# Patient Record
Sex: FEMALE | Race: WHITE | Employment: OTHER | URBAN - METROPOLITAN AREA
[De-identification: names, ages, dates, MRNs, and addresses within clinical notes are randomized per-mention and may not be internally consistent; named-entity substitution may affect disease eponyms.]

---

## 2017-01-17 PROBLEM — R09.81 CONGESTION OF NASAL SINUS: Status: ACTIVE | Noted: 2017-01-17

## 2017-01-17 PROBLEM — J30.89 PERENNIAL ALLERGIC RHINITIS: Status: ACTIVE | Noted: 2017-01-17

## 2017-06-20 PROBLEM — R09.81 CONGESTION OF NASAL SINUS: Status: RESOLVED | Noted: 2017-01-17 | Resolved: 2017-06-20

## 2018-02-26 PROBLEM — J31.0 NONALLERGIC RHINITIS: Status: ACTIVE | Noted: 2018-02-26

## 2018-06-13 ENCOUNTER — HOSPITAL ENCOUNTER (OUTPATIENT)
Dept: MAMMOGRAPHY | Age: 73
Discharge: HOME | End: 2018-06-13
Attending: INTERNAL MEDICINE
Payer: MEDICARE

## 2018-06-13 DIAGNOSIS — Z12.31 VISIT FOR SCREENING MAMMOGRAM: ICD-10-CM

## 2018-06-13 PROCEDURE — 77063 BREAST TOMOSYNTHESIS BI: CPT

## 2018-08-12 ENCOUNTER — APPOINTMENT (OUTPATIENT)
Dept: CT IMAGING | Age: 73
End: 2018-08-12
Attending: EMERGENCY MEDICINE
Payer: MEDICARE

## 2018-08-12 ENCOUNTER — HOSPITAL ENCOUNTER (EMERGENCY)
Age: 73
Discharge: HOME | End: 2018-08-12
Attending: EMERGENCY MEDICINE
Payer: MEDICARE

## 2018-08-12 VITALS
DIASTOLIC BLOOD PRESSURE: 82 MMHG | OXYGEN SATURATION: 100 % | HEIGHT: 62 IN | HEART RATE: 66 BPM | WEIGHT: 125 LBS | RESPIRATION RATE: 16 BRPM | TEMPERATURE: 98 F | BODY MASS INDEX: 23 KG/M2 | SYSTOLIC BLOOD PRESSURE: 142 MMHG

## 2018-08-12 DIAGNOSIS — R20.0 NUMBNESS: Primary | ICD-10-CM

## 2018-08-12 LAB
ALBUMIN SERPL-MCNC: 3.6 G/DL (ref 3.2–4.6)
ALBUMIN/GLOB SERPL: 1.1 {RATIO} (ref 1.2–3.5)
ALP SERPL-CCNC: 78 U/L (ref 50–136)
ALT SERPL-CCNC: 25 U/L (ref 12–65)
ANION GAP SERPL CALC-SCNC: 6 MMOL/L (ref 7–16)
AST SERPL-CCNC: 18 U/L (ref 15–37)
ATRIAL RATE: 57 BPM
BASOPHILS # BLD: 0.1 K/UL
BASOPHILS NFR BLD: 1 % (ref 0–2)
BILIRUB SERPL-MCNC: 0.2 MG/DL (ref 0.2–1.1)
BUN SERPL-MCNC: 14 MG/DL (ref 8–23)
CALCIUM SERPL-MCNC: 8.9 MG/DL (ref 8.3–10.4)
CALCULATED P AXIS, ECG09: 45 DEGREES
CALCULATED R AXIS, ECG10: 97 DEGREES
CALCULATED T AXIS, ECG11: 60 DEGREES
CHLORIDE SERPL-SCNC: 103 MMOL/L (ref 98–107)
CO2 SERPL-SCNC: 31 MMOL/L (ref 21–32)
CREAT SERPL-MCNC: 0.84 MG/DL (ref 0.6–1)
DIAGNOSIS, 93000: NORMAL
DIFFERENTIAL METHOD BLD: ABNORMAL
EOSINOPHIL # BLD: 0.5 K/UL
EOSINOPHIL NFR BLD: 6 % (ref 0.5–7.8)
ERYTHROCYTE [DISTWIDTH] IN BLOOD BY AUTOMATED COUNT: 12 %
GLOBULIN SER CALC-MCNC: 3.4 G/DL (ref 2.3–3.5)
GLUCOSE SERPL-MCNC: 121 MG/DL (ref 65–100)
HCT VFR BLD AUTO: 40.4 % (ref 35.8–46.3)
HGB BLD-MCNC: 13.3 G/DL (ref 11.7–15.4)
IMM GRANULOCYTES # BLD: 0 K/UL
IMM GRANULOCYTES NFR BLD AUTO: 0 % (ref 0–5)
INR PPP: 1
LYMPHOCYTES # BLD: 1.7 K/UL
LYMPHOCYTES NFR BLD: 23 % (ref 13–44)
MCH RBC QN AUTO: 31.5 PG (ref 26.1–32.9)
MCHC RBC AUTO-ENTMCNC: 32.9 G/DL (ref 31.4–35)
MCV RBC AUTO: 95.7 FL (ref 79.6–97.8)
MONOCYTES # BLD: 0.8 K/UL
MONOCYTES NFR BLD: 10 % (ref 4–12)
NEUTS SEG # BLD: 4.4 K/UL
NEUTS SEG NFR BLD: 60 % (ref 43–78)
NRBC # BLD: 0 K/UL (ref 0–0.2)
P-R INTERVAL, ECG05: 174 MS
PLATELET # BLD AUTO: 278 K/UL (ref 150–450)
PMV BLD AUTO: 8.7 FL (ref 9.4–12.3)
POTASSIUM SERPL-SCNC: 4 MMOL/L (ref 3.5–5.1)
PROT SERPL-MCNC: 7 G/DL (ref 6.3–8.2)
PROTHROMBIN TIME: 13.2 SEC (ref 11.5–14.5)
Q-T INTERVAL, ECG07: 426 MS
QRS DURATION, ECG06: 122 MS
QTC CALCULATION (BEZET), ECG08: 414 MS
RBC # BLD AUTO: 4.22 M/UL (ref 4.05–5.2)
SODIUM SERPL-SCNC: 140 MMOL/L (ref 136–145)
VENTRICULAR RATE, ECG03: 57 BPM
WBC # BLD AUTO: 7.4 K/UL (ref 4.3–11.1)

## 2018-08-12 PROCEDURE — 99285 EMERGENCY DEPT VISIT HI MDM: CPT | Performed by: EMERGENCY MEDICINE

## 2018-08-12 PROCEDURE — 81003 URINALYSIS AUTO W/O SCOPE: CPT | Performed by: EMERGENCY MEDICINE

## 2018-08-12 PROCEDURE — 85610 PROTHROMBIN TIME: CPT

## 2018-08-12 PROCEDURE — 85025 COMPLETE CBC W/AUTO DIFF WBC: CPT

## 2018-08-12 PROCEDURE — 80053 COMPREHEN METABOLIC PANEL: CPT

## 2018-08-12 PROCEDURE — 93005 ELECTROCARDIOGRAM TRACING: CPT | Performed by: EMERGENCY MEDICINE

## 2018-08-12 PROCEDURE — 70450 CT HEAD/BRAIN W/O DYE: CPT

## 2018-08-12 NOTE — ED NOTES
I have reviewed discharge instructions with the patient. The patient verbalized understanding. Patient left ED via Discharge Method: ambulatory to Home with spouse. Opportunity for questions and clarification provided. Patient given 0 scripts. To continue your aftercare when you leave the hospital, you may receive an automated call from our care team to check in on how you are doing. This is a free service and part of our promise to provide the best care and service to meet your aftercare needs.  If you have questions, or wish to unsubscribe from this service please call [phone]. Thank you for Choosing our [facility].

## 2018-08-12 NOTE — DISCHARGE INSTRUCTIONS
Return with any fevers, vomiting, worsening symptoms, or additional concerns. As we discussed, I encourage you to stop drinking alcohol. Follow-up with your primary care doctor in 2 or 3 days for reevaluation.

## 2018-08-12 NOTE — ED TRIAGE NOTES
Pt in states right arm numbness and tingling starting at 1500 states right side facial tingling also. States arm numbness resolved on the way to the hospital. No facial droop, weakness noted. Dr. Andrew Pappas at bedside to evaluate pt.

## 2018-08-13 NOTE — ED PROVIDER NOTES
HPI Comments: 80-year-old lady presents with concerns about some right-sided numbness that has been present for most of the day. Patient does have some dementia so getting a direct history from her is a little bit difficult. Her  reports that she ssaid her right arm felt different. She was unable to give any other specific clarification. She said she has not had any weakness or numbness and she has had no speech difficulty. additionally, the patient does drink daily. Elements of this note were created using speech recognition software. As such, errors of speech recognition may be present. Patient is a 67 y.o. female presenting with numbness. The history is provided by the patient. Numbness   Pertinent negatives include no shortness of breath, no chest pain, no vomiting, no confusion, no headaches and no nausea.         Past Medical History:   Diagnosis Date    Alcohol abuse, daily use 11/21/2014    Depression 11/21/2014    Lumbar stenosis 7/4/2012    Mild cognitive impairment 11/21/2014    Osteopenia 3/19/2015    Spondylolisthesis of lumbar region 7/4/2012    Vitamin D deficiency 11/21/2014       Past Surgical History:   Procedure Laterality Date    HX BACK SURGERY  5/31/12    L3-L4 Metrix TLIF with sextant/bilateral decompression - Dr. Glez Forward HX BREAST LUMPECTOMY Bilateral     HX HEENT Bilateral 2015    sinus    HX HYSTERECTOMY      complete    HX OTHER SURGICAL      sinuses x2    HX TONSILLECTOMY      HX WRIST FRACTURE [place]           Family History:   Problem Relation Age of Onset   Clove.Goldberg Stroke Mother     Heart Disease Mother     Arthritis-osteo Mother     Heart Attack Mother     Stroke Father     Dementia Father     Alzheimer Father 48    Hypertension Sister     Heart Disease Sister      stent and AVR    Coronary Artery Disease Maternal Uncle     Breast Cancer Neg Hx        Social History     Social History    Marital status:      Spouse name: [facility] Valerie Number of children: 2    Years of education: 1 post HS     Occupational History     Retired     Social History Main Topics    Smoking status: Former Smoker     Quit date: 1/21/1972    Smokeless tobacco: Never Used    Alcohol use 7.0 oz/week     7 Glasses of wine, 7 Shots of liquor per week    Drug use: No    Sexual activity: Not on file     Other Topics Concern    Not on file     Social History Narrative         ALLERGIES: Levofloxacin; Poison oak extract; Bupropion; Pcn [penicillins]; Shellfish containing products; Singulair [montelukast]; Sulfamethoxazole; and Wellbutrin [bupropion hcl]    Review of Systems   Constitutional: Negative for chills, diaphoresis and fever. HENT: Negative for congestion, rhinorrhea and sore throat. Eyes: Negative for redness and visual disturbance. Respiratory: Negative for cough, chest tightness, shortness of breath and wheezing. Cardiovascular: Negative for chest pain and palpitations. Gastrointestinal: Negative for abdominal pain, blood in stool, diarrhea, nausea and vomiting. Endocrine: Negative for polydipsia and polyuria. Genitourinary: Negative for dysuria and hematuria. Musculoskeletal: Negative for arthralgias, myalgias and neck stiffness. Skin: Negative for rash. Allergic/Immunologic: Negative for environmental allergies and food allergies. Neurological: Positive for numbness. Negative for dizziness, weakness and headaches. Hematological: Negative for adenopathy. Does not bruise/bleed easily. Psychiatric/Behavioral: Negative for confusion and sleep disturbance. The patient is not nervous/anxious. Vitals:    08/12/18 1752 08/12/18 1800 08/12/18 1900 08/12/18 1909   BP: 142/79 131/75 142/82 142/82   Pulse: 62 65 66 66   Resp: 16   16   Temp:       SpO2: 95% 95% 100% 100%   Weight:       Height:                Physical Exam   Constitutional: She is oriented to person, place, and time. She appears well-developed and well-nourished.    HENT: Head: Normocephalic and atraumatic. Eyes: Conjunctivae and EOM are normal. Pupils are equal, round, and reactive to light. Neck: Normal range of motion. Cardiovascular: Normal rate and regular rhythm. Pulmonary/Chest: Effort normal and breath sounds normal. No respiratory distress. She has no wheezes. She has no rales. She exhibits no tenderness. Abdominal: Soft. Bowel sounds are normal. There is no rebound and no guarding. Musculoskeletal: Normal range of motion. She exhibits no edema or tenderness. Lymphadenopathy:     She has no cervical adenopathy. Neurological: She is alert and oriented to person, place, and time. Patient is a mystery emerged without difficulty. I could not find any objective sensory deficits. She had normal reflexes and normal sharp dull sensation. She had normal vibration sensation. Skin: Skin is warm and dry. Psychiatric: She has a normal mood and affect. Nursing note and vitals reviewed. MDM  Number of Diagnoses or Management Options  Numbness:   Diagnosis management comments: CT scan of blood work is unremarkable. I will plan to discharge her home.         ED Course       Procedures

## 2018-09-04 PROBLEM — K21.00 GASTROESOPHAGEAL REFLUX DISEASE WITH ESOPHAGITIS: Status: ACTIVE | Noted: 2018-09-04

## 2018-09-04 PROBLEM — Z00.00 ROUTINE GENERAL MEDICAL EXAMINATION AT A HEALTH CARE FACILITY: Status: ACTIVE | Noted: 2018-09-04

## 2018-09-04 PROBLEM — M70.61 TROCHANTERIC BURSITIS OF RIGHT HIP: Status: ACTIVE | Noted: 2018-09-04

## 2018-09-04 PROBLEM — E03.9 ACQUIRED HYPOTHYROIDISM: Status: ACTIVE | Noted: 2018-09-04

## 2018-09-04 PROBLEM — R73.9 HYPERGLYCEMIA: Status: ACTIVE | Noted: 2018-09-04

## 2019-05-10 PROBLEM — R45.1 AGITATION: Status: ACTIVE | Noted: 2019-05-10

## 2019-05-10 PROBLEM — G30.1 DEMENTIA OF THE ALZHEIMER'S TYPE, WITH LATE ONSET, UNCOMPLICATED (HCC): Status: ACTIVE | Noted: 2019-05-10

## 2019-05-10 PROBLEM — F02.80 DEMENTIA OF THE ALZHEIMER'S TYPE, WITH LATE ONSET, UNCOMPLICATED (HCC): Status: ACTIVE | Noted: 2019-05-10

## 2019-10-03 PROBLEM — Z00.00 ROUTINE GENERAL MEDICAL EXAMINATION AT A HEALTH CARE FACILITY: Status: RESOLVED | Noted: 2018-09-04 | Resolved: 2019-10-03

## 2019-10-10 PROBLEM — E78.00 PURE HYPERCHOLESTEROLEMIA: Status: ACTIVE | Noted: 2019-10-10

## 2019-10-10 PROBLEM — R73.09 ABNORMAL GLUCOSE: Status: ACTIVE | Noted: 2019-10-10

## 2019-12-16 PROBLEM — M19.041 PRIMARY OSTEOARTHRITIS OF BOTH HANDS: Status: ACTIVE | Noted: 2019-12-16

## 2019-12-16 PROBLEM — M19.042 PRIMARY OSTEOARTHRITIS OF BOTH HANDS: Status: ACTIVE | Noted: 2019-12-16

## 2020-01-21 ENCOUNTER — HOSPITAL ENCOUNTER (INPATIENT)
Age: 75
LOS: 1 days | Discharge: HOME | DRG: 087 | End: 2020-01-23
Attending: EMERGENCY MEDICINE | Admitting: HOSPITALIST
Payer: MEDICARE

## 2020-01-21 ENCOUNTER — APPOINTMENT (OUTPATIENT)
Dept: CT IMAGING | Age: 75
DRG: 087 | End: 2020-01-21
Attending: EMERGENCY MEDICINE
Payer: MEDICARE

## 2020-01-21 ENCOUNTER — APPOINTMENT (OUTPATIENT)
Dept: GENERAL RADIOLOGY | Age: 75
DRG: 087 | End: 2020-01-21
Attending: EMERGENCY MEDICINE
Payer: MEDICARE
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
## 2020-01-21 DIAGNOSIS — S06.5XAA SUBDURAL HEMATOMA: ICD-10-CM

## 2020-01-21 DIAGNOSIS — S01.511A LACERATION OF LIP, INITIAL ENCOUNTER: ICD-10-CM

## 2020-01-21 DIAGNOSIS — S01.119A: ICD-10-CM

## 2020-01-21 PROBLEM — I61.9 INTRACEREBRAL BLEED (HCC): Status: ACTIVE | Noted: 2020-01-21

## 2020-01-21 PROBLEM — I60.9 SUBARACHNOID HEMORRHAGE (HCC): Status: ACTIVE | Noted: 2020-01-21

## 2020-01-21 PROBLEM — S06.36AA INTRACEREBRAL BLEED DUE TO TRAUMA: Status: ACTIVE | Noted: 2020-01-21

## 2020-01-21 PROBLEM — E86.0 DEHYDRATION: Status: ACTIVE | Noted: 2020-01-21

## 2020-01-21 PROCEDURE — 70486 CT MAXILLOFACIAL W/O DYE: CPT

## 2020-01-21 PROCEDURE — 99218 HC RM OBSERVATION: CPT

## 2020-01-21 PROCEDURE — 74011250636 HC RX REV CODE- 250/636: Performed by: HOSPITALIST

## 2020-01-21 PROCEDURE — 0HQ1XZZ REPAIR FACE SKIN, EXTERNAL APPROACH: ICD-10-PCS | Performed by: EMERGENCY MEDICINE

## 2020-01-21 PROCEDURE — 73130 X-RAY EXAM OF HAND: CPT

## 2020-01-21 PROCEDURE — 75810000293 HC SIMP/SUPERF WND  RPR

## 2020-01-21 PROCEDURE — 0CQ0XZZ REPAIR UPPER LIP, EXTERNAL APPROACH: ICD-10-PCS | Performed by: EMERGENCY MEDICINE

## 2020-01-21 PROCEDURE — 74011000250 HC RX REV CODE- 250: Performed by: EMERGENCY MEDICINE

## 2020-01-21 PROCEDURE — 99284 EMERGENCY DEPT VISIT MOD MDM: CPT

## 2020-01-21 PROCEDURE — 70450 CT HEAD/BRAIN W/O DYE: CPT

## 2020-01-21 PROCEDURE — 72125 CT NECK SPINE W/O DYE: CPT

## 2020-01-21 RX ORDER — IBUPROFEN 200 MG
1 TABLET ORAL
Status: DISCONTINUED | OUTPATIENT
Start: 2020-01-21 | End: 2020-01-23 | Stop reason: HOSPADM

## 2020-01-21 RX ORDER — SODIUM CHLORIDE 0.9 % (FLUSH) 0.9 %
5-40 SYRINGE (ML) INJECTION EVERY 8 HOURS
Status: DISCONTINUED | OUTPATIENT
Start: 2020-01-21 | End: 2020-01-22

## 2020-01-21 RX ORDER — SODIUM CHLORIDE 0.9 % (FLUSH) 0.9 %
5-40 SYRINGE (ML) INJECTION AS NEEDED
Status: DISCONTINUED | OUTPATIENT
Start: 2020-01-21 | End: 2020-01-23 | Stop reason: HOSPADM

## 2020-01-21 RX ORDER — ONDANSETRON 2 MG/ML
4 INJECTION INTRAMUSCULAR; INTRAVENOUS
Status: DISCONTINUED | OUTPATIENT
Start: 2020-01-21 | End: 2020-01-23 | Stop reason: HOSPADM

## 2020-01-21 RX ORDER — MEMANTINE HYDROCHLORIDE 5 MG/1
15 TABLET ORAL DAILY
Status: DISCONTINUED | OUTPATIENT
Start: 2020-01-22 | End: 2020-01-23 | Stop reason: HOSPADM

## 2020-01-21 RX ORDER — DONEPEZIL HYDROCHLORIDE 5 MG/1
5 TABLET, FILM COATED ORAL DAILY
Status: DISCONTINUED | OUTPATIENT
Start: 2020-01-22 | End: 2020-01-23 | Stop reason: HOSPADM

## 2020-01-21 RX ORDER — ACETAMINOPHEN 325 MG/1
650 TABLET ORAL
Status: DISCONTINUED | OUTPATIENT
Start: 2020-01-21 | End: 2020-01-23 | Stop reason: HOSPADM

## 2020-01-21 RX ORDER — METOPROLOL SUCCINATE 25 MG/1
25 TABLET, EXTENDED RELEASE ORAL DAILY
Status: DISCONTINUED | OUTPATIENT
Start: 2020-01-22 | End: 2020-01-23 | Stop reason: HOSPADM

## 2020-01-21 RX ORDER — HYDRALAZINE HYDROCHLORIDE 20 MG/ML
10 INJECTION INTRAMUSCULAR; INTRAVENOUS
Status: DISCONTINUED | OUTPATIENT
Start: 2020-01-21 | End: 2020-01-23 | Stop reason: HOSPADM

## 2020-01-21 RX ORDER — ADHESIVE BANDAGE
30 BANDAGE TOPICAL DAILY PRN
Status: DISCONTINUED | OUTPATIENT
Start: 2020-01-21 | End: 2020-01-23 | Stop reason: HOSPADM

## 2020-01-21 RX ORDER — NALOXONE HYDROCHLORIDE 0.4 MG/ML
0.4 INJECTION, SOLUTION INTRAMUSCULAR; INTRAVENOUS; SUBCUTANEOUS AS NEEDED
Status: DISCONTINUED | OUTPATIENT
Start: 2020-01-21 | End: 2020-01-23 | Stop reason: HOSPADM

## 2020-01-21 RX ORDER — DIPHENHYDRAMINE HCL 25 MG
25 CAPSULE ORAL
Status: DISCONTINUED | OUTPATIENT
Start: 2020-01-21 | End: 2020-01-23 | Stop reason: HOSPADM

## 2020-01-21 RX ORDER — HYDROCODONE BITARTRATE AND ACETAMINOPHEN 7.5; 325 MG/1; MG/1
1 TABLET ORAL
Status: DISCONTINUED | OUTPATIENT
Start: 2020-01-21 | End: 2020-01-23 | Stop reason: HOSPADM

## 2020-01-21 RX ORDER — MORPHINE SULFATE 2 MG/ML
2 INJECTION, SOLUTION INTRAMUSCULAR; INTRAVENOUS
Status: DISCONTINUED | OUTPATIENT
Start: 2020-01-21 | End: 2020-01-23 | Stop reason: HOSPADM

## 2020-01-21 RX ORDER — SODIUM CHLORIDE 9 MG/ML
50 INJECTION, SOLUTION INTRAVENOUS CONTINUOUS
Status: DISCONTINUED | OUTPATIENT
Start: 2020-01-21 | End: 2020-01-22

## 2020-01-21 RX ORDER — CLONIDINE HYDROCHLORIDE 0.1 MG/1
0.2 TABLET ORAL
Status: DISCONTINUED | OUTPATIENT
Start: 2020-01-21 | End: 2020-01-23 | Stop reason: HOSPADM

## 2020-01-21 RX ADMIN — Medication 5 ML: at 16:15

## 2020-01-21 RX ADMIN — SODIUM CHLORIDE 50 ML/HR: 900 INJECTION, SOLUTION INTRAVENOUS at 19:12

## 2020-01-21 RX ADMIN — Medication 10 ML: at 22:00

## 2020-01-21 NOTE — ED PROVIDER NOTES
66-year-old female was walking the dog with her daughter when she tripped over an uneven concrete edge and fell forward. Struck her face on the concrete. Was dazed but no loss of consciousness. No vomiting or ataxia or lethargy. Complains of pain in her right hand as well as facial injuries. No neck pain no chest pain no abdominal pain no focal numbness or weakness. No pain in her hips. Does have some issues with memory due to dementia. The history is provided by the patient and the spouse. The history is limited by the condition of the patient. Fall   The accident occurred less than 1 hour ago. The fall occurred while standing. She fell from a height of ground level. She landed on concrete. The volume of blood lost was minimal. The point of impact was the head. The pain is present in the head. The pain is mild. She was ambulatory at the scene. Associated symptoms include headaches and laceration. Pertinent negatives include no numbness, no abdominal pain, no nausea, no vomiting, no extremity weakness and no loss of consciousness. The patient's last tetanus shot was 5 to 10 years ago.        Past Medical History:   Diagnosis Date    Alcohol abuse, daily use 11/21/2014    Depression 11/21/2014    Lumbar stenosis 7/4/2012    Osteopenia 3/19/2015    Spondylolisthesis of lumbar region 7/4/2012    Vitamin D deficiency 11/21/2014       Past Surgical History:   Procedure Laterality Date    HX BACK SURGERY  5/31/12    L3-L4 Metrix TLIF with sextant/bilateral decompression - Dr. Landon Castanon HX BREAST LUMPECTOMY Bilateral     HX HEENT Bilateral 2015    sinus    HX HYSTERECTOMY      complete    HX OTHER SURGICAL      sinuses x2    HX TONSILLECTOMY      HX WRIST FRACTURE TX           Family History:   Problem Relation Age of Onset    Stroke Mother     Heart Disease Mother     Arthritis-osteo Mother     Heart Attack Mother     Stroke Father     Dementia Father     Alzheimer Father 48    Hypertension Sister     Heart Disease Sister         stent and AVR    Coronary Artery Disease Maternal Uncle     Breast Cancer Neg Hx        Social History     Socioeconomic History    Marital status:      Spouse name: Scott Berkowitz Number of children: 2    Years of education: 1 post HS    Highest education level: Not on file   Occupational History    Occupation:      Employer: RETIRED   Social Needs    Financial resource strain: Not on file    Food insecurity:     Worry: Not on file     Inability: Not on file   4tiitoo needs:     Medical: Not on file     Non-medical: Not on file   Tobacco Use    Smoking status: Former Smoker     Packs/day: 1.00     Years: 6.00     Pack years: 6.00     Last attempt to quit: 1972     Years since quittin.0    Smokeless tobacco: Never Used   Substance and Sexual Activity    Alcohol use:  Yes     Alcohol/week: 14.0 standard drinks     Types: 14 Shots of liquor per week    Drug use: No    Sexual activity: Not on file   Lifestyle    Physical activity:     Days per week: Not on file     Minutes per session: Not on file    Stress: Not on file   Relationships    Social connections:     Talks on phone: Not on file     Gets together: Not on file     Attends Mosque service: Not on file     Active member of club or organization: Not on file     Attends meetings of clubs or organizations: Not on file     Relationship status: Not on file    Intimate partner violence:     Fear of current or ex partner: Not on file     Emotionally abused: Not on file     Physically abused: Not on file     Forced sexual activity: Not on file   Other Topics Concern     Service Not Asked    Blood Transfusions Not Asked    Caffeine Concern Not Asked    Occupational Exposure Not Asked   Lonita Ely Hazards Not Asked    Sleep Concern Not Asked    Stress Concern Not Asked    Weight Concern Not Asked    Special Diet Not Asked    Back Care Not Asked    Exercise No Comment: tries to walk to her daughters house across busy intersection    Bike Helmet Not Asked   [address] Not Asked    Self-Exams Not Asked   Social History Narrative    , not active, gets up around 12 noon and then does things in the afternoon if a friend comes over. Unable to watch TV as cant figure out the controls. ALLERGIES: Levofloxacin; Poison oak extract; Bupropion; Pcn [penicillins]; Shellfish containing products; Singulair [montelukast]; Sulfamethoxazole; and Wellbutrin [bupropion hcl]    Review of Systems   Eyes: Negative for visual disturbance. Respiratory: Negative for shortness of breath. Cardiovascular: Negative for chest pain. Gastrointestinal: Negative for abdominal pain, nausea and vomiting. Musculoskeletal: Negative for back pain, extremity weakness and neck pain. Neurological: Positive for headaches. Negative for loss of consciousness, weakness and numbness. Vitals:    01/21/20 1557   BP: 129/79   Pulse: (!) 56   Resp: 14   Temp: 98 °F (36.7 °C)   SpO2: 97%   Weight: 56.7 kg (125 lb)            Physical Exam  Vitals signs and nursing note reviewed. Constitutional:       General: She is not in acute distress. Appearance: She is well-developed. HENT:      Head: Normocephalic. Mouth/Throat:     Eyes:      Pupils: Pupils are equal, round, and reactive to light. Neck:      Musculoskeletal: Normal range of motion and neck supple. No muscular tenderness. Cardiovascular:      Rate and Rhythm: Normal rate and regular rhythm. Heart sounds: Normal heart sounds. Pulmonary:      Effort: Pulmonary effort is normal.      Breath sounds: Normal breath sounds. Abdominal:      General: There is no distension. Palpations: Abdomen is soft. Tenderness: There is no tenderness. Musculoskeletal:      Right hip: Normal.      Left hip: Normal.        Hands:    Skin:     Findings: Laceration present.    Neurological:      Mental Status: She is alert.      GCS: GCS eye subscore is 4. GCS verbal subscore is 4. GCS motor subscore is 6. Comments: Speech normal  No focal weakness. Ambulatory. MDM  Number of Diagnoses or Management Options  Diagnosis management comments: Cranial and facial imaging. Imaging right hand. No evidence for syncope. Amount and/or Complexity of Data Reviewed  Tests in the radiology section of CPT®: reviewed and ordered  Obtain history from someone other than the patient: yes ()  Independent visualization of images, tracings, or specimens: yes    Risk of Complications, Morbidity, and/or Mortality  Presenting problems: moderate  Diagnostic procedures: low  Management options: moderate    Patient Progress  Patient progress: improved         Wound Repair  Date/Time: 1/21/2020 4:39 PM  Location details: lip  Wound length:2.5 cm or less  Anesthesia: local infiltration    Anesthesia:  Local Anesthetic: lidocaine 1% without epinephrine  Foreign bodies: no foreign bodies  Irrigation solution: saline  Debridement: none  Skin closure: gut  Number of sutures: 2  Technique: simple  Approximation: close  Patient tolerance: Patient tolerated the procedure well with no immediate complications  My total time at bedside, performing this procedure was 1-15 minutes. Comments: Laceration of lip does not cross vermilion border. 3 sutures 5-0 fast-absorbing gut placed. Good approximation of the edges. Wound Repair  Date/Time: 1/21/2020 5:35 PM  Location details: face  Wound length:2.5 cm or less  Anesthesia: local infiltration    Anesthesia:  Local Anesthetic: lidocaine 1% without epinephrine  Foreign bodies: no foreign bodies  Irrigation solution: saline  Debridement: none  Skin closure: gut  Technique: simple  Approximation: close  Patient tolerance: Patient tolerated the procedure well with no immediate complications  My total time at bedside, performing this procedure was 1-15 minutes.   Comments: Wound cleaned with saline. 2 small sutures of 5-0 fast-absorbing gut placed. Mertie Dibbles Hand Rt Min 3 V    Result Date: 1/21/2020  Right hand INDICATION: Fall. Pain. Swelling. COMPARISON: None TECHNIQUE: PA, lateral, oblique views of the right hand were obtained. FINDINGS: Bones are osteopenic. There is no acute fracture or dislocation. Alignment is maintained. There is joint space narrowing at the interphalangeal joints, consistent with osteoarthritis. There is soft tissue swelling at the dorsum of the hand. There are postsurgical changes of the distal radius. IMPRESSION: 1. Soft tissue swelling. No acute fracture is seen. 2. Osteoarthritis. Osteopenia. Ct Head Wo Cont    Result Date: 1/21/2020  Noncontrast CT of the brain. COMPARISON: August 12, 2018 INDICATION: Fall, head trauma. Change in mental status TECHNIQUE: Contiguous axial images were obtained from the skull base through the vertex without IV contrast. Radiation dose reduction techniques were used for this study:  Our CT scanners use one or all of the following: Automated exposure control, adjustment of the mA and/or kVp according to patient's size, iterative reconstruction. FINDINGS: There is tiny acute subdural hemorrhage along the anterior falx (series 2, image 19). There is small volume of subarachnoid hemorrhage in the right frontal lobe, near the vertex. There is no midline shift, mass effect or hydrocephalus. Periventricular hypodensities, nonspecific and likely due to chronic small vessel changes. There is right periorbital/forehead soft tissue swelling. There is no skull fracture. IMPRESSION: 1. Small volume subarachnoid hemorrhage in the right frontal lobe, near the vertex. Tiny acute subdural hemorrhage along the anterior falx. No mass effect or midline shift. Short-term follow-up is recommended. [address] [place] [address] Report telephoned to ED physician, Dr. Mando Cheatham, at 5:07 PM on exam date. Discussed with neurosurgery. No intervention needed.   Can be safely observed. Discussed with hospitalist regarding admission. Neurosurgery did recommend CT of the neck and that is ordered. 5:34 PM  Laceration above right IN right eyebrow continues to ooze. Less than a centimeter in length. 2 small absorbable sutures placed.   C procedure note

## 2020-01-21 NOTE — ED TRIAGE NOTES
Pt to ED with family c/o falling on concrete while walking the dog. No LOC but patient was stunned. Laceration to right eye brow. Abrasions to face and bilateral hands. Laceration to right index finger that may need stitches. Bruising and swelling noted to right hand. Pt takes 81mg asa daily. No bloodthinners.

## 2020-01-22 PROCEDURE — 65270000029 HC RM PRIVATE

## 2020-01-22 PROCEDURE — 97161 PT EVAL LOW COMPLEX 20 MIN: CPT

## 2020-01-22 PROCEDURE — 99218 HC RM OBSERVATION: CPT

## 2020-01-22 PROCEDURE — 74011250637 HC RX REV CODE- 250/637: Performed by: HOSPITALIST

## 2020-01-22 PROCEDURE — 65610000001 HC ROOM ICU GENERAL

## 2020-01-22 PROCEDURE — 97116 GAIT TRAINING THERAPY: CPT

## 2020-01-22 RX ADMIN — DONEPEZIL HYDROCHLORIDE 5 MG: 5 TABLET, FILM COATED ORAL at 08:57

## 2020-01-22 RX ADMIN — Medication 10 ML: at 14:00

## 2020-01-22 RX ADMIN — ACETAMINOPHEN 650 MG: 325 TABLET, FILM COATED ORAL at 09:41

## 2020-01-22 RX ADMIN — MEMANTINE 15 MG: 5 TABLET ORAL at 08:57

## 2020-01-22 RX ADMIN — METOPROLOL SUCCINATE 25 MG: 25 TABLET, EXTENDED RELEASE ORAL at 08:57

## 2020-01-22 RX ADMIN — DULOXETINE HYDROCHLORIDE 90 MG: 60 CAPSULE, DELAYED RELEASE ORAL at 08:57

## 2020-01-22 RX ADMIN — ACETAMINOPHEN 650 MG: 325 TABLET, FILM COATED ORAL at 20:06

## 2020-01-22 NOTE — PROGRESS NOTES
OT note: attempted to see patient she is currently eating. Will check back at another time.  Thank you for this referral.Vidhya Hall OTR/L

## 2020-01-22 NOTE — H&P
INTERNAL MEDICINE H&P/CONSULT    Subjective:     79-year-old female was walking the dog with her daughter when she tripped over an uneven concrete edge and fell forward. Struck her face on the concrete. Was dazed but no loss of consciousness. No vomiting or ataxia or lethargy. Complains of pain in her right hand as well as facial injuries. No neck pain no chest pain no abdominal pain no focal numbness or weakness. No pain in her hips. Does have some issues with memory due to dementia. Further questioning, with  at bedside, the pt did not pas out. She drinks only 2-3 oz daily of whiskey and not alcoholic. No hx of frequent falls. Past Medical History:   Diagnosis Date    Alcohol abuse, daily use 11/21/2014    Depression 11/21/2014    Lumbar stenosis 7/4/2012    Osteopenia 3/19/2015    Spondylolisthesis of lumbar region 7/4/2012    Vitamin D deficiency 11/21/2014      Past Surgical History:   Procedure Laterality Date    HX BACK SURGERY  5/31/12    L3-L4 Metrix TLIF with sextant/bilateral decompression - Dr. Elliott Arms HX BREAST LUMPECTOMY Bilateral     HX HEENT Bilateral 2015    sinus    HX HYSTERECTOMY      complete    HX OTHER SURGICAL      sinuses x2    HX TONSILLECTOMY      HX WRIST FRACTURE TX        Prior to Admission medications    Medication Sig Start Date End Date Taking? Authorizing Provider   metoprolol succinate (TOPROL-XL) 50 mg XL tablet TAKE ONE TABLET DAILY FOR BLOOD PRESSURE 1/14/20  Yes Florence Michael MD   donepezil (ARICEPT) 10 mg tablet TAKE ONE TABLET AT BEDTIME. 12/11/19  Yes Florence Michael MD   omeprazole (PRILOSEC) 40 mg capsule TAKE 1 CAPSULE EACH DAY. 12/2/19  Yes Richard Be MD   DULoxetine (CYMBALTA) 60 mg capsule TAKE 2 CAPSULES DAILY. Patient taking differently: 180 mg. 10/21/19  Yes Florence Michael MD   lisinopril (PRINIVIL, ZESTRIL) 40 mg tablet [id number]  Patient taking differently: Take 40 mg by mouth daily.  8/25/19  Yes Kristin Velásquez Gokul Roy MD   memantine (NAMENDA) 5 mg tablet Take 15 mg by mouth daily. Taking two tablets in am and one tablet in evening 6/18/18  Yes Provider, Historical   aspirin delayed-release 81 mg tablet Take 81 mg by mouth daily. Yes Provider, Historical   azelastine (ASTELIN) 137 mcg (0.1 %) nasal spray 1 [place] by Both Nostrils route two (2) times a day. Use in each nostril as directed  Indications: Vasomotor Rhinitis  Patient taking differently: 1 Spray by Both Nostrils route two (2) times daily as needed. Use in each nostril as directed  Indications: Vasomotor Rhinitis 4/3/19   Saul Simmons MD   TURMERIC ROOT EXTRACT PO Take 1,500 mg by mouth daily. Provider, Historical   cetirizine (ZYRTEC) 10 mg tablet Take 10 mg by mouth daily. Provider, Historical   triamcinolone (NASACORT AQ) 55 mcg nasal inhaler 2 Sprays by Both Nostrils route daily. Patient taking differently: 2 Sprays by Both Nostrils route daily as needed. 12/12/16   Saul Simmons MD   Cholecalciferol, Vitamin D3, (VITAMIN D3) 1,000 unit cap Take 1,000 Units by mouth daily. Provider, Historical   multivits,Stress Formula-Zinc tablet Take 1 Tab by mouth daily. Provider, Historical   omega-3 fatty acids-vitamin e (FISH OIL) 1,000 mg Cap Take 1 Cap by mouth daily (after breakfast). Provider, Historical   GLUC CHRISTIANSEN/CHONDRO CHRISTIANSEN A/VIT C/MN (GLUCOSAMINE 1500 COMPLEX PO) Take 1 Tab by mouth daily (after breakfast).     Provider, Historical     Allergies   Allergen Reactions    Levofloxacin Unknown (comments)    Poison [place] Extract Anaphylaxis    Bupropion Unknown (comments)    Pcn [Penicillins] Shortness of Breath and Swelling    Shellfish Containing Products Hives    Singulair [Montelukast] Anxiety    Sulfamethoxazole Other (comments) and Hives     She thinks she does not do well with this  She thinks she does not do well with this  She thinks she does not do well with this    Wellbutrin [Bupropion Hcl] Other (comments)     Other anxiety      Social History     Tobacco Use    Smoking status: Former Smoker     Packs/day: 1.00     Years: 6.00     Pack years: 6.00     Last attempt to quit: 1972     Years since quittin.0    Smokeless tobacco: Never Used   Substance Use Topics    Alcohol use: Yes     Alcohol/week: 14.0 standard drinks     Types: 14 Shots of liquor per week        Family History:  HTN    Review of Systems   A comprehensive review of systems was negative except for that written in the HPI. Objective: Intake / Output:  No intake/output data recorded. No intake/output data recorded. Physical Exam:  Visit Vitals  /76   Pulse 69   Temp 98.5 °F (36.9 °C)   Resp 15   Wt 56.7 kg (125 lb)   SpO2 98%   BMI 24.01 kg/m²     General appearance: awake, alert, cooperative, moderate distress, appears stated age - Pale, No icterus, Dry mucous membranes, Disoriented. Head: Normocephalic, without obvious abnormality, L frontal blue contusion 4-6 cm  Back: symmetric, no curvature. ROM normal. No CVA tenderness. Lungs: clear to auscultation bilaterally -  Heart: regular rate and rhythm, S1, S2 normal, no murmur, click, rub or gallop. Abdomen: soft, no tenderness, no distension, normal bowel sound, no masses, no organomegaly  Extremities: atraumatic, no cyanosis - Bilateral lower limbs edema none. Skin: No rashes or ulceration. Neurologic: Grossly intact - Moves 4 limbs. DTR +2. [place]'s reflex negative. Romberg's and gait tests are deferred at this time. ECG: sinus rhythm     Data Review (Labs):   No results found for this or any previous visit (from the past 24 hour(s)).     Assessment:     1-  Intracerebral bleed due to trauma ([facility] [address] (2020), small SAH and SDH, R frontal, reviewed nu Neurosurgery, no surgery recommended    2- Fall   3- Dehydration   4- Dementia  5- HTN, controlled        Plan:     Observation  Neuro checks  Hold aspirin  PT/ OT consulted  PPD placed  IVF hydration  UA pending  Blood pressure control  AM lab as needed  Continue essential home medications. Patient is full code. Further management depends on patient progress. Thank you for the oppourtinity to contribute in the care of your patient. Time 20 minutes.     Signed By: Chandan Truong MD     January 21, 2020

## 2020-01-22 NOTE — ED NOTES
TRANSFER - OUT REPORT:    Verbal report given to Anders rn (name) on Glen Ramirez  being transferred to [address] [place], [place] [id number] [facility] (unit) for routine progression of care       Report consisted of patients Situation, Background, Assessment and   Recommendations(SBAR). Information from the following report(s) SBAR, ED Summary, [facility] and Recent Results was reviewed with the receiving nurse. Lines:   Peripheral IV 01/21/20 Left Antecubital (Active)   Site Assessment Clean, dry, & intact 1/21/2020  6:02 PM   Phlebitis Assessment 0 1/21/2020  6:02 PM   Infiltration Assessment 0 1/21/2020  6:02 PM   Dressing Status Clean, dry, & intact 1/21/2020  6:02 PM        Opportunity for questions and clarification was provided.       Patient transported with:

## 2020-01-22 NOTE — PROGRESS NOTES
Problem: Mobility Impaired (Adult and Pediatric)  Goal: *Acute Goals and Plan of Care (Insert Text)  Description  STG:  (1.)Ms. Jonny Santos will move from supine to sit and sit to supine  with INDEPENDENT within 1-2 treatment day(s). (2.)Ms. Jonny Santos will transfer from bed to chair and chair to bed with INDEPENDENT using the least restrictive device within 1-2 treatment day(s). (3.)Ms. Morejon will ambulate with CGA-INDEPENDENT for 135-250 feet with the least restrictive device within 1-2 treatment day(s). LTG:  (1.)Ms. Morejon will ambulate with SUPERVISION-INDEPENDENT for 250-350 feet with the least restrictive device within 1-2 treatment day(s). ________________________________________________________________________________________________     Outcome: Progressing Towards Goal      PHYSICAL THERAPY: Initial Assessment 1/22/2020  OBSERVATION:    [place] / Product Type: Medicare /       NAME/AGE/GENDER: Jordyn Clement is a 76 y.o. female   PRIMARY DIAGNOSIS: Subarachnoid hemorrhage ([facility] [address] [I60.9]  Intracerebral bleed ([facility] [address] [I61.9] Intracerebral bleed due to trauma [facility]) Intracerebral bleed due to trauma [facility])        ICD-10: Treatment Diagnosis:    · Difficulty in walking, Not elsewhere classified (R26.2)  · Other abnormalities of gait and mobility (R26.89)   Precaution/Allergies:  Levofloxacin; Poison oak extract; Bupropion; Pcn [penicillins]; Shellfish containing products; Singulair [montelukast]; Sulfamethoxazole; and Wellbutrin [bupropion hcl]      ASSESSMENT:     Ms. Jonny Santos presents with decreased independence with functional mobility. Pt performed supine to sit to stand. Pt ambulated in hallway. Pt in bedside chair with needs in reach. Pt's  present. This section established at most recent assessment   PROBLEM LIST (Impairments causing functional limitations):  1. Decreased Strength  2. Decreased Transfer Abilities  3.  Decreased Ambulation Ability/Technique  4. Decreased Balance  5. Increased Pain  6. Decreased Activity Tolerance  7. Decreased Flexibility/Joint Mobility   INTERVENTIONS PLANNED: (Benefits and precautions of physical therapy have been discussed with the patient.)  1. Bed Mobility  2. Gait Training  3. Therapeutic Activites  4. Therapeutic Exercise/Strengthening  5. Transfer Training     TREATMENT PLAN: Frequency/Duration: daily for duration of hospital stay  Rehabilitation Potential For Stated Goals: Good     REHAB RECOMMENDATIONS (at time of discharge pending progress):    Placement: It is my opinion, based on this patient's performance to date, that Ms. Rena Philip may benefit from participating in 1-2 additional therapy sessions in order to continue to assess for rehab potential and then make recommendation for disposition at discharge. Equipment:    None at this time              HISTORY:   History of Present Injury/Illness (Reason for Referral):  Pt admitted with above diagnosis. Past Medical History/Comorbidities:   Ms. Rena Philip  has a past medical history of Alcohol abuse, daily use (11/21/2014), Depression (11/21/2014), Lumbar stenosis (7/4/2012), Osteopenia (3/19/2015), Spondylolisthesis of lumbar region (7/4/2012), and Vitamin D deficiency (11/21/2014). She also has no past medical history of Aneurysm ([address] Coagulation disorder ([address] Difficult intubation, Heart failure ([address] Malignant hyperthermia due to anesthesia, Nausea & vomiting, Pseudocholinesterase deficiency, or Unspecified sleep apnea. Ms. Rena Philip  has a past surgical history that includes hx hysterectomy; hx breast lumpectomy (Bilateral); hx back surgery (5/31/12); hx other surgical; hx wrist fracture tx; hx tonsillectomy; and hx heent (Bilateral, 2015).   Social History/Living Environment:   Home Environment: Independent living  # Steps to Enter: 4  One/Two Story Residence: One story  Living Alone: No  Support Systems: Home care staff, Child(kayla), Spouse/Significant Other/Partner  Patient Expects to be Discharged to[de-identified] Independent living facility  Current DME Used/Available at Home: None  Prior Level of Function/Work/Activity:  Pt ambulating with no assistive device. Had a similar fall four years ago. This fall pt fell over uneven surfaces. Number of Personal Factors/Comorbidities that affect the Plan of Care: 0: LOW COMPLEXITY   EXAMINATION:   Most Recent Physical Functioning:   Gross Assessment:                  Posture:  Posture (WDL): Within defined limits  Balance:  Sitting: Intact  Standing: Pull to stand; With support Bed Mobility:  Supine to Sit: Stand-by assistance  Wheelchair Mobility:     Transfers:  Sit to Stand: Contact guard assistance  Stand to Sit: Contact guard assistance  Gait:     Gait Abnormalities: (slightly unsteady gait)  Distance (ft): 135 Feet (ft)  Ambulation - Level of Assistance: Contact guard assistance      Body Structures Involved:  1. Bones  2. Joints  3. Muscles  4. Ligaments Body Functions Affected:  1. Neuromusculoskeletal  2. Movement Related Activities and Participation Affected:  1. Mobility   Number of elements that affect the Plan of Care: 4+: HIGH COMPLEXITY   CLINICAL PRESENTATION:   Presentation: Stable and uncomplicated: LOW COMPLEXITY   CLINICAL DECISION MAKIN[address] [place] [address]-PAC 6 Clicks   Basic Mobility Inpatient Short Form  How much difficulty does the patient currently have. .. Unable A Lot A Little None   1. Turning over in bed (including adjusting bedclothes, sheets and blankets)? [] 1   [] 2   [x] 3   [] 4   2. Sitting down on and standing up from a chair with arms ( e.g., wheelchair, bedside commode, etc.)   [] 1   [] 2   [x] 3   [] 4   3. Moving from lying on back to sitting on the side of the bed? [] 1   [] 2   [x] 3   [] 4   How much help from another person does the patient currently need. .. Total A Lot A Little None   4. Moving to and from a bed to a chair (including a wheelchair)?    [] 1   [] 2 [x] 3   [] 4   5. Need to walk in hospital room? [] 1   [] 2   [x] 3   [] 4   6. Climbing 3-5 steps with a railing? [] 1   [] 2   [x] 3   [] 4   © 2007, Trustees of [address] [place] [address] [address] [address], under license to [employer]. All rights reserved      Score:  Initial: 18 Most Recent: X (Date: -- )    Interpretation of Tool:  Represents activities that are increasingly more difficult (i.e. Bed mobility, Transfers, Gait). Medical Necessity:     · Skilled intervention continues to be required due to decreased mobility ability. Reason for Services/Other Comments:  · Patient continues to require skilled intervention due to   · Decreased mobility ability. · .   Use of outcome tool(s) and clinical judgement create a POC that gives a: Clear prediction of patient's progress: LOW COMPLEXITY            TREATMENT:   (In addition to Assessment/Re-Assessment sessions the following treatments were rendered)   Pre-treatment Symptoms/Complaints:    Pain: Initial:   Pain Intensity 1: 0  Post Session:  no complaints     Gait Training ( 15 minutes):  Gait training to improve and/or restore physical functioning as related to mobility. Ambulated 135 Feet (ft) with Contact guard assistance     Assessment/Reassessment only, no treatment provided today    Braces/Orthotics/Lines/Etc:   · O2 Device: Room air  Treatment/Session Assessment:    · Response to Treatment:  Pt participates with therapy. · Interdisciplinary Collaboration:   o Physical Therapist  o Registered Nurse  · After treatment position/precautions:   o Up in chair  o Bed in low position  o Caregiver at bedside  o Call light within reach  o RN notified   · Compliance with Program/Exercises: Compliant most of the time, Will assess as treatment progresses  · Recommendations/Intent for next treatment session: \"Next visit will focus on advancements to more challenging activities and reduction in assistance provided\".   Total Treatment Duration:  PT Patient Time In/Time Out  Time In: 1025  Time Out: [address], PT

## 2020-01-22 NOTE — PROGRESS NOTES
INTERNAL MEDICINE    Subjective:     80-year-old female was walking the dog with her daughter when she tripped over an uneven concrete edge and fell forward. Struck her face on the concrete. Was dazed but no loss of consciousness. No vomiting or ataxia or lethargy. Complains of pain in her right hand as well as facial injuries. No neck pain no chest pain no abdominal pain no focal numbness or weakness. No pain in her hips. Does have some issues with memory due to dementia. Further questioning, with  at bedside, the pt did not pas out. She drinks only 2-3 oz daily of whiskey and not alcoholic. No hx of frequent falls. Today, no ac events, feels and look better, pain controlled, no headache or visual spx    Objective: Intake / Output:  No intake/output data recorded. 01/20 1901 - 01/22 0700  In: 503.3 [I.V.:503.3]  Out: -     Physical Exam:  Visit Vitals  /76 (BP 1 Location: Right arm, BP Patient Position: At rest)   Pulse 72   Temp 98.3 °F (36.8 °C)   Resp 26   Ht 5' 2\" (1.575 m)   Wt 60.1 kg (132 lb 9.6 oz)   SpO2 97%   Breastfeeding No   BMI 24.25 kg/m²     General appearance: awake, alert, cooperative, moderate distress, appears stated age - Pale, No icterus, Disoriented. Head: Normocephalic, without obvious abnormality, L frontal blue contusion 4-6 cm  Lungs: clear to auscultation bilaterally -  Heart: regular rate and rhythm, S1, S2 normal, no murmur, click, rub or gallop. Abdomen: soft, no tenderness, no distension, normal bowel sound, no masses, no organomegaly  Extremities: atraumatic, no cyanosis - Bilateral lower limbs edema none. Skin: No rashes or ulceration. Neurologic: Grossly intact     ECG: sinus rhythm     Data Review (Labs):   No results found for this or any previous visit (from the past 24 hour(s)).     Assessment:     1-  Intracerebral bleed due to trauma ([facility] [address] (1/21/2020), small SAH and SDH, R frontal, reviewed nu Neurosurgery, no surgery recommended  2- Fall   3- Dehydration   4- Dementia  5- HTN, controlled      Plan:     Observation  Neuro checks  Hold aspirin  PT/ OT consulted, doing fair  UA pending  Blood pressure control  AM lab as needed    Dispo; repeat CT head early am tomorrow, DC home early if CT stable or improving   (have dentist appointment at 2pm)    Signed By: Kika Marquez MD     January 22, 2020

## 2020-01-22 NOTE — INTERDISCIPLINARY ROUNDS
Interdisciplinary team rounds were held 1/22/2020 with the following team members:Care Management, Nursing, Nutrition, Pastoral Care, Physical Therapy and Physician and the patient. Plan of care discussed. See clinical pathway and/or care plan for interventions and desired outcomes.

## 2020-01-22 NOTE — PROGRESS NOTES
Patient pulled out IV. MD notified. Orders to DC fluids and wait to put in a new IV until further notice. Will continue to monitor patient.

## 2020-01-22 NOTE — PROGRESS NOTES
Bedside shift change report given to Real Puente (oncoming nurse) by Amanda Armstrong RN (offgoing nurse). Report included the following information SBAR, Kardex, Intake/Output, MAR, Recent Results, Med Rec Status and Cardiac Rhythm NSR. Pt awake in bed, oriented to self only. Lung fields clear. S1 S2 auscultated, regular rate and rhythm. Bowel sounds active in all quadrants. Pulses palpable in all extremities. Pt ecchymotic from fall trauma on face and R hand. Pt fidgety and continually pulling leads and oxygen sensor. Dual NIH performed, score 1. Family at bedside.

## 2020-01-22 NOTE — PROGRESS NOTES
Admission note:  Patient came to ICU confused. Able to tell that she was in the hospital, that she fell before coming into the ER, and her name. Dual NIH=1. Her pupils are equal, round, and reactive to light. No facial droop. No slurred speech. Her face has bruising on the entire right side including nose and lips. Abrasion and contusion to the right eyebrow, forehead and right cheek. Patient and daughter state she chipped one of her teeth when she fell. HR 71. NSR. /67. O2 100% on room air with clear breath sounds bilaterally. Abdomen is soft, nontender, with no distention. Abrasion to the right and left palms and fingers. Right hand is swollen and bruised. It is wrapped up and has an ice pack on it. Abrasion to knees. No weakness to extremities. Patient states she is in no pain. She is calm, pleasant, and cooperative. Due to dementia, she becomes forgetful of where she is and begins to pull at wires and tubes. Daughter and  in the room with her. Family is cooperative and calm. Will continue to monitor.

## 2020-01-22 NOTE — PROGRESS NOTES
Care Management Interventions  PCP Verified by CM: Yes  Mode of Transport at Discharge: Other (see comment)  Transition of Care Consult (CM Consult): Other  Current Support Network: Lives with Spouse  Confirm Follow Up Transport: Family  The Patient and/or Patient Representative was Provided with a Choice of Provider and Agrees with the Discharge Plan?: Yes  Freedom of Choice List was Provided with Basic Dialogue that Supports the Patient's Individualized Plan of Care/Goals, Treatment Preferences and Shares the Quality Data Associated with the Providers?: Yes  Discharge Location  Discharge Placement: Home  Visited with pt regarding plans for discharge, pt lives at home with spouse, she is very inde, walks her dog daily, has a caregiver (pt suffers for Dementia) that comes mon, wed-fri from [phone]. Dtr is with mom on Tuesday. Pt's spouse is still working, daily until [phone]. Pt refused  to sign her Code 40 letter, will continue to follow until d/c, pt plans to return home with family.

## 2020-01-22 NOTE — ED NOTES
Report taken from [facility] and [address] [place] [address]. Assumed patient care at this time.

## 2020-01-23 ENCOUNTER — APPOINTMENT (OUTPATIENT)
Dept: CT IMAGING | Age: 75
DRG: 087 | End: 2020-01-23
Attending: HOSPITALIST
Payer: MEDICARE

## 2020-01-23 VITALS
WEIGHT: 132.6 LBS | HEART RATE: 75 BPM | TEMPERATURE: 98.1 F | OXYGEN SATURATION: 98 % | SYSTOLIC BLOOD PRESSURE: 131 MMHG | BODY MASS INDEX: 24.4 KG/M2 | RESPIRATION RATE: 20 BRPM | DIASTOLIC BLOOD PRESSURE: 68 MMHG | HEIGHT: 62 IN

## 2020-01-23 PROCEDURE — 97165 OT EVAL LOW COMPLEX 30 MIN: CPT

## 2020-01-23 PROCEDURE — 97535 SELF CARE MNGMENT TRAINING: CPT

## 2020-01-23 PROCEDURE — 70450 CT HEAD/BRAIN W/O DYE: CPT

## 2020-01-23 PROCEDURE — 74011250637 HC RX REV CODE- 250/637: Performed by: HOSPITALIST

## 2020-01-23 RX ADMIN — DONEPEZIL HYDROCHLORIDE 5 MG: 5 TABLET, FILM COATED ORAL at 08:46

## 2020-01-23 RX ADMIN — DULOXETINE HYDROCHLORIDE 90 MG: 60 CAPSULE, DELAYED RELEASE ORAL at 08:46

## 2020-01-23 RX ADMIN — MEMANTINE 15 MG: 5 TABLET ORAL at 08:47

## 2020-01-23 RX ADMIN — METOPROLOL SUCCINATE 25 MG: 25 TABLET, EXTENDED RELEASE ORAL at 08:46

## 2020-01-23 NOTE — PROGRESS NOTES
Problem: Self Care Deficits Care Plan (Adult)  Goal: *Acute Goals and Plan of Care (Insert Text)  Description  1. Patient will perform grooming with set up standing at sink. 2. Patient will perform Upper body dressing with set up. PROGRESSING  3. Patient will perform lower body dressing with supervision. Jojo Oswaldo PROGRESSING  4. Patient will perform upper and lower body bathing with set up. Jojo Oswaldo PROGRESSING  5. Patient will perform toilet transfers with SBA. 6. Patient will perform shower transfer with SBA. 7. Patient will participate in 30 + minutes of ADL/ therapeutic exercise/therapeutic activity with min rest breaks to increase activity tolerance for self care. Jojo Oswaldo PROGRESSING  8. Patient will perform ADL functional mobility in room with SBA. Goals to be achieved in 7 days. Outcome: Progressing Towards Goal     OCCUPATIONAL THERAPY: Initial Assessment and AM 1/23/2020  INPATIENT: OT Visit Days: 1  [place] / Product Type: Medicare /      NAME/AGE/GENDER: Jordyn Clement is a 76 y.o. female   PRIMARY DIAGNOSIS:  Subarachnoid hemorrhage ([facility]) [I60.9]  Intracerebral bleed ([facility] [address] [I61.9]  Intracerebral bleed ([facility] [address] [I61.9] Intracerebral bleed due to trauma [facility]) Intracerebral bleed due to trauma [facility])        ICD-10: Treatment Diagnosis:    Pain in Right Wrist (M25.531)  Other lack of cordination (R27.8)  Difficulty in walking, Not elsewhere classified (R26.2)  Other abnormalities of gait and mobility (R26.89)   Precautions/Allergies:     Levofloxacin; Poison oak extract; Bupropion; Pcn [penicillins]; Shellfish containing products; Singulair [montelukast]; Sulfamethoxazole; and Wellbutrin [bupropion hcl]      ASSESSMENT:     Ms. Jonyn Santos presents supine in bed, ace wrap on R wrist and R index with bandage. Her R fingers are bruised and R thumb has moderate edema. She fed herself with her R UE without difficulty this am per .  She has stiches in her face and bruising on R side of face. She transferred to EOB with SBA. She completed her sponge bath and was SBA/CGA for transfer to the recliner. She lives at home with her  and has hired help during the day. She plans to return home  is wanting out patient PT and she has an appointment on Friday for her R hand. She has all necessary equipment. Will follow. This section established at most recent assessment   PROBLEM LIST (Impairments causing functional limitations):  Decreased ADL/Functional Activities  Decreased Transfer Abilities  Decreased Ambulation Ability/Technique  Decreased Balance  Decreased Flexibility/Joint Mobility  Edema/Girth   INTERVENTIONS PLANNED: (Benefits and precautions of occupational therapy have been discussed with the patient.)  Activities of daily living training  Adaptive equipment training  Balance training  Clothing management  Neuromuscular re-eduation  Therapeutic activity  Therapeutic exercise     TREATMENT PLAN: Frequency/Duration: Follow patient 2 times to address above goals. Rehabilitation Potential For Stated Goals: Good     REHAB RECOMMENDATIONS (at time of discharge pending progress):    Placement: It is my opinion, based on this patient's performance to date, that Ms. Marylene Codding may benefit from being discharged with NO further skilled therapy due to patient has hand MD appointment on friday. Equipment:   None at this time              [address]:   History of Present Injury/Illness (Reason for Referral):  See H and P  Past Medical History/Comorbidities:   Ms. Marylene Codding  has a past medical history of Alcohol abuse, daily use (11/21/2014), Depression (11/21/2014), Lumbar stenosis (7/4/2012), Osteopenia (3/19/2015), Spondylolisthesis of lumbar region (7/4/2012), and Vitamin D deficiency (11/21/2014).  She also has no past medical history of Aneurysm ([address] Coagulation disorder ([address] Difficult intubation, Heart failure ([address] Malignant hyperthermia due to anesthesia, Nausea & vomiting, Pseudocholinesterase deficiency, or Unspecified sleep apnea. Ms. Jonny Santos  has a past surgical history that includes hx hysterectomy; hx breast lumpectomy (Bilateral); hx back surgery (5/31/12); hx other surgical; hx wrist fracture tx; hx tonsillectomy; and hx heent (Bilateral, 2015). Social History/Living Environment:   Home Environment: Independent living  # Steps to Enter: 4  One/Two Story Residence: One story  Living Alone: No  Support Systems: Home care staff, Child(kayla), Spouse/Significant Other/Partner  Patient Expects to be Discharged to[de-identified] Independent living facility  Current DME Used/Available at Home: Shower chair, Walker, rolling  Tub or Shower Type: Tub/Shower combination  Prior Level of Function/Work/Activity:  Mod I with ADLS     Number of Personal Factors/Comorbidities that affect the Plan of Care: Brief history (0):  LOW COMPLEXITY   ASSESSMENT OF OCCUPATIONAL PERFORMANCE[de-identified]   Activities of Daily Living:   Basic ADLs (From Assessment) Complex ADLs (From Assessment)   Feeding: Supervision  Oral Facial Hygiene/Grooming: Supervision  Bathing: Stand-by assistance  Upper Body Dressing: Supervision  Lower Body Dressing: Supervision  Toileting: Supervision, Stand by assistance     Grooming/Bathing/Dressing Activities of Daily Living     Cognitive Retraining  Safety/Judgement: Awareness of environment; Fall prevention   Upper Body Bathing  Bathing Assistance: Stand-by assistance  Position Performed: Seated in chair     Lower Body Bathing  Bathing Assistance: Stand-by assistance  Lower Body : Stand-by assistance  Position Performed: Seated in chair;Standing     Upper Body Dressing Assistance  Dressing Assistance: [address]: Supervision     Lower Body Dressing Assistance  Dressing Assistance: Supervision  Socks: Supervision Bed/Mat Mobility  Supine to Sit: Stand-by assistance  Sit to Stand: Stand-by assistance;Contact guard assistance  Stand to Sit: Stand-by assistance  Bed to Chair: Stand-by assistance;Contact guard assistance  Scooting: Stand-by assistance     Most Recent Physical Functioning:   Gross Assessment:                  Posture:  Posture (WDL): Within defined limits  Balance:  Sitting: Intact  Standing: With support; Without support Bed Mobility:  Supine to Sit: Stand-by assistance  Scooting: Stand-by assistance  Wheelchair Mobility:     Transfers:  Sit to Stand: Stand-by assistance;Contact guard assistance  Stand to Sit: Stand-by assistance  Bed to Chair: Stand-by assistance;Contact guard assistance            Patient Vitals for the past 6 hrs:   SpO2 Pulse   20 0729 98 % 75       Mental Status  Neurologic State: Alert  Orientation Level: Disoriented to person, Disoriented to situation, Oriented to person  Cognition: Follows commands  Perception: Appears intact  Perseveration: No perseveration noted  Safety/Judgement: Awareness of environment, Fall prevention                          Physical Skills Involved:  Balance  Edema Cognitive Skills Affected (resulting in the inability to perform in a timely and safe manner):  Perception  Comprehension Psychosocial Skills Affected:  Self-Awareness   Number of elements that affect the Plan of Care: 3-5:  MODERATE COMPLEXITY   CLINICAL DECISION MAKIN [facility] [address] [address] AM-PAC 6 Clicks   Daily Activity Inpatient Short Form  How much help from another person does the patient currently need. .. Total A Lot A Little None   1. Putting on and taking off regular lower body clothing? [] 1   [] 2   [x] 3   [] 4   2. Bathing (including washing, rinsing, drying)? [] 1   [] 2   [x] 3   [] 4   3. Toileting, which includes using toilet, bedpan or urinal?   [] 1   [] 2   [x] 3   [] 4   4. Putting on and taking off regular upper body clothing? [] 1   [] 2   [x] 3   [] 4   5. Taking care of personal grooming such as brushing teeth? [] 1   [] 2   [x] 3   [] 4   6. Eating meals?    [] 1   [] 2   [] 3   [x] 4   © 2007, Trustees of [facility], under license to [employer]. All rights reserved      Score:  Initial: 19 Most Recent: X (Date: -- )    Interpretation of Tool:  Represents activities that are increasingly more difficult (i.e. Bed mobility, Transfers, Gait). Medical Necessity:     · Patient is expected to demonstrate progress in   · Self care skills and functional mobility  ·     Reason for Services/Other Comments:  · Patient continues to require skilled intervention due to   · Above listed deficits     Use of outcome tool(s) and clinical judgement create a POC that gives a: LOW COMPLEXITY         TREATMENT:   (In addition to Assessment/Re-Assessment sessions the following treatments were rendered)     Pre-treatment Symptoms/Complaints:    Pain: Initial:   Pain Intensity 1: 0  Post Session:  0/10     Self Care: (15): Procedure(s) (per grid) utilized to improve and/or restore self-care/home management as related to dressing and bathing. Required minimal visual, verbal, and tactile cueing to facilitate activities of daily living skills and compensatory activities. Assessment/Reassessment  5 min    Braces/Orthotics/Lines/Etc:   O2 Device: Room air  Treatment/Session Assessment:    Response to Treatment:  tolerated well, up in recliner  present  Interdisciplinary Collaboration:   Occupational Therapist  Registered Nurse  After treatment position/precautions:   Up in chair  Bed/Chair-wheels locked  Bed in low position  Call light within reach  RN notified  Family at bedside   Compliance with Program/Exercises: Compliant all of the time. Recommendations/Intent for next treatment session: \"Next visit will focus on advancements to more challenging activities and reduction in assistance provided\".   Total Treatment Duration:15  OT Patient Time In/Time Out  Time In: 2461  Time Out: 1100 [facility],

## 2020-01-23 NOTE — DISCHARGE SUMMARY
Hospitalist Discharge Summary     Admit Date:  2020  4:01 PM   Name:  Debbei Sosa   Age:  76 y.o.  :  1945   MRN:  [id number]   PCP:  Candelario Muñoz MD  Treatment Team: Attending Provider: Elizabet Field MD; Utilization Review: Rhina Beck, RN; Care Manager: Jacqui Ferrer, RN; Staff Nurse: Rajni Guzmán, RN; Physical Therapist: Andria Lanes, PT; Primary Nurse: Moncho [place]; Occupational Therapist: Fani Forrester OT    Problem List for this Hospitalization:  Hospital Problems as of 2020 Date Reviewed: 10/10/2019          Codes Class Noted - Resolved POA    Subarachnoid hemorrhage ([facility] [address] ICD-10-CM: I60.9  ICD-9-CM: 789  2020 - Present Unknown        * (Principal) Intracerebral bleed due to trauma [facility]) ICD-10-CM: H59.550U  ICD-9-CM: 853.00  2020 - Present Unknown        Fall ICD-10-CM: W19. Garrie Penning  ICD-9-CM: E888.9  2020 - Present Unknown        Dehydration ICD-10-CM: E86.0  ICD-9-CM: 276.51  2020 - Present Unknown        Intracerebral bleed [facility]) ICD-10-CM: I61.9  ICD-9-CM: 234  2020 - Present Unknown                    Hospital Course:    Ms. Song Moon is a 77 yo female with PMH of dementia who was admitted s/p fall with SDH/SAH. There were no neurosurgical needs and followup CT head was stable. Here aspirin is stopped. She additionally sustained facial / lip lacerations that were sutured and will need PCP followup. She has a nondisplaced right nasal bone fracture that will need ENT referral. She is stable to discharge home with her  and will have ongoing PT. Disposition: Home or Self Care  Activity: Activity as tolerated  Diet: DIET CARDIAC Regular  Code Status: Full Code    Follow up instructions, discharge meds at bottom of this note. Plan was discussed with . All questions answered. Patient was stable at time of discharge. Patient will call a physician or return if any concerns.   Discharge summary was sent to PCP electronically via \"Comm Mgt\" link in [facility], if possible. Diagnostic Imaging/Tests:   Xr Hand Rt Min 3 V    Result Date: 1/21/2020  Right hand INDICATION: Fall. Pain. Swelling. COMPARISON: None TECHNIQUE: PA, lateral, oblique views of the right hand were obtained. FINDINGS: Bones are osteopenic. There is no acute fracture or dislocation. Alignment is maintained. There is joint space narrowing at the interphalangeal joints, consistent with osteoarthritis. There is soft tissue swelling at the dorsum of the hand. There are postsurgical changes of the distal radius. IMPRESSION: 1. Soft tissue swelling. No acute fracture is seen. 2. Osteoarthritis. Osteopenia. Ct Head Wo Cont    Result Date: 1/23/2020  CT HEAD WITHOUT CONTRAST. INDICATION: Intracranial hemorrhage, follow-up. COMPARISON: 21 January 2020, August 2018. TECHNIQUE:   5 mm axial scans from the skull base to the vertex. Our CT scanners use one or more of the following:  Automated exposure control, adjustment of the mA and or kV according to patient size, iterative reconstruction. FINDINGS:  No acute intraparenchymal hemorrhage identified on today's exam. The small subarachnoid hemorrhage near the left vertex is much less distinct, probably normal on today's exam. No abnormal extra-axial fluid collection. The ventricles are normal size. No midline shift or mass effect. Mild symmetric volume loss. Included portion of the paranasal sinuses and orbits grossly unremarkable. IMPRESSION:  Resolution of the small subarachnoid hemorrhage. No new abnormality. .  Chronic changes. Ct Head Wo Cont    Result Date: 1/21/2020  Noncontrast CT of the brain. COMPARISON: August 12, 2018 INDICATION: Fall, head trauma.  Change in mental status TECHNIQUE: Contiguous axial images were obtained from the skull base through the vertex without IV contrast. Radiation dose reduction techniques were used for this study:  Our CT scanners use one or all of the following: Automated exposure control, adjustment of the mA and/or kVp according to patient's size, iterative reconstruction. FINDINGS: There is tiny acute subdural hemorrhage along the anterior falx (series 2, image 19). There is small volume of subarachnoid hemorrhage in the right frontal lobe, near the vertex. There is no midline shift, mass effect or hydrocephalus. Periventricular hypodensities, nonspecific and likely due to chronic small vessel changes. There is right periorbital/forehead soft tissue swelling. There is no skull fracture. IMPRESSION: 1. Small volume subarachnoid hemorrhage in the right frontal lobe, near the vertex. Tiny acute subdural hemorrhage along the anterior falx. No mass effect or midline shift. Short-term follow-up is recommended. [facility] Report telephoned to ED physician, Dr. Michael Neville, at 5:07 PM on exam date. Ct Maxillofacial Wo Cont    Result Date: 1/21/2020  EXAM: CT face without contrast. INDICATION: Fall with facial laceration. COMPARISON: Facial CT dated April 16, 2016. Multiple high resolution axial images were obtained through the facial bones. Coronal reformats were also evaluated. Radiation dose reduction techniques were used for this study: All CT scans performed at this facility use one or all of the following: Automated exposure control, adjustment of the mA and/or kVp according to patient's size, iterative reconstruction. FINDINGS: Bandaging material noted superior to the right orbit. Under pneumatization of the frontal sinuses. Mucosal thickening of the anterior ethmoidal air cells as well as the left greater than right sphenoid sinus and bilateral maxillary sinuses with postsurgical change of bilateral maxillary antrostomy. Right cheek contusion and right supraorbital contusion. Nondisplaced fracture of the right nasal bone. Limited evaluation of the mandible given patient motion however the mandible appears grossly intact.  The mandibular condyles are well seated within the temporal mandibular joints. The pterygoid plates are intact. The globes appear normal. The walls, roofs, and floors of the orbits are intact. The intraconal and extraconal fat are within normal limits. IMPRESSION: 1. Right supraorbital and right cheek soft tissue contusion without evidence of orbital canal fracture. 2. Nondisplaced right nasal bone fracture with adjacent soft tissue swelling. 3. Bilateral maxillary antrostomies. 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 Ct Spine Cerv Wo Cont    Result Date: 1/21/2020  EXAM: CT of the cervical spine. INDICATION: Neck pain after recent fall. COMPARISON: None. Multiple axial images were obtained through the cervical spine without intravenous contrast. Coronal and sagittal reformatted images were also reviewed. Radiation dose reduction techniques were used for this study: All CT scans performed at this facility use one or all of the following: Automated exposure control, adjustment of the mA and/or kVp according to patient's size, iterative reconstruction. FINDINGS: Slight pannus formation about the dens. Ankylosis of the left and partial ankylosis of the right C2-C3 facet joints. Severe degenerative disc changes at C5-C6. Diffuse osteopenia. Advanced degenerative disc changes at T6-T7, partially imaged. Mild multilevel degenerative disc changes of the upper thoracic spine. No evidence of acute cervical spine fracture. Grade 1 anterolisthesis at C3-C4 and C4-C5 as well as C7-T1. IMPRESSION: 1. No evidence of acute cervical spine fracture. Degenerative changes as above. Echocardiogram results:  No results found for this visit on 01/21/20. Procedures done this admission:  * No surgery found *    All Micro Results     None          Labs: Results:       BMP, Mg, Phos No results for input(s): NA, K, CL, CO2, AGAP, BUN, CREA, CA, GLU, MG, PHOS in the last 72 hours.    CBC No results for input(s): WBC, RBC, HGB, HCT, PLT, GRANS, LYMPH, EOS, MONOS, BASOS, IG, ANEU, ABL, KIRA, ABM, ABB, AIG, HGBEXT, HCTEXT, PLTEXT in the last 72 hours. LFT No results for input(s): SGOT, ALT, TBIL, AP, TP, ALB, GLOB, AGRAT, GPT in the last 72 hours.    Cardiac Testing No results found for: BNPP, BNP, CPK, RCK1, RCK2, RCK3, RCK4, CKMB, CKNDX, CKND1, TROPT, TROIQ   Coagulation Tests Lab Results   Component Value Date/Time    Prothrombin time 13.2 08/12/2018 04:08 PM    Prothrombin time 10.8 07/10/2014 12:30 PM    INR 1.0 08/12/2018 04:08 PM    INR 1.0 07/10/2014 12:30 PM    aPTT 28.0 07/10/2014 12:30 PM      A1c Lab Results   Component Value Date/Time    Hemoglobin A1c 5.5 10/03/2019 09:18 AM    Hemoglobin A1c 5.2 03/22/2019 09:16 AM    Hemoglobin A1c 5.1 02/20/2018 10:08 AM    Hemoglobin A1c, External 5.7 05/20/2015      Lipid Panel Lab Results   Component Value Date/Time    Cholesterol, total 232 (H) 10/03/2019 09:18 AM    HDL Cholesterol 60 10/03/2019 09:18 AM    LDL, calculated 150 (H) 10/03/2019 09:18 AM    VLDL, calculated 22 10/03/2019 09:18 AM    Triglyceride 108 10/03/2019 09:18 AM      Thyroid Panel Lab Results   Component Value Date/Time    TSH 3.510 10/03/2019 09:18 AM    TSH 4.840 (H) 03/22/2019 09:16 AM        Most Recent UA Lab Results   Component Value Date/Time    Color YELLOW 05/24/2012 10:10 AM    Appearance CLEAR 05/24/2012 10:10 AM    pH (UA) 6.0 05/24/2012 10:10 AM    Protein NEGATIVE  05/24/2012 10:10 AM    Glucose NEGATIVE  05/24/2012 10:10 AM    Ketone NEGATIVE  05/24/2012 10:10 AM    Bilirubin NEGATIVE  05/24/2012 10:10 AM    Blood NEGATIVE  05/24/2012 10:10 AM    Urobilinogen 0.2 05/24/2012 10:10 AM    Nitrites NEGATIVE  05/24/2012 10:10 AM    Leukocyte Esterase NEGATIVE  05/24/2012 10:10 AM        Allergies   Allergen Reactions    Levofloxacin Unknown (comments)    Poison [place] Extract Anaphylaxis    Bupropion Unknown (comments)    Pcn [Penicillins] Shortness of Breath and Swelling    Shellfish Containing Products Hives  Singulair [Montelukast] Anxiety    Sulfamethoxazole Other (comments) and Hives     She thinks she does not do well with this  She thinks she does not do well with this  She thinks she does not do well with this    Wellbutrin [Bupropion Hcl] Other (comments)     Other anxiety     Immunization History   Administered Date(s) Administered    (RETIRED) Pneumococcal Vaccine (Unspecified Type) 10/01/2011    Influenza High Dose Vaccine PF 11/13/2018    Influenza Vaccine 01/01/2014, 12/30/2016, 11/18/2017    Influenza Vaccine (Tri) Adjuvanted 10/03/2019    Pneumococcal Conjugate (PCV-13) 04/10/2012, 02/06/2014, 05/20/2015    Pneumococcal Polysaccharide (PPSV-23) 01/01/2011    Pneumococcal Vaccine (Unspecified Type) 01/01/2011    Tdap 04/16/2016    Zoster Vaccine, Live 01/01/2010       All Labs from Last 24 Hrs:  No results found for this or any previous visit (from the past 24 hour(s)).     Current Med List in Hospital:   Current Facility-Administered Medications   Medication Dose Route Frequency    sodium chloride (NS) flush 5-40 mL  5-40 mL IntraVENous PRN    acetaminophen (TYLENOL) tablet 650 mg  650 mg Oral Q4H PRN    HYDROcodone-acetaminophen (NORCO) 7.5-325 mg per tablet 1 Tab  1 Tab Oral Q4H PRN    morphine injection 2 mg  2 mg IntraVENous Q4H PRN    naloxone (NARCAN) injection 0.4 mg  0.4 mg IntraVENous PRN    diphenhydrAMINE (BENADRYL) capsule 25 mg  25 mg Oral Q4H PRN    ondansetron (ZOFRAN) injection 4 mg  4 mg IntraVENous Q4H PRN    magnesium hydroxide (MILK OF MAGNESIA) 400 mg/5 mL oral suspension 30 mL  30 mL Oral DAILY PRN    nicotine (NICODERM CQ) 21 mg/24 hr patch 1 Patch  1 Patch TransDERmal DAILY PRN    cloNIDine HCL (CATAPRES) tablet 0.2 mg  0.2 mg Oral BID PRN    hydrALAZINE (APRESOLINE) 20 mg/mL injection 10 mg  10 mg IntraVENous Q6H PRN    DULoxetine (CYMBALTA) capsule 90 mg  90 mg Oral DAILY    memantine (NAMENDA) tablet 15 mg  15 mg Oral DAILY    donepeziL (ARICEPT) tablet 5 mg  5 mg Oral DAILY    metoprolol succinate (TOPROL-XL) XL tablet 25 mg  25 mg Oral DAILY       Discharge Exam:  Patient Vitals for the past 24 hrs:   Temp Pulse Resp BP SpO2   01/23/20 0729 98.1 °F (36.7 °C) 75 20  98 %   01/23/20 0300 97.6 °F (36.4 °C) 71 19 131/68 100 %   01/22/20 2300 97.1 °F (36.2 °C) 73 21 124/79 99 %   01/22/20 1900 97.4 °F (36.3 °C) 66 24 131/81 99 %   01/22/20 1725 97.8 °F (36.6 °C) 69 23 131/69 98 %   01/22/20 1709 97.8 °F (36.6 °C)       01/22/20 1237 98.3 °F (36.8 °C)       01/22/20 1153 98.1 °F (36.7 °C) 72 26 102/76 97 %     Oxygen Therapy  O2 Sat (%): 98 % (01/23/20 0729)  Pulse via Oximetry: 75 beats per minute (01/23/20 0729)  O2 Device: Room air (01/23/20 0729)    Estimated body mass index is 24.25 kg/m² as calculated from the following:    Height as of this encounter: 5' 2\" (1.575 m). Weight as of this encounter: 60.1 kg (132 lb 9.6 oz). Intake/Output Summary (Last 24 hours) at 1/23/2020 1027  Last data filed at 1/23/2020 8344  Gross per 24 hour   Intake 1440 ml   Output    Net 1440 ml       *Note that automatically entered I/Os may not be accurate; dependent on patient compliance with collection and accurate  by assistants. General:    Well nourished. Alert.elderly,  ENT:   Diffuse facial bruising  CV:   Regular rate and rhythm. No murmur, rub, or gallop. No edema  Lungs:  Clear to auscultation bilaterally. No wheezing, rhonchi, or rales. anterior  Abdomen: Soft, nontender, nondistended. Bowel sounds normal.   Extremities: Warm and dry  Neurologic: grossly intact  Psych:  Normal mood and affect.       Discharge Info:   Current Discharge Medication List      CONTINUE these medications which have NOT CHANGED    Details   metoprolol succinate (TOPROL-XL) 50 mg XL tablet TAKE ONE TABLET DAILY FOR BLOOD PRESSURE  Qty: 90 Tab, Refills: 3    Associated Diagnoses: Essential hypertension      donepezil (ARICEPT) 10 mg tablet TAKE ONE TABLET AT BEDTIME. Qty: 90 Tab, Refills: 3    Comments: PATIENT NEEDS A NEW RX FOR DONEPEZIL 10MG  Associated Diagnoses: Memory loss      omeprazole (PRILOSEC) 40 mg capsule TAKE 1 CAPSULE EACH DAY. Qty: 90 Cap, Refills: 3    Comments: PATIENT REQUESTING REFILL, PLEASE AUTHORIZE. THANK YOU, HERMILO LUIS. Associated Diagnoses: Gastroesophageal reflux disease with esophagitis      DULoxetine (CYMBALTA) 60 mg capsule TAKE 2 CAPSULES DAILY. Qty: 180 Cap, Refills: 3    Comments: PATIENT RQUESTING REFILL,PLEASE AUTHORIZE. THANK YOU, HERMILO LUIS. Associated Diagnoses: Mild cognitive impairment      memantine (NAMENDA) 5 mg tablet Take 15 mg by mouth daily. Taking two tablets in am and one tablet in evening      azelastine (ASTELIN) 137 mcg (0.1 %) nasal spray 1 [place] by Both Nostrils route two (2) times a day. Use in each nostril as directed  Indications: Vasomotor Rhinitis  Qty: 1 Bottle, Refills: 11    Associated Diagnoses: Nonallergic rhinitis      TURMERIC ROOT EXTRACT PO Take 1,500 mg by mouth daily. cetirizine (ZYRTEC) 10 mg tablet Take 10 mg by mouth daily. triamcinolone (NASACORT AQ) 55 mcg nasal inhaler 2 Sprays by Both Nostrils route daily. Qty: 3 Bottle, Refills: 4    Associated Diagnoses: Chronic maxillary sinusitis      Cholecalciferol, Vitamin D3, (VITAMIN D3) 1,000 unit cap Take 1,000 Units by mouth daily. multivits,Stress Formula-Zinc tablet Take 1 Tab by mouth daily. omega-3 fatty acids-vitamin e (FISH OIL) 1,000 mg Cap Take 1 Cap by mouth daily (after breakfast). GLUC CHRISTIANSEN/CHONDRO CHRISTIANSEN A/VIT C/MN (GLUCOSAMINE 1500 COMPLEX PO) Take 1 Tab by mouth daily (after breakfast). STOP taking these medications       lisinopril (PRINIVIL, ZESTRIL) 40 mg tablet Comments:   Reason for Stopping:         aspirin delayed-release 81 mg tablet Comments:   Reason for Stopping: Follow Up Orders:   Follow-up Appointments   Procedures    FOLLOW UP VISIT Appointment in: One Week 1 week PCP dr Moyer Nurse, needs ENT referral for nasal fracture,     1 week PCP dr Moyer Nurse, needs ENT referral for nasal fracture,     Standing Status:   Standing     Number of Occurrences:   1     Order Specific Question:   Appointment in     Answer: One Week       Follow-up Information     Follow up With Specialties Details Why Contact Info    Modesta Buerger, MD Internal Medicine   [address]  [phone]            Time spent in patient discharge planning and coordination 45 minutes.     Signed:  Rebecca Paredes MD

## 2020-01-23 NOTE — PROGRESS NOTES
Bedside shift change report given to Johanna Villanueva (oncoming nurse) by Jorge Guerra RN (offgoing nurse). Report included the following information SBAR, Kardex, Intake/Output, MAR, Recent Results and Cardiac Rhythm NSR. Pt awake in bed, oriented to self only. NIH 1. Lung fields clear. S1 S2 auscultated, regular rate and rhythm. Bowel sounds active in all quadrants. Pulses palpable in all extremities. Ecchymosis on face from fall 2 days ago. R hand ecchymotic and swollen, wrapped in ace compression bandage. Pt restless and removed cardiac monitor leads. Family at bedside.

## 2020-01-23 NOTE — INTERDISCIPLINARY ROUNDS
Interdisciplinary team rounds were held 1/23/2020 with the following team members:Care Management, Nursing, Nutrition, Pastoral Care, Physical Therapy and Physician and the patient. Plan of care discussed. See clinical pathway and/or care plan for interventions and desired outcomes.

## 2020-01-23 NOTE — DISCHARGE INSTRUCTIONS
Patient Education        Preventing Falls: Care Instructions  Your Care Instructions    Getting around your home safely can be a challenge if you have injuries or health problems that make it easy for you to fall. Loose rugs and furniture in walkways are among the dangers for many older people who have problems walking or who have poor eyesight. People who have conditions such as arthritis, osteoporosis, or dementia also have to be careful not to fall. You can make your home safer with a few simple measures. Follow-up care is a key part of your treatment and safety. Be sure to make and go to all appointments, and call your doctor if you are having problems. It's also a good idea to know your test results and keep a list of the medicines you take. How can you care for yourself at home? Taking care of yourself  · You may get dizzy if you do not drink enough water. To prevent dehydration, drink plenty of fluids, enough so that your urine is light yellow or clear like water. Choose water and other caffeine-free clear liquids. If you have kidney, heart, or liver disease and have to limit fluids, talk with your doctor before you increase the amount of fluids you drink. · Exercise regularly to improve your strength, muscle tone, and balance. Walk if you can. Swimming may be a good choice if you cannot walk easily. · Have your vision and hearing checked each year or any time you notice a change. If you have trouble seeing and hearing, you might not be able to avoid objects and could lose your balance. · Know the side effects of the medicines you take. Ask your doctor or pharmacist whether the medicines you take can affect your balance. Sleeping pills or sedatives can affect your balance. · Limit the amount of alcohol you drink. Alcohol can impair your balance and other senses. · Ask your doctor whether calluses or corns on your feet need to be removed.  If you wear loose-fitting shoes because of calluses or corns, you can lose your balance and fall. · Talk to your doctor if you have numbness in your feet. Preventing falls at home  · Remove raised doorway thresholds, throw rugs, and clutter. Repair loose carpet or raised areas in the floor. · Move furniture and electrical cords to keep them out of walking paths. · Use nonskid floor wax, and wipe up spills right away, especially on ceramic tile floors. · If you use a walker or cane, put rubber tips on it. If you use crutches, clean the bottoms of them regularly with an abrasive pad, such as steel wool. · Keep your house well lit, especially [facility], and outside walkways. Use night-lights in areas such as hallways and bathrooms. Add extra light switches or use remote switches (such as switches that go on or off when you clap your hands) to make it easier to turn lights on if you have to get up during the night. · Install sturdy handrails on stairways. · Move items in your cabinets so that the things you use a lot are on the lower shelves (about waist level). · Keep a cordless phone and a flashlight with new batteries by your bed. If possible, put a phone in each of the main rooms of your house, or carry a cell phone in case you fall and cannot reach a phone. Or, you can wear a device around your neck or wrist. You push a button that sends a signal for help. · Wear low-heeled shoes that fit well and give your feet good support. Use footwear with nonskid soles. Check the heels and soles of your shoes for wear. Repair or replace worn heels or soles. · Do not wear socks without shoes on wood floors. · Walk on the grass when the sidewalks are slippery. If you live in an area that gets snow and ice in the winter, sprinkle salt on slippery steps and sidewalks. Preventing falls in the bath  · Install grab bars and nonskid mats inside and outside your shower or tub and near the toilet and sinks. · Use shower chairs and bath benches.   · Use a hand-held shower head that will allow you to sit while showering. · Get into a tub or shower by putting the weaker leg in first. Get out of a tub or shower with your strong side first.  · Repair loose toilet seats and consider installing a raised toilet seat to make getting on and off the toilet easier. · Keep your bathroom door unlocked while you are in the shower. Where can you learn more? Go to http://kathie-carlos.info/. Enter [id number] [id number] in the search box to learn more about \"Preventing Falls: Care Instructions. \"  Current as of: November 7, 2018  Content Version: 12.2  © 7487-9628 [employer]. Care instructions adapted under license by [employer] (which disclaims liability or warranty for this information). If you have questions about a medical condition or this instruction, always ask your healthcare professional. [place] [address] any warranty or liability for your use of this information. Patient Education        Learning About a Closed Head Injury  What is a closed head injury? A closed head injury happens when your head gets hit hard. The strong force of the blow causes your brain to shake in your skull. This movement can cause the brain to bruise, swell, or tear. Sometimes nerves or blood vessels also get damaged. This can cause bleeding in or around the brain. A concussion is a type of closed head injury. What are the symptoms? If you have a mild concussion, you may have a mild headache or feel \"not quite right. \" These symptoms are common. They usually go away over a few days to 4 weeks. But sometimes after a concussion, you feel like you can't function as well as before the injury. And you have new symptoms. This is called postconcussive syndrome. You may:  · Find it harder to solve problems, think, concentrate, or remember. · Have headaches. · Have changes in your sleep patterns, such as not being able to sleep or sleeping all the time.   · Have changes in your personality. · Not be interested in your usual activities. · Feel angry or anxious without a clear reason. · Lose your sense of taste or smell. · Be dizzy, lightheaded, or unsteady. It may be hard to stand or walk. How is a closed head injury treated? Any person who may have a concussion needs to see a doctor. Some people have to stay in the hospital to be watched. Others can go home safely. If you go home, follow your doctor's instructions. He or she will tell you if you need someone to watch you closely for the next 24 hours or longer. Rest is the best treatment. Get plenty of sleep at night. And try to rest during the day. · Avoid activities that are physically or mentally demanding. These include housework, exercise, and schoolwork. And don't play video games, send text messages, or use the computer. You may need to change your school or work schedule to be able to avoid these activities. · Ask your doctor when it's okay to drive, ride a bike, or operate machinery. · Take an over-the-counter pain medicine, such as acetaminophen (Tylenol), ibuprofen (Advil, Motrin), or naproxen (Aleve). Be safe with medicines. Read and follow all instructions on the label. · Check with your doctor before you use any other medicines for pain. · Do not drink alcohol or use illegal drugs. They can slow recovery. They can also increase your risk of getting a second head injury. Follow-up care is a key part of your treatment and safety. Be sure to make and go to all appointments, and call your doctor if you are having problems. It's also a good idea to know your test results and keep a list of the medicines you take. Where can you learn more? Go to http://kathie-carlos.info/. Enter E235 in the search box to learn more about \"Learning About a Closed Head Injury. \"  Current as of: March 28, 2019  Content Version: 12.2  © 7355-2820 [employer].  Care instructions adapted under license by [facility] [address] (which disclaims liability or warranty for this information). If you have questions about a medical condition or this instruction, always ask your healthcare professional. [address] any warranty or liability for your use of this information. Patient Education        Subdural Hematoma: Care Instructions  Your Care Instructions  A subdural hematoma is a buildup of blood between the layers of tissue that cover the brain. The blood collects under the layer closest to the skull. (This layer is called the dura.) The bleeding is most often caused by a head injury, but there can be other causes. In an older adult, even a minor injury can lead to a subdural hematoma. The buildup of blood inside the skull can put pressure on the brain. This may cause symptoms, such as a severe headache, confusion, or seizures. There are two kinds of hematomas: acute and chronic. · With an acute hematoma, symptoms start soon after the injury. · With a chronic hematoma, it may be days or weeks before symptoms appear. Doctors use imaging tests to find the buildup of blood. You may have a test such as a CT scan or MRI. The doctor may also do a test to check the pressure inside your skull. Bleeding inside the skull may get worse over time. So it is very important to pay attention to your symptoms. And be sure to see your doctor for follow-up testing. In some cases, treatment is needed to remove the blood. This helps relieve the pressure on the brain. Your doctor may make one or two small holes in your skull. For a large hematoma, the doctor may need to remove a piece of the skull. You may not need treatment if you have a small hematoma that is not causing symptoms. If you take aspirin or some other blood thinner, you may need to stop taking it. The doctor may give you treatment to undo the effects of the blood thinner. This can help prevent more bleeding in the skull.   The doctor has checked you carefully, but problems can develop later. If you notice any problems or new symptoms, get medical treatment right away. Follow-up care is a key part of your treatment and safety. Be sure to make and go to all appointments, and call your doctor if you are having problems. It's also a good idea to know your test results and keep a list of the medicines you take. How can you care for yourself at home? For an acute hematoma  · Follow your doctor's instructions. He or she will tell you if you need someone to watch you closely for the next 24 hours or longer. For a chronic hematoma  · Get plenty of sleep at night, and take it easy during the day. Rest is the best way to recover. · Avoid activities that are physically or mentally demanding. These include housework, exercise, paperwork, video games, text messaging, and using the computer. You may need to change your work or school schedule for a while. · Return to your normal activities slowly. Do not try to do too much at once. For either type of hematoma  · Do not drink alcohol until your doctor says it is okay. · Don't drive a car, ride a bike, or operate machinery until your doctor says it's okay. · If you take aspirin or some other blood thinner, be sure to talk to your doctor. He or she will tell you if and when to start taking this medicine again. Make sure that you understand exactly what your doctor wants you to do. · If you normally take medicine, your doctor will tell you if and when you can restart it. He or she will also give you instructions about taking any new medicines. When should you call for help? Call 911 anytime you think you may need emergency care.  For example, call if:    · You have a seizure.     · You passed out (lost consciousness).     · You are confused or can't stay awake.    Call your doctor now or seek immediate medical care if:    · You have new or worse vomiting.     · You feel less alert.     · You have new weakness or numbness in any part of your body.     · You have a headache that is getting worse.    Watch closely for changes in your health, and be sure to contact your doctor if:    · You do not get better as expected.     · You have new symptoms, such as headaches, trouble concentrating, or changes in mood. Where can you learn more? Go to http://kathie-carlos.info/. Enter U698 in the search box to learn more about \"Subdural Hematoma: Care Instructions. \"  Current as of: March 28, 2019  Content Version: 12.2  © 0753-1080 [employer]. Care instructions adapted under license by [employer] (which disclaims liability or warranty for this information). If you have questions about a medical condition or this instruction, always ask your healthcare professional. [address] any warranty or liability for your use of this information.

## 2020-01-24 ENCOUNTER — PATIENT OUTREACH (OUTPATIENT)
Dept: CASE MANAGEMENT | Age: 75
End: 2020-01-24

## 2020-01-24 NOTE — PROGRESS NOTES
This note will not be viewable in [address] Transition of Care Discharge Follow-up Questionnaire   Date/Time of Call:   01/24/2020   10:20am   What was the patient hospitalized for? Intracerebral bleed due to Trauma   Does the patient understand his/her diagnosis and/or treatment and what happened during the hospitalization? Yes, spoke with patients  states understanding of diagnosis and treatment; and is agreeable to call. Patients  states doing ok. Patients  states patient has a caregiver every Mon, Wed, Thru and Fri. Patient states grateful for the call. Did the patient receive discharge instructions? Yes    CM Assessed Risk for Readmission:       Patient stated Risk for Readmission:      Moderate to High r/t diagnosis and/or comorbidities and/or treatments. Review any discharge instructions ([facility] Ask patient if they understand these. Do they have any questions? Reviewed, understanding is stated, no questions at this time       Were home services ordered (nursing, PT, OT, ST, etc.)? No     If so, has the first visit occurred? If not, why? (Assist with coordination of services if necessary.)   N/A     Was any DME ordered? No   If so, has it been received? If not, why?  (Assist patient in obtaining DME orders &/or equipment if necessary.) N/A   Complete a review of all medications (new, continued and discontinued meds per the D/C instructions and medication tab in [facility]). Completed  STOP taking:  aspirin delayed-release 81 mg tablet  Lisinopril 40 mg tablet (PRINIVIL, ZESTRIL)   Were all new prescriptions filled? If not, why?  (Assist patient in obtaining medications if necessary  escalate for CCM &/or SW if ongoing issues are verbalized by pt or anticipated)   Yes, patients  states  stopped  taking medication indicated.    Does the patient understand the purpose and dosing instructions for all medications? (If patient has questions, provide explanation and education.)   Yes      Does the patient have any problems in performing ADLs? (If patient is unable to perform ADLs  what is the limiting factor(s)? Do they have a support system that can assist? If no support system is present, discuss possible assistance that they may be able to obtain. Escalate for CCM/SW if ongoing issues are verbalized by pt or anticipated)   Independent with ADLs at baseline. Does the patient have all follow-up appointments scheduled? 7 day f/up with PCP?   (f/up with PCP may be w/in 14 days if patient has a f/up with their specialist w/in 7 days)    7-14 day f/up with specialist?   (or per discharge instructions)    If f/up has not been made  what actions has the care coordinator made to accomplish this? Has transportation been arranged? Yes        Dr. Mali Gregorio 1/28/2020        No specialist noted          Yes, no transportation needs at this time. Any other questions or concerns expressed by the patient? No other needs or concerns identified. Contact information for Care Coordinator was given, instructed to call with new questions or concerns. Schedule next appointment with [facility] or refer to RN Case Manager/ per the workflow guidelines. When is care coordinators next follow-up call scheduled? If referred for CCM  what RN care manager was the referral assigned? Care Coordinator will follow per workflow guidelines.           Within 14days   RONALD Call Completed By: Jaci Agudelo LPN  Care Coordinator

## 2020-01-28 PROBLEM — S02.2XXA CLOSED FRACTURE OF NASAL BONES: Status: ACTIVE | Noted: 2020-01-28

## 2020-01-28 PROBLEM — R26.89 BALANCE PROBLEM: Status: ACTIVE | Noted: 2020-01-28

## 2020-01-28 PROBLEM — S06.6XAA SUBARACHNOID HEMORRHAGE FOLLOWING INJURY: Status: ACTIVE | Noted: 2020-01-21

## 2020-02-06 ENCOUNTER — PATIENT OUTREACH (OUTPATIENT)
Dept: CASE MANAGEMENT | Age: 75
End: 2020-02-06

## 2020-02-06 NOTE — PROGRESS NOTES
This note will not be viewable in [address] Transitions of Care  Follow up Outreach Note   Outreach type Phone call: spoke with patient's   Home visit:   Date/Time of Outreach: 02/06/2020 2:06pm     Has patient attended PCP or specialist follow-up appointments since last contact? What was outcome of appointment? When is next follow-up scheduled? Patient's  states doing well. Patient's  reports a f/u visit with Gael France on 1/28/2020 and a visit with Dr. Ashley Montoya on that same day. Review medications. Any medication changes since last outreach? Does patient have any questions or issues related to their medications? None stated      Not at this time. Home health active? If yes  any issue? Progress? No       Referrals needed?  (CM, SW, HH, etc. )   No    Other issues/Miscellaneous? (Transportation, access to meals, ability to perform ADLs, adequate caregiver support, etc.) No other needs or concerns at this time. Patient 's   states his gratitude for follow up. Next Outreach Scheduled?     Graduation from program?   N/A    Yes        Next Steps/Goals (if applicable):   N/A     Outreach completed by:   Neha Toro LPN  Care Coordinator

## 2020-03-27 PROBLEM — S02.2XXA CLOSED FRACTURE OF NASAL BONES: Status: RESOLVED | Noted: 2020-01-28 | Resolved: 2020-03-27

## 2020-03-27 PROBLEM — R51.9 HEADACHE ABOVE THE EYE REGION: Status: ACTIVE | Noted: 2020-03-27

## 2020-05-22 ENCOUNTER — HOSPITAL ENCOUNTER (OUTPATIENT)
Dept: MAMMOGRAPHY | Age: 75
Discharge: HOME | End: 2020-05-22
Attending: INTERNAL MEDICINE
Payer: MEDICARE

## 2020-05-22 DIAGNOSIS — E28.39 OVARIAN FAILURE: ICD-10-CM

## 2020-05-22 DIAGNOSIS — Z12.31 VISIT FOR SCREENING MAMMOGRAM: ICD-10-CM

## 2020-05-22 PROCEDURE — 77063 BREAST TOMOSYNTHESIS BI: CPT

## 2020-05-22 PROCEDURE — 77080 DXA BONE DENSITY AXIAL: CPT

## 2020-06-16 PROBLEM — R41.3 MEMORY LOSS: Status: ACTIVE | Noted: 2020-06-16

## 2020-06-16 PROBLEM — G44.309 POST-TRAUMATIC HEADACHE, NOT INTRACTABLE: Status: ACTIVE | Noted: 2020-03-27

## 2020-10-09 ENCOUNTER — HOSPITAL ENCOUNTER (OUTPATIENT)
Dept: GENERAL RADIOLOGY | Age: 75
Discharge: HOME | End: 2020-10-09
Attending: INTERNAL MEDICINE

## 2020-10-09 DIAGNOSIS — G89.29 CHRONIC PAIN OF RIGHT KNEE: ICD-10-CM

## 2020-10-09 DIAGNOSIS — M25.561 CHRONIC PAIN OF RIGHT KNEE: ICD-10-CM

## 2020-10-21 NOTE — PROGRESS NOTES
Spoke with pt  and PT is working with her doing exercises. Pt also taking some advil prn to help with the pain.

## 2020-11-05 PROBLEM — R45.1 AGITATION: Status: RESOLVED | Noted: 2019-05-10 | Resolved: 2020-11-05

## 2020-11-05 PROBLEM — G44.309 POST-TRAUMATIC HEADACHE, NOT INTRACTABLE: Status: RESOLVED | Noted: 2020-03-27 | Resolved: 2020-11-05

## 2020-11-05 PROBLEM — R26.9 GAIT DIFFICULTY: Status: ACTIVE | Noted: 2020-11-05

## 2020-12-05 PROBLEM — Z00.00 ROUTINE PHYSICAL EXAMINATION: Status: RESOLVED | Noted: 2018-09-04 | Resolved: 2020-12-05

## 2021-03-01 ENCOUNTER — HOSPITAL ENCOUNTER (EMERGENCY)
Age: 76
Discharge: HOME | End: 2021-03-01
Attending: EMERGENCY MEDICINE
Payer: MEDICARE

## 2021-03-01 ENCOUNTER — APPOINTMENT (OUTPATIENT)
Dept: CT IMAGING | Age: 76
End: 2021-03-01
Attending: EMERGENCY MEDICINE
Payer: MEDICARE

## 2021-03-01 VITALS
SYSTOLIC BLOOD PRESSURE: 126 MMHG | BODY MASS INDEX: 23.92 KG/M2 | HEIGHT: 62 IN | TEMPERATURE: 99 F | OXYGEN SATURATION: 96 % | DIASTOLIC BLOOD PRESSURE: 66 MMHG | WEIGHT: 130 LBS | HEART RATE: 64 BPM | RESPIRATION RATE: 18 BRPM

## 2021-03-01 DIAGNOSIS — S00.83XA CONTUSION OF FACE, INITIAL ENCOUNTER: ICD-10-CM

## 2021-03-01 DIAGNOSIS — S09.90XA INJURY OF HEAD, INITIAL ENCOUNTER: ICD-10-CM
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
## 2021-03-01 PROCEDURE — 70450 CT HEAD/BRAIN W/O DYE: CPT

## 2021-03-01 PROCEDURE — 70486 CT MAXILLOFACIAL W/O DYE: CPT

## 2021-03-01 PROCEDURE — 99282 EMERGENCY DEPT VISIT SF MDM: CPT

## 2021-03-01 PROCEDURE — 99283 EMERGENCY DEPT VISIT LOW MDM: CPT

## 2021-03-01 PROCEDURE — 72125 CT NECK SPINE W/O DYE: CPT

## 2021-03-01 NOTE — ED TRIAGE NOTES
Patient complains of a fall yesterday. She tripped in her driveway, falling forward, and hit the right side of her face on the pavement. Patient has dementia so her  is the historian. They just left [address] [place] [address] office, and dr. Geronimo Aguirre told them to come here to get checked out.

## 2021-03-01 NOTE — ED PROVIDER NOTES
[facility]     Cherylene Chancy is a 76 y.o. female seen on 3/1/2021 in the [address] [place] [address] in room ER07/07. Patient's HPI on review of systems slightly limited secondary to patient's dementia. Chief Complaint   Patient presents with   [place]     HPI: 42-year-old female presenting the emergency department with her  for evaluation after having a fall yesterday. Patient has history of dementia and so was unable to give a good history. Per patient's , patient does have intermittent falls. In fact patient was seen earlier today by her orthopedic doctor after having a fall that resulted in a broken thumb last week. Patient's orthopedic doctor encouraged patient to be evaluated in the emergency department. Patient fell yesterday and landed on the right side of her face. Patient fall was witnessed and did not lose consciousness. Patient complains of right-sided face pain and mild neck pain. She has not had any nausea or vomiting. She has no other complaints at this time. Patient has chronically unstable gait. Historian:     REVIEW OF SYSTEMS     Review of Systems   Unable to perform ROS: Dementia   Constitutional: Negative. Gastrointestinal: Negative. Musculoskeletal: Positive for gait problem and neck pain. Psychiatric/Behavioral: Positive for confusion (Chronic dementia). All other systems reviewed and are negative.       PAST MEDICAL HISTORY     Past Medical History:   Diagnosis Date    Alcohol abuse, daily use 11/21/2014    Depression 11/21/2014    Lumbar stenosis 7/4/2012    Osteopenia 3/19/2015    Sinus problem     Spondylolisthesis of lumbar region 7/4/2012    Vitamin D deficiency 11/21/2014     Past Surgical History:   Procedure Laterality Date    HX BACK SURGERY  5/31/12    L3-L4 Metrix TLIF with sextant/bilateral decompression - Dr. Neeta Levine HX BREAST LUMPECTOMY Bilateral     HX HEENT Bilateral 2015    sinus    HX HYSTERECTOMY      complete    HX OTHER SURGICAL      sinuses x2    HX TONSILLECTOMY      HX WRIST FRACTURE [place]       Social History     Socioeconomic History    Marital status:      Spouse name: Sue Patel Number of children: 2    Years of education: 1 post HS    Highest education level: Not on file   Occupational History    Occupation:      Employer: RETIRED   Tobacco Use    Smoking status: Former Smoker     Packs/day: 1.00     Years: 6.00     Pack years: 6.00     Quit date: 1972     Years since quittin.1    Smokeless tobacco: Never Used   Substance and Sexual Activity    Alcohol use: Yes     Alcohol/week: 14.0 standard drinks     Types: 14 Shots of liquor per week    Drug use: No   Other Topics Concern    Exercise No     Comment: tries to walk to her daughters house across busy intersection   Social History Narrative    , not active, gets up around 12 noon and then does things in the afternoon if a friend comes over. Unable to watch TV as cant figure out the controls. Prior to Admission Medications   Prescriptions Last Dose Informant Patient Reported? Taking? B.infantis-B.ani-B.long-B.bifi (Probiotic 4X) 10-15 mg TbEC   Yes No   Sig: Take  by mouth. COCONUT OIL PO   Yes No   Sig: Take  by mouth. Cholecalciferol, Vitamin D3, (VITAMIN D3) 1,000 unit cap   Yes No   Sig: Take 1,000 Units by mouth daily. DULoxetine (CYMBALTA) 60 mg capsule   No No   Sig: TAKE 2 CAPSULES DAILY. TURMERIC PO   Yes No   Sig: Take  by mouth. TURMERIC ROOT EXTRACT PO   Yes No   Sig: Take 1,500 mg by mouth daily. azelastine (ASTELIN) 137 mcg (0.1 %) nasal spray   No No   Si [place] by Both Nostrils route two (2) times a day. Use in each nostril as directed  Indications: Vasomotor Rhinitis   Patient taking differently: 1 Spray by Both Nostrils route two (2) times daily as needed.  Use in each nostril as directed  Indications: Vasomotor Rhinitis   azelastine (ASTELIN) 137 mcg (0.1 %) nasal spray   No No   Si [place] by Both Nostrils route two (2) times a day. Use in each nostril as directed  Indications: seasonal runny nose   diphenhydrAMINE (Benadryl Allergy) 25 mg tablet   Yes No   Sig: Take 25 mg by mouth every six (6) hours as needed. donepeziL (ARICEPT) 10 mg tablet   No No   Sig: TAKE ONE TABLET AT BEDTIME. hydroCHLOROthiazide (HYDRODIURIL) 25 mg tablet   No No   Sig: Take 1 Tab by mouth daily. magnesium 250 mg tab   Yes No   Sig: Take  by mouth.   memantine (NAMENDA) 5 mg tablet   Yes No   Sig: Take 10 mg by mouth daily. Taking one tablets in am and one tablet in evening   metoprolol succinate (TOPROL-XL) 50 mg XL tablet   No No   Sig: TAKE ONE TABLET DAILY FOR BLOOD PRESSURE   multivits,Stress Formula-Zinc tablet   Yes No   Sig: Take 1 Tab by mouth daily. omega-3 fatty acids-vitamin e (FISH OIL) 1,000 mg Cap   Yes No   Sig: Take 1 Cap by mouth daily (after breakfast). omeprazole (PRILOSEC) 40 mg capsule   No No   Sig: TAKE 1 CAPSULE EACH DAY. triamcinolone (NASACORT AQ) 55 mcg nasal inhaler   No No   Si Sprays by Both Nostrils route daily. Patient taking differently: 2 Sprays by Both Nostrils route daily as needed. Facility-Administered Medications: None     Allergies   Allergen Reactions    Levofloxacin Unknown (comments)    Poison Doss Extract Anaphylaxis    Bupropion Unknown (comments)    Pcn [Penicillins] Shortness of Breath and Swelling    Shellfish Containing Products Hives    Singulair [Montelukast] Anxiety    Sulfamethoxazole Hives and Other (comments)     She thinks she does not do well with this      Wellbutrin [Bupropion Hcl] Other (comments)     Other anxiety        PHYSICAL EXAM       Vitals:    21 1735 21 1913   BP: 127/68    Pulse: 67    Resp: 18    Temp: 99.4 °F (37.4 °C)    SpO2: 96% 96%    Vital signs were reviewed. Physical Exam  Vitals signs and nursing note reviewed.    Constitutional:       General: She is not in acute distress. Appearance: She is not ill-appearing or toxic-appearing. HENT:      Head: Normocephalic. Comments: Tenderness to the superior orbital rim. Mild abrasion to the lateral right orbital rim. Nose: Nose normal.      Mouth/Throat:      Mouth: Mucous membranes are moist.   Eyes:      Comments: Extraocular motion is intact without evidence of entrapment. Left pupil 3 mm, right pupil 2 mm   Neck:      Musculoskeletal: Muscular tenderness (Mild tenderness to palpation over the lower C-spine) present. Cardiovascular:      Rate and Rhythm: Normal rate and regular rhythm. Pulses: Normal pulses. Pulmonary:      Effort: Pulmonary effort is normal.   Abdominal:      General: Abdomen is flat. Palpations: Abdomen is soft. Tenderness: There is no abdominal tenderness. Musculoskeletal:      Comments: Right thumb spica splint   Skin:     General: Skin is warm and dry. Neurological:      General: No focal deficit present. Mental Status: She is alert. Mental status is at baseline. She is disoriented. Psychiatric:         Mood and Affect: Mood normal.         Behavior: Behavior normal.          MEDICAL DECISION MAKING     ED Course:    No results found for this or any previous visit (from the past 8 hour(s)). Ct Head Wo Cont    Result Date: 3/1/2021  Exam: CT head without contrast. Indication: Fall yesterday. Patient hit the the right side of her face. Comparison: Head CT dated January 23, 2020. Multiple axial images obtained through the brain without intravenous contrast. Radiation dose reduction techniques were used for this study: All CT scans performed at this facility use one or all of the following: Automated exposure control, adjustment of the mA and/or kVp according to patient's size, iterative reconstruction. FINDINGS: Diffuse cerebral atrophy with commensurate ex vacuo dilatation of the ventricles.  Scattered periventricular and centrum semiovale white matter hypointensities most indicative of chronic ischemic white matter change. No evidence of intracranial mass, hemorrhage, or large territorial infarct. The basal cisterns are patent. No extra-axial fluid collection or mass effect. The orbital contents are within normal limits. The paranasal sinuses are clear. The mastoid air cells and middle ears are clear. No significant osseous or extracranial soft tissue lesions. 1. No evidence of acute intracranial abnormality. Chronic changes as above. Ct Maxillofacial Wo Cont    Result Date: 3/1/2021  EXAM: CT facial sinuses without contrast. INDICATION: Fall with right-sided facial pain. Fall yesterday. COMPARISON: None. Multiple high resolution axial images were obtained through the facial bones. Coronal reformats were also evaluated. Radiation dose reduction techniques were used for this study: All CT scans performed at this facility use one or all of the following: Automated exposure control, adjustment of the mA and/or kVp according to patient's size, iterative reconstruction. FINDINGS: Under pneumatization of the frontal sinuses. Post surgical change of bilateral maxillary antrostomy. Mucosal thickening of the left maxillary sinus. Zygomatic arches and orbital walls are intact. Pterygoid plates are intact. No evidence of acute facial fracture. Mandible is intact. No nasal bone fracture. Orbital contents are unremarkable. There is a slight soft tissue contusion along the right supraorbital soft tissues. 1.  No evidence of acute facial fracture. 2. Slight soft tissue contusion along the right supraorbital soft tissues. Ct Spine Cerv Wo Cont    Result Date: 3/1/2021  EXAM: CT of the cervical spine. INDICATION: Fall yesterday. COMPARISON: Cervical spine CT dated January 21, 2021. Multiple axial images were obtained through the cervical spine without intravenous contrast. Coronal and sagittal reformatted images were also reviewed.   Radiation dose reduction techniques were used for this study: All CT scans performed at this facility use one or all of the following: Automated exposure control, adjustment of the mA and/or kVp according to patient's size, iterative reconstruction. FINDINGS: Moderate atlantodental interval degenerative change. Calcified pannus formation about the dens. Ankylosis of the left C2/C3 facet joints and partial ankylosis on the right at C2-C3. Moderate multilevel facet arthropathy. Severe degenerative disc changes at C5-C6 with chronic endplate degenerative changes. Grade 1 anterolisthesis at C4-C5 which is unchanged. No evidence of acute cervical spine fracture. Moderate degenerative disc changes C6-C7 and C7-T1 otherwise mild multilevel degenerative disc changes of the cervical spine. 1. No evidence of acute cervical spine fracture. Degenerative changes as above. MDM  Number of Diagnoses or Management Options  Contusion of face, initial encounter  Fall, initial encounter  Injury of head, initial encounter  Diagnosis management comments: 29-year-old female with history of dementia presented the emergency department for evaluation after a fall. Patient scans are negative for acute fractures. Patient is in no acute distress. Patient will be discharged home will return the emergency department for any concerns. Amount and/or Complexity of Data Reviewed  Tests in the radiology section of CPT®: ordered and reviewed  Obtain history from someone other than the patient: yes  Independent visualization of images, tracings, or specimens: yes    Patient Progress  Patient progress: stable        Disposition:  Discharged  Diagnosis:     ICD-10-CM ICD-9-CM   1. Fall, initial encounter  [address] XXXA E888.9   2. Contusion of face, initial encounter  S00.83XA 920   3.  Injury of head, initial encounter  S09.90XA 959.01     ____________________________________________________________________  A portion of this note was generated using voice recognition dictation software. While the note has been reviewed for accuracy, please note certain words and phrases may not be transcribed as intended and some grammatical and/or typographical errors may be present.

## 2021-03-02 NOTE — ED NOTES
I have reviewed discharge instructions with the patient and spouse. The patient and spouse verbalized understanding. Patient left ED via Discharge Method: ambulatory to Home with spouse    Opportunity for questions and clarification provided. Patient given 0 scripts. To continue your aftercare when you leave the hospital, you may receive an automated call from our care team to check in on how you are doing. This is a free service and part of our promise to provide the best care and service to meet your aftercare needs.  If you have questions, or wish to unsubscribe from this service please call [phone]. Thank you for Choosing our [facility] Emergency Department.

## 2021-05-05 PROBLEM — Z20.828 EXPOSURE TO VIRAL DISEASE: Status: ACTIVE | Noted: 2021-05-05

## 2021-05-09 ENCOUNTER — HOSPITAL ENCOUNTER (EMERGENCY)
Age: 76
Discharge: HOME | End: 2021-05-09
Attending: EMERGENCY MEDICINE
Payer: MEDICARE

## 2021-05-09 ENCOUNTER — APPOINTMENT (OUTPATIENT)
Dept: CT IMAGING | Age: 76
End: 2021-05-09
Attending: EMERGENCY MEDICINE
Payer: MEDICARE

## 2021-05-09 ENCOUNTER — APPOINTMENT (OUTPATIENT)
Dept: GENERAL RADIOLOGY | Age: 76
End: 2021-05-09
Attending: EMERGENCY MEDICINE
Payer: MEDICARE

## 2021-05-09 VITALS
HEIGHT: 62 IN | SYSTOLIC BLOOD PRESSURE: 128 MMHG | OXYGEN SATURATION: 95 % | HEART RATE: 83 BPM | RESPIRATION RATE: 16 BRPM | TEMPERATURE: 98.2 F | WEIGHT: 135 LBS | BODY MASS INDEX: 24.84 KG/M2 | DIASTOLIC BLOOD PRESSURE: 68 MMHG

## 2021-05-09 DIAGNOSIS — Z23 IMMUNIZATION, TETANUS-DIPHTHERIA: ICD-10-CM

## 2021-05-09 DIAGNOSIS — S01.81XA FACIAL LACERATION, INITIAL ENCOUNTER: Primary | ICD-10-CM

## 2021-05-09 DIAGNOSIS — F03.90 DEMENTIA WITHOUT BEHAVIORAL DISTURBANCE, UNSPECIFIED DEMENTIA TYPE: ICD-10-CM
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
## 2021-05-09 PROCEDURE — 99283 EMERGENCY DEPT VISIT LOW MDM: CPT

## 2021-05-09 PROCEDURE — 75810000293 HC SIMP/SUPERF WND  RPR

## 2021-05-09 PROCEDURE — 70450 CT HEAD/BRAIN W/O DYE: CPT

## 2021-05-09 PROCEDURE — 90471 IMMUNIZATION ADMIN: CPT

## 2021-05-09 PROCEDURE — 73030 X-RAY EXAM OF SHOULDER: CPT

## 2021-05-09 PROCEDURE — 74011250636 HC RX REV CODE- 250/636: Performed by: EMERGENCY MEDICINE

## 2021-05-09 PROCEDURE — 90715 TDAP VACCINE 7 YRS/> IM: CPT | Performed by: EMERGENCY MEDICINE

## 2021-05-09 RX ADMIN — TETANUS TOXOID, REDUCED DIPHTHERIA TOXOID AND ACELLULAR PERTUSSIS VACCINE, ADSORBED 0.5 ML: 5; 2.5; 8; 8; 2.5 SUSPENSION INTRAMUSCULAR at 08:31

## 2021-05-09 NOTE — ED PROVIDER NOTES
Patient has a history of dementia, presents with her . He states that sometime last night the patient got out of bed to use the restroom and fell. She sustained a laceration to her forehead. He does not know if she fell in the bedroom or the bathroom. The patient is a poor historian and is unable to provide any meaningful details. He states that she has underlying dementia and her mental status has been at its baseline. She also seem to be complaining of left shoulder pain. She did receive a vaccination in her left shoulder over the past several days and has been complaining of pain to that shoulder but he says it seems to be worse this morning.            Past Medical History:   Diagnosis Date    Alcohol abuse, daily use 11/21/2014    Depression 11/21/2014    Lumbar stenosis 7/4/2012    Osteopenia 3/19/2015    Sinus problem     Spondylolisthesis of lumbar region 7/4/2012    Vitamin D deficiency 11/21/2014       Past Surgical History:   Procedure Laterality Date    HX BACK SURGERY  5/31/12    L3-L4 Metrix TLIF with sextant/bilateral decompression - Dr. Patric Champagne HX BREAST LUMPECTOMY Bilateral     HX HEENT Bilateral 2015    sinus    HX HYSTERECTOMY      complete    HX OTHER SURGICAL      sinuses x2    HX TONSILLECTOMY      HX WRIST FRACTURE [place]           Family History:   Problem Relation Age of Onset    Stroke Mother     Heart Disease Mother     Arthritis-osteo Mother     Heart Attack Mother     Stroke Father     Dementia Father     Alzheimer Father 48    Hypertension Sister     Heart Disease Sister         stent and AVR    Coronary Artery Disease Maternal Uncle     Breast Cancer Neg Hx        Social History     Socioeconomic History    Marital status:      Spouse name: Darrick Arcos Number of children: 2    Years of education: 1 post HS    Highest education level: Not on file   Occupational History    Occupation:      Employer: RETIRED   Social Needs    Financial resource strain: Not on file    Food insecurity     Worry: Not on file     Inability: Not on file    Transportation needs     Medical: Not on file     Non-medical: Not on file   Tobacco Use    Smoking status: Former Smoker     Packs/day: 1.00     Years: 6.00     Pack years: 6.00     Quit date: 1972     Years since quittin.3    Smokeless tobacco: Never Used   Substance and Sexual Activity    Alcohol use: Yes     Alcohol/week: 14.0 standard drinks     Types: 14 Shots of liquor per week    Drug use: No    Sexual activity: Not on file   Lifestyle    Physical activity     Days per week: Not on file     Minutes per session: Not on file    Stress: Not on file   Relationships    Social connections     Talks on phone: Not on file     Gets together: Not on file     Attends Bahai service: Not on file     Active member of club or organization: Not on file     Attends meetings of clubs or organizations: Not on file     Relationship status: Not on file    Intimate partner violence     Fear of current or ex partner: Not on file     Emotionally abused: Not on file     Physically abused: Not on file     Forced sexual activity: Not on file   Other Topics Concern     Service Not Asked    Blood Transfusions Not Asked    Caffeine Concern Not Asked    Occupational Exposure Not Asked   Jody Plaster Hazards Not Asked    Sleep Concern Not Asked    Stress Concern Not Asked    Weight Concern Not Asked    Special Diet Not Asked    Back Care Not Asked    Exercise No     Comment: tries to walk to her daughters house across busy intersection    Bike Helmet Not Asked    [address] Not Asked    Self-Exams Not Asked   Social History Narrative    , not active, gets up around 12 noon and then does things in the afternoon if a friend comes over. Unable to watch TV as cant figure out the controls.           ALLERGIES: Levofloxacin, Poison oak extract, Bupropion, Pcn [penicillins], Shellfish containing products, Singulair [montelukast], Sulfamethoxazole, and Wellbutrin [bupropion hcl]    Review of Systems   Constitutional: Negative for chills and fever. Gastrointestinal: Negative for nausea and vomiting. All other systems reviewed and are negative. Vitals:    05/09/21 0736   BP: (!) 144/86   Pulse: 88   Resp: 18   Temp: 98.2 °F (36.8 °C)   SpO2: 97%   Weight: 61.2 kg (135 lb)   Height: 5' 2\" (1.575 m)            Physical Exam  Vitals signs and nursing note reviewed. Constitutional:       General: She is not in acute distress. Appearance: Normal appearance. She is well-developed. HENT:      Head: Normocephalic. Comments: 2.5 cm linear laceration just over lateral aspect of left eyebrow as indicated. No swelling or ecchymosis noted  Eyes:      Conjunctiva/sclera: Conjunctivae normal.      Pupils: Pupils are equal, round, and reactive to light. Neck:      Musculoskeletal: Normal range of motion and neck supple. Pulmonary:      Effort: Pulmonary effort is normal. No respiratory distress. Musculoskeletal:         General: No tenderness. Skin:     General: Skin is warm and dry. Neurological:      Mental Status: She is alert.           MDM  Number of Diagnoses or Management Options  Dementia without behavioral disturbance, unspecified dementia type (HCC)  Facial laceration, initial encounter: new and does not require workup  Fall, initial encounter: new and does not require workup  Immunization, tetanus-diphtheria  Diagnosis management comments: 10:27 AM discussed results with patient and , need for follow-up in 10 days for suture removal.       Amount and/or Complexity of Data Reviewed  Tests in the radiology section of CPT®: ordered and reviewed  Obtain history from someone other than the patient: yes    Risk of Complications, Morbidity, and/or Mortality  Presenting problems: moderate  Diagnostic procedures: moderate  Management options: moderate    Patient Progress  Patient progress: improved         Wound Repair    Date/Time: 5/13/2021 5:09 PM  Performed by: attendingPreparation: skin prepped with Betadine  Pre-procedure re-eval: Immediately prior to the procedure, the patient was reevaluated and found suitable for the planned procedure and any planned medications. Time out: Immediately prior to the procedure a time out was called to verify the correct patient, procedure, equipment, staff and marking as appropriate. .  Location details: face  Wound length:2.6 - 7.5 cm  Foreign bodies: no foreign bodies  Irrigation solution: saline  Irrigation method: syringe  Debridement: none  Skin closure: 6-0 nylon  Number of sutures: 3  Technique: simple  Approximation: close  Dressing: 4x4 and antibiotic ointment  Patient tolerance: patient tolerated the procedure well with no immediate complications  My total time at bedside, performing this procedure was 1-15 minutes.

## 2021-05-09 NOTE — DISCHARGE INSTRUCTIONS
Follow-up with either your doctor or here in 10 days to have the sutures removed. Return to the emergency department if there are signs of infection such as redness, swelling or increased pain.

## 2021-05-09 NOTE — ED TRIAGE NOTES
Presents after fall at home aprox 1 inch laceration to left forehead. History of dementia but  reports patient is at baseline. Mask applied.

## 2021-05-09 NOTE — ED NOTES
I have reviewed discharge instructions with the patient. The patient verbalized understanding. Patient left ED via Discharge Method: wheelchair to Home with spouse. Opportunity for questions and clarification provided. Patient given 0 scripts. To continue your aftercare when you leave the hospital, you may receive an automated call from our care team to check in on how you are doing. This is a free service and part of our promise to provide the best care and service to meet your aftercare needs.  If you have questions, or wish to unsubscribe from this service please call [phone]. Thank you for Choosing our [address] [place] [address] Emergency Department.

## 2021-11-08 PROBLEM — G31.84 MILD COGNITIVE IMPAIRMENT: Status: ACTIVE | Noted: 2021-11-08

## 2021-11-08 PROBLEM — R73.09 ABNORMAL GLUCOSE: Status: RESOLVED | Noted: 2019-10-10 | Resolved: 2021-11-08

## 2021-11-08 PROBLEM — R06.09 DYSPNEA ON EXERTION: Status: ACTIVE | Noted: 2021-11-08

## 2021-11-08 PROBLEM — Z11.1 TUBERCULOSIS SCREENING: Status: ACTIVE | Noted: 2021-11-08

## 2021-11-08 PROBLEM — R41.3 MEMORY LOSS: Status: RESOLVED | Noted: 2020-06-16 | Resolved: 2021-11-08

## 2021-11-08 PROBLEM — Z01.84 IMMUNITY STATUS TESTING: Status: ACTIVE | Noted: 2021-11-08

## 2021-11-10 PROBLEM — S06.36AA INTRACEREBRAL BLEED DUE TO TRAUMA: Status: RESOLVED | Noted: 2020-01-21 | Resolved: 2021-11-10

## 2021-11-10 PROBLEM — M19.042 PRIMARY OSTEOARTHRITIS OF BOTH HANDS: Status: RESOLVED | Noted: 2019-12-16 | Resolved: 2021-11-10

## 2021-11-10 PROBLEM — M19.041 PRIMARY OSTEOARTHRITIS OF BOTH HANDS: Status: RESOLVED | Noted: 2019-12-16 | Resolved: 2021-11-10

## 2021-11-10 PROBLEM — G31.84 MILD COGNITIVE IMPAIRMENT: Status: RESOLVED | Noted: 2021-11-08 | Resolved: 2021-11-10

## 2021-12-08 PROBLEM — Z01.84 IMMUNITY STATUS TESTING: Status: RESOLVED | Noted: 2021-11-08 | Resolved: 2021-12-08

## 2021-12-08 PROBLEM — Z11.1 TUBERCULOSIS SCREENING: Status: RESOLVED | Noted: 2021-11-08 | Resolved: 2021-12-08

## 2022-03-19 PROBLEM — R26.89 BALANCE DISORDER: Status: ACTIVE | Noted: 2020-01-28

## 2022-03-19 PROBLEM — J30.89 PERENNIAL ALLERGIC RHINITIS: Status: ACTIVE | Noted: 2017-01-17
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
## 2022-03-19 PROBLEM — M70.61 TROCHANTERIC BURSITIS OF RIGHT HIP: Status: ACTIVE | Noted: 2018-09-04

## 2022-03-19 PROBLEM — R06.09 DYSPNEA ON EXERTION: Status: ACTIVE | Noted: 2021-11-08

## 2022-03-19 PROBLEM — Z20.828 EXPOSURE TO VIRAL DISEASE: Status: ACTIVE | Noted: 2021-05-05

## 2022-03-19 PROBLEM — E78.00 PURE HYPERCHOLESTEROLEMIA: Status: ACTIVE | Noted: 2019-10-10

## 2022-03-19 PROBLEM — E03.9 ACQUIRED HYPOTHYROIDISM: Status: ACTIVE | Noted: 2018-09-04

## 2022-03-19 PROBLEM — J31.0 NONALLERGIC RHINITIS: Status: ACTIVE | Noted: 2018-02-26

## 2022-03-19 PROBLEM — E86.0 DEHYDRATION: Status: ACTIVE | Noted: 2020-01-21

## 2022-03-20 PROBLEM — R26.9 GAIT DIFFICULTY: Status: ACTIVE | Noted: 2020-11-05

## 2022-03-20 PROBLEM — K21.00 GASTROESOPHAGEAL REFLUX DISEASE WITH ESOPHAGITIS: Status: ACTIVE | Noted: 2018-09-04

## 2022-03-20 PROBLEM — R73.9 HYPERGLYCEMIA: Status: ACTIVE | Noted: 2018-09-04

## 2022-04-14 ENCOUNTER — NURSE TRIAGE (OUTPATIENT)
Dept: OTHER | Facility: CLINIC | Age: 77
End: 2022-04-14

## 2022-04-14 NOTE — TELEPHONE ENCOUNTER
Received call from Cristiano Gunderson [facility]. Subjective: Caller states \"It happens when we go for a walk. It has been going on for a while. Her BP seems to be high. 152/93 and 148/99. Oxygen has been around 96-97%. She's just getting over a UTI and would like that checked again as well. \"    Current Symptoms: SOB with exertion    Onset: couple of months    Associated Symptoms: denies other symptoms    Pain Severity: denies pain    Temperature: denies fever    What has been tried: N/A    LMP: NA Pregnant: NA    Recommended disposition: Go to [facility]    Care advice provided, patient verbalizes understanding; denies any other questions or concerns; instructed to call back for any new or worsening symptoms. Patient/Caller agrees with recommended disposition; writer provided warm transfer to [address] [facility] for appointment scheduling    Attention Provider: Thank you for allowing me to participate in the care of your patient. The patient was connected to triage in response to information provided to the ECC. Please do not respond through this encounter as the response is not directed to a shared pool.     Reason for Disposition   MILD difficulty breathing (e.g., minimal/no SOB at rest, SOB with walking, pulse < 100) of new-onset or worse than normal    Protocols used: BREATHING DIFFICULTY-ADULT-[place]

## 2022-04-15 ENCOUNTER — HOSPITAL ENCOUNTER (OUTPATIENT)
Dept: LAB | Age: 77
Discharge: HOME | End: 2022-04-15
Payer: MEDICARE

## 2022-04-15 DIAGNOSIS — E03.9 ACQUIRED HYPOTHYROIDISM: ICD-10-CM

## 2022-04-15 DIAGNOSIS — I10 ESSENTIAL HYPERTENSION: ICD-10-CM

## 2022-04-15 DIAGNOSIS — R06.02 SOB (SHORTNESS OF BREATH) ON EXERTION: ICD-10-CM

## 2022-04-15 LAB
ALBUMIN SERPL-MCNC: 4 G/DL (ref 3.2–4.6)
ALBUMIN/GLOB SERPL: 1.3 {RATIO} (ref 1.2–3.5)
ALP SERPL-CCNC: 67 U/L (ref 50–136)
ALT SERPL-CCNC: 23 U/L (ref 12–65)
ANION GAP SERPL CALC-SCNC: 6 MMOL/L (ref 7–16)
AST SERPL-CCNC: 21 U/L (ref 15–37)
BASOPHILS # BLD: 0.1 K/UL (ref 0–0.2)
BASOPHILS NFR BLD: 1 % (ref 0–2)
BILIRUB SERPL-MCNC: 0.3 MG/DL (ref 0.2–1.1)
BUN SERPL-MCNC: 23 MG/DL (ref 8–23)
CALCIUM SERPL-MCNC: 9.6 MG/DL (ref 8.3–10.4)
CHLORIDE SERPL-SCNC: 104 MMOL/L (ref 98–107)
CO2 SERPL-SCNC: 30 MMOL/L (ref 21–32)
CREAT SERPL-MCNC: 0.8 MG/DL (ref 0.6–1)
DIFFERENTIAL METHOD BLD: ABNORMAL
EOSINOPHIL # BLD: 0.6 K/UL (ref 0–0.8)
EOSINOPHIL NFR BLD: 6 % (ref 0.5–7.8)
ERYTHROCYTE [DISTWIDTH] IN BLOOD BY AUTOMATED COUNT: 12.6 % (ref 11.9–14.6)
GLOBULIN SER CALC-MCNC: 3 G/DL (ref 2.3–3.5)
GLUCOSE SERPL-MCNC: 94 MG/DL (ref 65–100)
HCT VFR BLD AUTO: 41.3 % (ref 35.8–46.3)
HGB BLD-MCNC: 13.4 G/DL (ref 11.7–15.4)
IMM GRANULOCYTES # BLD AUTO: 0 K/UL (ref 0–0.5)
IMM GRANULOCYTES NFR BLD AUTO: 0 % (ref 0–5)
LYMPHOCYTES # BLD: 1.3 K/UL (ref 0.5–4.6)
LYMPHOCYTES NFR BLD: 14 % (ref 13–44)
MCH RBC QN AUTO: 29.7 PG (ref 26.1–32.9)
MCHC RBC AUTO-ENTMCNC: 32.4 G/DL (ref 31.4–35)
MCV RBC AUTO: 91.6 FL (ref 79.6–97.8)
MONOCYTES # BLD: 0.6 K/UL (ref 0.1–1.3)
MONOCYTES NFR BLD: 6 % (ref 4–12)
NEUTS SEG # BLD: 7.2 K/UL (ref 1.7–8.2)
NEUTS SEG NFR BLD: 74 % (ref 43–78)
NRBC # BLD: 0 K/UL (ref 0–0.2)
PLATELET # BLD AUTO: 300 K/UL (ref 150–450)
PMV BLD AUTO: 9.2 FL (ref 9.4–12.3)
POTASSIUM SERPL-SCNC: 3.7 MMOL/L (ref 3.5–5.1)
PROT SERPL-MCNC: 7 G/DL (ref 6.3–8.2)
RBC # BLD AUTO: 4.51 M/UL (ref 4.05–5.2)
SODIUM SERPL-SCNC: 140 MMOL/L (ref 136–145)
TSH SERPL DL<=0.005 MIU/L-ACNC: 3.6 UIU/ML (ref 0.36–3.74)
WBC # BLD AUTO: 9.8 K/UL (ref 4.3–11.1)

## 2022-04-15 PROCEDURE — 84443 ASSAY THYROID STIM HORMONE: CPT

## 2022-04-15 PROCEDURE — 36415 COLL VENOUS BLD VENIPUNCTURE: CPT

## 2022-04-15 PROCEDURE — 85025 COMPLETE CBC W/AUTO DIFF WBC: CPT

## 2022-04-15 PROCEDURE — 80053 COMPREHEN METABOLIC PANEL: CPT

## 2022-05-03 PROBLEM — F02.80 ALZHEIMER'S DEMENTIA WITHOUT BEHAVIORAL DISTURBANCE (HCC): Status: ACTIVE | Noted: 2022-05-03

## 2022-05-03 PROBLEM — G30.9 ALZHEIMER'S DEMENTIA WITHOUT BEHAVIORAL DISTURBANCE (HCC): Status: ACTIVE | Noted: 2022-05-03

## 2022-05-24 ENCOUNTER — NURSE ONLY (OUTPATIENT)
Dept: INTERNAL MEDICINE CLINIC | Facility: CLINIC | Age: 77
End: 2022-05-24
Payer: MEDICARE

## 2022-05-24 DIAGNOSIS — N39.0 RECURRENT UTI: ICD-10-CM

## 2022-05-24 DIAGNOSIS — R41.82 ALTERED MENTAL STATUS, UNSPECIFIED ALTERED MENTAL STATUS TYPE: ICD-10-CM

## 2022-05-24 DIAGNOSIS — F02.80 ALZHEIMER'S DEMENTIA WITHOUT BEHAVIORAL DISTURBANCE, UNSPECIFIED TIMING OF DEMENTIA ONSET: ICD-10-CM

## 2022-05-24 DIAGNOSIS — R41.82 ALTERED MENTAL STATUS, UNSPECIFIED ALTERED MENTAL STATUS TYPE: Primary | ICD-10-CM

## 2022-05-24 DIAGNOSIS — R82.81 PYURIA: Primary | ICD-10-CM

## 2022-05-24 DIAGNOSIS — G30.9 ALZHEIMER'S DEMENTIA WITHOUT BEHAVIORAL DISTURBANCE, UNSPECIFIED TIMING OF DEMENTIA ONSET: ICD-10-CM

## 2022-05-24 LAB
BILIRUBIN, URINE, POC: NEGATIVE
BLOOD URINE, POC: ABNORMAL
GLUCOSE URINE, POC: NEGATIVE
KETONES, URINE, POC: NEGATIVE
LEUKOCYTE ESTERASE, URINE, POC: ABNORMAL
NITRITE, URINE, POC: POSITIVE
PH, URINE, POC: 5 (ref 4.6–8)
PROTEIN,URINE, POC: NEGATIVE
SPECIFIC GRAVITY, URINE, POC: 1.01 (ref 1–1.03)
URINALYSIS CLARITY, POC: ABNORMAL
URINALYSIS COLOR, POC: ABNORMAL
UROBILINOGEN, POC: 0.2

## 2022-05-24 PROCEDURE — 81003 URINALYSIS AUTO W/O SCOPE: CPT | Performed by: INTERNAL MEDICINE

## 2022-05-24 RX ORDER — NITROFURANTOIN MACROCRYSTALS 50 MG/1
50 CAPSULE ORAL 4 TIMES DAILY
Qty: 28 CAPSULE | Refills: 0 | Status: SHIPPED | OUTPATIENT
Start: 2022-05-24 | End: 2022-05-31

## 2022-05-24 RX ORDER — TRIMETHOPRIM 100 MG/1
100 TABLET ORAL DAILY
Qty: 30 TABLET | Refills: 5 | Status: SHIPPED | OUTPATIENT
Start: 2022-05-24 | End: 2022-06-23

## 2022-05-24 NOTE — PROGRESS NOTES
HPI    Past Medical History, Past Surgical History, Family history, Social History, and Medications were all reviewed with the patient today and updated as necessary. Current Outpatient Medications   Medication Sig Dispense Refill    TURMERIC PO Take 1 capsule by mouth daily      azelastine (ASTELIN) 0.1 % nasal spray 1 spray by Nasal route 2 times daily      vitamin D 25 MCG (1000 UT) CAPS Take 1,000 Units by mouth daily      diclofenac sodium (VOLTAREN) 1 % GEL Apply 4 g topically 4 times daily      diphenhydrAMINE (SOMINEX) 25 MG tablet Take 25 mg by mouth every 6 hours as needed      donepezil (ARICEPT) 10 MG tablet TAKE ONE TABLET AT BEDTIME.  DULoxetine (CYMBALTA) 60 MG extended release capsule TAKE 2 CAPSULES DAILY.  hydroCHLOROthiazide (HYDRODIURIL) 25 MG tablet TAKE 1 TABLET BY MOUTH EACH DAY.  hydroxychloroquine (PLAQUENIL) 200 MG tablet Take 1 pill once a day after food.  memantine (NAMENDA) 10 MG tablet Take 10 mg by mouth 2 times daily      metoprolol succinate (TOPROL XL) 50 MG extended release tablet TAKE ONE TABLET DAILY FOR BLOOD PRESSURE      Naproxen Sodium 220 MG CAPS Take 1 capsule by mouth 2 times daily      nitrofurantoin (MACRODANTIN) 50 MG capsule Take 50 mg by mouth 4 times daily      omeprazole (PRILOSEC) 40 MG delayed release capsule TAKE 1 CAPSULE EACH DAY. No current facility-administered medications for this visit.      Allergies   Allergen Reactions    Levofloxacin Other (See Comments)    Penicillins Shortness Of Breath and Swelling    Poison [place] Extract Anaphylaxis    Bupropion Other (See Comments)     Other anxiety      Sulfamethoxazole Hives and Other (See Comments)     She thinks she does not do well with this    Montelukast Anxiety     Patient Active Problem List   Diagnosis    Vitamin D deficiency    Anxiety    Chronic pain of both knees    HLD (hyperlipidemia)    Trochanteric bursitis of right hip    Dehydration    Chronic maxillary sinusitis    Fall    Depression    Pure hypercholesterolemia    Perennial allergic rhinitis    Dementia of the Alzheimer's type, with late onset, uncomplicated ([facility]    Nonallergic rhinitis    Acquired hypothyroidism    Balance disorder    Spondylolisthesis of lumbar region    Dyspnea on exertion    Exposure to viral disease    Primary osteoarthritis    Spinal stenosis of lumbar region without neurogenic claudication    Gait difficulty    Hyperglycemia    Depression with anxiety    Gastroesophageal reflux disease with esophagitis    Osteopenia    Hypertension    Alzheimer's dementia without behavioral disturbance ([facility]     Past Medical History:   Diagnosis Date    Alcohol abuse, daily use 2014    Depression 2014    Lumbar stenosis 2012    Osteopenia 3/19/2015    Sinus problem     Spondylolisthesis of lumbar region 2012    Vitamin D deficiency 2014     Past Surgical History:   Procedure Laterality Date    BACK SURGERY  12    L3-L4 Metrix TLIF with sextant/bilateral decompression - Dr. Corbin Chavira LUMPECTOMY Bilateral     HEENT Bilateral     sinus    HYSTERECTOMY      complete    OTHER SURGICAL HISTORY      sinuses x2    TONSILLECTOMY      WRIST FRACTURE SURGERY       Family History   Problem Relation Age of Onset    Osteoarthritis Mother     Heart Disease Mother     Stroke Mother     Breast Cancer Neg Hx     Coronary Art Dis Maternal Uncle     Heart Attack Mother     Hypertension Sister     Alzheimer's Disease Father 48    Dementia Father     Stroke Father     Heart Disease Sister         stent and AVR     Social History     Tobacco Use    Smoking status: Former Smoker     Packs/day: 1.00     Quit date: 1972     Years since quittin.3    Smokeless tobacco: Never Used   Substance Use Topics    Alcohol use:  Yes     Alcohol/week: 14.0 standard drinks         Review of Systems      OBJECTIVE:  There were no vitals taken for this visit. Physical Exam     Medical problems and test results were reviewed with the patient today. Recent Results (from the past 672 hour(s))   AMB POC URINALYSIS DIP STICK MANUAL W/O MICRO    Collection Time: 04/29/22  1:49 PM   Result Value Ref Range    Color (UA POC) Light Yellow     Clarity (UA POC) Cloudy     Glucose, Urine, POC Negative Negative    Bilirubin, Urine, POC Negative Negative    KETONES, Urine, POC Negative Negative    Specific Gravity, Urine, POC 1.005 1.001 - 1.035 NA    Blood (UA POC) Trace Negative    pH, Urine, POC 6.5 4.6 - 8.0 NA    Protein, Urine, POC Negative Negative    Urobilinogen, POC 0.2 mg/dL 0.2 - 1    Nitrite, Urine, POC Positive Negative    Leukocyte Esterase, Urine, POC 3+ Negative   Culture, Urine    Collection Time: 04/29/22  2:02 PM    Specimen: Urine   Result Value Ref Range    Urine Culture, Routine ESCHERICHIA COLI (A)        Susceptibility    Escherichia coli - CULTURE, URINE*     amoxicillin-clavulanate S Sensitive ug/mL     ampicillin R Resistant ug/mL     cefepime S Sensitive ug/mL     cefTRIAXone S Sensitive ug/mL     cefuroxime S Sensitive ug/mL     ciprofloxacin R Resistant ug/mL     ertapenem S Sensitive ug/mL     gentamicin S Sensitive ug/mL     imipenem S Sensitive ug/mL     levofloxacin R Resistant ug/mL     meropenem S Sensitive ug/mL     nitrofurantoin S Sensitive ug/mL     piperacillin-tazobactam S Sensitive ug/mL     tetracycline S Sensitive ug/mL     tobramycin S Sensitive ug/mL     trimethoprim-sulfamethoxazole S Sensitive ug/mL     * R41.82^Altered mental status, unspecified^ICD-10-CM^^^^^^Altered mental status, unspecified         ASSESSMENT and PLAN    There are no diagnoses linked to this encounter. No follow-ups on file.

## 2022-05-26 ENCOUNTER — OFFICE VISIT (OUTPATIENT)
Dept: RHEUMATOLOGY | Age: 77
End: 2022-05-26
Payer: MEDICARE

## 2022-05-26 VITALS
HEIGHT: 62 IN | WEIGHT: 144 LBS | BODY MASS INDEX: 26.5 KG/M2 | DIASTOLIC BLOOD PRESSURE: 75 MMHG | HEART RATE: 64 BPM | SYSTOLIC BLOOD PRESSURE: 130 MMHG

## 2022-05-26 DIAGNOSIS — M11.269 PSEUDOGOUT OF KNEE, UNSPECIFIED LATERALITY: Primary | ICD-10-CM

## 2022-05-26 DIAGNOSIS — Z79.899 LONG-TERM USE OF HIGH-RISK MEDICATION: ICD-10-CM

## 2022-05-26 LAB
BACTERIA SPEC CULT: ABNORMAL
SERVICE CMNT-IMP: ABNORMAL

## 2022-05-26 PROCEDURE — G8399 PT W/DXA RESULTS DOCUMENT: HCPCS | Performed by: INTERNAL MEDICINE

## 2022-05-26 PROCEDURE — 99214 OFFICE O/P EST MOD 30 MIN: CPT | Performed by: INTERNAL MEDICINE

## 2022-05-26 PROCEDURE — 1036F TOBACCO NON-USER: CPT | Performed by: INTERNAL MEDICINE

## 2022-05-26 PROCEDURE — G8427 DOCREV CUR MEDS BY ELIG CLIN: HCPCS | Performed by: INTERNAL MEDICINE

## 2022-05-26 PROCEDURE — 1090F PRES/ABSN URINE INCON ASSESS: CPT | Performed by: INTERNAL MEDICINE

## 2022-05-26 PROCEDURE — 1123F ACP DISCUSS/DSCN MKR DOCD: CPT | Performed by: INTERNAL MEDICINE

## 2022-05-26 PROCEDURE — G8417 CALC BMI ABV UP PARAM F/U: HCPCS | Performed by: INTERNAL MEDICINE

## 2022-05-26 RX ORDER — HYDROXYCHLOROQUINE SULFATE 200 MG/1
TABLET, FILM COATED ORAL
Qty: 90 TABLET | Refills: 0 | Status: SHIPPED | OUTPATIENT
Start: 2022-05-26

## 2022-05-26 NOTE — PROGRESS NOTES
Annette Martinez M.D.  [address] [place] [address], [address] [facility]  Office : [phone], Fax: [phone] OFFICE VISIT NOTE  Date of Visit:  2022 2:57 PM    Patient Information:  Name:  Serena Ramirez  :  1945  Age:  68 y.o. Gender:  female      Ms. Donalee Cranker is here today for follow-up of pseudogout. Last visit: 2022      History of Present Illness: On talking to the patient's spouse he states that she has had increased knee pain and has been complaining of fatigue. Her last eye exam was at [facility] in 2022 and we are in the process of obtaining those notes. Since she last saw me she has had a fall 3 weeks ago and hurt her right leg and right shoulder blade. She does have some bruising involving the posterior aspect of the right leg. Her other current joint complaints are as mentioned below. Since the last visit, patient is feeling \"poor to fair\". Pain: 510  Location:  Has had bilateral knee pain right worse than the left knee   Quality:  Achy to sharp in nature. Modifying Factors: Certain positions bring on the pain. Associated Symptoms:  Has not been dropping things more often. Needs help with her ADL's.     Last TB screen: Unsure  TB result: Unsure     Current dose of steroids: None  How long on current dose of steroids:na  How long on continuous steroid therapy:na     Past DMARDs, if applicable (methotrexate, plaquenil/hydroxychloroquine, sulfasalazine, Arava/leflunomide): Currently on Plaquenil 200 mg once a day.      Past biologics, if applicable (enbrel, humira, simponi, cimzia, xeljanz, orencia, remicade, simponi aria, actemra, rituximab, Selam Eber, stelara, cosentyx):none     Past NSAIDs, if applicable (motrin, aleve, naproxen, advil, ibuprofen, celebrex, voltaren/diclofenac, etc.):Aleve         Last BMD:2020- due   Past osteoporosis drugs, if applicable (fosamax, actonel, boniva, reclast, prolia, forteo):none     BMI:26.34  Current exercise regimen, if any:none  Current vitamin D dose:5,000 international unit's daily. Current calcium dose:MV  Fractures since last visit, if any: None        The patient otherwise has no significant interval changes in health or medical history to report. History Reviewed:    Past Medical History  Past Medical History:   Diagnosis Date    Alcohol abuse, daily use 2014    Depression 2014    Lumbar stenosis 2012    Osteopenia 3/19/2015    Sinus problem     Spondylolisthesis of lumbar region 2012    Vitamin D deficiency 2014       Past Surgical History  Past Surgical History:   Procedure Laterality Date    BACK SURGERY  12    L3-L4 Metrix TLIF with sextant/bilateral decompression - Dr. Melita Lara BREAST LUMPECTOMY Bilateral     HEENT Bilateral 2015    sinus    HYSTERECTOMY      complete    OTHER SURGICAL HISTORY      sinuses x2    TONSILLECTOMY      WRIST FRACTURE SURGERY         Family History  Family History   Problem Relation Age of Onset    Osteoarthritis Mother     Heart Disease Mother     Stroke Mother     Breast Cancer Neg Hx     Coronary Art Dis Maternal Uncle     Heart Attack Mother     Hypertension Sister     Alzheimer's Disease Father 48    Dementia Father     Stroke Father     Heart Disease Sister         stent and AVR       Social History  Social History     Socioeconomic History    Marital status:      Spouse name: None    Number of children: None    Years of education: 1 post HS    Highest education level: None   Occupational History    None   Tobacco Use    Smoking status: Former Smoker     Packs/day: 1.00     Quit date: 1972     Years since quittin.3    Smokeless tobacco: Never Used   Substance and Sexual Activity    Alcohol use:  Yes     Alcohol/week: 14.0 standard drinks    Drug use: No    Sexual activity: None   Other Topics Concern    None   Social History Narrative , not active, gets up around 12 noon and then does things in the afternoon if a friend comes over. Unable to watch TV as cant figure out the controls. Social Determinants of Health     Financial Resource Strain:     Difficulty of Paying Living Expenses: Not on file   Food Insecurity:     Worried About Running Out of Food in the Last Year: Not on file    Joseluis of Food in the Last Year: Not on file   Transportation Needs:     Lack of Transportation (Medical): Not on file    Lack of Transportation (Non-Medical):  Not on file   Physical Activity:     Days of Exercise per Week: Not on file    Minutes of Exercise per Session: Not on file   Stress:     Feeling of Stress : Not on file   Social Connections:     Frequency of Communication with Friends and Family: Not on file    Frequency of Social Gatherings with Friends and Family: Not on file    Attends Rastafarian Services: Not on file    Active Member of [address] [place] [address] or Organizations: Not on file    Attends Club or Organization Meetings: Not on file    Marital Status: Not on file   Intimate Partner Violence:     Fear of Current or Ex-Partner: Not on file    Emotionally Abused: Not on file    Physically Abused: Not on file    Sexually Abused: Not on file   Housing Stability:     Unable to Pay for Housing in the Last Year: Not on file    Number of [place] in the Last Year: Not on file    Unstable Housing in the Last Year: Not on file               Allergy:  Allergies   Allergen Reactions    Levofloxacin Other (See Comments)    Penicillins Shortness Of Breath and Swelling    Poison [place] Extract Anaphylaxis    Bupropion Other (See Comments)     Other anxiety      Sulfamethoxazole Hives and Other (See Comments)     She thinks she does not do well with this    Montelukast Anxiety         Current Medications:  Outpatient Encounter Medications as of 5/26/2022   Medication Sig Dispense Refill    hydroxychloroquine (PLAQUENIL) 200 mg tablet Take 1 pill once a day after food. 90 tablet 0    nitrofurantoin (MACRODANTIN) 50 MG capsule Take 1 capsule by mouth 4 times daily for 7 days 28 capsule 0    trimethoprim (TRIMPEX) 100 MG tablet Take 1 tablet by mouth daily Take daily after finishing the macrodantin 30 tablet 5    TURMERIC PO Take 1 capsule by mouth daily      azelastine (ASTELIN) 0.1 % nasal spray 1 spray by Nasal route 2 times daily      vitamin D 25 MCG (1000 UT) CAPS Take 1,000 Units by mouth daily      diclofenac sodium (VOLTAREN) 1 % GEL Apply 4 g topically 4 times daily      diphenhydrAMINE (SOMINEX) 25 MG tablet Take 25 mg by mouth every 6 hours as needed      donepezil (ARICEPT) 10 MG tablet TAKE ONE TABLET AT BEDTIME.  DULoxetine (CYMBALTA) 60 MG extended release capsule TAKE 2 CAPSULES DAILY.  hydroCHLOROthiazide (HYDRODIURIL) 25 MG tablet TAKE 1 TABLET BY MOUTH EACH DAY.  memantine (NAMENDA) 10 MG tablet Take 10 mg by mouth 2 times daily      metoprolol succinate (TOPROL XL) 50 MG extended release tablet TAKE ONE TABLET DAILY FOR BLOOD PRESSURE      Naproxen Sodium 220 MG CAPS Take 1 capsule by mouth 2 times daily      omeprazole (PRILOSEC) 40 MG delayed release capsule TAKE 1 CAPSULE EACH DAY.  [DISCONTINUED] hydroxychloroquine (PLAQUENIL) 200 MG tablet Take 1 pill once a day after food. No facility-administered encounter medications on file as of 5/26/2022.            REVIEW OF SYSTEMS: The following systems were reviewed with patient today and were negative except for the following (depicted with an \"X\"):        \"X\" General  \"X\" Head and Neck  \"X\" Heart and Breathing  \"X\" Gastrointestinal    Fever/chills  x Hair loss   Shortness of breath   Upset stomach   x Falls   Dry mouth   Coughing   Diarrhea / constipation    Wt loss   Mouth sores   Wheezing   Heartburn    Wt gain   Ringing ears   Chest pain   Dark or bloody stools    Night sweats   Diff. swallowing  X None of above   Nausea or vomiting    None of above   None of above     X None of above                \"X\" Skin  \"X\" Neurology  \"X\" Urinary/Gyn  \"X\" Other   x Easy bruising   Numbness/ tingling   Female problems  x Depression    Rashes   Weakness  x Problems with urination   Feeling anxious    Sun sensitivity   Headaches   None of above   Problems sleeping    None of above  X None of above      None of above          Physical Exam:  Blood pressure 130/75, pulse 64, height 5' 2\" (1.575 m), weight 144 lb (65.3 kg). General:  Patient alert, cooperative and in no apparent distress. HEENT: Pupils equally reactive to light and accommodation, no scleral injection noted. Heart: Regular rate and rhythm, normal S1 and S2, no rubs or gallops. Lungs: Clear to auscultation bilaterally. Abdomen: Soft, nontender, no hepatosplenomegaly. Skin:  No rashes. No nail abnormalities. Neurologic:  Oriented, normal speech and affect. Normal gait. Extremities:  No edema in bilateral lower extremities with no cyanosis or clubbing. Muskoskeletal Exam:     I examined the shoulders, elbows, wrists, MCPs, PIPs, DIPs and knees bilaterally for strength, range of motion, deformity, tenderness, swelling, and synovitis. The findings are: Does have tenderness on palpation of the C-spine, T-spine and L-spine with no paraspinal or SI joint tenderness. Does have tenderness on palpation of the knees in the mid joint line with no synovitis, warmth or redness. Does have tenderness on palpation of the right ankle laterally with no synovitis, warmth or redness. Patient otherwise has a normal joint exam without other evidence of joint tenderness, synovitis, warmth, erythema, decreased ROM, weakness or deformities.      Radiology Reports Reviewed (if available):  Last 3 months  [unfilled]    Lab Reports Reviewed (if available): Last 3 months    Orders Only on 05/24/2022   Component Date Value Ref Range Status    Special Requests 05/24/2022 NO SPECIAL REQUESTS    Preliminary    Culture 05/24/2022 >100,000 COLONIES/mL GRAM NEGATIVE RODS IDENTIFICATION AND SUSCEPTIBILITY TO FOLLOW*   Preliminary   Nurse Only on 05/24/2022   Component Date Value Ref Range Status    Color, Urine, POC 05/24/2022 yell   Final    Clarity, Urine, POC 05/24/2022 turb   Final    Glucose, Urine, POC 05/24/2022 Negative  Negative Final    Bilirubin, Urine, POC 05/24/2022 Negative  Negative Final    KETONES, Urine, POC 05/24/2022 Negative  Negative Final    Specific Gravity, Urine, POC 05/24/2022 1.015  1.001 - 1.035 Final    Blood, Urine, POC 05/24/2022 neg  Negative Final    pH, Urine, POC 05/24/2022 5.0  4.6 - 8.0 Final    Protein, Urine, POC 05/24/2022 Negative  Negative Final    Urobilinogen, POC 05/24/2022 0.2   Final    Nitrate, Urine, POC 05/24/2022 Positive  Negative Corrected    Leukocyte Esterase, Urine, POC 05/24/2022 2+  Negative Final   Office Visit on 04/28/2022   Component Date Value Ref Range Status    Color (UA POC) 04/29/2022 Light Yellow   Final    Clarity (UA POC) 04/29/2022 Cloudy   Final    Glucose, Urine, POC 04/29/2022 Negative  Negative Final    Bilirubin, Urine, POC 04/29/2022 Negative  Negative Final    KETONES, Urine, POC 04/29/2022 Negative  Negative Final    Specific Gravity, Urine, POC 04/29/2022 1.005  1.001 - 1.035 NA Final    Blood (UA POC) 04/29/2022 Trace  Negative Final    pH, Urine, POC 04/29/2022 6.5  4.6 - 8.0 NA Final    Protein, Urine, POC 04/29/2022 Negative  Negative Final    Urobilinogen, POC 04/29/2022 0.2 mg/dL  0.2 - 1 Final    Nitrite, Urine, POC 04/29/2022 Positive  Negative Final    Leukocyte Esterase, Urine, POC 04/29/2022 3+  Negative Final    Urine Culture, Routine 04/29/2022 ESCHERICHIA COLI*  Final    Comment: Escherichia coli  Identified by an automated biochemical system.   Greater than 100,000 colony forming units per mL     Office Visit on 04/14/2022   Component Date Value Ref Range Status    Urine Culture, Routine 04/14/2022 No growth   Final    Color (UA POC) 04/14/2022 Yellow   Final    Clarity (UA POC) 04/14/2022 Clear   Final    Glucose, Urine, POC 04/14/2022 Negative  Negative Final    Bilirubin, Urine, POC 04/14/2022 Negative  Negative Final    KETONES, Urine, POC 04/14/2022 Negative  Negative Final    Specific Gravity, Urine, POC 04/14/2022 1.015  1.001 - 1.035 NA Final    Blood (UA POC) 04/14/2022 Negative  Negative Final    pH, Urine, POC 04/14/2022 7.0  4.6 - 8.0 NA Final    Protein, Urine, POC 04/14/2022 Negative  Negative Final    Urobilinogen, POC 04/14/2022 0.2 mg/dL  0.2 - 1 Final    Nitrite, Urine, POC 04/14/2022 Negative  Negative Final    Leukocyte Esterase, Urine, POC 04/14/2022 2+  Negative Final   Office Visit on 03/31/2022   Component Date Value Ref Range Status    Urine Culture, Routine 03/31/2022 ESCHERICHIA COLI*  Final    Comment: Escherichia coli  Identified by an automated biochemical system. Cefazolin <=4 ug/mL  Cefazolin with an MICKY <=16 predicts susceptibility to the oral agents  cefaclor, cefdinir, cefpodoxime, cefprozil, cefuroxime, cephalexin,  and loracarbef when used for therapy of uncomplicated urinary tract  infections due to E. coli, Klebsiella pneumoniae, and Proteus  mirabilis. Greater than 100,000 colony forming units per mL           The results above were reviewed and discussed with patient. Assessment/Plan:   Cornelio Rosales is a 68 y.o. female who presents with:     1. Pseudogout of knee, unspecified laterality: She was instructed to continue hydroxychloroquine 200 mg to be taken once a day after food. Patient was advised to continue to keep up with yearly eye exams to monitor for side effects of Plaquenil involving the eyes. -     hydroxychloroquine (PLAQUENIL) 200 mg tablet; Take 1 pill once a day after food.     2. Long-term use of high-risk medication: If there is any noted abnormality I will keep the patient informed but if not I will review her labs with her on follow-up. -     CBC with Auto Differential; Future  -     Comprehensive Metabolic Panel; Future  -     Comprehensive Metabolic Panel  -     CBC with Auto Differential    Disease activity plan:  As stated above. Steroid management plan:  As stated above, if applicable. Pain management plan:  As stated above, if applicable. Weight management plan:  Weight loss through diet and exercise is always encouraged    Disease prognosis: Good        I appreciate the opportunity to continue to participate in the care of this patient. Follow-up and Dispositions    · Return in about 3 months (around 8/26/2022). Electronically signed by:  Saira Luevano MD      This note was dictated using dragon voice recognition software.   It has been proofread, but there may still exist voice recognition errors that the author did not detect.                --------------------------------------------------------------------------------------------------------------------------------------------------------------------------------------------------------------------------------

## 2022-05-27 LAB
ALBUMIN SERPL-MCNC: 4.1 G/DL (ref 3.2–4.6)
ALBUMIN/GLOB SERPL: 1.5 {RATIO} (ref 1.2–3.5)
ALP SERPL-CCNC: 78 U/L (ref 50–136)
ALT SERPL-CCNC: 28 U/L (ref 12–65)
ANION GAP SERPL CALC-SCNC: 7 MMOL/L (ref 7–16)
AST SERPL-CCNC: 19 U/L (ref 15–37)
BASOPHILS # BLD: 0.1 K/UL (ref 0–0.2)
BASOPHILS NFR BLD: 1 % (ref 0–2)
BILIRUB SERPL-MCNC: 0.4 MG/DL (ref 0.2–1.1)
BUN SERPL-MCNC: 26 MG/DL (ref 8–23)
CALCIUM SERPL-MCNC: 9.7 MG/DL (ref 8.3–10.4)
CHLORIDE SERPL-SCNC: 105 MMOL/L (ref 98–107)
CO2 SERPL-SCNC: 28 MMOL/L (ref 21–32)
CREAT SERPL-MCNC: 0.9 MG/DL (ref 0.6–1)
DIFFERENTIAL METHOD BLD: ABNORMAL
EOSINOPHIL # BLD: 0.3 K/UL (ref 0–0.8)
EOSINOPHIL NFR BLD: 4 % (ref 0.5–7.8)
ERYTHROCYTE [DISTWIDTH] IN BLOOD BY AUTOMATED COUNT: 12.9 % (ref 11.9–14.6)
GLOBULIN SER CALC-MCNC: 2.8 G/DL (ref 2.3–3.5)
GLUCOSE SERPL-MCNC: 86 MG/DL (ref 65–100)
HCT VFR BLD AUTO: 38.5 % (ref 35.8–46.3)
HGB BLD-MCNC: 12.5 G/DL (ref 11.7–15.4)
IMM GRANULOCYTES # BLD AUTO: 0 K/UL (ref 0–0.5)
IMM GRANULOCYTES NFR BLD AUTO: 0 % (ref 0–5)
LYMPHOCYTES # BLD: 1.7 K/UL (ref 0.5–4.6)
LYMPHOCYTES NFR BLD: 25 % (ref 13–44)
MCH RBC QN AUTO: 30.5 PG (ref 26.1–32.9)
MCHC RBC AUTO-ENTMCNC: 32.5 G/DL (ref 31.4–35)
MCV RBC AUTO: 93.9 FL (ref 79.6–97.8)
MONOCYTES # BLD: 0.6 K/UL (ref 0.1–1.3)
MONOCYTES NFR BLD: 8 % (ref 4–12)
NEUTS SEG # BLD: 4.4 K/UL (ref 1.7–8.2)
NEUTS SEG NFR BLD: 62 % (ref 43–78)
NRBC # BLD: 0 K/UL (ref 0–0.2)
PLATELET # BLD AUTO: 314 K/UL (ref 150–450)
PMV BLD AUTO: 9.1 FL (ref 9.4–12.3)
POTASSIUM SERPL-SCNC: 4.3 MMOL/L (ref 3.5–5.1)
PROT SERPL-MCNC: 6.9 G/DL (ref 6.3–8.2)
RBC # BLD AUTO: 4.1 M/UL (ref 4.05–5.2)
SODIUM SERPL-SCNC: 140 MMOL/L (ref 136–145)
WBC # BLD AUTO: 7 K/UL (ref 4.3–11.1)

## 2022-05-31 PROBLEM — N39.0 RECURRENT UTI: Status: ACTIVE | Noted: 2022-05-31

## 2022-06-01 ENCOUNTER — OFFICE VISIT (OUTPATIENT)
Dept: INTERNAL MEDICINE CLINIC | Facility: CLINIC | Age: 77
End: 2022-06-01
Payer: MEDICARE

## 2022-06-01 VITALS
HEIGHT: 62 IN | DIASTOLIC BLOOD PRESSURE: 82 MMHG | BODY MASS INDEX: 27.05 KG/M2 | WEIGHT: 147 LBS | SYSTOLIC BLOOD PRESSURE: 118 MMHG

## 2022-06-01 DIAGNOSIS — N39.0 RECURRENT UTI: ICD-10-CM

## 2022-06-01 DIAGNOSIS — G30.1 DEMENTIA OF THE ALZHEIMER'S TYPE, WITH LATE ONSET, UNCOMPLICATED (HCC): ICD-10-CM

## 2022-06-01 DIAGNOSIS — M70.61 TROCHANTERIC BURSITIS OF RIGHT HIP: ICD-10-CM

## 2022-06-01 DIAGNOSIS — F02.80 DEMENTIA OF THE ALZHEIMER'S TYPE, WITH LATE ONSET, UNCOMPLICATED (HCC): ICD-10-CM

## 2022-06-01 DIAGNOSIS — F02.80 LATE ONSET ALZHEIMER'S DEMENTIA WITHOUT BEHAVIORAL DISTURBANCE (HCC): Primary | ICD-10-CM

## 2022-06-01 DIAGNOSIS — I10 PRIMARY HYPERTENSION: ICD-10-CM

## 2022-06-01 DIAGNOSIS — E03.9 ACQUIRED HYPOTHYROIDISM: ICD-10-CM

## 2022-06-01 DIAGNOSIS — G30.1 LATE ONSET ALZHEIMER'S DEMENTIA WITHOUT BEHAVIORAL DISTURBANCE (HCC): Primary | ICD-10-CM

## 2022-06-01 PROCEDURE — 1090F PRES/ABSN URINE INCON ASSESS: CPT | Performed by: INTERNAL MEDICINE

## 2022-06-01 PROCEDURE — G8417 CALC BMI ABV UP PARAM F/U: HCPCS | Performed by: INTERNAL MEDICINE

## 2022-06-01 PROCEDURE — 99214 OFFICE O/P EST MOD 30 MIN: CPT | Performed by: INTERNAL MEDICINE

## 2022-06-01 PROCEDURE — 1036F TOBACCO NON-USER: CPT | Performed by: INTERNAL MEDICINE

## 2022-06-01 PROCEDURE — 1123F ACP DISCUSS/DSCN MKR DOCD: CPT | Performed by: INTERNAL MEDICINE

## 2022-06-01 PROCEDURE — G8427 DOCREV CUR MEDS BY ELIG CLIN: HCPCS | Performed by: INTERNAL MEDICINE

## 2022-06-01 PROCEDURE — G8399 PT W/DXA RESULTS DOCUMENT: HCPCS | Performed by: INTERNAL MEDICINE

## 2022-06-01 RX ORDER — LISINOPRIL 20 MG/1
20 TABLET ORAL DAILY
COMMUNITY
End: 2022-06-01 | Stop reason: SDUPTHER

## 2022-06-01 RX ORDER — LISINOPRIL 20 MG/1
20 TABLET ORAL DAILY
Qty: 90 TABLET | Refills: 3 | Status: ON HOLD | OUTPATIENT
Start: 2022-06-01 | End: 2022-07-17 | Stop reason: HOSPADM

## 2022-06-01 RX ORDER — DIVALPROEX SODIUM 125 MG/1
125 TABLET, DELAYED RELEASE ORAL 3 TIMES DAILY PRN
COMMUNITY

## 2022-06-01 NOTE — PROGRESS NOTES
She comes in with her  and had had a dramatic change in her level of functioning, she can barely speak, tries to leave the exam room several times and turns on the water aimlessly and folds up the paper on the exam table. She has been this way since her last UTI and about to finish her antibiotics. She was started on depakote at [facility] in [address] [place] [address] and was given the names of some local facilities that could care for her. She is undergoing cardiac evaluation first. She will come back for repeat urine culture in 2 weeks and still stay on trimethoprim. Past Medical History, Past Surgical History, Family history, Social History, and Medications were all reviewed with the patient today and updated as necessary. She has started depakote for sleep difficulty and agitation adam in the evenings, she is sleeping some better. she is getting up earlier. And now has help in the am.    Current Outpatient Medications   Medication Sig Dispense Refill    lisinopril (PRINIVIL;ZESTRIL) 20 MG tablet Take 20 mg by mouth daily      divalproex (DEPAKOTE) 125 MG DR tablet Take 125 mg by mouth 3 times daily as needed       hydroxychloroquine (PLAQUENIL) 200 mg tablet Take 1 pill once a day after food. 90 tablet 0    trimethoprim (TRIMPEX) 100 MG tablet Take 1 tablet by mouth daily Take daily after finishing the macrodantin 30 tablet 5    TURMERIC PO Take 1 capsule by mouth daily      azelastine (ASTELIN) 0.1 % nasal spray 1 spray by Nasal route 2 times daily      vitamin D 25 MCG (1000 UT) CAPS Take 1,000 Units by mouth daily      diclofenac sodium (VOLTAREN) 1 % GEL Apply 4 g topically 4 times daily      diphenhydrAMINE (SOMINEX) 25 MG tablet Take 25 mg by mouth every 6 hours as needed      donepezil (ARICEPT) 10 MG tablet TAKE ONE TABLET AT BEDTIME.  DULoxetine (CYMBALTA) 60 MG extended release capsule TAKE 2 CAPSULES DAILY.       hydroCHLOROthiazide (HYDRODIURIL) 25 MG tablet TAKE 1 TABLET BY MOUTH EACH DAY.  memantine (NAMENDA) 10 MG tablet Take 10 mg by mouth 2 times daily      metoprolol succinate (TOPROL XL) 50 MG extended release tablet TAKE ONE TABLET DAILY FOR BLOOD PRESSURE      Naproxen Sodium 220 MG CAPS Take 1 capsule by mouth 2 times daily      omeprazole (PRILOSEC) 40 MG delayed release capsule TAKE 1 CAPSULE EACH DAY. No current facility-administered medications for this visit. Allergies   Allergen Reactions    Levofloxacin Other (See Comments)    Penicillins Shortness Of Breath and Swelling    Poison [place] Extract Anaphylaxis    Bupropion Other (See Comments)     Other anxiety      Sulfamethoxazole Hives and Other (See Comments)     She thinks she does not do well with this    Montelukast Anxiety     Patient Active Problem List   Diagnosis    Vitamin D deficiency    Anxiety    Chronic pain of both knees    HLD (hyperlipidemia)    Trochanteric bursitis of right hip    Dehydration    Chronic maxillary sinusitis    Fall    Depression    Pure hypercholesterolemia    Perennial allergic rhinitis    Dementia of the Alzheimer's type, with late onset, uncomplicated (HCC)    Nonallergic rhinitis    Acquired hypothyroidism    Balance disorder    Spondylolisthesis of lumbar region    Dyspnea on exertion    Exposure to viral disease    Primary osteoarthritis    Spinal stenosis of lumbar region without neurogenic claudication    Gait difficulty    Hyperglycemia    Depression with anxiety    Gastroesophageal reflux disease with esophagitis    Osteopenia    Hypertension    Alzheimer's dementia without behavioral disturbance (HCC)    Recurrent UTI         Review of Systems   Psychiatric/Behavioral: Positive for confusion and sleep disturbance. Negative for behavioral problems.         Has not been aggressive, has not been able to sleep well and has been getting up in the middle of the night         OBJECTIVE:  /82   Ht 5' 2\" (1.575 m)   Wt 147 lb (66.7 kg)   BMI 26.89 kg/m²      Physical Exam  Constitutional:       General: She is not in acute distress. Comments: Blank staring face   Cardiovascular:      Rate and Rhythm: Normal rate and regular rhythm. Pulmonary:      Effort: Pulmonary effort is normal.      Breath sounds: Normal breath sounds. Medical problems and test results were reviewed with the patient today.      Recent Results (from the past 672 hour(s))   AMB POC URINALYSIS DIP STICK AUTO W/O MICRO    Collection Time: 05/24/22  9:37 AM   Result Value Ref Range    Color, Urine, POC yell     Clarity, Urine, POC turb     Glucose, Urine, POC Negative Negative    Bilirubin, Urine, POC Negative Negative    KETONES, Urine, POC Negative Negative    Specific Gravity, Urine, POC 1.015 1.001 - 1.035    Blood, Urine, POC neg Negative    pH, Urine, POC 5.0 4.6 - 8.0    Protein, Urine, POC Negative Negative    Urobilinogen, POC 0.2     Nitrate, Urine, POC Positive Negative    Leukocyte Esterase, Urine, POC 2+ Negative   Culture, Urine    Collection Time: 05/24/22  4:50 PM    Specimen: Urine   Result Value Ref Range    Special Requests NO SPECIAL REQUESTS      Culture >100,000 COLONIES/mL ESCHERICHIA COLI (A)         Susceptibility    Escherichia coli - BACTERIAL SUSCEPTIBILITY PANEL MICKY     trimethoprim-sulfamethoxazole <=0.5/9.5 Sensitive ug/mL     nitrofurantoin <=16 Sensitive ug/mL     ampicillin >16 Resistant ug/mL     gentamicin <=2 Sensitive ug/mL     tobramycin <=2 Sensitive ug/mL     ampicillin-sulbactam 16/8 Intermediate ug/mL     ceFAZolin 2 Sensitive ug/mL     cefTRIAXone <=1 Sensitive ug/mL     cefepime <=1 Sensitive ug/mL     piperacillin-tazobactam <=2/4 Sensitive ug/mL     aztreonam <=2 Sensitive ug/mL   Comprehensive Metabolic Panel    Collection Time: 05/26/22  3:03 PM   Result Value Ref Range    Sodium 140 136 - 145 mmol/L    Potassium 4.3 3.5 - 5.1 mmol/L    Chloride 105 98 - 107 mmol/L    CO2 28 21 - 32 mmol/L    Anion Gap 7 7 - 16 mmol/L    Glucose 86 65 - 100 mg/dL    BUN 26 (H) 8 - 23 MG/DL    CREATININE 0.90 0.6 - 1.0 MG/DL    GFR African American >60 >60 ml/min/1.73m2    GFR Non- >60 >60 ml/min/1.73m2    Calcium 9.7 8.3 - 10.4 MG/DL    Total Bilirubin 0.4 0.2 - 1.1 MG/DL    ALT 28 12 - 65 U/L    AST 19 15 - 37 U/L    Alk Phosphatase 78 50 - 136 U/L    Total Protein 6.9 6.3 - 8.2 g/dL    Albumin 4.1 3.2 - 4.6 g/dL    Globulin 2.8 2.3 - 3.5 g/dL    Albumin/Globulin Ratio 1.5 1.2 - 3.5     CBC with Auto Differential    Collection Time: 05/26/22  3:03 PM   Result Value Ref Range    WBC 7.0 4.3 - 11.1 K/uL    RBC 4.10 4.05 - 5.2 M/uL    Hemoglobin 12.5 11.7 - 15.4 g/dL    Hematocrit 38.5 35.8 - 46.3 %    MCV 93.9 79.6 - 97.8 FL    MCH 30.5 26.1 - 32.9 PG    MCHC 32.5 31.4 - 35.0 g/dL    RDW 12.9 11.9 - 14.6 %    Platelets 627 561 - 709 K/uL    MPV 9.1 (L) 9.4 - 12.3 FL    nRBC 0.00 0.0 - 0.2 K/uL    Differential Type AUTOMATED      Seg Neutrophils 62 43 - 78 %    Lymphocytes 25 13 - 44 %    Monocytes 8 4.0 - 12.0 %    Eosinophils % 4 0.5 - 7.8 %    Basophils 1 0.0 - 2.0 %    Immature Granulocytes 0 0.0 - 5.0 %    Segs Absolute 4.4 1.7 - 8.2 K/UL    Absolute Lymph # 1.7 0.5 - 4.6 K/UL    Absolute Mono # 0.6 0.1 - 1.3 K/UL    Absolute Eos # 0.3 0.0 - 0.8 K/UL    Basophils Absolute 0.1 0.0 - 0.2 K/UL    Absolute Immature Granulocyte 0.0 0.0 - 0.5 K/UL         ASSESSMENT and PLAN    1. Late onset Alzheimer's dementia without behavioral disturbance ([facility]  2. Recurrent UTI  3. Primary hypertension  4. Acquired hypothyroidism  5. Dementia of the Alzheimer's type, with late onset, uncomplicated ([facility] [address] [facility]   acute worsening of her level of functioning  Although not agitated     will await culture results, more home care provided, to finish up cardiac evaluation    Return in about 3 months (around 9/1/2022).

## 2022-06-13 DIAGNOSIS — N39.0 RECURRENT UTI: Primary | ICD-10-CM

## 2022-06-13 DIAGNOSIS — N39.0 RECURRENT UTI: ICD-10-CM

## 2022-06-15 ENCOUNTER — TELEPHONE (OUTPATIENT)
Dept: CARDIOLOGY CLINIC | Age: 77
End: 2022-06-15

## 2022-06-23 LAB
AEROBE ID RESULT 1: ABNORMAL
BACTERIA ISLT: ABNORMAL
SPECIMEN SOURCE: ABNORMAL

## 2022-06-26 NOTE — PROGRESS NOTES
[facility] CARDIOLOGY Follow Up                 Reason for Visit: Follow-up echocardiogram    Subjective:     Patient is a 68 y.o. female with a PMH of hypertension, prior alcohol use, and Alzheimer's dementia who presents for follow-up. The patient was last seen in May 2022. An echocardiogram was ordered. It was noted that after a shared decision-making strategy with the patient's , an echocardiogram was ordered as a first step in evaluation for HOLLY in the setting of progressive dementia and lack of decision-making capacity. The patient had an echocardiogram in June 2022 that was noted to demonstrate a normal EF. The patient is unable to answer questions. The patient saw her geriatrics physician in May 2022. It was noted that the patient has \"chronic, progressive with ongoing decline\" in terms of her dementia. \"She is not able to clean her self appropriately and needs more oversight\".     Past Medical History:   Diagnosis Date    Alcohol abuse, daily use 11/21/2014    Depression 11/21/2014    Lumbar stenosis 7/4/2012    Osteopenia 3/19/2015    Sinus problem     Spondylolisthesis of lumbar region 7/4/2012    Vitamin D deficiency 11/21/2014      Past Surgical History:   Procedure Laterality Date    BACK SURGERY  5/31/12    L3-L4 Metrix TLIF with sextant/bilateral decompression - Dr. Benjamín Mares BREAST LUMPECTOMY Bilateral     HEENT Bilateral 2015    sinus    HYSTERECTOMY      complete    OTHER SURGICAL HISTORY      sinuses x2    TONSILLECTOMY      WRIST FRACTURE SURGERY        Family History   Problem Relation Age of Onset    Osteoarthritis Mother     Heart Disease Mother     Stroke Mother     Breast Cancer Neg Hx     Coronary Art Dis Maternal Uncle     Heart Attack Mother     Hypertension Sister     Alzheimer's Disease Father 48    Dementia Father     Stroke Father     Heart Disease Sister         stent and AVR      Social History     Tobacco Use    Smoking status: Former Smoker Packs/day: 1.00     Quit date: 1972     Years since quittin.4    Smokeless tobacco: Never Used   Substance Use Topics    Alcohol use: Yes     Alcohol/week: 14.0 standard drinks      Allergies   Allergen Reactions    Levofloxacin Other (See Comments)    Penicillins Shortness Of Breath and Swelling    Poison [place] Extract Anaphylaxis    Bupropion Other (See Comments)     Other anxiety      Sulfamethoxazole Hives and Other (See Comments)     She thinks she does not do well with this    Montelukast Anxiety         ROS:  No obvious pertinent positives on review of systems except for what was outlined above.        Objective:       /82   Pulse 80   Ht 5' 2\" (1.575 m)   Wt 144 lb 6.4 oz (65.5 kg)   BMI 26.41 kg/m²     BP Readings from Last 3 Encounters:   22 138/82   22 118/82   22 130/75       Wt Readings from Last 3 Encounters:   22 144 lb 6.4 oz (65.5 kg)   22 147 lb (66.7 kg)   22 147 lb (66.7 kg)       General/Constitutional:   No acute distress; walking about the room with difficulty following directions  HEENT:   normocephalic, atraumatic, moist mucous membranes  Neck:   No JVD or carotid bruits bilaterally  Cardiovascular:   regular rate and rhythm, no rub/gallop appreciated  Pulmonary:   clear to auscultation bilaterally, no respiratory distress  Abdomen:   soft, non-tender, non-distended  Ext:   No sig LE edema bilaterally  Skin:  warm and dry, no obvious rashes seen  Neuro:   no obvious sensory or motor deficits  Psychiatric:   normal mood and affect    Data Review:   Lab Results   Component Value Date    CHOL 265 (H) 2020    CHOL 232 (H) 10/03/2019     Lab Results   Component Value Date    TRIG 195 (H) 2020    TRIG 108 10/03/2019     Lab Results   Component Value Date    HDL 52 2020    HDL 60 10/03/2019     Lab Results   Component Value Date    LDLCALC 177 (H) 2020    LDLCALC 150 (H) 10/03/2019     Lab Results   Component Value Date    LABVLDL 22 10/03/2019    VLDL 36 11/05/2020     No results found for: [facility]     Lab Results   Component Value Date     05/26/2022     04/15/2022     01/06/2022    K 4.3 05/26/2022    K 3.7 04/15/2022    K 3.5 01/06/2022     05/26/2022     04/15/2022    CL 97 01/06/2022    CO2 28 05/26/2022    CO2 30 04/15/2022    CO2 30 01/06/2022    BUN 26 05/26/2022    BUN 23 04/15/2022    BUN 15 01/06/2022    CREATININE 0.90 05/26/2022    CREATININE 0.80 04/15/2022    CREATININE 0.83 01/06/2022    GLUCOSE 86 05/26/2022    GLUCOSE 94 04/15/2022    GLUCOSE 97 01/06/2022    CALCIUM 9.7 05/26/2022    CALCIUM 9.6 04/15/2022    CALCIUM 9.5 01/06/2022         Lab Results   Component Value Date    ALT 28 05/26/2022    ALT 23 04/15/2022    ALT 14 01/06/2022    AST 19 05/26/2022    AST 21 04/15/2022    AST 17 01/06/2022        Assessment/Plan:   1. Hypertension, unspecified type  - Well-controlled  - Currently on lisinopril, hydrochlorothiazide, and Toprol-XL  - Defer to PCP for further management    2.  HOLLY (dyspnea on exertion)  - Echocardiogram in June 2022 noted a normal EF  - Geriatrics note reviewed with worsening dementia noted  - Patient is without decision-making capacity, and the patient's  assists with ADLs, including personal hygiene  - Discussed with patient's  that the patient is not an appropriate candidate for an ischemic evaluation in the setting of advanced dementia    F/U: As needed    Reagan Leos MD

## 2022-06-28 ENCOUNTER — OFFICE VISIT (OUTPATIENT)
Dept: CARDIOLOGY CLINIC | Age: 77
End: 2022-06-28
Payer: MEDICARE

## 2022-06-28 VITALS
HEART RATE: 80 BPM | WEIGHT: 144.4 LBS | SYSTOLIC BLOOD PRESSURE: 138 MMHG | HEIGHT: 62 IN | BODY MASS INDEX: 26.57 KG/M2 | DIASTOLIC BLOOD PRESSURE: 82 MMHG

## 2022-06-28 DIAGNOSIS — I10 HYPERTENSION, UNSPECIFIED TYPE: Primary | ICD-10-CM

## 2022-06-28 DIAGNOSIS — R06.09 DOE (DYSPNEA ON EXERTION): ICD-10-CM

## 2022-06-28 PROCEDURE — 1036F TOBACCO NON-USER: CPT | Performed by: INTERNAL MEDICINE

## 2022-06-28 PROCEDURE — 1090F PRES/ABSN URINE INCON ASSESS: CPT | Performed by: INTERNAL MEDICINE

## 2022-06-28 PROCEDURE — G8417 CALC BMI ABV UP PARAM F/U: HCPCS | Performed by: INTERNAL MEDICINE

## 2022-06-28 PROCEDURE — G8427 DOCREV CUR MEDS BY ELIG CLIN: HCPCS | Performed by: INTERNAL MEDICINE

## 2022-06-28 PROCEDURE — 1123F ACP DISCUSS/DSCN MKR DOCD: CPT | Performed by: INTERNAL MEDICINE

## 2022-06-28 PROCEDURE — G8399 PT W/DXA RESULTS DOCUMENT: HCPCS | Performed by: INTERNAL MEDICINE

## 2022-06-28 PROCEDURE — 99213 OFFICE O/P EST LOW 20 MIN: CPT | Performed by: INTERNAL MEDICINE

## 2022-06-28 RX ORDER — MAGNESIUM OXIDE 400 MG/1
400 TABLET ORAL DAILY
COMMUNITY

## 2022-07-01 LAB
BACTERIA SPEC CULT: ABNORMAL
SERVICE CMNT-IMP: ABNORMAL

## 2022-07-07 DIAGNOSIS — N39.0 RECURRENT UTI: Primary | ICD-10-CM

## 2022-07-08 ENCOUNTER — HOSPITAL ENCOUNTER (EMERGENCY)
Dept: CT IMAGING | Age: 77
Discharge: HOME | DRG: 871 | End: 2022-07-11
Payer: MEDICARE

## 2022-07-08 ENCOUNTER — HOSPITAL ENCOUNTER (INPATIENT)
Age: 77
LOS: 9 days | Discharge: NURSING, REHAB OR LONG-TERM CARE | DRG: 871 | End: 2022-07-17
Attending: EMERGENCY MEDICINE | Admitting: INTERNAL MEDICINE
Payer: MEDICARE

## 2022-07-08 ENCOUNTER — HOSPITAL ENCOUNTER (EMERGENCY)
Dept: GENERAL RADIOLOGY | Age: 77
Discharge: HOME | DRG: 871 | End: 2022-07-11
Payer: MEDICARE

## 2022-07-08 DIAGNOSIS — E86.0 DEHYDRATION: ICD-10-CM

## 2022-07-08 DIAGNOSIS — N39.0 URINARY TRACT INFECTION WITHOUT HEMATURIA, SITE UNSPECIFIED: Primary | ICD-10-CM

## 2022-07-08 DIAGNOSIS — R41.82 ALTERED MENTAL STATUS, UNSPECIFIED ALTERED MENTAL STATUS TYPE: ICD-10-CM

## 2022-07-08 LAB
ALBUMIN SERPL-MCNC: 2.9 G/DL (ref 3.2–4.6)
ALBUMIN/GLOB SERPL: 0.8 {RATIO} (ref 1.2–3.5)
ALP SERPL-CCNC: 93 U/L (ref 50–130)
ALT SERPL-CCNC: 23 U/L (ref 12–65)
ANION GAP SERPL CALC-SCNC: 6 MMOL/L (ref 7–16)
APPEARANCE UR: ABNORMAL
AST SERPL-CCNC: 21 U/L (ref 15–37)
BACTERIA URNS QL MICRO: ABNORMAL /HPF
BASOPHILS # BLD: 0.1 K/UL (ref 0–0.2)
BASOPHILS NFR BLD: 0 % (ref 0–2)
BILIRUB SERPL-MCNC: 0.6 MG/DL (ref 0.2–1.1)
BILIRUB UR QL: ABNORMAL
BUN SERPL-MCNC: 40 MG/DL (ref 8–23)
CALCIUM SERPL-MCNC: 9.5 MG/DL (ref 8.3–10.4)
CASTS URNS QL MICRO: 0 /LPF
CHLORIDE SERPL-SCNC: 96 MMOL/L (ref 98–107)
CO2 SERPL-SCNC: 30 MMOL/L (ref 21–32)
COLOR UR: ABNORMAL
CREAT SERPL-MCNC: 1.64 MG/DL (ref 0.6–1)
CRYSTALS URNS QL MICRO: 0 /LPF
DIFFERENTIAL METHOD BLD: ABNORMAL
EOSINOPHIL # BLD: 0 K/UL (ref 0–0.8)
EOSINOPHIL NFR BLD: 0 % (ref 0.5–7.8)
EPI CELLS #/AREA URNS HPF: ABNORMAL /HPF
ERYTHROCYTE [DISTWIDTH] IN BLOOD BY AUTOMATED COUNT: 12.2 % (ref 11.9–14.6)
GLOBULIN SER CALC-MCNC: 3.6 G/DL (ref 2.3–3.5)
GLUCOSE SERPL-MCNC: 163 MG/DL (ref 65–100)
GLUCOSE UR STRIP.AUTO-MCNC: NEGATIVE MG/DL
HCT VFR BLD AUTO: 34.9 % (ref 35.8–46.3)
HGB BLD-MCNC: 12.1 G/DL (ref 11.7–15.4)
HGB UR QL STRIP: ABNORMAL
IMM GRANULOCYTES # BLD AUTO: 0.2 K/UL (ref 0–0.5)
IMM GRANULOCYTES NFR BLD AUTO: 1 % (ref 0–5)
KETONES UR QL STRIP.AUTO: ABNORMAL MG/DL
LACTATE SERPL-SCNC: 1.2 MMOL/L (ref 0.4–2)
LEUKOCYTE ESTERASE UR QL STRIP.AUTO: ABNORMAL
LIPASE SERPL-CCNC: 103 U/L (ref 73–393)
LYMPHOCYTES # BLD: 0.6 K/UL (ref 0.5–4.6)
LYMPHOCYTES NFR BLD: 3 % (ref 13–44)
MCH RBC QN AUTO: 31 PG (ref 26.1–32.9)
MCHC RBC AUTO-ENTMCNC: 34.7 G/DL (ref 31.4–35)
MCV RBC AUTO: 89.5 FL (ref 79.6–97.8)
MONOCYTES # BLD: 2 K/UL (ref 0.1–1.3)
MONOCYTES NFR BLD: 10 % (ref 4–12)
MUCOUS THREADS URNS QL MICRO: 0 /LPF
NEUTS SEG # BLD: 17.6 K/UL (ref 1.7–8.2)
NEUTS SEG NFR BLD: 86 % (ref 43–78)
NITRITE UR QL STRIP.AUTO: POSITIVE
NRBC # BLD: 0 K/UL (ref 0–0.2)
PH UR STRIP: 5 [PH] (ref 5–9)
PLATELET # BLD AUTO: 289 K/UL (ref 150–450)
PMV BLD AUTO: 9.1 FL (ref 9.4–12.3)
POTASSIUM SERPL-SCNC: 4.1 MMOL/L (ref 3.5–5.1)
PROT SERPL-MCNC: 6.5 G/DL (ref 6.3–8.2)
PROT UR STRIP-MCNC: 100 MG/DL
RBC # BLD AUTO: 3.9 M/UL (ref 4.05–5.2)
RBC #/AREA URNS HPF: ABNORMAL /HPF
SODIUM SERPL-SCNC: 132 MMOL/L (ref 136–145)
SP GR UR REFRACTOMETRY: 1.03 (ref 1–1.02)
TROPONIN I SERPL HS-MCNC: 9.5 PG/ML (ref 0–14)
UROBILINOGEN UR QL STRIP.AUTO: 1 EU/DL (ref 0.2–1)
WBC # BLD AUTO: 20.4 K/UL (ref 4.3–11.1)
WBC URNS QL MICRO: ABNORMAL /HPF

## 2022-07-08 PROCEDURE — 96361 HYDRATE IV INFUSION ADD-ON: CPT

## 2022-07-08 PROCEDURE — 71045 X-RAY EXAM CHEST 1 VIEW: CPT

## 2022-07-08 PROCEDURE — 83690 ASSAY OF LIPASE: CPT

## 2022-07-08 PROCEDURE — 87040 BLOOD CULTURE FOR BACTERIA: CPT

## 2022-07-08 PROCEDURE — 2580000003 HC RX 258: Performed by: EMERGENCY MEDICINE

## 2022-07-08 PROCEDURE — 81001 URINALYSIS AUTO W/SCOPE: CPT

## 2022-07-08 PROCEDURE — 87086 URINE CULTURE/COLONY COUNT: CPT

## 2022-07-08 PROCEDURE — 84484 ASSAY OF TROPONIN QUANT: CPT

## 2022-07-08 PROCEDURE — 99285 EMERGENCY DEPT VISIT HI MDM: CPT

## 2022-07-08 PROCEDURE — 87186 SC STD MICRODIL/AGAR DIL: CPT

## 2022-07-08 PROCEDURE — 96374 THER/PROPH/DIAG INJ IV PUSH: CPT

## 2022-07-08 PROCEDURE — 6360000002 HC RX W HCPCS: Performed by: EMERGENCY MEDICINE

## 2022-07-08 PROCEDURE — 2500000003 HC RX 250 WO HCPCS: Performed by: NURSE PRACTITIONER

## 2022-07-08 PROCEDURE — 80053 COMPREHEN METABOLIC PANEL: CPT

## 2022-07-08 PROCEDURE — 81015 MICROSCOPIC EXAM OF URINE: CPT

## 2022-07-08 PROCEDURE — 87088 URINE BACTERIA CULTURE: CPT

## 2022-07-08 PROCEDURE — 83605 ASSAY OF LACTIC ACID: CPT

## 2022-07-08 PROCEDURE — 6370000000 HC RX 637 (ALT 250 FOR IP): Performed by: NURSE PRACTITIONER

## 2022-07-08 PROCEDURE — 1100000000 HC RM PRIVATE

## 2022-07-08 PROCEDURE — 70450 CT HEAD/BRAIN W/O DYE: CPT

## 2022-07-08 PROCEDURE — 85025 COMPLETE CBC W/AUTO DIFF WBC: CPT

## 2022-07-08 PROCEDURE — 2580000003 HC RX 258: Performed by: NURSE PRACTITIONER

## 2022-07-08 RX ORDER — ONDANSETRON 2 MG/ML
4 INJECTION INTRAMUSCULAR; INTRAVENOUS EVERY 6 HOURS PRN
Status: DISCONTINUED | OUTPATIENT
Start: 2022-07-08 | End: 2022-07-17 | Stop reason: HOSPADM

## 2022-07-08 RX ORDER — ONDANSETRON 4 MG/1
4 TABLET, ORALLY DISINTEGRATING ORAL EVERY 8 HOURS PRN
Status: DISCONTINUED | OUTPATIENT
Start: 2022-07-08 | End: 2022-07-17 | Stop reason: HOSPADM

## 2022-07-08 RX ORDER — SODIUM CHLORIDE 0.9 % (FLUSH) 0.9 %
5-40 SYRINGE (ML) INJECTION PRN
Status: DISCONTINUED | OUTPATIENT
Start: 2022-07-08 | End: 2022-07-17 | Stop reason: HOSPADM

## 2022-07-08 RX ORDER — DONEPEZIL HYDROCHLORIDE 5 MG/1
10 TABLET, FILM COATED ORAL NIGHTLY
Status: DISCONTINUED | OUTPATIENT
Start: 2022-07-08 | End: 2022-07-14

## 2022-07-08 RX ORDER — MEMANTINE HYDROCHLORIDE 5 MG/1
10 TABLET ORAL 2 TIMES DAILY
Status: DISCONTINUED | OUTPATIENT
Start: 2022-07-08 | End: 2022-07-14

## 2022-07-08 RX ORDER — DIVALPROEX SODIUM 125 MG/1
125 CAPSULE, COATED PELLETS ORAL EVERY 8 HOURS SCHEDULED
Status: DISCONTINUED | OUTPATIENT
Start: 2022-07-08 | End: 2022-07-17 | Stop reason: HOSPADM

## 2022-07-08 RX ORDER — MAGNESIUM OXIDE 400 MG/1
400 TABLET ORAL DAILY
Status: DISCONTINUED | OUTPATIENT
Start: 2022-07-08 | End: 2022-07-08

## 2022-07-08 RX ORDER — 0.9 % SODIUM CHLORIDE 0.9 %
1000 INTRAVENOUS SOLUTION INTRAVENOUS ONCE
Status: COMPLETED | OUTPATIENT
Start: 2022-07-08 | End: 2022-07-08

## 2022-07-08 RX ORDER — METOPROLOL SUCCINATE 50 MG/1
50 TABLET, EXTENDED RELEASE ORAL DAILY
Status: DISCONTINUED | OUTPATIENT
Start: 2022-07-09 | End: 2022-07-13

## 2022-07-08 RX ORDER — ACETAMINOPHEN 325 MG/1
650 TABLET ORAL EVERY 6 HOURS PRN
Status: DISCONTINUED | OUTPATIENT
Start: 2022-07-08 | End: 2022-07-17 | Stop reason: HOSPADM

## 2022-07-08 RX ORDER — HYDROCHLOROTHIAZIDE 25 MG/1
25 TABLET ORAL DAILY
Status: DISCONTINUED | OUTPATIENT
Start: 2022-07-09 | End: 2022-07-12

## 2022-07-08 RX ORDER — SODIUM CHLORIDE 0.9 % (FLUSH) 0.9 %
5-40 SYRINGE (ML) INJECTION EVERY 12 HOURS SCHEDULED
Status: DISCONTINUED | OUTPATIENT
Start: 2022-07-08 | End: 2022-07-17 | Stop reason: HOSPADM

## 2022-07-08 RX ORDER — LISINOPRIL 20 MG/1
20 TABLET ORAL DAILY
Status: DISCONTINUED | OUTPATIENT
Start: 2022-07-09 | End: 2022-07-12

## 2022-07-08 RX ORDER — POLYETHYLENE GLYCOL 3350 17 G/17G
17 POWDER, FOR SOLUTION ORAL DAILY PRN
Status: DISCONTINUED | OUTPATIENT
Start: 2022-07-08 | End: 2022-07-17 | Stop reason: HOSPADM

## 2022-07-08 RX ORDER — HYDROXYCHLOROQUINE SULFATE 200 MG/1
200 TABLET, FILM COATED ORAL DAILY
Status: DISCONTINUED | OUTPATIENT
Start: 2022-07-09 | End: 2022-07-17 | Stop reason: HOSPADM

## 2022-07-08 RX ORDER — DULOXETIN HYDROCHLORIDE 60 MG/1
60 CAPSULE, DELAYED RELEASE ORAL 2 TIMES DAILY
Status: DISCONTINUED | OUTPATIENT
Start: 2022-07-08 | End: 2022-07-10

## 2022-07-08 RX ORDER — SODIUM CHLORIDE 9 MG/ML
INJECTION, SOLUTION INTRAVENOUS PRN
Status: DISCONTINUED | OUTPATIENT
Start: 2022-07-08 | End: 2022-07-17 | Stop reason: HOSPADM

## 2022-07-08 RX ORDER — ACETAMINOPHEN 650 MG/1
650 SUPPOSITORY RECTAL EVERY 6 HOURS PRN
Status: DISCONTINUED | OUTPATIENT
Start: 2022-07-08 | End: 2022-07-17 | Stop reason: HOSPADM

## 2022-07-08 RX ORDER — PANTOPRAZOLE SODIUM 40 MG/1
40 TABLET, DELAYED RELEASE ORAL
Status: DISCONTINUED | OUTPATIENT
Start: 2022-07-09 | End: 2022-07-17 | Stop reason: HOSPADM

## 2022-07-08 RX ADMIN — SODIUM CHLORIDE, PRESERVATIVE FREE 10 ML: 5 INJECTION INTRAVENOUS at 20:31

## 2022-07-08 RX ADMIN — MEMANTINE 10 MG: 5 TABLET ORAL at 22:21

## 2022-07-08 RX ADMIN — CEFEPIME HYDROCHLORIDE 1000 MG: 1 INJECTION, POWDER, FOR SOLUTION INTRAMUSCULAR; INTRAVENOUS at 12:58

## 2022-07-08 RX ADMIN — SODIUM CHLORIDE 1000 ML: 9 INJECTION, SOLUTION INTRAVENOUS at 12:58

## 2022-07-08 RX ADMIN — DONEPEZIL HYDROCHLORIDE 10 MG: 5 TABLET, FILM COATED ORAL at 22:21

## 2022-07-08 RX ADMIN — DIVALPROEX SODIUM 125 MG: 125 CAPSULE ORAL at 22:22

## 2022-07-08 RX ADMIN — DIVALPROEX SODIUM 125 MG: 125 CAPSULE ORAL at 17:47

## 2022-07-08 RX ADMIN — TUBERCULIN PURIFIED PROTEIN DERIVATIVE 5 UNITS: 5 INJECTION, SOLUTION INTRADERMAL at 22:36

## 2022-07-08 RX ADMIN — SODIUM CHLORIDE 1000 ML: 9 INJECTION, SOLUTION INTRAVENOUS at 11:30

## 2022-07-08 RX ADMIN — DULOXETINE HYDROCHLORIDE 60 MG: 60 CAPSULE, DELAYED RELEASE ORAL at 22:21

## 2022-07-08 NOTE — H&P
Hospitalist History and Physical   Admit Date:  2022 11:08 AM   Name:  Lukas Acosta   Age:  68 y.o. Sex:  female  :  1945   MRN:  [id number]   Room:      Presenting Complaint: Fever     Reason(s) for Admission: UTI (urinary tract infection) [N39.0]     History of Present Illness:   Lukas Acosta is a 68 y.o. female with medical history of HTN, GERD, Depression, past alcohol abuse, Alzeimer's Dementia who presented with altered mental status.  reports recurrent UTIs over the last 3 months. Pt has continued to decline in mentation, now does not communicate. Also declined in ambulation per . Found to have a UTI. Started on cefepime given allergy hx. CT head without acute findings. CXR without acute findings.  is interested in STR at [facility] and has plans to look for long term memory care. Review of Systems:  10 systems  Unable to review due to dementia  Assessment & Plan:     UTI  Send urine cx  Follow BC  Cefepime as pt has allergie to PCN  IVF    Hyponatremia  IVF  BMP in AM    Leukocytosis  Secondary to UTI  Abx  CBC in AM    Acute metabolic encephalopathy  Likely secondary to UTI  CT head without acute findings    Dementia/Alzheimer's   Worsening per   Continue home meds  Requested bed close to nurses station    HTN  Home meds          Dispo/Discharge Planning:     Pending clinical course, likely needs STR 2-3 days    Diet: Regular   VTE ppx: SCD  Code status: Full discussed with   Surrogate decision maker:          Hospital Problems:  Principal Problem:    UTI (urinary tract infection)  Resolved Problems:    * No resolved hospital problems.  *       Past History:     Past Medical History:   Diagnosis Date    Alcohol abuse, daily use 2014    Depression 2014    Lumbar stenosis 2012    Osteopenia 3/19/2015    Sinus problem     Spondylolisthesis of lumbar region 2012    Vitamin D deficiency 2014     Past Surgical History:   Procedure Laterality Date    BACK SURGERY  12    L3-L4 Metrix TLIF with sextant/bilateral decompression - Dr. Wayne Middleton LUMPECTOMY Bilateral     HEENT Bilateral 2015    sinus    HYSTERECTOMY (CERVIX STATUS UNKNOWN)      complete    OTHER SURGICAL HISTORY      sinuses x2    TONSILLECTOMY      WRIST FRACTURE SURGERY        Social History     Tobacco Use    Smoking status: Former Smoker     Packs/day: 1.00     Quit date: 1972     Years since quittin.4    Smokeless tobacco: Never Used   Substance Use Topics    Alcohol use: Yes     Alcohol/week: 14.0 standard drinks      Social History     Substance and Sexual Activity   Drug Use No     Family History   Problem Relation Age of Onset    Osteoarthritis Mother     Heart Disease Mother     Stroke Mother     Breast Cancer Neg Hx     Coronary Art Dis Maternal Uncle     Heart Attack Mother     Hypertension Sister     Alzheimer's Disease Father 48    Dementia Father     Stroke Father     Heart Disease Sister         stent and AVR      Family history reviewed and negative except as noted above.       Immunization History   Administered Date(s) Administered    Influenza Virus Vaccine 2014, 2016, 2017    Influenza, High Dose (Fluzone 65 yrs and older) 2018    Influenza, MDCK Quadv, IM, PF (Flucelvax 2 yrs and older) 2017    Influenza, Quadv, adjuvanted, 65 yrs +, IM, PF (Fluad) 2020    Influenza, Triv, inactivated, subunit, adjuvanted, IM (Fluad 65 yrs and older) 10/03/2019    Pneumococcal Conjugate 13-valent ([id number]) 04/10/2012, 2014, 2015    Pneumococcal Polysaccharide ([id number]) 2011    Pneumococcal Vaccine 2011, 10/01/2011    Tdap (Boostrix, Adacel) 2016, 2021    Zoster Live (Zostavax) 2010     Allergies   Allergen Reactions    Levofloxacin Other (See Comments)    Penicillins Shortness Of Breath and [place] 81 Extract Anaphylaxis    Bupropion Other (See Comments)     Other anxiety      Sulfamethoxazole Hives and Other (See Comments)     She thinks she does not do well with this    Montelukast Anxiety     Prior to Admit Medications:  Current Outpatient Medications   Medication Instructions    azelastine (ASTELIN) 0.1 % nasal spray 1 spray, Nasal, 2 TIMES DAILY    diclofenac sodium (VOLTAREN) 4 g, Topical, 4 TIMES DAILY    diphenhydrAMINE (SOMINEX) 25 mg, Oral, EVERY 6 HOURS PRN    divalproex (DEPAKOTE) 125 mg, Oral, 3 TIMES DAILY PRN    donepezil (ARICEPT) 10 MG tablet TAKE ONE TABLET AT BEDTIME.  DULoxetine (CYMBALTA) 60 MG extended release capsule TAKE 2 CAPSULES DAILY.  hydroCHLOROthiazide (HYDRODIURIL) 25 MG tablet TAKE 1 TABLET BY MOUTH EACH DAY.  hydroxychloroquine (PLAQUENIL) 200 mg tablet Take 1 pill once a day after food.  lisinopril (PRINIVIL;ZESTRIL) 20 mg, Oral, DAILY    magnesium oxide (MAG-OX) 400 mg, Oral, DAILY    memantine (NAMENDA) 10 mg, Oral, 2 TIMES DAILY    metoprolol succinate (TOPROL XL) 50 MG extended release tablet TAKE ONE TABLET DAILY FOR BLOOD PRESSURE    Naproxen Sodium 220 MG CAPS 1 capsule, Oral, 2 TIMES DAILY    omeprazole (PRILOSEC) 40 MG delayed release capsule TAKE 1 CAPSULE EACH DAY.  TURMERIC PO 1 capsule, Oral, DAILY    vitamin D 1,000 Units, Oral, DAILY         Objective:     Patient Vitals for the past 24 hrs:   Temp Pulse Resp BP SpO2   07/08/22 1114 99.3 °F (37.4 °C) (!) 103 16 119/66 95 %       Oxygen Therapy  SpO2: 95 %  O2 Device: None (Room air)    Estimated body mass index is 25.82 kg/m² as calculated from the following:    Height as of this encounter: 5' 6\" (1.676 m). Weight as of this encounter: 160 lb (72.6 kg). No intake or output data in the 24 hours ending 07/08/22 1413      Physical Exam:    Blood pressure 119/66, pulse (!) 103, temperature 99.3 °F (37.4 °C), temperature source Axillary, resp.  rate 16, height 5' 6\" (1.676 m), weight 160 lb (72.6 kg), SpO2 95 %. General:    Well nourished. Head:  Normocephalic, atraumatic  Eyes:  Sclerae appear normal.  Pupils equally round. ENT:  Nares appear normal, no drainage. Moist oral mucosa  Neck:  No restricted ROM. Trachea midline   CV:   RRR. No m/r/g. No jugular venous distension. Lungs:   CTAB. No wheezing, rhonchi, or rales. Symmetric expansion. Abdomen: Bowel sounds present. Soft, nondistended. Extremities: No cyanosis or clubbing. No edema  Skin:     No rashes and normal coloration. Warm and dry. Neuro: Will not open eyes. Turns head to my voice otherwise does not follow commands. Non communicative. Psych:  Normal mood and affect.       I have reviewed ordered lab tests and independently visualized imaging below:    Last 24hr Labs:  Recent Results (from the past 24 hour(s))   CBC with Auto Differential    Collection Time: 07/08/22 11:28 AM   Result Value Ref Range    WBC 20.4 (H) 4.3 - 11.1 K/uL    RBC 3.90 (L) 4.05 - 5.2 M/uL    Hemoglobin 12.1 11.7 - 15.4 g/dL    Hematocrit 34.9 (L) 35.8 - 46.3 %    MCV 89.5 79.6 - 97.8 FL    MCH 31.0 26.1 - 32.9 PG    MCHC 34.7 31.4 - 35.0 g/dL    RDW 12.2 11.9 - 14.6 %    Platelets 313 984 - 963 K/uL    MPV 9.1 (L) 9.4 - 12.3 FL    nRBC 0.00 0.0 - 0.2 K/uL    Differential Type AUTOMATED      Seg Neutrophils 86 (H) 43 - 78 %    Lymphocytes 3 (L) 13 - 44 %    Monocytes 10 4.0 - 12.0 %    Eosinophils % 0 (L) 0.5 - 7.8 %    Basophils 0 0.0 - 2.0 %    Immature Granulocytes 1 0.0 - 5.0 %    Segs Absolute 17.6 (H) 1.7 - 8.2 K/UL    Absolute Lymph # 0.6 0.5 - 4.6 K/UL    Absolute Mono # 2.0 (H) 0.1 - 1.3 K/UL    Absolute Eos # 0.0 0.0 - 0.8 K/UL    Basophils Absolute 0.1 0.0 - 0.2 K/UL    Absolute Immature Granulocyte 0.2 0.0 - 0.5 K/UL   CMP    Collection Time: 07/08/22 11:28 AM   Result Value Ref Range    Sodium 132 (L) 136 - 145 mmol/L    Potassium 4.1 3.5 - 5.1 mmol/L    Chloride 96 (L) 98 - 107 mmol/L    CO2 30 21 - 32 mmol/L    Anion Gap 6 (L) 7 - 16 mmol/L    Glucose 163 (H) 65 - 100 mg/dL    BUN 40 (H) 8 - 23 MG/DL    CREATININE 1.64 (H) 0.6 - 1.0 MG/DL    GFR  39 (L) >60 ml/min/1.73m2    GFR Non- 32 (L) >60 ml/min/1.73m2    Calcium 9.5 8.3 - 10.4 MG/DL    Total Bilirubin 0.6 0.2 - 1.1 MG/DL    ALT 23 12 - 65 U/L    AST 21 15 - 37 U/L    Alk Phosphatase 93 50 - 130 U/L    Total Protein 6.5 6.3 - 8.2 g/dL    Albumin 2.9 (L) 3.2 - 4.6 g/dL    Globulin 3.6 (H) 2.3 - 3.5 g/dL    Albumin/Globulin Ratio 0.8 (L) 1.2 - 3.5     Lipase    Collection Time: 07/08/22 11:28 AM   Result Value Ref Range    Lipase 103 73 - 393 U/L   Troponin    Collection Time: 07/08/22 11:28 AM   Result Value Ref Range    Troponin, High Sensitivity 9.5 0 - 14 pg/mL   Lactic Acid    Collection Time: 07/08/22 11:28 AM   Result Value Ref Range    Lactic Acid, Plasma 1.2 0.4 - 2.0 MMOL/L   Urinalysis    Collection Time: 07/08/22 11:46 AM   Result Value Ref Range    Color, UA DARK YELLOW      Appearance TURBID      Specific Gravity, UA 1.027 (H) 1.001 - 1.023      pH, Urine 5.0 5.0 - 9.0      Protein,  (A) NEG mg/dL    Glucose, UA Negative mg/dL    Ketones, Urine TRACE (A) NEG mg/dL    Bilirubin Urine MODERATE (A) NEG      Blood, Urine SMALL (A) NEG      Urobilinogen, Urine 1.0 0.2 - 1.0 EU/dL    Nitrite, Urine Positive (A) NEG      Leukocyte Esterase, Urine LARGE (A) NEG     Urinalysis, Micro    Collection Time: 07/08/22 11:46 AM   Result Value Ref Range    WBC, UA 20-50 0 /hpf    RBC, UA 10-20 0 /hpf    Epithelial Cells UA 3-5 0 /hpf    BACTERIA, URINE 2+ (H) 0 /hpf    Casts 0 0 /lpf    Crystals 0 0 /LPF    Mucus, UA 0 0 /lpf       Other Studies:  CT Head W/O Contrast    Result Date: 7/8/2022  CT of the head without contrast. CLINICAL INDICATION: Altered mental status PROCEDURE: Serial thin section axial images are obtained from the cranial vertex through the skull base without the administration of intravenous contrast. Radiation dose reduction techniques were used for this study. Our CT scanners use one or all of the following: Automated exposure control, adjusted of the mA and/or kV according to patient size, iterative reconstruction COMPARISON: Head CT dated 5/9/2021. FINDINGS: There is no acute intracranial hemorrhage, mass, or mass effect. No abnormal extra-axial fluid collections identified. There is no hydrocephalus. The basilar cisterns are widely patent. There is stable moderate bilateral hemispheric atrophy. There is mild bilateral periventricular white matter hypoattenuation that is also stable. No skull fracture or aggressive bone lesion. The imaged paranasal sinuses and mastoid air cells are clear. 1. No acute intracranial abnormality. 2. Stable atrophy and white matter microangiopathic changes. XR CHEST PORTABLE    Result Date: 7/8/2022  Portable chest x-ray CLINICAL INDICATION: Fever FINDINGS: Single AP view the chest show the lungs to be slightly underexpanded but otherwise clear. There is no pleural effusion. The cardiac silhouette and mediastinum are unremarkable. The bones are normal.     Slightly underexpanded lungs, otherwise no acute abnormality. Echocardiogram:  06/14/22    TRANSTHORACIC ECHOCARDIOGRAM (TTE) COMPLETE (CONTRAST/BUBBLE/3D PRN) 06/14/2022  6:14 PM (Final)    Interpretation Summary    Left Ventricle: Normal left ventricular systolic function with a visually estimated EF of 60 - 65%. Left ventricle size is normal. Normal wall thickness.     Signed by: Taylor Lowe MD on 6/14/2022  6:14 PM      Meds previously ordered:  Orders Placed This Encounter   Medications    0.9 % sodium chloride bolus    cefepime (MAXIPIME) 1,000 mg in sodium chloride 0.9 % 50 mL IVPB mini-bag     Order Specific Question:   Antimicrobial Indications     Answer:   Urinary Tract Infection    0.9 % sodium chloride bolus         Signed:  EWA Vincent - CNP    Notes, labs, VS, diagnostic testing reviewed  Case discussed with  care team ,Dr. Kim Hodgkins,  at bedside

## 2022-07-08 NOTE — PROGRESS NOTES
END OF SHIFT SUMMARY:      Pt came to unit from ER, ER report given by Devyn Guillermo by use of SBAR, vitals were stable, pt cannot give report of hx, spouse it at bedside and gave admission answers. Started IV fluids, made pt comfortable, gave scheduled ordered medications. Pt is resting with spouse at bedside with no questions at this time. Report was given at bedside to oncoming RN.      Mariza Rincon RN

## 2022-07-08 NOTE — ACP (ADVANCE CARE PLANNING)
Advanced Care Planning (Initial Encounter)      Name: Rosa Isela Sanchez            Age: 68 y.o. Sex: female  : 1945    MRN:     [id number]    Date of ACP Conversation: 22    Conversation Conducted with:  Patient's     Patient does not have adequate capacity to make medical decisions. As such, this encounter was discussed with Isaiah Persons ()      Discussed the current conditions, workup/management plans as well as prognosis. The family has been informed and is fully aware of the sufficient risks of the active diagnosis and risk for further deterioration due to the underlying condition. In the event of cardiopulmonary arrest, family would like us to perform resuscitation including chest compression and intubation.       Time Spent face to face with patient/family:  16 mins (This is addition to time spent for clinical care)        Code Status: FULL  Designated Health Care Decision Maker: Lorna Hernandez MD

## 2022-07-08 NOTE — FLOWSHEET NOTE
07/08/22 1541   Dual Clinician Skin Assessment   Dual Skin Assessment (4 Eyes) WDL   Second Clinical  (First and Last Name) RICHARD Thornton   Skin Integumentary    Skin Integumentary (WDL) WDL   Care Plan - Skin/Tissue Integrity Goals   Skin Integrity Remains Intact Monitor for areas of redness and/or skin breakdown

## 2022-07-08 NOTE — ED PROVIDER NOTES
Laterality Date    BACK SURGERY  5/31/12    L3-L4 Metrix TLIF with sextant/bilateral decompression - Dr. Morrow Stable LUMPECTOMY Bilateral     HEENT Bilateral 2015    sinus    HYSTERECTOMY (CERVIX STATUS UNKNOWN)      complete    OTHER SURGICAL HISTORY      sinuses x2    TONSILLECTOMY      WRIST FRACTURE SURGERY          Family History   Problem Relation Age of Onset    Osteoarthritis Mother     Heart Disease Mother     Stroke Mother     Breast Cancer Neg Hx     Coronary Art Dis Maternal Uncle     Heart Attack Mother     Hypertension Sister     Alzheimer's Disease Father 48    Dementia Father     Stroke Father     Heart Disease Sister         stent and AVR           Social Connections:     Frequency of Communication with Friends and Family: Not on file    Frequency of Social Gatherings with Friends and Family: Not on file    Attends Roman [employer] Services: Not on file    Active Member of Clubs or Organizations: Not on file    Attends Club or Organization Meetings: Not on file    Marital Status: Not on file        Allergies   Allergen Reactions    Levofloxacin Other (See Comments)    Penicillins Shortness Of Breath and Swelling    Poison [place] Extract Anaphylaxis    Bupropion Other (See Comments)     Other anxiety      Sulfamethoxazole Hives and Other (See Comments)     She thinks she does not do well with this    Montelukast Anxiety        Vitals signs and nursing note reviewed. Patient Vitals for the past 4 hrs:   Temp Pulse Resp BP SpO2   07/08/22 1114 99.3 °F (37.4 °C) (!) 103 16 119/66 95 %          Physical Exam  Vitals and nursing note reviewed. Constitutional:       Appearance: Normal appearance. HENT:      Head: Normocephalic and atraumatic. Nose: Nose normal.      Mouth/Throat:      Mouth: Mucous membranes are dry. Pharynx: Oropharynx is clear. Eyes:      Extraocular Movements: Extraocular movements intact.       Conjunctiva/sclera: Conjunctivae normal. Pupils: Pupils are equal, round, and reactive to light. Cardiovascular:      Rate and Rhythm: Normal rate. Pulses: Normal pulses. Pulmonary:      Effort: Pulmonary effort is normal.   Abdominal:      General: Abdomen is flat. Bowel sounds are normal.      Palpations: Abdomen is soft. Musculoskeletal:         General: Normal range of motion. Cervical back: Normal range of motion and neck supple. Skin:     General: Skin is warm and dry. Capillary Refill: Capillary refill takes less than 2 seconds. Neurological:      Mental Status: She is alert. She is disoriented. Motor: Weakness (Generalized weakness) present. Procedures    ED EKG Interpretation  EKG was interpreted in the absence of a cardiologist.    Rate: Rate: Tachycardia  EKG Interpretation: EKG Interpretation: no acute changes  ST Segments: Normal ST segments - NO STEMI    Labs Reviewed   CBC WITH AUTO DIFFERENTIAL   COMPREHENSIVE METABOLIC PANEL   LIPASE   URINALYSIS   TROPONIN        XR CHEST PORTABLE    (Results Pending)   CT Head W/O Contrast    (Results Pending)                            Voice dictation software was used during the making of this note. This software is not perfect and grammatical and other typographical errors may be present. This note has not been completely proofread for errors.      Gypsy Gillette MD  07/09/22 3229

## 2022-07-08 NOTE — ED TRIAGE NOTES
Per EMS, pt's  called for concerns of fever. Pt has completed recent antibiotics for UTI. Last dose reported was 2 days ago. Pt has dementia and presents with normal level of alertness per EMS. Triage Temp is 99.3 axillary.

## 2022-07-08 NOTE — ED NOTES
Dr Gail Cockayne at [facility] [address], [address] [facility]  07/08/22 5696

## 2022-07-09 LAB
ALBUMIN SERPL-MCNC: 2.4 G/DL (ref 3.2–4.6)
ALBUMIN/GLOB SERPL: 0.6 {RATIO} (ref 1.2–3.5)
ALP SERPL-CCNC: 98 U/L (ref 50–136)
ALT SERPL-CCNC: 21 U/L (ref 12–65)
ANION GAP SERPL CALC-SCNC: 7 MMOL/L (ref 7–16)
AST SERPL-CCNC: 18 U/L (ref 15–37)
BASOPHILS # BLD: 0.1 K/UL (ref 0–0.2)
BASOPHILS NFR BLD: 0 % (ref 0–2)
BILIRUB SERPL-MCNC: 0.4 MG/DL (ref 0.2–1.1)
BUN SERPL-MCNC: 30 MG/DL (ref 8–23)
CALCIUM SERPL-MCNC: 8.7 MG/DL (ref 8.3–10.4)
CHLORIDE SERPL-SCNC: 103 MMOL/L (ref 98–107)
CO2 SERPL-SCNC: 25 MMOL/L (ref 21–32)
CREAT SERPL-MCNC: 1.01 MG/DL (ref 0.6–1)
DIFFERENTIAL METHOD BLD: ABNORMAL
EOSINOPHIL # BLD: 0.1 K/UL (ref 0–0.8)
EOSINOPHIL NFR BLD: 1 % (ref 0.5–7.8)
ERYTHROCYTE [DISTWIDTH] IN BLOOD BY AUTOMATED COUNT: 12.3 % (ref 11.9–14.6)
GLOBULIN SER CALC-MCNC: 3.9 G/DL (ref 2.3–3.5)
GLUCOSE SERPL-MCNC: 112 MG/DL (ref 65–100)
HCT VFR BLD AUTO: 30 % (ref 35.8–46.3)
HGB BLD-MCNC: 10.1 G/DL (ref 11.7–15.4)
IMM GRANULOCYTES # BLD AUTO: 0.1 K/UL (ref 0–0.5)
IMM GRANULOCYTES NFR BLD AUTO: 1 % (ref 0–5)
LYMPHOCYTES # BLD: 1.1 K/UL (ref 0.5–4.6)
LYMPHOCYTES NFR BLD: 9 % (ref 13–44)
MAGNESIUM SERPL-MCNC: 1.7 MG/DL (ref 1.8–2.4)
MCH RBC QN AUTO: 30.3 PG (ref 26.1–32.9)
MCHC RBC AUTO-ENTMCNC: 33.7 G/DL (ref 31.4–35)
MCV RBC AUTO: 90.1 FL (ref 79.6–97.8)
MM INDURATION, POC: 0 MM (ref 0–5)
MONOCYTES # BLD: 1.4 K/UL (ref 0.1–1.3)
MONOCYTES NFR BLD: 11 % (ref 4–12)
NEUTS SEG # BLD: 10.1 K/UL (ref 1.7–8.2)
NEUTS SEG NFR BLD: 78 % (ref 43–78)
NRBC # BLD: 0 K/UL (ref 0–0.2)
PLATELET # BLD AUTO: 260 K/UL (ref 150–450)
PMV BLD AUTO: 8.9 FL (ref 9.4–12.3)
POTASSIUM SERPL-SCNC: 3.3 MMOL/L (ref 3.5–5.1)
PPD, POC: NEGATIVE
PROT SERPL-MCNC: 6.3 G/DL (ref 6.3–8.2)
RBC # BLD AUTO: 3.33 M/UL (ref 4.05–5.2)
SODIUM SERPL-SCNC: 135 MMOL/L (ref 136–145)
WBC # BLD AUTO: 12.9 K/UL (ref 4.3–11.1)

## 2022-07-09 PROCEDURE — 92610 EVALUATE SWALLOWING FUNCTION: CPT

## 2022-07-09 PROCEDURE — 1100000000 HC RM PRIVATE

## 2022-07-09 PROCEDURE — 36415 COLL VENOUS BLD VENIPUNCTURE: CPT

## 2022-07-09 PROCEDURE — 97535 SELF CARE MNGMENT TRAINING: CPT

## 2022-07-09 PROCEDURE — 2580000003 HC RX 258: Performed by: NURSE PRACTITIONER

## 2022-07-09 PROCEDURE — 97165 OT EVAL LOW COMPLEX 30 MIN: CPT

## 2022-07-09 PROCEDURE — 6360000002 HC RX W HCPCS: Performed by: NURSE PRACTITIONER

## 2022-07-09 PROCEDURE — 97163 PT EVAL HIGH COMPLEX 45 MIN: CPT

## 2022-07-09 PROCEDURE — 85025 COMPLETE CBC W/AUTO DIFF WBC: CPT

## 2022-07-09 PROCEDURE — 6370000000 HC RX 637 (ALT 250 FOR IP): Performed by: NURSE PRACTITIONER

## 2022-07-09 PROCEDURE — 83735 ASSAY OF MAGNESIUM: CPT

## 2022-07-09 PROCEDURE — 97530 THERAPEUTIC ACTIVITIES: CPT

## 2022-07-09 PROCEDURE — 80053 COMPREHEN METABOLIC PANEL: CPT

## 2022-07-09 RX ORDER — SODIUM CHLORIDE 9 MG/ML
INJECTION, SOLUTION INTRAVENOUS CONTINUOUS
Status: DISCONTINUED | OUTPATIENT
Start: 2022-07-09 | End: 2022-07-15

## 2022-07-09 RX ORDER — POTASSIUM CHLORIDE 20 MEQ/1
20 TABLET, EXTENDED RELEASE ORAL 2 TIMES DAILY WITH MEALS
Status: COMPLETED | OUTPATIENT
Start: 2022-07-09 | End: 2022-07-09

## 2022-07-09 RX ADMIN — MEMANTINE 10 MG: 5 TABLET ORAL at 22:47

## 2022-07-09 RX ADMIN — METOPROLOL SUCCINATE 50 MG: 50 TABLET, EXTENDED RELEASE ORAL at 09:44

## 2022-07-09 RX ADMIN — POTASSIUM CHLORIDE 20 MEQ: 1500 TABLET, EXTENDED RELEASE ORAL at 09:44

## 2022-07-09 RX ADMIN — DULOXETINE HYDROCHLORIDE 60 MG: 60 CAPSULE, DELAYED RELEASE ORAL at 09:44

## 2022-07-09 RX ADMIN — DIVALPROEX SODIUM 125 MG: 125 CAPSULE ORAL at 06:22

## 2022-07-09 RX ADMIN — MEMANTINE 10 MG: 5 TABLET ORAL at 09:44

## 2022-07-09 RX ADMIN — POTASSIUM CHLORIDE 20 MEQ: 1500 TABLET, EXTENDED RELEASE ORAL at 17:16

## 2022-07-09 RX ADMIN — DIVALPROEX SODIUM 125 MG: 125 CAPSULE ORAL at 22:47

## 2022-07-09 RX ADMIN — HYDROCHLOROTHIAZIDE 25 MG: 25 TABLET ORAL at 09:44

## 2022-07-09 RX ADMIN — HYDROXYCHLOROQUINE SULFATE 200 MG: 200 TABLET ORAL at 09:44

## 2022-07-09 RX ADMIN — CEFEPIME HYDROCHLORIDE 1000 MG: 1 INJECTION, POWDER, FOR SOLUTION INTRAMUSCULAR; INTRAVENOUS at 13:13

## 2022-07-09 RX ADMIN — DIVALPROEX SODIUM 125 MG: 125 CAPSULE ORAL at 15:34

## 2022-07-09 RX ADMIN — DULOXETINE HYDROCHLORIDE 60 MG: 60 CAPSULE, DELAYED RELEASE ORAL at 22:47

## 2022-07-09 RX ADMIN — PANTOPRAZOLE SODIUM 40 MG: 40 TABLET, DELAYED RELEASE ORAL at 06:22

## 2022-07-09 RX ADMIN — SODIUM CHLORIDE: 900 INJECTION, SOLUTION INTRAVENOUS at 13:14

## 2022-07-09 RX ADMIN — SODIUM CHLORIDE, PRESERVATIVE FREE 10 ML: 5 INJECTION INTRAVENOUS at 09:47

## 2022-07-09 RX ADMIN — CEFEPIME HYDROCHLORIDE 1000 MG: 1 INJECTION, POWDER, FOR SOLUTION INTRAMUSCULAR; INTRAVENOUS at 00:16

## 2022-07-09 RX ADMIN — LISINOPRIL 20 MG: 20 TABLET ORAL at 09:44

## 2022-07-09 RX ADMIN — DONEPEZIL HYDROCHLORIDE 10 MG: 5 TABLET, FILM COATED ORAL at 22:47

## 2022-07-09 RX ADMIN — SODIUM CHLORIDE, PRESERVATIVE FREE 10 ML: 5 INJECTION INTRAVENOUS at 22:48

## 2022-07-09 NOTE — PROGRESS NOTES
Hospitalist Progress Note   Admit Date:  2022 11:08 AM   Name:  Leola Muñoz   Age:  68 y.o. Sex:  female  :  1945   MRN:  [id number]   Room:  [facility]/    Presenting Complaint: Fever     Reason(s) for Admission: Dehydration [E86.0]  UTI (urinary tract infection) [N39.0]  Urinary tract infection without hematuria, site unspecified [N39.0]  Altered mental status, unspecified altered mental status type [facility] Course & Interval History:   Leola Muñoz is a 68 y.o. female with a PMH of HTN, GERD, depression, past alcohol abuse, Alzeimer's dementia who presented with altered mental status.  reports recurrent UTIs over the last 3 months. Pt has continued to decline in mentation, now does not communicate. Also declined in ambulation per . Found to have a UTI. Started on cefepime given allergy hx. CT head without acute findings. CXR without acute findings.  is interested in STR at [place] and has plans to look for long term memory care. Subjective/24hr Events (22): No AEO. Patient resting comfortably. I spoke with the patient's spouse at the bedside and provided an update. Will continue to follow cultures, labs. Assessment & Plan:     UTI  UA (+) nitrites, leuk est  Cultures negative thus far  Cefepime as pt has allergy to PCN  IVF  PRN bladder scan     Hypokalemia  Replaced  Follow BMP    Hyponatremia  IVF  Na+ 132 --> 135     Leukocytosis, Fever  Secondary to UTI  Abx  WBC 20.4 --> 12.9     Acute metabolic encephalopathy  Likely secondary to UTI  CT head without acute findings     Dementia/Alzheimer's   Worsening per   Continue home meds  Requested bed close to nurses station     HTN  Home meds      Discharge Planning: Pending      Diet:  ADULT DIET;  Regular  DVT PPx: SCD  Code status: Full Code    Hospital Problems:  Principal Problem:    UTI (urinary tract infection)  Active Problems:    Anxiety    HLD (hyperlipidemia)    Depression Dementia of the Alzheimer's type, with late onset, uncomplicated ([address]    Hypertension  Resolved Problems:    * No resolved hospital problems. *      Objective:     Patient Vitals for the past 24 hrs:   Temp Pulse Resp BP SpO2   07/09/22 1038 98.4 °F (36.9 °C) (!) 102 18 125/84 94 %   07/09/22 0810 98.8 °F (37.1 °C) (!) 118 18 (!) 108/96 94 %   07/09/22 0615 99.3 °F (37.4 °C) -- -- -- --   07/09/22 0347 100.2 °F (37.9 °C) (!) 122 16 (!) 150/81 95 %   07/08/22 2255 (!) 100.8 °F (38.2 °C) (!) 117 16 (!) 143/90 95 %   07/08/22 1938 -- (!) 108 20 120/83 95 %   07/08/22 1531 98.4 °F (36.9 °C) 95 20 116/70 99 %   07/08/22 1415 99.8 °F (37.7 °C) 89 23 96/62 96 %   07/08/22 1400 -- 92 20 (!) 100/58 96 %   07/08/22 1315 -- 90 19 (!) 102/56 97 %   07/08/22 1300 -- 87 17 (!) 99/56 97 %   07/08/22 1245 -- 89 19 (!) 94/54 98 %   07/08/22 1145 -- (!) 102 20 -- 98 %       Oxygen Therapy  SpO2: 94 %  Pulse via Oximetry: 93 beats per minute  O2 Device: None (Room air)    Estimated body mass index is 25.82 kg/m² as calculated from the following:    Height as of this encounter: 5' 6\" (1.676 m). Weight as of this encounter: 160 lb (72.6 kg). No intake or output data in the 24 hours ending 07/09/22 1130      Physical Exam:   Blood pressure 125/84, pulse (!) 102, temperature 98.4 °F (36.9 °C), temperature source Axillary, resp. rate 18, height 5' 6\" (1.676 m), weight 160 lb (72.6 kg), SpO2 94 %. General:    NAD  Head:  Normocephalic, atraumatic  Eyes:  Sclerae appear normal.  Pupils equally round. ENT:  Nares appear normal, no drainage. Moist oral mucosa  Neck:  No restricted ROM. Trachea midline   CV:   RRR. No m/r/g. Lungs: No wheezing. Symmetric expansion. Abdomen:   Soft, nontender, nondistended. Extremities: No cyanosis or clubbing. No edema  Skin:     No rashes and normal coloration. Warm and dry.     Neuro:  Drowsy      I have reviewed ordered lab tests and independently visualized imaging below:    Recent Labs:  Recent Results (from the past 48 hour(s))   CBC with Auto Differential    Collection Time: 07/08/22 11:28 AM   Result Value Ref Range    WBC 20.4 (H) 4.3 - 11.1 K/uL    RBC 3.90 (L) 4.05 - 5.2 M/uL    Hemoglobin 12.1 11.7 - 15.4 g/dL    Hematocrit 34.9 (L) 35.8 - 46.3 %    MCV 89.5 79.6 - 97.8 FL    MCH 31.0 26.1 - 32.9 PG    MCHC 34.7 31.4 - 35.0 g/dL    RDW 12.2 11.9 - 14.6 %    Platelets 882 413 - 089 K/uL    MPV 9.1 (L) 9.4 - 12.3 FL    nRBC 0.00 0.0 - 0.2 K/uL    Differential Type AUTOMATED      Seg Neutrophils 86 (H) 43 - 78 %    Lymphocytes 3 (L) 13 - 44 %    Monocytes 10 4.0 - 12.0 %    Eosinophils % 0 (L) 0.5 - 7.8 %    Basophils 0 0.0 - 2.0 %    Immature Granulocytes 1 0.0 - 5.0 %    Segs Absolute 17.6 (H) 1.7 - 8.2 K/UL    Absolute Lymph # 0.6 0.5 - 4.6 K/UL    Absolute Mono # 2.0 (H) 0.1 - 1.3 K/UL    Absolute Eos # 0.0 0.0 - 0.8 K/UL    Basophils Absolute 0.1 0.0 - 0.2 K/UL    Absolute Immature Granulocyte 0.2 0.0 - 0.5 K/UL   CMP    Collection Time: 07/08/22 11:28 AM   Result Value Ref Range    Sodium 132 (L) 136 - 145 mmol/L    Potassium 4.1 3.5 - 5.1 mmol/L    Chloride 96 (L) 98 - 107 mmol/L    CO2 30 21 - 32 mmol/L    Anion Gap 6 (L) 7 - 16 mmol/L    Glucose 163 (H) 65 - 100 mg/dL    BUN 40 (H) 8 - 23 MG/DL    CREATININE 1.64 (H) 0.6 - 1.0 MG/DL    GFR  39 (L) >60 ml/min/1.73m2    GFR Non- 32 (L) >60 ml/min/1.73m2    Calcium 9.5 8.3 - 10.4 MG/DL    Total Bilirubin 0.6 0.2 - 1.1 MG/DL    ALT 23 12 - 65 U/L    AST 21 15 - 37 U/L    Alk Phosphatase 93 50 - 130 U/L    Total Protein 6.5 6.3 - 8.2 g/dL    Albumin 2.9 (L) 3.2 - 4.6 g/dL    Globulin 3.6 (H) 2.3 - 3.5 g/dL    Albumin/Globulin Ratio 0.8 (L) 1.2 - 3.5     Lipase    Collection Time: 07/08/22 11:28 AM   Result Value Ref Range    Lipase 103 73 - 393 U/L   Troponin    Collection Time: 07/08/22 11:28 AM   Result Value Ref Range    Troponin, High Sensitivity 9.5 0 - 14 pg/mL   Lactic Acid    Collection Time: 07/08/22 11:28 AM   Result Value Ref Range    Lactic Acid, Plasma 1.2 0.4 - 2.0 MMOL/L   Urinalysis    Collection Time: 07/08/22 11:46 AM   Result Value Ref Range    Color, UA DARK YELLOW      Appearance TURBID      Specific Gravity, UA 1.027 (H) 1.001 - 1.023      pH, Urine 5.0 5.0 - 9.0      Protein,  (A) NEG mg/dL    Glucose, UA Negative mg/dL    Ketones, Urine TRACE (A) NEG mg/dL    Bilirubin Urine MODERATE (A) NEG      Blood, Urine SMALL (A) NEG      Urobilinogen, Urine 1.0 0.2 - 1.0 EU/dL    Nitrite, Urine Positive (A) NEG      Leukocyte Esterase, Urine LARGE (A) NEG     Urinalysis, Micro    Collection Time: 07/08/22 11:46 AM   Result Value Ref Range    WBC, UA 20-50 0 /hpf    RBC, UA 10-20 0 /hpf    Epithelial Cells UA 3-5 0 /hpf    BACTERIA, URINE 2+ (H) 0 /hpf    Casts 0 0 /lpf    Crystals 0 0 /LPF    Mucus, UA 0 0 /lpf   Blood Culture 1    Collection Time: 07/08/22 12:15 PM    Specimen: Blood   Result Value Ref Range    Special Requests NO SPECIAL REQUESTS      Culture NO GROWTH AFTER 15 HOURS     Blood Culture 2    Collection Time: 07/08/22 12:44 PM    Specimen: Blood   Result Value Ref Range    Special Requests NO SPECIAL REQUESTS  RIGHT  Antecubital        Culture NO GROWTH AFTER 15 HOURS     Comprehensive Metabolic Panel w/ Reflex to MG    Collection Time: 07/09/22  5:59 AM   Result Value Ref Range    Sodium 135 (L) 136 - 145 mmol/L    Potassium 3.3 (L) 3.5 - 5.1 mmol/L    Chloride 103 98 - 107 mmol/L    CO2 25 21 - 32 mmol/L    Anion Gap 7 7 - 16 mmol/L    Glucose 112 (H) 65 - 100 mg/dL    BUN 30 (H) 8 - 23 MG/DL    CREATININE 1.01 (H) 0.6 - 1.0 MG/DL    GFR African American >60 >60 ml/min/1.73m2    GFR Non- 57 (L) >60 ml/min/1.73m2    Calcium 8.7 8.3 - 10.4 MG/DL    Total Bilirubin 0.4 0.2 - 1.1 MG/DL    ALT 21 12 - 65 U/L    AST 18 15 - 37 U/L    Alk Phosphatase 98 50 - 136 U/L    Total Protein 6.3 6.3 - 8.2 g/dL    Albumin 2.4 (L) 3.2 - 4.6 g/dL    Globulin 3.9 (H) 2.3 - 3.5 g/dL    Albumin/Globulin Ratio 0.6 (L) 1.2 - 3.5     CBC with Auto Differential    Collection Time: 07/09/22  5:59 AM   Result Value Ref Range    WBC 12.9 (H) 4.3 - 11.1 K/uL    RBC 3.33 (L) 4.05 - 5.2 M/uL    Hemoglobin 10.1 (L) 11.7 - 15.4 g/dL    Hematocrit 30.0 (L) 35.8 - 46.3 %    MCV 90.1 79.6 - 97.8 FL    MCH 30.3 26.1 - 32.9 PG    MCHC 33.7 31.4 - 35.0 g/dL    RDW 12.3 11.9 - 14.6 %    Platelets 647 759 - 238 K/uL    MPV 8.9 (L) 9.4 - 12.3 FL    nRBC 0.00 0.0 - 0.2 K/uL    Differential Type AUTOMATED      Seg Neutrophils 78 43 - 78 %    Lymphocytes 9 (L) 13 - 44 %    Monocytes 11 4.0 - 12.0 %    Eosinophils % 1 0.5 - 7.8 %    Basophils 0 0.0 - 2.0 %    Immature Granulocytes 1 0.0 - 5.0 %    Segs Absolute 10.1 (H) 1.7 - 8.2 K/UL    Absolute Lymph # 1.1 0.5 - 4.6 K/UL    Absolute Mono # 1.4 (H) 0.1 - 1.3 K/UL    Absolute Eos # 0.1 0.0 - 0.8 K/UL    Basophils Absolute 0.1 0.0 - 0.2 K/UL    Absolute Immature Granulocyte 0.1 0.0 - 0.5 K/UL   Magnesium    Collection Time: 07/09/22  5:59 AM   Result Value Ref Range    Magnesium 1.7 (L) 1.8 - 2.4 mg/dL       Other Studies:  CT Head W/O Contrast   Final Result   1. No acute intracranial abnormality. 2. Stable atrophy and white matter microangiopathic changes. XR CHEST PORTABLE   Final Result   Slightly underexpanded lungs, otherwise no acute abnormality.           Current Meds:  Current Facility-Administered Medications   Medication Dose Route Frequency    potassium chloride (KLOR-CON M) extended release tablet 20 mEq  20 mEq Oral BID WC    cefepime (MAXIPIME) 1,000 mg in sodium chloride 0.9 % 50 mL IVPB mini-bag  1,000 mg IntraVENous Q12H    divalproex (DEPAKOTE SPRINKLE) capsule 125 mg  125 mg Oral 3 times per day    donepezil (ARICEPT) tablet 10 mg  10 mg Oral Nightly    DULoxetine (CYMBALTA) extended release capsule 60 mg  60 mg Oral BID    hydroCHLOROthiazide (HYDRODIURIL) tablet 25 mg  25 mg Oral Daily    hydroxychloroquine (PLAQUENIL) tablet 200 mg  200 mg Oral Daily    lisinopril (PRINIVIL;ZESTRIL) tablet 20 mg  20 mg Oral Daily    memantine (NAMENDA) tablet 10 mg  10 mg Oral BID    metoprolol succinate (TOPROL XL) extended release tablet 50 mg  50 mg Oral Daily    pantoprazole (PROTONIX) tablet 40 mg  40 mg Oral QAM AC    sodium chloride flush 0.9 % injection 5-40 mL  5-40 mL IntraVENous 2 times per day    sodium chloride flush 0.9 % injection 5-40 mL  5-40 mL IntraVENous PRN    0.9 % sodium chloride infusion   IntraVENous PRN    ondansetron (ZOFRAN-ODT) disintegrating tablet 4 mg  4 mg Oral Q8H PRN    Or    ondansetron (ZOFRAN) injection 4 mg  4 mg IntraVENous Q6H PRN    acetaminophen (TYLENOL) tablet 650 mg  650 mg Oral Q6H PRN    Or    acetaminophen (TYLENOL) suppository 650 mg  650 mg Rectal Q6H PRN    polyethylene glycol (GLYCOLAX) packet 17 g  17 g Oral Daily PRN    tuberculin injection 5 Units  5 Units IntraDERmal Once       Signed:  Quintin Moura APRN - CNP    Part of this note may have been written by using a voice dictation software. The note has been proof read but may still contain some grammatical/other typographical errors.

## 2022-07-09 NOTE — PROGRESS NOTES
Physician Progress Note      Annmarie Hill  CSN #:                  [id number]  :                       1945  ADMIT DATE:       2022 11:08 AM  DISCH DATE:  RESPONDING  PROVIDER #:        Arnel Monroe MD          QUERY TEXT:    Pt admitted with AMS and UTI. Pt noted to have SIRS criteria with an   infection. If possible, please document in the progress notes and discharge   summary if you are evaluating and /or treating any of the following: The medical record reflects the following:  Risk Factors: Frequent UTI's, leukocytosis, tachycardia, tachypnea, dementia  Clinical Indicators: wbc --20.4, pulse-103, resps 41, ua -+2 bacteria, wbc -   20-50, large LE, positive nitrite, AMS away from baseline dementia. CT head   without acute process. Treatment: iv cefepime, cultures, fluids, xray, CT head, labs, essential home   meds. Options provided:  -- Sepsis, present on admission  -- Sepsis, present on admission, now resolved  -- Sepsis, not present on admission  -- UTI without Sepsis  -- Other - I will add my own diagnosis  -- Disagree - Not applicable / Not valid  -- Disagree - Clinically unable to determine / Unknown  -- Refer to Clinical Documentation Reviewer    PROVIDER RESPONSE TEXT:    This patient has sepsis which was present on admission.     Query created by: Jaclyn Sykes on 2022 12:33 PM      Electronically signed by:  Arnel Monroe MD 2022 12:39 PM

## 2022-07-09 NOTE — CARE COORDINATION
Consult to LISA for STR at D/C. Therapy recommendation to return to home setting with resumed 24/7 care. Patient with advanced dementia and unable to participate in assessment. SW called the patient's  and emergency contact TATE Medel. Update: SW spoke with the patient's  Meera Hartmann, advised of therapy recommendations. SW offered a [facility] referral for PT/OT/RN and CNA. Meera Hartmann concerned with any dispo discussion, states that he wants to have more results and explanations medically prior to discharge. SW advised that the patient was not discharging today and SW/CM dept begins working on discharge needs the moment a patient is admitted to ensure that everything is arranged. Meera Hartmann is agreeable to [facility] if the patient is ambulating at baseline. However, if she is unable to walk then he would prefer STR. At this time he does not wish to make a decision regarding [facility] vs. STR and would like to re-visit on Monday once the patient has had more time to improve. The patient at this time has around the clock care either through family or hired caregivers. LISA continuing to follow.      Nelly Duong LMSW    [facility]

## 2022-07-09 NOTE — PROGRESS NOTES
LTG: patient will tolerate safest, least restrictive oral diet without s/sx airway compromise  STG: Patient will tolerate soft/bite size diet with thin liquids without overt s/sx aspiration  STG: Patient will tolerate ongoing po trials in efforts to advance diet  STG: Patient will participate in modified barium swallow study to objectively assess oropharyngeal swallow if indicated        SPEECH LANGUAGE PATHOLOGY: DYSPHAGIA  Initial Assessment    NAME: Joey Cullen  : 1945  MRN: [id number]    ADMISSION DATE: 2022  ADMITTING DIAGNOSIS: has Vitamin D deficiency; Anxiety; Chronic pain of both knees; HLD (hyperlipidemia); Trochanteric bursitis of right hip; Dehydration; Chronic maxillary sinusitis; Fall; Depression; Pure hypercholesterolemia; Perennial allergic rhinitis; Dementia of the Alzheimer's type, with late onset, uncomplicated ([address] Nonallergic rhinitis; Acquired hypothyroidism; Balance disorder; Spondylolisthesis of lumbar region; HOLLY (dyspnea on exertion); Exposure to viral disease; Primary osteoarthritis; Spinal stenosis of lumbar region without neurogenic claudication; Gait difficulty; Hyperglycemia; Depression with anxiety; Gastroesophageal reflux disease with esophagitis; Osteopenia; Hypertension; Alzheimer's dementia without behavioral disturbance ([address] Recurrent UTI; and UTI (urinary tract infection) on their problem list.    ICD-10: Treatment Diagnosis: R13.11 Dysphagia, Oral Phase    RECOMMENDATIONS   Diet:  Diet Solids Recommendation: Soft & Bite Sized  Liquid Consistency Recommendation: Thin    Medications: Crushed in puree as able         Compensatory Swallowing Strategies: Alternate solids and liquids;Assist feed;Eat/Feed slowly; Check for pocketing of food on the Left; Check for pocketing of food on the Right;Upright as possible for all oral intake;Remain upright for 30-45 minutes after meals   Therapeutic Intervention:Patient/Family education;Diet tolerance monitoring   Patient continues to require skilled intervention: Yes   D/C Recommendations: Ongoing speech therapy is recommended during this hospitalization     ASSESSMENT    Dysphagia Diagnosis: Mild to moderate oral stage dysphagia  Increased mastication time with solids. Initially resistant to PO intake (attempted to bite SLP when cup presented) - accepted further PO trials from straw/spoon with verbal prompts. No overt s/sx with any/all consistencies presented. Unable to bite/pull cracker, but tolerated bite size pieces with mildly increased mastication time and adequate oral clearance. Recommend soft, bite size diet with thin liquids. Assistance with meals. Follow for tolerance and upgrades/downgrades as indicated. GENERAL    History of Present Injury/Illness: Ms. Ivette Rae  has a past medical history of Alcohol abuse, daily use, Alzheimer's dementia ([facility] [address] Depression, Hypertension, Lumbar stenosis, Osteopenia, Sinus problem, Spondylolisthesis of lumbar region, and Vitamin D deficiency. . She also  has a past surgical history that includes heent (Bilateral, 2015); Breast lumpectomy (Bilateral); back surgery (5/31/12); other surgical history; Wrist fracture surgery; Tonsillectomy; and Hysterectomy. Chart Reviewed: Yes  General Comment  Comments: drowsy  Subjective: attempted to bite therrapist when presented with cup but furthermore participated in evaluation with prompts  Behavior/Cognition: Lethargic  Communication Observation: Non-verbal  Follows Directions: None  Respiratory Status: Room air     O2 Device: None (Room air)              Current Diet : Regular  Current Liquid Diet : Thin  Pain:   Patient does not c/o pain                                        OBJECTIVE    Patient Positioning: Upright in bed   Oral Motor   Labial: No impairment  Dentition: Intact  Oral Hygiene: Moist  Dentition: Adequate           Oropharyngeal Phase: All  Assessment Method(s): Observation  Consistency Presented:  All consistencies  How Presented: SLP-fed/Presented;Spoon;Straw  Bolus Acceptance: Impaired  Bolus Formation/Control: Impaired  Type of Impairment: Mastication;Delayed;Piecemeal  Oral Residue: 10-50% of bolus  Aspiration Signs/Symptoms: None  Pharyngeal Phase Characteristics: No impairment, issues, or problems              PLAN    Duration/Frequency: Continue to follow patient 2x/week for duration of hospitalization and/or until goals met    Dysphagia Outcome and Severity Scale (ROM)  Dysphagia Outcome Severity Scale: Level 5: Mild dysphagia- Distant supervision. May need one diet consistency restricted  Interpretation of Tool: The Dysphagia Outcome and Severity Scale (ROM) is a simple, easy-to-use, 7-point scale developed to systematically rate the functional severity of dysphagia based on objective assessment and make recommendations for diet level, independence level, and type of nutrition. Normal(7), Functional(6), Mild(5), Mild-Moderate(4), Moderate(3), Moderate-Severe(2), Severe(1)         Education:     Patient Education: SLP role/recommendations   Patient Education Response: No evidence of learning    Current Medications:   No current facility-administered medications on file prior to encounter. Current Outpatient Medications on File Prior to Encounter   Medication Sig Dispense Refill    magnesium oxide (MAG-OX) 400 MG tablet Take 400 mg by mouth daily      lisinopril (PRINIVIL;ZESTRIL) 20 MG tablet Take 1 tablet by mouth daily 90 tablet 3    diclofenac sodium (VOLTAREN) 1 % GEL Apply 4 g topically 4 times daily 100 g 3    divalproex (DEPAKOTE) 125 MG DR tablet Take 125 mg by mouth 3 times daily as needed       hydroxychloroquine (PLAQUENIL) 200 mg tablet Take 1 pill once a day after food.  90 tablet 0    TURMERIC PO Take 1 capsule by mouth daily      azelastine (ASTELIN) 0.1 % nasal spray 1 spray by Nasal route 2 times daily      vitamin D 25 MCG (1000 UT) CAPS Take 1,000 Units by mouth daily      diphenhydrAMINE (SOMINEX) 25 MG tablet Take 25 mg by mouth every 6 hours as needed      donepezil (ARICEPT) 10 MG tablet TAKE ONE TABLET AT BEDTIME.  DULoxetine (CYMBALTA) 60 MG extended release capsule TAKE 2 CAPSULES DAILY.  hydroCHLOROthiazide (HYDRODIURIL) 25 MG tablet TAKE 1 TABLET BY MOUTH EACH DAY.  memantine (NAMENDA) 10 MG tablet Take 10 mg by mouth 2 times daily      metoprolol succinate (TOPROL XL) 50 MG extended release tablet TAKE ONE TABLET DAILY FOR BLOOD PRESSURE      Naproxen Sodium 220 MG CAPS Take 1 capsule by mouth 2 times daily      omeprazole (PRILOSEC) 40 MG delayed release capsule TAKE 1 CAPSULE EACH DAY.          PRECAUTIONS/ALLERGIES: Levofloxacin, Penicillins, Poison oak extract, Bupropion, Sulfamethoxazole, and Montelukast        Therapy Time  SLP Individual Minutes  Time In: 3344  Time Out: 3267  Minutes: 300 [address]  7/9/2022 3:49 PM

## 2022-07-09 NOTE — PROGRESS NOTES
OCCUPATIONAL THERAPY Initial Assessment and AM       OT Visit Days: 1  Acknowledge Orders  Time  OT Charge Capture  Rehab Caseload Tracker      Joey Cullen is a 68 y.o. female   PRIMARY DIAGNOSIS: UTI (urinary tract infection)  Dehydration [E86.0]  UTI (urinary tract infection) [N39.0]  Urinary tract infection without hematuria, site unspecified [N39.0]  Altered mental status, unspecified altered mental status type [R41.82]       Reason for Referral: Generalized Muscle Weakness (M62.81)  Other lack of cordination (R27.8)  Difficulty in walking, Not elsewhere classified (R26.2)  Inpatient: Payor: MEDICARE / Plan: MEDICARE PART A AND B / Product Type: *No Product type* /     ASSESSMENT:     REHAB RECOMMENDATIONS:   Recommendation to date pending progress:  Setting:   24/7 care     Equipment:     To Be Determined     ASSESSMENT:  Ms. Ivana Sage was mostly non-verbal, not responding to stimuli or cues (verbal, visual or manual). This pt only briefly opened eyes throughout evaluation. OT worked on facilitating purposeful self care activities in bed and on edge of bed. Pt resistive to shoes being put on feet and would not open eyes to cold wet washcloth. PTA, pt was functional without an assistive device and assist with ADLs 1 week ago. Pt has 24/7 sitters at home and pt needed a lot of directions to stay on task. This pt would benefit from skilled OT to increase independence to get back to baseline.       [address] [facility] [address] [address] [facility] 6 Clicks Daily Activity Inpatient Short Form:    AM-PAC Daily Activity Inpatient   How much help for putting on and taking off regular lower body clothing?: Total  How much help for Bathing?: Total  How much help for Toileting?: Total  How much help for putting on and taking off regular upper body clothing?: Total  How much help for taking care of personal grooming?: Total  How much help for eating meals?: Total  AM-PAC Inpatient Daily Activity Raw Score: 6  AM-PAC Inpatient ADL T-Scale Score : 17.07  ADL Inpatient CMS 0-100% Score: 100  ADL Inpatient CMS G-Code Modifier : CN           SUBJECTIVE:     Ms. Heath Free  Non-verbal during evaluation    Social/Functional Lives With: Spouse  Type of Home: House  Home Layout: Able to Live on Main level with bedroom/bathroom  Home Access: Stairs to enter with rails  Entrance Stairs - Number of Steps: 6  Entrance Stairs - Rails: Left  Receives Help From:  (pt not left alone)  Ambulation Assistance:  (supervision)  Transfer Assistance:  (supervision)    OBJECTIVE:     Ang Willis / Anny Cid / Samuel Sharp: NA    RESTRICTIONS/PRECAUTIONS:  Restrictions/Precautions: Fall Risk    PAIN: VITALS / O2:   Pre Treatment:   Pain Assessment: Adult Nonverbal Pain Scale (NPVS)      Post Treatment: 0/10       Vitals          Oxygen            GROSS EVALUATION: INTACT IMPAIRED   (See Comments)   UE AROM [] []non-functional throughout, reflexive movement   UE PROM [] []   Strength []    Non- functional throughout   Posture / Balance [] Non-functional throughout, unsteady   Sensation []  unable to assess   Coordination []    Non-functional throughout   Tone [x]       Edema []    Activity Tolerance []   decreased      Hand Dominance R [] L []      COGNITION/  PERCEPTION: INTACT IMPAIRED   (See Comments)   Orientation []  not oriented   Vision []  NT   Hearing []  NT   Cognition  []  dementia   Perception []       MOBILITY: I Mod I S SBA CGA Min Mod Max Total  NT x2 Comments:   Bed Mobility    Rolling [] [] [] [] [] [] [] [] [x] [] []    Supine to Sit [] [] [] [] [] [] [] [] [x] [] []    Scooting [] [] [] [] [] [] [] [] [x] [] []    Sit to Supine [] [] [] [] [] [] [] [] [x] [] []    Transfers    Sit to Stand [] [] [] [] [] [] [] [x] [] [] []    Bed to Chair [] [] [] [] [] [] [] [x] [] [] []    Stand to Sit [] [] [] [] [] [] [] [x] [] [] []    Tub/Shower [] [] [] [] [] [] [] [] [] [] []     Toilet [] [] [] [] [] [] [] [] [] [] []      [] [] [] [] [] [] [] [] [] [] []    I=Independent, Mod I=Modified Independent, S=Supervision/Setup, SBA=Standby Assistance, CGA=Contact Guard Assistance, Min=Minimal Assistance, Mod=Moderate Assistance, Max=Maximal Assistance, Total=Total Assistance, NT=Not Tested    ACTIVITIES OF DAILY LIVING: I Mod I S SBA CGA Min Mod Max Total NT Comments   BASIC ADLs:              Upper Body Bathing  [] [] [] [] [] [] [] [] [] []    Lower Body Bathing [] [] [] [] [] [] [] [] [] []    Toileting [] [] [] [] [] [] [] [] [x] []    Upper Body Dressing [] [] [] [] [] [] [] [] [x] []    Lower Body Dressing [] [] [] [] [] [] [] [] [x] []    Feeding [] [] [] [] [] [] [] [] [x] []    Grooming [] [] [] [] [] [] [] [] [x] []    Personal Device Care [] [] [] [] [] [] [] [] [x] []    Functional Mobility [] [] [] [] [] [] [x] [] [] []    I=Independent, Mod I=Modified Independent, S=Supervision/Setup, SBA=Standby Assistance, CGA=Contact Guard Assistance, Min=Minimal Assistance, Mod=Moderate Assistance, Max=Maximal Assistance, Total=Total Assistance, NT=Not Tested    PLAN:     FREQUENCY/DURATION   OT Plan of Care: 3 times/week for duration of hospital stay or until stated goals are met, whichever comes first.    ACUTE OCCUPATIONAL THERAPY GOALS:   (Developed with and agreed upon by patient and/or caregiver.)  1. Patient will perform grooming with mod assist.   2. Patient will perform upper body dressing with mod assist.   3. Patient will perform feeding with mod assist.   4. Patient will perform toilet transfer with min assist      Goals to be achieved in 7 days.       PROBLEM LIST:   (Skilled intervention is medically necessary to address:)  Decreased ADL/Functional Activities  Decreased Activity Tolerance  Decreased AROM/PROM  Decreased Balance  Decreased Cognition  Decreased Strength  Decreased Transfer Abilities   INTERVENTIONS PLANNED:  (Benefits and precautions of occupational therapy have been discussed with the patient.)  Self Care Training  Therapeutic Activity  Therapeutic Exercise/HEP  Neuromuscular Re-education  Manual Therapy  Education         TREATMENT:     EVALUATION: LOW COMPLEXITY: (Untimed Charge)    TREATMENT:   Self Care: (25): Procedure(s) (per grid) utilized to improve and/or restore self-care/home management as related to dressing, grooming, self feeding and functional mobility for ADLs. Required maximal visual, verbal, manual, tactile and   cueing to facilitate activities of daily living skills and compensatory activities.     TREATMENT GRID:      AFTER TREATMENT PRECAUTIONS: Alarm Activated, Bed, Bed/Chair Locked, Call light within reach, Needs within reach, RN notified and Side rails x3    INTERDISCIPLINARY COLLABORATION:  RN/ PCT, PT/ PTA and OT/ HOBSON    EDUCATION:  Education Given To: Patient  Education Provided: Role of Therapy  Education Method: Verbal  Barriers to Learning: Cognition  Education Outcome: Unable to verbalize    TOTAL TREATMENT DURATION AND TIME:  Time In: 1050  Time Out: [address]  Minutes: [address], [place]

## 2022-07-09 NOTE — PROGRESS NOTES
Outreach Follow Up Note    Bernice Davis was seen and assessed. Mariia Sosa RN is the primary nurse. No distress noted. HR is elevated, temperature WNL. Pt to receive scheduled beta blocker this morning. Will reevaluate in about an hour. @[facility]([id number])@  Vitals:    07/08/22 2255 07/09/22 0347 07/09/22 0615 07/09/22 0810   BP: (!) 143/90 (!) 150/81  (!) 108/96   Pulse: (!) 117 (!) 122  (!) 118   Resp: 16 16  18   Temp: (!) 100.8 °F (38.2 °C) 100.2 °F (37.9 °C) 99.3 °F (37.4 °C) 98.8 °F (37.1 °C)   TempSrc: Axillary Axillary Axillary Oral   SpO2: 95% 95%  94%   Weight:       Height:                           Patient reviewed and discussed with primary nurse. There have been no significant clinical changes since the completion of the last dated Outreach assessment. Will continue to follow up per outreach protocol.     Signed By:   Jose Ramon Solares RN    July 9, 2022 8:54 AM

## 2022-07-09 NOTE — PROGRESS NOTES
much difficulty turning over in bed?: Unable  How much difficulty sitting down on / standing up from a chair with arms?: A Lot  How much difficulty moving from lying on back to sitting on side of bed?: Unable  How much help from another person moving to and from a bed to a chair?: A Lot  How much help from another person needed to walk in hospital room?: A Lot  How much help from another person for climbing 3-5 steps with a railing?: Total  AM-PAC Inpatient Mobility Raw Score : 9  AM-[facility] Inpatient T-Scale Score : 30.55  Mobility Inpatient CMS 0-100% Score: 81.38  Mobility Inpatient CMS G-Code Modifier : CM    SUBJECTIVE:   Ms. Jeniffer Urbina states, No verbal or non-verbal response from pt    Social/Functional Lives With: Spouse  Type of Home: House  Home Layout: Able to Live on Main level with bedroom/bathroom  Home Access: Stairs to enter with rails  Entrance Stairs - Number of Steps: 6  Entrance Stairs - Rails: Left  Receives Help From:  (pt not left alone)  Ambulation Assistance:  (supervision)  Transfer Assistance:  (supervision)    OBJECTIVE:     PAIN: Amelia Senait / O2: Salli Capuchin / Cristi Dyke / DRAINS:   Pre Treatment:   Pain Assessment: Adult Nonverbal Pain Scale (NPVS) (0/10)      Post Treatment: 0/10 Vitals NT       Oxygen NT      IV    RESTRICTIONS/PRECAUTIONS:  Restrictions/Precautions: Fall Risk                 GROSS EVALUATION: Intact Impaired (Comments):   AROM []  non-functional throughout, reflexive movement   PROM []    Strength []    non-functional throughout, copying  movement   Balance []    non-functional throughout, reflexive movement only   Posture [] N/A   Sensation []  unable to assess   Coordination []   non-functional   Tone [x]     Edema []    Activity Tolerance [] poor    []      COGNITION/  PERCEPTION: Intact Impaired (Comments):   Orientation []  not oriented x 4   Vision []  not able to assess   Hearing [] questionable   Cognition  []  no awareness     MOBILITY: I Mod I S SBA CGA Min Mod Max Total  NT x2 Comments:   Bed Mobility    Rolling [] [] [] [] [] [] [] [] [x] [] []    Supine to Sit [] [] [] [] [] [] [] [] [x] [] []    Scooting [] [] [] [] [] [] [] [] [x] [] []    Sit to Supine [] [] [] [] [] [] [] [] [x] [] [x]    Transfers    Sit to Stand [] [] [] [] [] [] [x] [] [] [] []    Bed to Chair [] [] [] [] [] [] [x] [] [] [] [] With walker   Stand to Sit [] [] [] [] [] [] [x] [] [] [] []     [] [] [] [] [] [] [] [] [] [] []    I=Independent, Mod I=Modified Independent, S=Supervision, SBA=Standby Assistance, CGA=Contact Guard Assistance,   Min=Minimal Assistance, Mod=Moderate Assistance, Max=Maximal Assistance, Total=Total Assistance, NT=Not Tested    GAIT: I Mod I S SBA CGA Min Mod Max Total  NT x2 Comments:   Level of Assistance [] [] [] [] [] [] [x] [] [] [] []    Distance  (additional 12ft after an initial 12ft gait assessment)     DME Rolling Walker    Gait Quality Decreased step clearance, Decreased step length, Path deviations , Trunk sway increased and posterior lean or push    Weightbearing Status Restrictions/Precautions  Restrictions/Precautions: Fall Risk    Stairs Stairs/Curb  Stairs?:  (NT)    I=Independent, Mod I=Modified Independent, S=Supervision, SBA=Standby Assistance, CGA=Contact Guard Assistance,   Min=Minimal Assistance, Mod=Moderate Assistance, Max=Maximal Assistance, Total=Total Assistance, NT=Not Tested    PLAN:   ACUTE PHYSICAL THERAPY GOALS:   (Developed with and agreed upon by patient and/or caregiver.)  DISCHARGE GOALS :  (1.)Ms. Melita Banks will move from supine to sit and sit to supine  with MINIMAL ASSIST.    (2.)Ms. Melita Banks will transfer from bed to chair and chair to bed with MINIMAL ASSIST using a walker. (3.)Ms. Lockhart will ambulate with MINIMAL ASSIST for 50-60 feet with a rolling walker. 4) pt able to participate (consistently) with exercises on cue.     WILL RE-ASSESS PROGRESS IN 1 WEEK & MODIFY GOALS PRN    ________________________________________________________________________________________________    FREQUENCY AND DURATION: Daily for duration of hospital stay or until stated goals are met, whichever comes first.    THERAPY PROGNOSIS: Guarded    PROBLEM LIST:   (Skilled intervention is medically necessary to address:)  Decreased ADL/Functional Activities  Decreased Activity Tolerance  Decreased Balance  Decreased Cognition  Decreased Coordination  Decreased Gait Ability  Decreased Safety Awareness  Decreased Strength  Decreased Transfer Abilities INTERVENTIONS PLANNED:   (Benefits and precautions of physical therapy have been discussed with the patient.)  Therapeutic Activity  Therapeutic Exercise/HEP  Gait Training  Education       TREATMENT:   EVALUATION: HIGH COMPLEXITY: (Untimed Charge)    TREATMENT:   Therapeutic Activity (30 Minutes): Therapeutic activity included  attempt to stimulate LE's to elicit a muscle response, sitting balance activity ( attempting to stimulate righting reflex for static balance), rocking R<>L & F<>B to stimulate core reflexive response, standing balance activity with assisted wt shift using a walker, repeated transfers & progressive gait training an additional 12ft after an initial 12ft gait assessment, reviewed with spouse PT role, POC & PT expectations to improve functional Activity tolerance, Balance, Coordination, Mobility and Strength.     TREATMENT GRID:  N/A    AFTER TREATMENT PRECAUTIONS: Bed, Bed/Chair Locked, Call light within reach, Needs within reach and Visitors at bedside    INTERDISCIPLINARY COLLABORATION:  RN/ PCT, OT/ HOBSON and RN Case Manager/      EDUCATION: Education Given To: Patient  Education Provided: Transfer Training;IADL Safety  Education Method: Demonstration;Verbal;Teach Back  Barriers to Learning: Cognition  Education Outcome: Unable to demonstrate understanding;Continued education needed    TIME IN/OUT:  Time In: 1049  Time Out: [address]  Minutes: [address]  Cherie Hernandezer

## 2022-07-09 NOTE — CARE COORDINATION
Dispo TBD     ASSESSMENT NOTE    Attending Physician: Teresita Akhtar MD  Admit Problem: Dehydration [E86.0]  UTI (urinary tract infection) [N39.0]  Urinary tract infection without hematuria, site unspecified [N39.0]  Altered mental status, unspecified altered mental status type [R41.82]  Date/Time of Admission: 7/8/2022 11:08 AM  Problem List:  Patient Active Problem List   Diagnosis    Vitamin D deficiency    Anxiety    Chronic pain of both knees    HLD (hyperlipidemia)    Trochanteric bursitis of right hip    Dehydration    Chronic maxillary sinusitis    Fall    Depression    Pure hypercholesterolemia    Perennial allergic rhinitis    Dementia of the Alzheimer's type, with late onset, uncomplicated ([address]    Nonallergic rhinitis    Acquired hypothyroidism    Balance disorder    Spondylolisthesis of lumbar region    HOLLY (dyspnea on exertion)    Exposure to viral disease    Primary osteoarthritis    Spinal stenosis of lumbar region without neurogenic claudication    Gait difficulty    Hyperglycemia    Depression with anxiety    Gastroesophageal reflux disease with esophagitis    Osteopenia    Hypertension    Alzheimer's dementia without behavioral disturbance ([address]    Recurrent UTI    UTI (urinary tract infection)       Service Assessment  Patient Orientation     Cognition     History Provided By     Primary Caregiver (P) Family   Accompanied By/Relationship     Support Systems (P) Spouse/Significant Other,Home Care Staff,Friends/Neighbors,Family Members   Patient's Healthcare Decision Maker is: Legal Next of Kin   PCP Verified by CM     Last Visit to PCP     Prior Functional Level     Current Functional Level     Can patient return to prior living arrangement (P) Unknown at present   Ability to make needs known:     Family able to assist with home care needs: (P) Yes   Would you like for me to discuss the discharge plan with any other family members/significant others, and if so, who?      Financial Resources Community Resources     CM/SW Referral       Social/Functional History  Lives With (P) Spouse   Type of Home (P) [address] (P) Able to Live on Main level with bedroom/bathroom   Home Access (P) Stairs to enter with rails   Entrance Stairs - Number of Steps (P) 6   Entrance Stairs - Rails (P) Left   Bathroom Shower/Tub     Bathroom Toilet     [address] Help From (P)  (pt not left alone)   ADL Assistance     Bath     Dressing     Grooming     Feeding     [address] [facility] Work     Driving     Shopping          Other (Comment)     [employer] Paying/Finance Responsibility     [address] Management     Other (Comment)     Ambuation Assistance (P)  (supervision)   Transfer Assisstance (P)  (supervision)   Active      Patient's  Info     Mode of Transportation     Education     Occupation     Type of Occupation       Discharge Planning   Type of Residence (P) House   Living Arrangements (P) Spouse/Significant Other   [place] (P) Spouse/Significant [address] Staff,Friends/Neighbors,Family Members   Current Services Prior To Admission (P) [address] (P) [address]   DME     DME     DME Ordered? (P) No   Potential Assistance Purchasing Medications     Meds-to-Beds: Does the patient want to have any new prescriptions delivered to bedside prior to discharge?      Type of Home Care Services (P) Aide Services   Patient expects to be discharged to: (P) Unknown   Follow Up Appointment: Best Day/Time     One/Two Story Residence:     # of Interior Steps     Height of Each Step (in)     triptap History of Falls? (P) Yes     Services At/After Discharge  Transition of Care Consult (CM Consult): Internal Home Health     Internal Hospice     Reason Outside Agency [address]     Partner SNF     Reason Why Partner SNF Not Chosen     Internal Comfort Care     Reason Outside [address] Discharge     [address] Provided? Mode of Transport at [facility] Time of Discharge     Confirm Follow Up Transport       Condition of Participation: Discharge Planning  The plan for Transition of Care is related to the following treatment goals: The Patient and/or Patient Representative was provided with a Choice of Provider? Name of the Patient Representative who was provided with the Choice of Provider and agrees with the Discharge Plan? The Patient and/or Patient Representative Agree with the Discharge Plan? Freedom of Choice list was provided with basic dialogue that supports the individualized plan of care/goals, treatment preferences, and shares the quality data associated with the providers?        Documentation for Discharge Appeal  Discharge Appealed by     Date notified by [facility] of appeal request:     Time notified by [facility] of appeal request:     Detailed Notice of Discharge given to:     Date Notice of Discharge given:     Time Notice of Discharge given:     Date records sent to [facility]     Time records sent to Leslie Garner     Date Notified of Outcome     Time Notified of Outcome     Outcome of appeal           CONOR Allan 07/09/22 3:43 PM    [facility], [address] [place] [address] Work   [facility]

## 2022-07-10 LAB
ALBUMIN SERPL-MCNC: 2.2 G/DL (ref 3.2–4.6)
ALBUMIN/GLOB SERPL: 0.6 {RATIO} (ref 1.2–3.5)
ALP SERPL-CCNC: 91 U/L (ref 50–136)
ALT SERPL-CCNC: 28 U/L (ref 12–65)
AMPHET UR QL SCN: NEGATIVE
ANION GAP SERPL CALC-SCNC: 5 MMOL/L (ref 7–16)
AST SERPL-CCNC: 17 U/L (ref 15–37)
BARBITURATES UR QL SCN: NEGATIVE
BENZODIAZ UR QL: NEGATIVE
BILIRUB SERPL-MCNC: 0.4 MG/DL (ref 0.2–1.1)
BUN SERPL-MCNC: 24 MG/DL (ref 8–23)
CALCIUM SERPL-MCNC: 8.8 MG/DL (ref 8.3–10.4)
CANNABINOIDS UR QL SCN: NEGATIVE
CHLORIDE SERPL-SCNC: 106 MMOL/L (ref 98–107)
CO2 SERPL-SCNC: 26 MMOL/L (ref 21–32)
COCAINE UR QL SCN: NEGATIVE
CREAT SERPL-MCNC: 0.74 MG/DL (ref 0.6–1)
ERYTHROCYTE [DISTWIDTH] IN BLOOD BY AUTOMATED COUNT: 12.2 % (ref 11.9–14.6)
GLOBULIN SER CALC-MCNC: 3.9 G/DL (ref 2.3–3.5)
GLUCOSE SERPL-MCNC: 93 MG/DL (ref 65–100)
HCT VFR BLD AUTO: 30.6 % (ref 35.8–46.3)
HGB BLD-MCNC: 10.4 G/DL (ref 11.7–15.4)
MCH RBC QN AUTO: 30.9 PG (ref 26.1–32.9)
MCHC RBC AUTO-ENTMCNC: 34 G/DL (ref 31.4–35)
MCV RBC AUTO: 90.8 FL (ref 79.6–97.8)
METHADONE UR QL: NEGATIVE
MM INDURATION, POC: 0 MM (ref 0–5)
NRBC # BLD: 0 K/UL (ref 0–0.2)
OPIATES UR QL: NEGATIVE
PCP UR QL: NEGATIVE
PLATELET # BLD AUTO: 278 K/UL (ref 150–450)
PMV BLD AUTO: 9 FL (ref 9.4–12.3)
POTASSIUM SERPL-SCNC: 3.6 MMOL/L (ref 3.5–5.1)
PPD, POC: NEGATIVE
PROT SERPL-MCNC: 6.1 G/DL (ref 6.3–8.2)
RBC # BLD AUTO: 3.37 M/UL (ref 4.05–5.2)
SODIUM SERPL-SCNC: 137 MMOL/L (ref 136–145)
WBC # BLD AUTO: 10.3 K/UL (ref 4.3–11.1)

## 2022-07-10 PROCEDURE — 6370000000 HC RX 637 (ALT 250 FOR IP): Performed by: NURSE PRACTITIONER

## 2022-07-10 PROCEDURE — 2580000003 HC RX 258: Performed by: NURSE PRACTITIONER

## 2022-07-10 PROCEDURE — 1100000000 HC RM PRIVATE

## 2022-07-10 PROCEDURE — 6360000002 HC RX W HCPCS: Performed by: NURSE PRACTITIONER

## 2022-07-10 PROCEDURE — 80307 DRUG TEST PRSMV CHEM ANLYZR: CPT

## 2022-07-10 PROCEDURE — 80053 COMPREHEN METABOLIC PANEL: CPT

## 2022-07-10 PROCEDURE — 36415 COLL VENOUS BLD VENIPUNCTURE: CPT

## 2022-07-10 PROCEDURE — 85027 COMPLETE CBC AUTOMATED: CPT

## 2022-07-10 RX ORDER — DULOXETIN HYDROCHLORIDE 60 MG/1
60 CAPSULE, DELAYED RELEASE ORAL DAILY
Status: DISCONTINUED | OUTPATIENT
Start: 2022-07-11 | End: 2022-07-15

## 2022-07-10 RX ADMIN — DIVALPROEX SODIUM 125 MG: 125 CAPSULE ORAL at 21:05

## 2022-07-10 RX ADMIN — DIVALPROEX SODIUM 125 MG: 125 CAPSULE ORAL at 06:42

## 2022-07-10 RX ADMIN — CEFEPIME HYDROCHLORIDE 1000 MG: 1 INJECTION, POWDER, FOR SOLUTION INTRAMUSCULAR; INTRAVENOUS at 13:15

## 2022-07-10 RX ADMIN — SODIUM CHLORIDE, PRESERVATIVE FREE 10 ML: 5 INJECTION INTRAVENOUS at 10:42

## 2022-07-10 RX ADMIN — DIVALPROEX SODIUM 125 MG: 125 CAPSULE ORAL at 15:13

## 2022-07-10 RX ADMIN — PANTOPRAZOLE SODIUM 40 MG: 40 TABLET, DELAYED RELEASE ORAL at 06:42

## 2022-07-10 RX ADMIN — DONEPEZIL HYDROCHLORIDE 10 MG: 5 TABLET, FILM COATED ORAL at 21:05

## 2022-07-10 RX ADMIN — MEMANTINE 10 MG: 5 TABLET ORAL at 21:05

## 2022-07-10 RX ADMIN — HYDROCHLOROTHIAZIDE 25 MG: 25 TABLET ORAL at 10:42

## 2022-07-10 RX ADMIN — DULOXETINE HYDROCHLORIDE 60 MG: 60 CAPSULE, DELAYED RELEASE ORAL at 10:42

## 2022-07-10 RX ADMIN — MEMANTINE 10 MG: 5 TABLET ORAL at 10:42

## 2022-07-10 RX ADMIN — LISINOPRIL 20 MG: 20 TABLET ORAL at 10:42

## 2022-07-10 RX ADMIN — METOPROLOL SUCCINATE 50 MG: 50 TABLET, EXTENDED RELEASE ORAL at 10:42

## 2022-07-10 RX ADMIN — HYDROXYCHLOROQUINE SULFATE 200 MG: 200 TABLET ORAL at 10:42

## 2022-07-10 RX ADMIN — CEFEPIME HYDROCHLORIDE 1000 MG: 1 INJECTION, POWDER, FOR SOLUTION INTRAMUSCULAR; INTRAVENOUS at 00:22

## 2022-07-10 RX ADMIN — SODIUM CHLORIDE: 900 INJECTION, SOLUTION INTRAVENOUS at 02:25

## 2022-07-10 NOTE — PROGRESS NOTES
[facility] CRITICAL CARE OUTREACH NURSE PROGRESS REPORT      SUBJECTIVE: Called to assess patient secondary to outreach protocol. Vitals:    07/09/22 1630 07/09/22 1920 07/10/22 0017 07/10/22 0255   BP: 124/79 108/68 126/75 (!) 142/97   Pulse: 94 (!) 101 93 97   Resp: 16 17 17 17   Temp: 97.3 °F (36.3 °C) 99.1 °F (37.3 °C) 97.3 °F (36.3 °C) 97.9 °F (36.6 °C)   TempSrc: Axillary Axillary Axillary Oral   SpO2: 96% 97% 94% 97%   Weight:       Height:              LAB DATA:    Recent Labs     07/08/22  1128 07/09/22  0559 07/10/22  0529   * 135* 137   K 4.1 3.3* 3.6   CL 96* 103 106   CO2 30 25 26   BUN 40* 30* 24*   GFRAA 39* >60 >60   MG  --  1.7*  --    GLOB 3.6* 3.9* 3.9*   ALT 23 21 28        Recent Labs     07/08/22 1128 07/09/22  0559 07/10/22  0529   WBC 20.4* 12.9* 10.3   HGB 12.1 10.1* 10.4*   HCT 34.9* 30.0* 30.6*    260 278          ASSESSMENT:  Patient resting quietly in bed with eyes closed. She replies to voice but doesn't open eyes. Says she is tired. Lung sounds diminished over bases. Respirations even and unlabored. No edema noted. Bowel sounds active. No complaints of pain or discomfort. PLAN: Patient is steadily improving. White count and lactic normalized, VSS, afebrile. Outreach available if more issues arise.

## 2022-07-10 NOTE — PROGRESS NOTES
Hospitalist Progress Note   Admit Date:  2022 11:08 AM   Name:  Carmen Vallejo   Age:  68 y.o. Sex:  female  :  1945   MRN:  [id number]   Room:  [facility]/01    Presenting Complaint: Fever     Reason(s) for Admission: Dehydration [E86.0]  UTI (urinary tract infection) [N39.0]  Urinary tract infection without hematuria, site unspecified [N39.0]  Altered mental status, unspecified altered mental status type [facility] Course & Interval History:   Carmen Vallejo is a 68 y.o. female with a PMH of HTN, GERD, depression, past alcohol abuse, Alzeimer's dementia who presented with altered mental status.  reports recurrent UTIs over the last 3 months. Pt has continued to decline in mentation, now does not communicate. Also declined in ambulation per . Found to have a UTI. Started on cefepime given allergy hx. CT head without acute findings. CXR without acute findings.  [place] [facility] in [facility] [place] and has plans to look for long term memory care. Subjective/24 hr Events (07/10/22) : Patient is seen and examined at bedside. No acute events reported overnight by nursing staff. Non verbal and not following commands. Eyes opens and tracks. Watching tv. Nodding to some questions.  at bedside. He is worried that she may have ingested poison/caustic agents prior to admission as she has been lethargic more than usual even with UTI. Review of Systems:  Unable to obtain. Assessment & Plan:     UTI  UA (+) nitrites, leuk est.  07/10/22: Cultures negative thus far. Will continue on cefepime EOT    PRN bladder scan     Hypokalemia - resolved  Hyponatremia - resolved     Leukocytosis, Fever - Secondary to UTI. resolved    Acute metabolic encephalopathy with underlying Dementia/Alzheimer's   Likely secondary to UTI  CT head without acute findings  - titrate cymbalta to daily.  states she has been on the med for 15+yrs but has not been re-evaluated.   - add Utox     HTN: on Home meds      Discharge Planning: HHA    Diet:  ADULT DIET; Dysphagia - Soft and Bite Sized  DVT PPx: SCD  Code status: Full Code      I have reviewed and ordered clinical lab tests and independently visualized images, tracing. I spent 32 minutes of time caring for this patient at bedside or nearby, more than 50 percent of which was spent on coordination of care and/or counseling regarding the disease process, treatment options, and treatment plan. Hospital Problems:  Principal Problem:    UTI (urinary tract infection)  Active Problems:    Anxiety    HLD (hyperlipidemia)    Depression    Dementia of the Alzheimer's type, with late onset, uncomplicated ([address]    Hypertension  Resolved Problems:    * No resolved hospital problems. *      Objective:     Patient Vitals for the past 24 hrs:   Temp Pulse Resp BP SpO2   07/10/22 1149 99.1 °F (37.3 °C) 95 17 (!) 157/86 98 %   07/10/22 0740 98.2 °F (36.8 °C) 92 17 (!) 152/94 97 %   07/10/22 0255 97.9 °F (36.6 °C) 97 17 (!) 142/97 97 %   07/10/22 0017 97.3 °F (36.3 °C) 93 17 126/75 94 %   07/09/22 1920 99.1 °F (37.3 °C) (!) 101 17 108/68 97 %   07/09/22 1630 97.3 °F (36.3 °C) 94 16 124/79 96 %       Oxygen Therapy  SpO2: 98 %  Pulse via Oximetry: 93 beats per minute  O2 Device: None (Room air)    Estimated body mass index is 25.82 kg/m² as calculated from the following:    Height as of this encounter: 5' 6\" (1.676 m). Weight as of this encounter: 160 lb (72.6 kg). No intake or output data in the 24 hours ending 07/10/22 1306      Physical Exam:   Blood pressure (!) 157/86, pulse 95, temperature 99.1 °F (37.3 °C), temperature source Axillary, resp. rate 17, height 5' 6\" (1.676 m), weight 160 lb (72.6 kg), SpO2 98 %. General:    NAD  Head:  Normocephalic, atraumatic  Eyes:  Sclerae appear normal.  Pupils equally round. ENT:  Nares appear normal, no drainage. Moist oral mucosa  Neck:  No restricted ROM. Trachea midline   CV:   RRR.   No m/r/g.   Lungs: No wheezing. Symmetric expansion. Abdomen:   Soft, nontender, nondistended. Extremities: No cyanosis or clubbing. No edema  Skin:     No rashes and normal coloration. Warm and dry.     Neuro:  Drowsy      I have reviewed ordered lab tests and independently visualized imaging below:    Recent Labs:  Recent Results (from the past 48 hour(s))   PLEASE READ & DOCUMENT PPD TEST IN 24 HRS    Collection Time: 07/09/22 12:00 AM   Result Value Ref Range    PPD, (POC) Negative Negative    mm Induration 0 0 - 5 mm   Comprehensive Metabolic Panel w/ Reflex to MG    Collection Time: 07/09/22  5:59 AM   Result Value Ref Range    Sodium 135 (L) 136 - 145 mmol/L    Potassium 3.3 (L) 3.5 - 5.1 mmol/L    Chloride 103 98 - 107 mmol/L    CO2 25 21 - 32 mmol/L    Anion Gap 7 7 - 16 mmol/L    Glucose 112 (H) 65 - 100 mg/dL    BUN 30 (H) 8 - 23 MG/DL    CREATININE 1.01 (H) 0.6 - 1.0 MG/DL    GFR African American >60 >60 ml/min/1.73m2    GFR Non- 57 (L) >60 ml/min/1.73m2    Calcium 8.7 8.3 - 10.4 MG/DL    Total Bilirubin 0.4 0.2 - 1.1 MG/DL    ALT 21 12 - 65 U/L    AST 18 15 - 37 U/L    Alk Phosphatase 98 50 - 136 U/L    Total Protein 6.3 6.3 - 8.2 g/dL    Albumin 2.4 (L) 3.2 - 4.6 g/dL    Globulin 3.9 (H) 2.3 - 3.5 g/dL    Albumin/Globulin Ratio 0.6 (L) 1.2 - 3.5     CBC with Auto Differential    Collection Time: 07/09/22  5:59 AM   Result Value Ref Range    WBC 12.9 (H) 4.3 - 11.1 K/uL    RBC 3.33 (L) 4.05 - 5.2 M/uL    Hemoglobin 10.1 (L) 11.7 - 15.4 g/dL    Hematocrit 30.0 (L) 35.8 - 46.3 %    MCV 90.1 79.6 - 97.8 FL    MCH 30.3 26.1 - 32.9 PG    MCHC 33.7 31.4 - 35.0 g/dL    RDW 12.3 11.9 - 14.6 %    Platelets 602 533 - 014 K/uL    MPV 8.9 (L) 9.4 - 12.3 FL    nRBC 0.00 0.0 - 0.2 K/uL    Differential Type AUTOMATED      Seg Neutrophils 78 43 - 78 %    Lymphocytes 9 (L) 13 - 44 %    Monocytes 11 4.0 - 12.0 %    Eosinophils % 1 0.5 - 7.8 %    Basophils 0 0.0 - 2.0 %    Immature Granulocytes 1 0.0 - 5.0 %    Segs Absolute 10.1 (H) 1.7 - 8.2 K/UL    Absolute Lymph # 1.1 0.5 - 4.6 K/UL    Absolute Mono # 1.4 (H) 0.1 - 1.3 K/UL    Absolute Eos # 0.1 0.0 - 0.8 K/UL    Basophils Absolute 0.1 0.0 - 0.2 K/UL    Absolute Immature Granulocyte 0.1 0.0 - 0.5 K/UL   Magnesium    Collection Time: 07/09/22  5:59 AM   Result Value Ref Range    Magnesium 1.7 (L) 1.8 - 2.4 mg/dL   Comprehensive Metabolic Panel w/ Reflex to MG    Collection Time: 07/10/22  5:29 AM   Result Value Ref Range    Sodium 137 136 - 145 mmol/L    Potassium 3.6 3.5 - 5.1 mmol/L    Chloride 106 98 - 107 mmol/L    CO2 26 21 - 32 mmol/L    Anion Gap 5 (L) 7 - 16 mmol/L    Glucose 93 65 - 100 mg/dL    BUN 24 (H) 8 - 23 MG/DL    CREATININE 0.74 0.6 - 1.0 MG/DL    GFR African American >60 >60 ml/min/1.73m2    GFR Non- >60 >60 ml/min/1.73m2    Calcium 8.8 8.3 - 10.4 MG/DL    Total Bilirubin 0.4 0.2 - 1.1 MG/DL    ALT 28 12 - 65 U/L    AST 17 15 - 37 U/L    Alk Phosphatase 91 50 - 136 U/L    Total Protein 6.1 (L) 6.3 - 8.2 g/dL    Albumin 2.2 (L) 3.2 - 4.6 g/dL    Globulin 3.9 (H) 2.3 - 3.5 g/dL    Albumin/Globulin Ratio 0.6 (L) 1.2 - 3.5     CBC    Collection Time: 07/10/22  5:29 AM   Result Value Ref Range    WBC 10.3 4.3 - 11.1 K/uL    RBC 3.37 (L) 4.05 - 5.2 M/uL    Hemoglobin 10.4 (L) 11.7 - 15.4 g/dL    Hematocrit 30.6 (L) 35.8 - 46.3 %    MCV 90.8 79.6 - 97.8 FL    MCH 30.9 26.1 - 32.9 PG    MCHC 34.0 31.4 - 35.0 g/dL    RDW 12.2 11.9 - 14.6 %    Platelets 455 991 - 763 K/uL    MPV 9.0 (L) 9.4 - 12.3 FL    nRBC 0.00 0.0 - 0.2 K/uL       Other Studies:  CT Head W/O Contrast   Final Result   1. No acute intracranial abnormality. 2. Stable atrophy and white matter microangiopathic changes. XR CHEST PORTABLE   Final Result   Slightly underexpanded lungs, otherwise no acute abnormality.           Current Meds:  Current Facility-Administered Medications   Medication Dose Route Frequency    [START ON 7/11/2022] DULoxetine (CYMBALTA) extended release capsule 60 mg  60 mg Oral Daily    0.9 % sodium chloride infusion   IntraVENous Continuous    cefepime (MAXIPIME) 1,000 mg in sodium chloride 0.9 % 50 mL IVPB mini-bag  1,000 mg IntraVENous Q12H    divalproex (DEPAKOTE SPRINKLE) capsule 125 mg  125 mg Oral 3 times per day    donepezil (ARICEPT) tablet 10 mg  10 mg Oral Nightly    hydroCHLOROthiazide (HYDRODIURIL) tablet 25 mg  25 mg Oral Daily    hydroxychloroquine (PLAQUENIL) tablet 200 mg  200 mg Oral Daily    lisinopril (PRINIVIL;ZESTRIL) tablet 20 mg  20 mg Oral Daily    memantine (NAMENDA) tablet 10 mg  10 mg Oral BID    metoprolol succinate (TOPROL XL) extended release tablet 50 mg  50 mg Oral Daily    pantoprazole (PROTONIX) tablet 40 mg  40 mg Oral QAM AC    sodium chloride flush 0.9 % injection 5-40 mL  5-40 mL IntraVENous 2 times per day    sodium chloride flush 0.9 % injection 5-40 mL  5-40 mL IntraVENous PRN    0.9 % sodium chloride infusion   IntraVENous PRN    ondansetron (ZOFRAN-ODT) disintegrating tablet 4 mg  4 mg Oral Q8H PRN    Or    ondansetron (ZOFRAN) injection 4 mg  4 mg IntraVENous Q6H PRN    acetaminophen (TYLENOL) tablet 650 mg  650 mg Oral Q6H PRN    Or    acetaminophen (TYLENOL) suppository 650 mg  650 mg Rectal Q6H PRN    polyethylene glycol (GLYCOLAX) packet 17 g  17 g Oral Daily PRN       Signed:  Varsha Drake MD    Part of this note may have been written by using a voice dictation software. The note has been proof read but may still contain some grammatical/other typographical errors.

## 2022-07-11 PROBLEM — A49.8 PSEUDOMONAS AERUGINOSA INFECTION: Status: ACTIVE | Noted: 2022-07-11

## 2022-07-11 LAB
ALBUMIN SERPL-MCNC: 2.3 G/DL (ref 3.2–4.6)
ALBUMIN/GLOB SERPL: 0.6 {RATIO} (ref 1.2–3.5)
ALP SERPL-CCNC: 87 U/L (ref 50–136)
ALT SERPL-CCNC: 28 U/L (ref 12–65)
ANION GAP SERPL CALC-SCNC: 2 MMOL/L (ref 7–16)
AST SERPL-CCNC: 21 U/L (ref 15–37)
BACTERIA SPEC CULT: ABNORMAL
BILIRUB SERPL-MCNC: 0.3 MG/DL (ref 0.2–1.1)
BUN SERPL-MCNC: 16 MG/DL (ref 8–23)
CALCIUM SERPL-MCNC: 9 MG/DL (ref 8.3–10.4)
CHLORIDE SERPL-SCNC: 103 MMOL/L (ref 98–107)
CO2 SERPL-SCNC: 30 MMOL/L (ref 21–32)
CREAT SERPL-MCNC: 0.71 MG/DL (ref 0.6–1)
ERYTHROCYTE [DISTWIDTH] IN BLOOD BY AUTOMATED COUNT: 11.9 % (ref 11.9–14.6)
GLOBULIN SER CALC-MCNC: 3.9 G/DL (ref 2.3–3.5)
GLUCOSE SERPL-MCNC: 101 MG/DL (ref 65–100)
HCT VFR BLD AUTO: 30.9 % (ref 35.8–46.3)
HGB BLD-MCNC: 10.7 G/DL (ref 11.7–15.4)
MCH RBC QN AUTO: 31.1 PG (ref 26.1–32.9)
MCHC RBC AUTO-ENTMCNC: 34.6 G/DL (ref 31.4–35)
MCV RBC AUTO: 89.8 FL (ref 79.6–97.8)
NRBC # BLD: 0 K/UL (ref 0–0.2)
PLATELET # BLD AUTO: 297 K/UL (ref 150–450)
PMV BLD AUTO: 8.4 FL (ref 9.4–12.3)
POTASSIUM SERPL-SCNC: 3.7 MMOL/L (ref 3.5–5.1)
PROT SERPL-MCNC: 6.2 G/DL (ref 6.3–8.2)
RBC # BLD AUTO: 3.44 M/UL (ref 4.05–5.2)
SERVICE CMNT-IMP: ABNORMAL
SODIUM SERPL-SCNC: 135 MMOL/L (ref 136–145)
WBC # BLD AUTO: 8.1 K/UL (ref 4.3–11.1)

## 2022-07-11 PROCEDURE — 36415 COLL VENOUS BLD VENIPUNCTURE: CPT

## 2022-07-11 PROCEDURE — 80053 COMPREHEN METABOLIC PANEL: CPT

## 2022-07-11 PROCEDURE — 6370000000 HC RX 637 (ALT 250 FOR IP): Performed by: INTERNAL MEDICINE

## 2022-07-11 PROCEDURE — 97530 THERAPEUTIC ACTIVITIES: CPT

## 2022-07-11 PROCEDURE — 6360000002 HC RX W HCPCS: Performed by: NURSE PRACTITIONER

## 2022-07-11 PROCEDURE — 1100000000 HC RM PRIVATE

## 2022-07-11 PROCEDURE — 2580000003 HC RX 258: Performed by: NURSE PRACTITIONER

## 2022-07-11 PROCEDURE — 85027 COMPLETE CBC AUTOMATED: CPT

## 2022-07-11 PROCEDURE — 6370000000 HC RX 637 (ALT 250 FOR IP): Performed by: NURSE PRACTITIONER

## 2022-07-11 RX ORDER — DULOXETIN HYDROCHLORIDE 60 MG/1
60 CAPSULE, DELAYED RELEASE ORAL DAILY
Qty: 30 CAPSULE | Refills: 0
Start: 2022-07-11

## 2022-07-11 RX ADMIN — SODIUM CHLORIDE: 900 INJECTION, SOLUTION INTRAVENOUS at 05:38

## 2022-07-11 RX ADMIN — PANTOPRAZOLE SODIUM 40 MG: 40 TABLET, DELAYED RELEASE ORAL at 05:38

## 2022-07-11 RX ADMIN — SODIUM CHLORIDE: 900 INJECTION, SOLUTION INTRAVENOUS at 20:45

## 2022-07-11 RX ADMIN — METOPROLOL SUCCINATE 50 MG: 50 TABLET, EXTENDED RELEASE ORAL at 08:41

## 2022-07-11 RX ADMIN — DIVALPROEX SODIUM 125 MG: 125 CAPSULE ORAL at 05:38

## 2022-07-11 RX ADMIN — HYDROCHLOROTHIAZIDE 25 MG: 25 TABLET ORAL at 08:42

## 2022-07-11 RX ADMIN — DONEPEZIL HYDROCHLORIDE 10 MG: 5 TABLET, FILM COATED ORAL at 21:08

## 2022-07-11 RX ADMIN — LISINOPRIL 20 MG: 20 TABLET ORAL at 08:41

## 2022-07-11 RX ADMIN — HYDROXYCHLOROQUINE SULFATE 200 MG: 200 TABLET ORAL at 08:41

## 2022-07-11 RX ADMIN — CEFEPIME HYDROCHLORIDE 1000 MG: 1 INJECTION, POWDER, FOR SOLUTION INTRAMUSCULAR; INTRAVENOUS at 00:10

## 2022-07-11 RX ADMIN — SODIUM CHLORIDE, PRESERVATIVE FREE 10 ML: 5 INJECTION INTRAVENOUS at 08:42

## 2022-07-11 RX ADMIN — CEFEPIME HYDROCHLORIDE 1000 MG: 1 INJECTION, POWDER, FOR SOLUTION INTRAMUSCULAR; INTRAVENOUS at 13:03

## 2022-07-11 RX ADMIN — MEMANTINE 10 MG: 5 TABLET ORAL at 21:08

## 2022-07-11 RX ADMIN — MEMANTINE 10 MG: 5 TABLET ORAL at 08:41

## 2022-07-11 RX ADMIN — DIVALPROEX SODIUM 125 MG: 125 CAPSULE ORAL at 21:08

## 2022-07-11 RX ADMIN — DIVALPROEX SODIUM 125 MG: 125 CAPSULE ORAL at 14:46

## 2022-07-11 RX ADMIN — DULOXETINE HYDROCHLORIDE 60 MG: 60 CAPSULE, DELAYED RELEASE ORAL at 08:41

## 2022-07-11 NOTE — PROGRESS NOTES
Hospitalist Progress Note   Admit Date:  2022 11:08 AM   Name:  Hailee Gagnon   Age:  68 y.o. Sex:  female  :  1945   MRN:  [id number]   Room:  [facility]/    Presenting Complaint: Fever     Reason(s) for Admission: Dehydration [E86.0]  UTI (urinary tract infection) [N39.0]  Urinary tract infection without hematuria, site unspecified [N39.0]  Altered mental status, unspecified altered mental status type [facility] Course & Interval History:   Hailee Gagnon is a 68 y.o. female with a PMH of HTN, GERD, depression, past alcohol abuse, Alzeimer's dementia who presented with altered mental status.  reports recurrent UTIs over the last 3 months. Pt has continued to decline in mentation, now does not communicate. Also declined in ambulation per . Found to have a UTI. Started on cefepime given allergy hx. CT head without acute findings. CXR without acute findings. UCx with Pseudomas aeruginosa. Subjective/24 hr Events (22) : Patient is seen and examined at bedside. No acute events reported overnight by nursing staff. UCx with Pseudomas aeruginosa. Patient is more alert and awake. Eyes open and tracks. Answering some of her husbands but not mine. Minimally following commands. Review of Systems:  Unable to obtain. Assessment & Plan:     UTI due to Pseudomas aeruginosa  UA (+) nitrites, leuk est.   22: UCx with Pseudomas aeruginosa that is pan-sensitive  - will continue on cefepime today and plan to dc tomorrow with PO abx.     Hypokalemia - resolved  Hyponatremia - resolved     Leukocytosis, Fever - Secondary to UTI. resolved    Acute metabolic encephalopathy with underlying Dementia/Alzheimer's   Likely secondary to UTI. CT head without acute findings  22: improved but not at baseline. - cymbalta titrated to daily from BID  - neurology outpatient follow up her underlying alzheimer's dementia.     HTN: on Home meds      Discharge Planning: HHA    Diet:  ADULT DIET; Dysphagia - Soft and Bite Sized  DVT PPx: SCD  Code status: Full Code      I have reviewed and ordered clinical lab tests and independently visualized images, tracing. I spent 30 minutes of time caring for this patient at bedside or nearby, more than 50 percent of which was spent on coordination of care and/or counseling regarding the disease process, treatment options, and treatment plan. Hospital Problems:  Principal Problem:    UTI (urinary tract infection)  Active Problems:    Pseudomonas aeruginosa infection    Anxiety    HLD (hyperlipidemia)    Depression    Dementia of the Alzheimer's type, with late onset, uncomplicated ([address]    Hypertension  Resolved Problems:    * No resolved hospital problems. *      Objective:     Patient Vitals for the past 24 hrs:   Temp Pulse Resp BP SpO2   07/11/22 1127 97.7 °F (36.5 °C) 76 -- (!) 138/91 --   07/11/22 0742 98.6 °F (37 °C) 88 16 (!) 159/102 97 %   07/11/22 0330 98.3 °F (36.8 °C) 81 18 (!) 140/92 95 %   07/10/22 2300 98.8 °F (37.1 °C) 74 18 (!) 177/77 95 %   07/10/22 1915 97.8 °F (36.6 °C) 84 16 (!) 143/79 100 %   07/10/22 1610 97.5 °F (36.4 °C) 82 17 (!) 149/86 97 %   07/10/22 1149 99.1 °F (37.3 °C) 95 17 (!) 157/86 98 %       Oxygen Therapy  SpO2: 97 %  Pulse via Oximetry: 93 beats per minute  O2 Device: None (Room air)    Estimated body mass index is 25.82 kg/m² as calculated from the following:    Height as of this encounter: 5' 6\" (1.676 m). Weight as of this encounter: 160 lb (72.6 kg). Intake/Output Summary (Last 24 hours) at 7/11/2022 1146  Last data filed at 7/11/2022 0542  Gross per 24 hour   Intake --   Output 1300 ml   Net -1300 ml         Physical Exam:   Blood pressure (!) 138/91, pulse 76, temperature 97.7 °F (36.5 °C), resp. rate 16, height 5' 6\" (1.676 m), weight 160 lb (72.6 kg), SpO2 97 %. General:    Alert and awake.  Minimally verbal but responding to   Head:  Normocephalic, atraumatic  Eyes:  Sclerae appear normal.  Pupils equally round. ENT:  Nares appear normal, no drainage. Moist oral mucosa  Neck:  No restricted ROM. Trachea midline   CV:   RRR. No m/r/g. Lungs: No wheezing. Symmetric expansion. Abdomen:   Soft, nontender, nondistended. Extremities: No cyanosis or clubbing. No edema  Skin:     No rashes and normal coloration. Warm and dry. Neuro:  Alert and awake, minimally following commands.       I have reviewed ordered lab tests and independently visualized imaging below:    Recent Labs:  Recent Results (from the past 48 hour(s))   Comprehensive Metabolic Panel w/ Reflex to MG    Collection Time: 07/10/22  5:29 AM   Result Value Ref Range    Sodium 137 136 - 145 mmol/L    Potassium 3.6 3.5 - 5.1 mmol/L    Chloride 106 98 - 107 mmol/L    CO2 26 21 - 32 mmol/L    Anion Gap 5 (L) 7 - 16 mmol/L    Glucose 93 65 - 100 mg/dL    BUN 24 (H) 8 - 23 MG/DL    CREATININE 0.74 0.6 - 1.0 MG/DL    GFR African American >60 >60 ml/min/1.73m2    GFR Non- >60 >60 ml/min/1.73m2    Calcium 8.8 8.3 - 10.4 MG/DL    Total Bilirubin 0.4 0.2 - 1.1 MG/DL    ALT 28 12 - 65 U/L    AST 17 15 - 37 U/L    Alk Phosphatase 91 50 - 136 U/L    Total Protein 6.1 (L) 6.3 - 8.2 g/dL    Albumin 2.2 (L) 3.2 - 4.6 g/dL    Globulin 3.9 (H) 2.3 - 3.5 g/dL    Albumin/Globulin Ratio 0.6 (L) 1.2 - 3.5     CBC    Collection Time: 07/10/22  5:29 AM   Result Value Ref Range    WBC 10.3 4.3 - 11.1 K/uL    RBC 3.37 (L) 4.05 - 5.2 M/uL    Hemoglobin 10.4 (L) 11.7 - 15.4 g/dL    Hematocrit 30.6 (L) 35.8 - 46.3 %    MCV 90.8 79.6 - 97.8 FL    MCH 30.9 26.1 - 32.9 PG    MCHC 34.0 31.4 - 35.0 g/dL    RDW 12.2 11.9 - 14.6 %    Platelets 190 165 - 466 K/uL    MPV 9.0 (L) 9.4 - 12.3 FL    nRBC 0.00 0.0 - 0.2 K/uL   Urine Drug Screen    Collection Time: 07/10/22  3:15 PM   Result Value Ref Range    PCP, Urine Negative      Benzodiazepines, Urine Negative      Cocaine, Urine Negative      Amphetamine, Urine Negative      Methadone, Urine Negative      THC, TH-Cannabinol, Urine Negative      Opiates, Urine Negative      Barbiturates, Urine Negative     PLEASE READ & DOCUMENT PPD TEST IN 48 HRS    Collection Time: 07/10/22 10:45 PM   Result Value Ref Range    PPD, (POC) Negative Negative    mm Induration 0 0 - 5 mm   Comprehensive Metabolic Panel w/ Reflex to MG    Collection Time: 07/11/22  5:53 AM   Result Value Ref Range    Sodium 135 (L) 136 - 145 mmol/L    Potassium 3.7 3.5 - 5.1 mmol/L    Chloride 103 98 - 107 mmol/L    CO2 30 21 - 32 mmol/L    Anion Gap 2 (L) 7 - 16 mmol/L    Glucose 101 (H) 65 - 100 mg/dL    BUN 16 8 - 23 MG/DL    CREATININE 0.71 0.6 - 1.0 MG/DL    GFR African American >60 >60 ml/min/1.73m2    GFR Non- >60 >60 ml/min/1.73m2    Calcium 9.0 8.3 - 10.4 MG/DL    Total Bilirubin 0.3 0.2 - 1.1 MG/DL    ALT 28 12 - 65 U/L    AST 21 15 - 37 U/L    Alk Phosphatase 87 50 - 136 U/L    Total Protein 6.2 (L) 6.3 - 8.2 g/dL    Albumin 2.3 (L) 3.2 - 4.6 g/dL    Globulin 3.9 (H) 2.3 - 3.5 g/dL    Albumin/Globulin Ratio 0.6 (L) 1.2 - 3.5     CBC    Collection Time: 07/11/22  5:53 AM   Result Value Ref Range    WBC 8.1 4.3 - 11.1 K/uL    RBC 3.44 (L) 4.05 - 5.2 M/uL    Hemoglobin 10.7 (L) 11.7 - 15.4 g/dL    Hematocrit 30.9 (L) 35.8 - 46.3 %    MCV 89.8 79.6 - 97.8 FL    MCH 31.1 26.1 - 32.9 PG    MCHC 34.6 31.4 - 35.0 g/dL    RDW 11.9 11.9 - 14.6 %    Platelets 751 082 - 235 K/uL    MPV 8.4 (L) 9.4 - 12.3 FL    nRBC 0.00 0.0 - 0.2 K/uL       Other Studies:  CT Head W/O Contrast   Final Result   1. No acute intracranial abnormality. 2. Stable atrophy and white matter microangiopathic changes. XR CHEST PORTABLE   Final Result   Slightly underexpanded lungs, otherwise no acute abnormality.           Current Meds:  Current Facility-Administered Medications   Medication Dose Route Frequency    DULoxetine (CYMBALTA) extended release capsule 60 mg  60 mg Oral Daily    0.9 % sodium chloride infusion   IntraVENous Continuous    cefepime (MAXIPIME) 1,000 mg in sodium chloride 0.9 % 50 mL IVPB mini-bag  1,000 mg IntraVENous Q12H    divalproex (DEPAKOTE SPRINKLE) capsule 125 mg  125 mg Oral 3 times per day    donepezil (ARICEPT) tablet 10 mg  10 mg Oral Nightly    hydroCHLOROthiazide (HYDRODIURIL) tablet 25 mg  25 mg Oral Daily    hydroxychloroquine (PLAQUENIL) tablet 200 mg  200 mg Oral Daily    lisinopril (PRINIVIL;ZESTRIL) tablet 20 mg  20 mg Oral Daily    memantine (NAMENDA) tablet 10 mg  10 mg Oral BID    metoprolol succinate (TOPROL XL) extended release tablet 50 mg  50 mg Oral Daily    pantoprazole (PROTONIX) tablet 40 mg  40 mg Oral QAM AC    sodium chloride flush 0.9 % injection 5-40 mL  5-40 mL IntraVENous 2 times per day    sodium chloride flush 0.9 % injection 5-40 mL  5-40 mL IntraVENous PRN    0.9 % sodium chloride infusion   IntraVENous PRN    ondansetron (ZOFRAN-ODT) disintegrating tablet 4 mg  4 mg Oral Q8H PRN    Or    ondansetron (ZOFRAN) injection 4 mg  4 mg IntraVENous Q6H PRN    acetaminophen (TYLENOL) tablet 650 mg  650 mg Oral Q6H PRN    Or    acetaminophen (TYLENOL) suppository 650 mg  650 mg Rectal Q6H PRN    polyethylene glycol (GLYCOLAX) packet 17 g  17 g Oral Daily PRN       Signed:  David Richard MD    Part of this note may have been written by using a voice dictation software. The note has been proof read but may still contain some grammatical/other typographical errors.

## 2022-07-11 NOTE — CARE COORDINATION
LISA met with the patient, her , and PT to discuss dispo. Patient is not ambulating and only doing bed exercises per therapy.  does not wish to commit to [facility] or [facility] at this time, but is agreeable after further discussion to allow SW to send referrals to facilities to secure a bed if he decides to go that routes. Choice list provided, patient's  wishes to compare list to another one he got from [facility], will advise SW of choices tomorrow. Update: LISA spoke with the patient's  who states that he's receptive to a referral to [facility]. Referral sent in Epic.      Gabriela Perez LMSW    [facility]

## 2022-07-11 NOTE — PROGRESS NOTES
PHYSICAL THERAPY Daily Note and AM  (Link to Caseload Tracking: PT Visit Days : 2  Acknowledge Orders  Time In/Out  PT Charge Capture  Rehab Caseload Tracker    Adriana Palafox is a 68 y.o. female   PRIMARY DIAGNOSIS: UTI (urinary tract infection)  Dehydration [E86.0]  UTI (urinary tract infection) [N39.0]  Urinary tract infection without hematuria, site unspecified [N39.0]  Altered mental status, unspecified altered mental status type [R41.82]       Reason for Referral: Generalized Muscle Weakness (M62.81)  Other lack of cordination (R27.8)  Difficulty in walking, Not elsewhere classified (R26.2)  Other abnormalities of gait and mobility (R26.89)  Inpatient: Payor: MEDICARE / Plan: MEDICARE PART A AND B / Product Type: *No Product type* /     ASSESSMENT:     REHAB RECOMMENDATIONS:   Recommendation to date pending progress:  Setting:   pt mauricio need 24/7 assist, home setting is ideal with advance dementia    Equipment:     To Be Determined     ASSESSMENT:  Ms. Ericka Bedolla was mostly non-verbal, not responding to combined cues (vebal, manual & visual when pt would open eyes). This pt only briefly opened eyes with aggressive stimulation. PT spent most of the session trying to facilitate purposeful or reflexive muscle activity in UE, LE's & core. Spouse arrived after the session & informed PT that pt was functional without an assistive device 1 week ago, but was not left alone due to the dementia. Pt needed help with ADl's & a lot of directions with personal care & functional activity. This pt will benefit from follow up therapy to help restore her to more functional or manageable level of care. It would be best to provide any therapy follow up, on DC, in the home where the environment is familiar since she is not left alone anyway. Recommended to RN a ST consult for swallowing. MSW aware of DC concerns. 7/11 still non-verbal, not responding to verbal cues but did open eyes.   Therapist performs B PROM to B UE/LE exercises with no eye contact or movement. PT sat on eob with Total A from the therapist, will verbal cues to lean forward and not push back. Pt was unable to follow commands or open her eyes. Therapist laid pt back down with Total A. Therapist and PCT had to clean pt up. Work on rolling from L><R x 4 to get clean up and new pad under pt, still unable to follow commands. Left in supine with need in reach and instructed to call for assists, before getting up.      [address] [facility] [address] [address] AM-PAC 6 Clicks Basic Mobility Inpatient Short Form  AM-PAC Mobility Inpatient   How much difficulty turning over in bed?: Unable  How much difficulty sitting down on / standing up from a chair with arms?: A Lot  How much difficulty moving from lying on back to sitting on side of bed?: Unable  How much help from another person moving to and from a bed to a chair?: A Lot  How much help from another person needed to walk in hospital room?: A Lot  How much help from another person for climbing 3-5 steps with a railing?: Total  AM-PAC Inpatient Mobility Raw Score : 9  AM-PAC Inpatient T-Scale Score : 30.55  Mobility Inpatient CMS 0-100% Score: 81.38  Mobility Inpatient CMS G-Code Modifier : CM    SUBJECTIVE:   Ms. Terry Laws states, No verbal or non-verbal response from pt    Social/Functional Lives With: Spouse  Type of Home: House  Home Layout: Able to Live on Main level with bedroom/bathroom  Home Access: Stairs to enter with rails  Entrance Stairs - Number of Steps: 6  Entrance Stairs - Rails: Left  Receives Help From:  (pt not left alone)  Ambulation Assistance:  (supervision)  Transfer Assistance:  (supervision)    OBJECTIVE:     PAIN: Drena Bi / O2: PRECAUTION / Anthony Im / DRAINS:   Pre Treatment: non-verbal         Post Treatment: 0/10 Vitals NT       Oxygen NT      IV    RESTRICTIONS/PRECAUTIONS:  Restrictions/Precautions: Fall Risk                 GROSS EVALUATION: Intact Impaired (Comments):   AROM []  non-functional throughout, MINIMAL ASSIST using a walker. (3.)Ms. Lockhart will ambulate with MINIMAL ASSIST for 50-60 feet with a rolling walker. 4) pt able to participate (consistently) with exercises on cue. WILL RE-ASSESS PROGRESS IN 1 WEEK & MODIFY GOALS PRN    ________________________________________________________________________________________________    FREQUENCY AND DURATION: Daily for duration of hospital stay or until stated goals are met, whichever comes first.    THERAPY PROGNOSIS: Guarded    PROBLEM LIST:   (Skilled intervention is medically necessary to address:)  Decreased ADL/Functional Activities  Decreased Activity Tolerance  Decreased Balance  Decreased Cognition  Decreased Coordination  Decreased Gait Ability  Decreased Safety Awareness  Decreased Strength  Decreased Transfer Abilities INTERVENTIONS PLANNED:   (Benefits and precautions of physical therapy have been discussed with the patient.)  Therapeutic Activity  Therapeutic Exercise/HEP  Gait Training  Education       TREATMENT:   EVALUATION: HIGH COMPLEXITY: (Untimed Charge)    TREATMENT:   Therapeutic Activity (40 Minutes): Therapeutic activity included Rolling, Supine to Sit and Sit to Supine to improve functional Activity tolerance, Balance, Coordination, Mobility and Strength.     TREATMENT GRID:  All exercises PROM B Date:  7/11 Date:   Date:     Activity/Exercise Parameters Parameters Parameters   Ankle pumps 12     Heel slides 12     Hip abd/add 12     SAQ 12     SLR 12     Shoulder flex/ext 12     Elbow flex/ext 12     Hand/wrist 12     Shoulder abd/add 12               N/A    AFTER TREATMENT PRECAUTIONS: Bed, Bed/Chair Locked, Call light within reach, Needs within reach and Visitors at bedside    INTERDISCIPLINARY COLLABORATION:  RN/ PCT    EDUCATION: Education Given To: Patient  Education Provided Comments:  (non-verbal)    TIME IN/OUT:  Time In: 0930  Time Out: 1010  Minutes: 36    ADELAIDA ALEXANDER, PTA

## 2022-07-11 NOTE — PROGRESS NOTES
SPEECH PATHOLOGY NOTE:      Attempted to see patient for diet tolerance. Patient sleeping upon arrival. Did not wake to name or tactile cues. Will follow up at later time/date.            Vaughn Genao MS, CCC-SLP

## 2022-07-12 ENCOUNTER — APPOINTMENT (OUTPATIENT)
Dept: MRI IMAGING | Age: 77
DRG: 871 | End: 2022-07-12
Payer: MEDICARE

## 2022-07-12 PROCEDURE — 2580000003 HC RX 258: Performed by: NURSE PRACTITIONER

## 2022-07-12 PROCEDURE — 1100000000 HC RM PRIVATE

## 2022-07-12 PROCEDURE — 6370000000 HC RX 637 (ALT 250 FOR IP): Performed by: INTERNAL MEDICINE

## 2022-07-12 PROCEDURE — 70551 MRI BRAIN STEM W/O DYE: CPT

## 2022-07-12 PROCEDURE — 6370000000 HC RX 637 (ALT 250 FOR IP): Performed by: NURSE PRACTITIONER

## 2022-07-12 PROCEDURE — 6360000002 HC RX W HCPCS: Performed by: NURSE PRACTITIONER

## 2022-07-12 RX ORDER — HYDROCHLOROTHIAZIDE 25 MG/1
25 TABLET ORAL DAILY
Status: DISCONTINUED | OUTPATIENT
Start: 2022-07-13 | End: 2022-07-15

## 2022-07-12 RX ORDER — HYDRALAZINE HYDROCHLORIDE 25 MG/1
25 TABLET, FILM COATED ORAL EVERY 8 HOURS PRN
Status: DISCONTINUED | OUTPATIENT
Start: 2022-07-12 | End: 2022-07-15

## 2022-07-12 RX ORDER — LISINOPRIL 20 MG/1
40 TABLET ORAL DAILY
Status: DISCONTINUED | OUTPATIENT
Start: 2022-07-13 | End: 2022-07-17 | Stop reason: HOSPADM

## 2022-07-12 RX ORDER — HYDROCHLOROTHIAZIDE 25 MG/1
50 TABLET ORAL DAILY
Status: DISCONTINUED | OUTPATIENT
Start: 2022-07-13 | End: 2022-07-12

## 2022-07-12 RX ADMIN — LISINOPRIL 20 MG: 20 TABLET ORAL at 08:42

## 2022-07-12 RX ADMIN — DIVALPROEX SODIUM 125 MG: 125 CAPSULE ORAL at 21:38

## 2022-07-12 RX ADMIN — CEFEPIME HYDROCHLORIDE 1000 MG: 1 INJECTION, POWDER, FOR SOLUTION INTRAMUSCULAR; INTRAVENOUS at 00:17

## 2022-07-12 RX ADMIN — PANTOPRAZOLE SODIUM 40 MG: 40 TABLET, DELAYED RELEASE ORAL at 06:25

## 2022-07-12 RX ADMIN — METOPROLOL SUCCINATE 50 MG: 50 TABLET, EXTENDED RELEASE ORAL at 08:41

## 2022-07-12 RX ADMIN — MEMANTINE 10 MG: 5 TABLET ORAL at 08:42

## 2022-07-12 RX ADMIN — DIVALPROEX SODIUM 125 MG: 125 CAPSULE ORAL at 06:25

## 2022-07-12 RX ADMIN — HYDROXYCHLOROQUINE SULFATE 200 MG: 200 TABLET ORAL at 08:41

## 2022-07-12 RX ADMIN — SODIUM CHLORIDE, PRESERVATIVE FREE 10 ML: 5 INJECTION INTRAVENOUS at 09:54

## 2022-07-12 RX ADMIN — DULOXETINE HYDROCHLORIDE 60 MG: 60 CAPSULE, DELAYED RELEASE ORAL at 08:42

## 2022-07-12 RX ADMIN — MEMANTINE 10 MG: 5 TABLET ORAL at 21:38

## 2022-07-12 RX ADMIN — HYDROCHLOROTHIAZIDE 25 MG: 25 TABLET ORAL at 08:42

## 2022-07-12 RX ADMIN — CEFEPIME HYDROCHLORIDE 1000 MG: 1 INJECTION, POWDER, FOR SOLUTION INTRAMUSCULAR; INTRAVENOUS at 12:56

## 2022-07-12 RX ADMIN — DONEPEZIL HYDROCHLORIDE 10 MG: 5 TABLET, FILM COATED ORAL at 21:38

## 2022-07-12 ASSESSMENT — PAIN SCALES - WONG BAKER
WONGBAKER_NUMERICALRESPONSE: 0
WONGBAKER_NUMERICALRESPONSE: 0

## 2022-07-12 NOTE — PROGRESS NOTES
SPEECH PATHOLOGY NOTE:      Attempted to see patient for diet tolerance this PM. Patient sleeping upon arrival. Does not wake to name or tactile cues. Not appropriate for po trials at this time. RN and hospitalist notified.          Carlos Zaman MS, CCC-SLP

## 2022-07-12 NOTE — PROGRESS NOTES
Hospitalist Progress Note   Admit Date:  2022 11:08 AM   Name:  Kristen Keller   Age:  68 y.o. Sex:  female  :  1945   MRN:  [id number]   Room:  [facility]/    Presenting Complaint: Fever     Reason(s) for Admission: Dehydration [E86.0]  UTI (urinary tract infection) [N39.0]  Urinary tract infection without hematuria, site unspecified [N39.0]  Altered mental status, unspecified altered mental status type [facility] Course & Interval History:   Kristen Keller is a 68 y.o. female with a PMH of HTN, GERD, depression, past alcohol abuse, Alzeimer's dementia who presented with altered mental status.  reports recurrent UTIs over the last 3 months. Pt has continued to decline in mentation, now does not communicate. Also declined in ambulation per . Found to have a UTI. Started on cefepime given allergy hx. CT head without acute findings. CXR without acute findings. UCx with Pseudomas aeruginosa. Subjective/24 hr Events (22) : Patient is seen and examined at bedside. No acute events reported overnight by nursing staff. Patient is more lethargic and not interactive even with the  at bedside. Vital stable overnight without any fever. UCx with Pseudomas aeruginosa. Eyes closed. Not following commands. Non verbal.    Review of Systems:  Unable to obtain. Assessment & Plan:     UTI due to Pseudomas aeruginosa  UA (+) nitrites, leuk est.   22: UCx with Pseudomas aeruginosa that is pan-sensitive  - continue with cefepime ( - )     Hypokalemia - resolved  Hyponatremia - resolved     Leukocytosis, Fever - Secondary to UTI. resolved    Acute metabolic encephalopathy with underlying Dementia/Alzheimer's   Likely secondary to UTI. CT head without acute findings  22: improved but not at baseline. - cymbalta titrated to daily from BID  - neurology outpatient follow up her underlying alzheimer's dementia. HTN  22: Hypertensive today.     -As needed hydralazine   -continue with home HCTZ  -increase lisinopril      Discharge Planning: home vs STR    Diet:  ADULT DIET; Dysphagia - Soft and Bite Sized  DVT PPx: SCD  Code status: Full Code      I have reviewed and ordered clinical lab tests and independently visualized images, tracing. I spent 30 minutes of time caring for this patient at bedside or nearby, more than 50 percent of which was spent on coordination of care and/or counseling regarding the disease process, treatment options, and treatment plan. Hospital Problems:  Principal Problem:    UTI (urinary tract infection)  Active Problems:    Pseudomonas aeruginosa infection    Anxiety    HLD (hyperlipidemia)    Depression    Dementia of the Alzheimer's type, with late onset, uncomplicated ([address]    Hypertension  Resolved Problems:    * No resolved hospital problems. *      Objective:     Patient Vitals for the past 24 hrs:   Temp Pulse Resp BP SpO2   07/12/22 1144 97.7 °F (36.5 °C) 79 16 (!) 178/103 97 %   07/12/22 0824 98.2 °F (36.8 °C) 92 -- (!) 156/91 97 %   07/12/22 0331 98.2 °F (36.8 °C) 94 18 134/89 100 %   07/11/22 2233 98.2 °F (36.8 °C) 86 21 (!) 179/97 97 %   07/11/22 2231 98.2 °F (36.8 °C) 86 21 (!) 184/99 97 %   07/11/22 1927 -- -- -- (!) 164/78 --   07/11/22 1909 98.1 °F (36.7 °C) 84 22 (!) 185/82 95 %   07/11/22 1630 97.7 °F (36.5 °C) 82 18 (!) 150/70 97 %       Oxygen Therapy  SpO2: 97 %  Pulse via Oximetry: 93 beats per minute  O2 Device: None (Room air)    Estimated body mass index is 25.82 kg/m² as calculated from the following:    Height as of this encounter: 5' 6\" (1.676 m). Weight as of this encounter: 160 lb (72.6 kg). Intake/Output Summary (Last 24 hours) at 7/12/2022 1434  Last data filed at 7/12/2022 1315  Gross per 24 hour   Intake --   Output 1300 ml   Net -1300 ml         Physical Exam:   Blood pressure (!) 178/103, pulse 79, temperature 97.7 °F (36.5 °C), temperature source Axillary, resp.  rate 16, height 5' 6\" (1.676 m), weight 160 lb (72.6 kg), SpO2 97 %. General:    Ill appearing  Head:  Normocephalic, atraumatic  Eyes:  Sclerae appear normal.  Pupils equally round. ENT:  Nares appear normal, no drainage. Moist oral mucosa  Neck:  No restricted ROM. Trachea midline   CV:   RRR. No m/r/g. Lungs: No wheezing. Symmetric expansion. Abdomen:   Soft, nontender, nondistended. Extremities: No cyanosis or clubbing. No edema  Skin:     No rashes and normal coloration. Warm and dry. Neuro:  Somnolent, not opening eyes, nonverbal and not following commands.      I have reviewed ordered lab tests and independently visualized imaging below:    Recent Labs:  Recent Results (from the past 48 hour(s))   Urine Drug Screen    Collection Time: 07/10/22  3:15 PM   Result Value Ref Range    PCP, Urine Negative      Benzodiazepines, Urine Negative      Cocaine, Urine Negative      Amphetamine, Urine Negative      Methadone, Urine Negative      THC, TH-Cannabinol, Urine Negative      Opiates, Urine Negative      Barbiturates, Urine Negative     PLEASE READ & DOCUMENT PPD TEST IN 48 HRS    Collection Time: 07/10/22 10:45 PM   Result Value Ref Range    PPD, (POC) Negative Negative    mm Induration 0 0 - 5 mm   Comprehensive Metabolic Panel w/ Reflex to MG    Collection Time: 07/11/22  5:53 AM   Result Value Ref Range    Sodium 135 (L) 136 - 145 mmol/L    Potassium 3.7 3.5 - 5.1 mmol/L    Chloride 103 98 - 107 mmol/L    CO2 30 21 - 32 mmol/L    Anion Gap 2 (L) 7 - 16 mmol/L    Glucose 101 (H) 65 - 100 mg/dL    BUN 16 8 - 23 MG/DL    CREATININE 0.71 0.6 - 1.0 MG/DL    GFR African American >60 >60 ml/min/1.73m2    GFR Non- >60 >60 ml/min/1.73m2    Calcium 9.0 8.3 - 10.4 MG/DL    Total Bilirubin 0.3 0.2 - 1.1 MG/DL    ALT 28 12 - 65 U/L    AST 21 15 - 37 U/L    Alk Phosphatase 87 50 - 136 U/L    Total Protein 6.2 (L) 6.3 - 8.2 g/dL    Albumin 2.3 (L) 3.2 - 4.6 g/dL    Globulin 3.9 (H) 2.3 - 3.5 g/dL Albumin/Globulin Ratio 0.6 (L) 1.2 - 3.5     CBC    Collection Time: 07/11/22  5:53 AM   Result Value Ref Range    WBC 8.1 4.3 - 11.1 K/uL    RBC 3.44 (L) 4.05 - 5.2 M/uL    Hemoglobin 10.7 (L) 11.7 - 15.4 g/dL    Hematocrit 30.9 (L) 35.8 - 46.3 %    MCV 89.8 79.6 - 97.8 FL    MCH 31.1 26.1 - 32.9 PG    MCHC 34.6 31.4 - 35.0 g/dL    RDW 11.9 11.9 - 14.6 %    Platelets 948 598 - 145 K/uL    MPV 8.4 (L) 9.4 - 12.3 FL    nRBC 0.00 0.0 - 0.2 K/uL       Other Studies:  CT Head W/O Contrast   Final Result   1. No acute intracranial abnormality. 2. Stable atrophy and white matter microangiopathic changes. XR CHEST PORTABLE   Final Result   Slightly underexpanded lungs, otherwise no acute abnormality.       MRI BRAIN WO CONTRAST    (Results Pending)       Current Meds:  Current Facility-Administered Medications   Medication Dose Route Frequency    DULoxetine (CYMBALTA) extended release capsule 60 mg  60 mg Oral Daily    0.9 % sodium chloride infusion   IntraVENous Continuous    cefepime (MAXIPIME) 1,000 mg in sodium chloride 0.9 % 50 mL IVPB mini-bag  1,000 mg IntraVENous Q12H    divalproex (DEPAKOTE SPRINKLE) capsule 125 mg  125 mg Oral 3 times per day    donepezil (ARICEPT) tablet 10 mg  10 mg Oral Nightly    hydroCHLOROthiazide (HYDRODIURIL) tablet 25 mg  25 mg Oral Daily    hydroxychloroquine (PLAQUENIL) tablet 200 mg  200 mg Oral Daily    lisinopril (PRINIVIL;ZESTRIL) tablet 20 mg  20 mg Oral Daily    memantine (NAMENDA) tablet 10 mg  10 mg Oral BID    metoprolol succinate (TOPROL XL) extended release tablet 50 mg  50 mg Oral Daily    pantoprazole (PROTONIX) tablet 40 mg  40 mg Oral QAM AC    sodium chloride flush 0.9 % injection 5-40 mL  5-40 mL IntraVENous 2 times per day    sodium chloride flush 0.9 % injection 5-40 mL  5-40 mL IntraVENous PRN    0.9 % sodium chloride infusion   IntraVENous PRN    ondansetron (ZOFRAN-ODT) disintegrating tablet 4 mg  4 mg Oral Q8H PRN    Or    ondansetron [facility]) injection 4 mg  4 mg IntraVENous Q6H PRN    acetaminophen (TYLENOL) tablet 650 mg  650 mg Oral Q6H PRN    Or    acetaminophen (TYLENOL) suppository 650 mg  650 mg Rectal Q6H PRN    polyethylene glycol (GLYCOLAX) packet 17 g  17 g Oral Daily PRN       Signed:  Ceci Lantigua MD    Part of this note may have been written by using a voice dictation software. The note has been proof read but may still contain some grammatical/other typographical errors.

## 2022-07-13 ENCOUNTER — APPOINTMENT (OUTPATIENT)
Dept: CT IMAGING | Age: 77
DRG: 871 | End: 2022-07-13
Payer: MEDICARE

## 2022-07-13 LAB
ANION GAP SERPL CALC-SCNC: 4 MMOL/L (ref 7–16)
BASOPHILS # BLD: 0 K/UL (ref 0–0.2)
BASOPHILS NFR BLD: 0 % (ref 0–2)
BUN SERPL-MCNC: 13 MG/DL (ref 8–23)
CALCIUM SERPL-MCNC: 9.4 MG/DL (ref 8.3–10.4)
CHLORIDE SERPL-SCNC: 102 MMOL/L (ref 98–107)
CO2 SERPL-SCNC: 31 MMOL/L (ref 21–32)
CREAT SERPL-MCNC: 0.68 MG/DL (ref 0.6–1)
DIFFERENTIAL METHOD BLD: ABNORMAL
EOSINOPHIL # BLD: 0.3 K/UL (ref 0–0.8)
EOSINOPHIL NFR BLD: 3 % (ref 0.5–7.8)
ERYTHROCYTE [DISTWIDTH] IN BLOOD BY AUTOMATED COUNT: 12.2 % (ref 11.9–14.6)
GLUCOSE SERPL-MCNC: 104 MG/DL (ref 65–100)
HCT VFR BLD AUTO: 34.3 % (ref 35.8–46.3)
HGB BLD-MCNC: 11.7 G/DL (ref 11.7–15.4)
IMM GRANULOCYTES # BLD AUTO: 0.5 K/UL (ref 0–0.5)
IMM GRANULOCYTES NFR BLD AUTO: 5 % (ref 0–5)
LYMPHOCYTES # BLD: 1.3 K/UL (ref 0.5–4.6)
LYMPHOCYTES NFR BLD: 14 % (ref 13–44)
MAGNESIUM SERPL-MCNC: 1.8 MG/DL (ref 1.8–2.4)
MCH RBC QN AUTO: 30.8 PG (ref 26.1–32.9)
MCHC RBC AUTO-ENTMCNC: 34.1 G/DL (ref 31.4–35)
MCV RBC AUTO: 90.3 FL (ref 79.6–97.8)
MONOCYTES # BLD: 0.8 K/UL (ref 0.1–1.3)
MONOCYTES NFR BLD: 8 % (ref 4–12)
NEUTS SEG # BLD: 6.2 K/UL (ref 1.7–8.2)
NEUTS SEG NFR BLD: 68 % (ref 43–78)
NRBC # BLD: 0 K/UL (ref 0–0.2)
PLATELET # BLD AUTO: 396 K/UL (ref 150–450)
PMV BLD AUTO: 8.7 FL (ref 9.4–12.3)
POTASSIUM SERPL-SCNC: 3.4 MMOL/L (ref 3.5–5.1)
RBC # BLD AUTO: 3.8 M/UL (ref 4.05–5.2)
SODIUM SERPL-SCNC: 137 MMOL/L (ref 136–145)
WBC # BLD AUTO: 9.1 K/UL (ref 4.3–11.1)

## 2022-07-13 PROCEDURE — 85025 COMPLETE CBC W/AUTO DIFF WBC: CPT

## 2022-07-13 PROCEDURE — 2580000003 HC RX 258: Performed by: NURSE PRACTITIONER

## 2022-07-13 PROCEDURE — 1100000000 HC RM PRIVATE

## 2022-07-13 PROCEDURE — 92526 ORAL FUNCTION THERAPY: CPT

## 2022-07-13 PROCEDURE — 36415 COLL VENOUS BLD VENIPUNCTURE: CPT

## 2022-07-13 PROCEDURE — 80048 BASIC METABOLIC PNL TOTAL CA: CPT

## 2022-07-13 PROCEDURE — 83735 ASSAY OF MAGNESIUM: CPT

## 2022-07-13 PROCEDURE — 6370000000 HC RX 637 (ALT 250 FOR IP): Performed by: INTERNAL MEDICINE

## 2022-07-13 PROCEDURE — 6360000002 HC RX W HCPCS: Performed by: NURSE PRACTITIONER

## 2022-07-13 PROCEDURE — 97530 THERAPEUTIC ACTIVITIES: CPT

## 2022-07-13 PROCEDURE — 6360000002 HC RX W HCPCS: Performed by: HOSPITALIST

## 2022-07-13 RX ORDER — HYDRALAZINE HYDROCHLORIDE 20 MG/ML
10 INJECTION INTRAMUSCULAR; INTRAVENOUS EVERY 4 HOURS PRN
Status: DISCONTINUED | OUTPATIENT
Start: 2022-07-13 | End: 2022-07-17 | Stop reason: HOSPADM

## 2022-07-13 RX ORDER — MAGNESIUM SULFATE IN WATER 40 MG/ML
2000 INJECTION, SOLUTION INTRAVENOUS PRN
Status: DISCONTINUED | OUTPATIENT
Start: 2022-07-13 | End: 2022-07-17 | Stop reason: HOSPADM

## 2022-07-13 RX ORDER — POTASSIUM CHLORIDE 7.45 MG/ML
10 INJECTION INTRAVENOUS PRN
Status: DISCONTINUED | OUTPATIENT
Start: 2022-07-13 | End: 2022-07-17 | Stop reason: HOSPADM

## 2022-07-13 RX ORDER — METOPROLOL SUCCINATE 100 MG/1
100 TABLET, EXTENDED RELEASE ORAL DAILY
Status: DISCONTINUED | OUTPATIENT
Start: 2022-07-14 | End: 2022-07-14

## 2022-07-13 RX ORDER — POTASSIUM CHLORIDE 20 MEQ/1
40 TABLET, EXTENDED RELEASE ORAL PRN
Status: DISCONTINUED | OUTPATIENT
Start: 2022-07-13 | End: 2022-07-17 | Stop reason: HOSPADM

## 2022-07-13 RX ADMIN — HYDRALAZINE HYDROCHLORIDE 10 MG: 20 INJECTION INTRAMUSCULAR; INTRAVENOUS at 11:27

## 2022-07-13 RX ADMIN — HYDRALAZINE HYDROCHLORIDE 10 MG: 20 INJECTION INTRAMUSCULAR; INTRAVENOUS at 00:33

## 2022-07-13 RX ADMIN — CEFEPIME HYDROCHLORIDE 1000 MG: 1 INJECTION, POWDER, FOR SOLUTION INTRAMUSCULAR; INTRAVENOUS at 11:47

## 2022-07-13 RX ADMIN — SODIUM CHLORIDE: 900 INJECTION, SOLUTION INTRAVENOUS at 06:15

## 2022-07-13 RX ADMIN — HYDRALAZINE HYDROCHLORIDE 10 MG: 20 INJECTION INTRAMUSCULAR; INTRAVENOUS at 23:44

## 2022-07-13 RX ADMIN — CEFEPIME HYDROCHLORIDE 1000 MG: 1 INJECTION, POWDER, FOR SOLUTION INTRAMUSCULAR; INTRAVENOUS at 00:03

## 2022-07-13 RX ADMIN — SODIUM CHLORIDE, PRESERVATIVE FREE 10 ML: 5 INJECTION INTRAVENOUS at 23:39

## 2022-07-13 RX ADMIN — HYDRALAZINE HYDROCHLORIDE 10 MG: 20 INJECTION INTRAMUSCULAR; INTRAVENOUS at 11:47

## 2022-07-13 ASSESSMENT — PAIN SCALES - WONG BAKER: WONGBAKER_NUMERICALRESPONSE: 0

## 2022-07-13 NOTE — PROGRESS NOTES
PHYSICAL THERAPY Daily Note and PM  (Link to Caseload Tracking: PT Visit Days : 3  Acknowledge Orders  Time In/Out  PT Charge Capture  Rehab Caseload Tracker    Sarah Beth Horne is a 68 y.o. female   PRIMARY DIAGNOSIS: UTI (urinary tract infection)  Dehydration [E86.0]  UTI (urinary tract infection) [N39.0]  Urinary tract infection without hematuria, site unspecified [N39.0]  Altered mental status, unspecified altered mental status type [R41.82]       Reason for Referral: Generalized Muscle Weakness (M62.81)  Other lack of cordination (R27.8)  Difficulty in walking, Not elsewhere classified (R26.2)  Other abnormalities of gait and mobility (R26.89)  Inpatient: Payor: MEDICARE / Plan: MEDICARE PART A AND B / Product Type: *No Product type* /     ASSESSMENT:     REHAB RECOMMENDATIONS:   Recommendation to date pending progress:  Setting:   pt mauricio need 24/7 assist, home setting is ideal with advance dementia    Equipment:     To Be Determined     ASSESSMENT:  Ms. Dee Ohara had eyes closed on arrival but periodically & briefly pt opened eyes with stimulation (verbal & manual). Pt did respond appropriately verbally on a few occassions& pt performed improved sitting balance with SBA. Pt assisted with wt shift while EOB & pt showed minimal but purposeful movement on a few occassions in arms, legs & core, including bridging. Pt was able to participate with sit <>stand using a walker WB'ing through arms & legs, also participated with wt shift while in standing but unable to take steps today. This pt had made observable progress since evaluation,will continue with POC & goals still apply. Need to attempt to [facility] & advance each session.      [facility] AM-PAC 6 Clicks Basic Mobility Inpatient Short Form  AM-PAC Mobility Inpatient   How much difficulty turning over in bed?: Unable  How much difficulty sitting down on / standing up from a chair with arms?: A Lot  How much difficulty moving from lying on back to sitting on side of bed?: Unable  How much help from another person moving to and from a bed to a chair?: A Lot  How much help from another person needed to walk in hospital room?: A Lot  How much help from another person for climbing 3-5 steps with a railing?: Total  AM-PAC Inpatient Mobility Raw Score : 9  AM-PAC Inpatient T-Scale Score : 30.55  Mobility Inpatient CMS 0-100% Score: 81.38  Mobility Inpatient CMS G-Code Modifier : CM    SUBJECTIVE:   Ms. Rebekah Sanchez states, \" yes darling\", \"OK\", \"yes sir\"    Social/Functional Lives With: Spouse  Type of Home: House  Home Layout: Able to Live on Main level with bedroom/bathroom  Home Access: Stairs to enter with rails  Entrance Stairs - Number of Steps: 6  Entrance Stairs - Rails: Left  Receives Help From:  (pt not left alone)  Ambulation Assistance:  (supervision)  Transfer Assistance:  (supervision)    OBJECTIVE:     PAIN: Marilynn Chimera / O2: Thermon Benjamín / [employer] / Davey Stapler:   Pre Treatment: non-verbal 0/10  Pain Assessment: Adult Nonverbal Pain Scale (NPVS)      Post Treatment: 0/10 Vitals NT       Oxygen NT      IV    RESTRICTIONS/PRECAUTIONS:  Restrictions/Precautions: Fall Risk                 GROSS EVALUATION: Intact Impaired (Comments):   AROM []  non-functional throughout but some purposeful & reflexive movement observed   PROM []    Strength []    non-functional throughout, but some purposeful muscle activity observed   Balance []    non-functional standing but purposeful strength or muscle activity, better with sitting balance with arms legs & core   Posture [] N/A   Sensation []  unable to fully assess   Coordination []   non-functional   Tone [x]     Edema []    Activity Tolerance [] poor    []      COGNITION/  PERCEPTION: Intact Impaired (Comments):   Orientation []  not oriented x 4   Vision []  unable to assess   Hearing []  questionable   Cognition  []  significantly impaired, can not care for self     MOBILITY: I Mod I S SBA CGA Min Mod Max Total  NT x2 Comments:   Bed (Skilled intervention is medically necessary to address:)  Decreased ADL/Functional Activities  Decreased Activity Tolerance  Decreased Balance  Decreased Cognition  Decreased Coordination  Decreased Gait Ability  Decreased Safety Awareness  Decreased Strength  Decreased Transfer Abilities INTERVENTIONS PLANNED:   (Benefits and precautions of physical therapy have been discussed with the patient.)  Therapeutic Activity  Therapeutic Exercise/HEP  Gait Training  Education       TREATMENT:       TREATMENT:   Therapeutic Activity (24 Minutes): Therapeutic activity included stimulation & facillation to arms, legs & core, bed mobility, assisted wt shitt while EOB, repeated sit<>stand, wt shift while standing with walker, repeated bed mobility to improve functional Activity tolerance, Balance, Coordination, Mobility and Strength. AFTER TREATMENT PRECAUTIONS: Alarm Activated, Bed, Bed/Chair Locked, Call light within reach, Needs within reach, RN notified and Visitors at bedside    INTERDISCIPLINARY COLLABORATION:  RN/ PCT    EDUCATION: Education Given To: Patient;Caregiver  Education Provided: Role of Therapy;Plan of Care;Precautions;Transfer Training;IADL Safety  Education Method: Demonstration;Verbal;Teach Back  Barriers to Learning:  (pt cognition)  Education Outcome:  ( caregiver understood, pt no understanding determimed)    TIME IN/OUT:  Time In: 1703  Time Out: 235 [facility]  Minutes: 25    Cr Cruz

## 2022-07-13 NOTE — PROGRESS NOTES
LTG: patient will tolerate safest, least restrictive oral diet without s/sx airway compromise. STG:  Discontinued   STG: Patient will tolerate ongoing po trials in efforts to advance diet  STG: Patient will participate in modified barium swallow study to objectively assess oropharyngeal swallow if indicated    SPEECH LANGUAGE PATHOLOGY: DYSPHAGIA  Daily Note #1    NAME: Epi Barrera  : 1945  MRN: [id number]    ADMISSION DATE: 2022  ADMITTING DIAGNOSIS: has Vitamin D deficiency; Anxiety; Chronic pain of both knees; HLD (hyperlipidemia); Trochanteric bursitis of right hip; Dehydration; Chronic maxillary sinusitis; Fall; Depression; Pure hypercholesterolemia; Perennial allergic rhinitis; Dementia of the Alzheimer's type, with late onset, uncomplicated ([address] Nonallergic rhinitis; Acquired hypothyroidism; Balance disorder; Spondylolisthesis of lumbar region; HOLLY (dyspnea on exertion); Exposure to viral disease; Primary osteoarthritis; Spinal stenosis of lumbar region without neurogenic claudication; Gait difficulty; Hyperglycemia; Depression with anxiety; Gastroesophageal reflux disease with esophagitis; Osteopenia; Hypertension; Alzheimer's dementia without behavioral disturbance ([address] Recurrent UTI; UTI (urinary tract infection); and Pseudomonas aeruginosa infection on their problem list.    ICD-10: Treatment Diagnosis: R13.12 Dysphagia, Oropharyngeal Phase    RECOMMENDATIONS   Diet:  Diet Solids Recommendation: NPO  Liquid Consistency Recommendation: NPO   Single sips of water for pleasure when upright/actively accepting    Medications:  (non-oral)         Compensatory Swallowing Strategies: (sips of water for pleasure when upright/actively accepting)   Therapeutic Intervention:Patient/Family education; Therapeutic PO trials with SLP   Patient continues to require skilled intervention: Yes  D/C Recommendations: Ongoing speech therapy is recommended during this hospitalization     ASSESSMENT Dysphagia Diagnosis: Moderate to severe oral stage dysphagia exacerbated by AMS. Patient at high risk for aspiration due to limited alertness, altered mentation, and oral dysphagia. Eyes closed throughout session. No command following or verbalizations. Minimal mouth opening to accept puree trials with patient essentially \"slurping\" trials from spoon then slowly manipulating and propelling bolus. Able to draw liquids through straw consuming thin liquids with no overt s/sx airway compromise observed; however,limited trials accepted. Recommend NPO except single sips of water for pleasure when upright/actively accepting. Non-oral meds. Will continue to follow for po trials in attempts to resume oral diet once mentation improves. GENERAL    History of Present Injury/Illness: Ms. Latrice Owusu  has a past medical history of Alcohol abuse, daily use, Alzheimer's dementia ([facility] [address] Depression, Hypertension, Lumbar stenosis, Osteopenia, Sinus problem, Spondylolisthesis of lumbar region, and Vitamin D deficiency. . She also  has a past surgical history that includes heent (Bilateral, 2015); Breast lumpectomy (Bilateral); back surgery (5/31/12); other surgical history; Wrist fracture surgery; Tonsillectomy; and Hysterectomy. Chart Reviewed: Yes  Subjective: Upright in bed with eyes open. Not verbalizing or following commands. Nursing assistant at bedside attempting to feed patient.  at bedside. Behavior/Cognition: Lethargic;Doesn't follow directions  Communication Observation: Non-verbal  Follows Directions: None  Respiratory Status: Room air                    Current Diet : Easy to chew  Current Liquid Diet : Thin  Pain:   Patient does not appear in pain                                        OBJECTIVE    Patient Positioning: Upright in bed               Baseline Vocal Quality:  (nonverbal)  Oropharyngeal Phase:    Patient upright with eyes closed.  Nursing assistant attempting to feed patient lunch meal. Patient had accepted single bite of chopped meat, which she masticated for over 4 minutes before clearing oral cavity. Consumed thin by teaspoon x5 and puree across 3 trials. \"Slurped\" puree from spoon and slowly manipulated; however,  at times will not open oral cavity to accept. Swallows appear timely with thin liquids. No overt s/sx airway compromise observed. PLAN    Duration/Frequency: Continue to follow patient 3x/week for duration of hospitalization and/or until goals met   -Frequency increased due to worsening dysphagia symptoms. Dysphagia Outcome and Severity Scale (ROM)  Dysphagia Outcome Severity Scale: Level 2: Moderate Severe dysphagia- Maximum assistance or maximum use of strategies with partial PO only  Interpretation of Tool: The Dysphagia Outcome and Severity Scale (ROM) is a simple, easy-to-use, 7-point scale developed to systematically rate the functional severity of dysphagia based on objective assessment and make recommendations for diet level, independence level, and type of nutrition. Normal(7), Functional(6), Mild(5), Mild-Moderate(4), Moderate(3), Moderate-Severe(2), Severe(1)    Speech Therapy Prognosis  Prognosis: Fair  Prognosis Considerations: Previous Level of Function; Family/Community Support    Education: Renae Doss  Patient Education: NPO status, aspiration risk  Patient Education Response: No evidence of learning    Current Medications:   No current facility-administered medications on file prior to encounter.      Current Outpatient Medications on File Prior to Encounter   Medication Sig Dispense Refill    magnesium oxide (MAG-OX) 400 MG tablet Take 400 mg by mouth daily      lisinopril (PRINIVIL;ZESTRIL) 20 MG tablet Take 1 tablet by mouth daily 90 tablet 3    diclofenac sodium (VOLTAREN) 1 % GEL Apply 4 g topically 4 times daily 100 g 3    divalproex (DEPAKOTE) 125 MG DR tablet Take 125 mg by mouth 3 times daily as needed       hydroxychloroquine (PLAQUENIL) 200 mg tablet Take 1 pill once a day after food. 90 tablet 0    TURMERIC PO Take 1 capsule by mouth daily      azelastine (ASTELIN) 0.1 % nasal spray 1 spray by Nasal route 2 times daily      vitamin D 25 MCG (1000 UT) CAPS Take 1,000 Units by mouth daily      diphenhydrAMINE (SOMINEX) 25 MG tablet Take 25 mg by mouth every 6 hours as needed      donepezil (ARICEPT) 10 MG tablet TAKE ONE TABLET AT BEDTIME.  hydroCHLOROthiazide (HYDRODIURIL) 25 MG tablet TAKE 1 TABLET BY MOUTH EACH DAY.  memantine (NAMENDA) 10 MG tablet Take 10 mg by mouth 2 times daily      metoprolol succinate (TOPROL XL) 50 MG extended release tablet TAKE ONE TABLET DAILY FOR BLOOD PRESSURE      Naproxen Sodium 220 MG CAPS Take 1 capsule by mouth 2 times daily      omeprazole (PRILOSEC) 40 MG delayed release capsule TAKE 1 CAPSULE EACH DAY.          PRECAUTIONS/ALLERGIES: Levofloxacin, Penicillins, Poison oak extract, Bupropion, Sulfamethoxazole, and Montelukast   Safety Devices in place: Yes  Type of devices: Nurse notified,Left in bed ( at bedside)  Restraints Initially in Place: No    Therapy Time  SLP Individual Minutes  Time In: 1240  Time Out: [facility] [facility]  Minutes: [address], [place]  7/13/2022 1:32 PM

## 2022-07-13 NOTE — PROGRESS NOTES
Hospitalist Progress Note   Admit Date:  2022 11:08 AM   Name:  Alex Tucker   Age:  68 y.o. Sex:  female  :  1945   MRN:  [id number]   Room:  [facility]/    Presenting Complaint: Fever     Reason(s) for Admission: Dehydration [E86.0]  UTI (urinary tract infection) [N39.0]  Urinary tract infection without hematuria, site unspecified [N39.0]  Altered mental status, unspecified altered mental status type [facility]     Hospital Course & Interval History:   68 y. o. female with a PMH of HTN, GERD, depression, past alcohol abuse, Alzeimer's dementia who presented with altered mental status.   reports recurrent UTIs over the last 3 months. Pt has continued to decline in mentation, now does not communicate.  Also declined in ambulation per .  Found to have a UTI. Started on cefepime given allergy hx.  CT head without acute findings. CXR without acute findings. UCx with Pseudomas aeruginosa. Subjective/24hr Events (22): Patient nonverbal today.  reports some opening of eyes, no conversation. Is on cefepime. At baseline patient can care for herself, for the most part. Some elevated blood pressures today, new oxygen requirement. Good urine output. Last fever this morning. Assessment & Plan:   UTI due to Pseudomas aeruginosa  UA (+) nitrites, leuk est.   - continue with cefepime ( - )  2022: DC abx recheck labs tomorrow     Hypokalemia - resolved  Hyponatremia - resolved     Leukocytosis, Fever - Secondary to UTI. Resolved  2022: returned today while on abx, will image kidneys     Acute metabolic encephalopathy with underlying Dementia/Alzheimer's   Likely secondary to UTI. CT head without acute findings  22: improved but not at baseline.    - cymbalta titrated to daily from BID  - neurology outpatient follow up her underlying alzheimer's dementia.     HTN  -As needed hydralazine   -HCTZ, metoprolol  -increase lisinopril  2022: again hypertensive requiring PRN, increase metoprplol      Discharge Planning:      Pending improvment    Diet:  Diet NPO Exceptions are: Ice Chips  DVT PPx: SCDs  Code status: Full Code    Hospital Problems:  Principal Problem:    UTI (urinary tract infection)  Active Problems:    Pseudomonas aeruginosa infection    Anxiety    HLD (hyperlipidemia)    Depression    Dementia of the Alzheimer's type, with late onset, uncomplicated ([address]    Hypertension  Resolved Problems:    * No resolved hospital problems. *      Objective:     Patient Vitals for the past 24 hrs:   Temp Pulse Resp BP SpO2   07/13/22 1614 98.4 °F (36.9 °C) 93 18 (!) 152/110 (!) 88 %   07/13/22 1116 98.2 °F (36.8 °C) (!) 104 18 (!) 173/123 95 %   07/13/22 0801 98.2 °F (36.8 °C) 97 18 (!) 159/97 --   07/13/22 0315 98.1 °F (36.7 °C) 86 19 (!) 149/92 96 %   07/12/22 2341 -- 92 -- (!) 188/85 --   07/12/22 2339 98.4 °F (36.9 °C) 81 16 (!) 188/102 96 %   07/12/22 1940 -- 87 -- (!) 166/84 98 %   07/12/22 1939 98.4 °F (36.9 °C) 87 16 (!) 170/85 97 %       Oxygen Therapy  SpO2: (!) 88 %  Pulse via Oximetry: 93 beats per minute  O2 Device: None (Room air)    Estimated body mass index is 25.82 kg/m² as calculated from the following:    Height as of this encounter: 5' 6\" (1.676 m). Weight as of this encounter: 160 lb (72.6 kg). Intake/Output Summary (Last 24 hours) at 7/13/2022 1821  Last data filed at 7/13/2022 1738  Gross per 24 hour   Intake 0 ml   Output 1150 ml   Net -1150 ml         Blood pressure (!) 152/110, pulse 93, temperature 98.4 °F (36.9 °C), temperature source Axillary, resp. rate 18, height 5' 6\" (1.676 m), weight 160 lb (72.6 kg), SpO2 (!) 88 %. Physical Exam  Vitals and nursing note reviewed. Constitutional:       General: She is in acute distress. Appearance: She is normal weight. She is ill-appearing. She is not diaphoretic. Eyes:      Extraocular Movements: Extraocular movements intact.    Cardiovascular:      Rate and Rhythm: Normal mmol/L    CO2 31 21 - 32 mmol/L    Anion Gap 4 (L) 7 - 16 mmol/L    Glucose 104 (H) 65 - 100 mg/dL    BUN 13 8 - 23 MG/DL    CREATININE 0.68 0.6 - 1.0 MG/DL    GFR African American >60 >60 ml/min/1.73m2    GFR Non- >60 >60 ml/min/1.73m2    Calcium 9.4 8.3 - 10.4 MG/DL   Magnesium    Collection Time: 07/13/22  4:50 AM   Result Value Ref Range    Magnesium 1.8 1.8 - 2.4 mg/dL       Other Studies:  MRI BRAIN WO CONTRAST   Final Result   1. Moderate temporal lobe predominant volume loss. 2. Relatively mild chronic small vessel ischemic change. CT Head W/O Contrast   Final Result   1. No acute intracranial abnormality. 2. Stable atrophy and white matter microangiopathic changes. XR CHEST PORTABLE   Final Result   Slightly underexpanded lungs, otherwise no acute abnormality.           Current Meds:  Current Facility-Administered Medications   Medication Dose Route Frequency    hydrALAZINE (APRESOLINE) injection 10 mg  10 mg IntraVENous Q4H PRN    potassium chloride (KLOR-CON M) extended release tablet 40 mEq  40 mEq Oral PRN    Or    potassium bicarb-citric acid (EFFER-K) effervescent tablet 40 mEq  40 mEq Oral PRN    Or    potassium chloride 10 mEq/100 mL IVPB (Peripheral Line)  10 mEq IntraVENous PRN    magnesium sulfate 2000 mg in 50 mL IVPB premix  2,000 mg IntraVENous PRN    sodium phosphate 10 mmol in sodium chloride 0.9 % 250 mL IVPB  10 mmol IntraVENous PRN    Or    sodium phosphate 15 mmol in sodium chloride 0.9 % 250 mL IVPB  15 mmol IntraVENous PRN    Or    sodium phosphate 20 mmol in sodium chloride 0.9 % 500 mL IVPB  20 mmol IntraVENous PRN    hydrALAZINE (APRESOLINE) tablet 25 mg  25 mg Oral Q8H PRN    lisinopril (PRINIVIL;ZESTRIL) tablet 40 mg  40 mg Oral Daily    hydroCHLOROthiazide (HYDRODIURIL) tablet 25 mg  25 mg Oral Daily    DULoxetine (CYMBALTA) extended release capsule 60 mg  60 mg Oral Daily    0.9 % sodium chloride infusion   IntraVENous Continuous done  divalproex (DEPAKOTE SPRINKLE) capsule 125 mg  125 mg Oral 3 times per day    donepezil (ARICEPT) tablet 10 mg  10 mg Oral Nightly    hydroxychloroquine (PLAQUENIL) tablet 200 mg  200 mg Oral Daily    memantine (NAMENDA) tablet 10 mg  10 mg Oral BID    metoprolol succinate (TOPROL XL) extended release tablet 50 mg  50 mg Oral Daily    pantoprazole (PROTONIX) tablet 40 mg  40 mg Oral QAM AC    sodium chloride flush 0.9 % injection 5-40 mL  5-40 mL IntraVENous 2 times per day    sodium chloride flush 0.9 % injection 5-40 mL  5-40 mL IntraVENous PRN    0.9 % sodium chloride infusion   IntraVENous PRN    ondansetron (ZOFRAN-ODT) disintegrating tablet 4 mg  4 mg Oral Q8H PRN    Or    ondansetron (ZOFRAN) injection 4 mg  4 mg IntraVENous Q6H PRN    acetaminophen (TYLENOL) tablet 650 mg  650 mg Oral Q6H PRN    Or    acetaminophen (TYLENOL) suppository 650 mg  650 mg Rectal Q6H PRN    polyethylene glycol (GLYCOLAX) packet 17 g  17 g Oral Daily PRN       Signed:  Flory Portillo MD

## 2022-07-14 ENCOUNTER — APPOINTMENT (OUTPATIENT)
Dept: CT IMAGING | Age: 77
DRG: 871 | End: 2022-07-14
Payer: MEDICARE

## 2022-07-14 LAB
BACTERIA SPEC CULT: NORMAL
BACTERIA SPEC CULT: NORMAL
SARS-COV-2 RDRP RESP QL NAA+PROBE: NOT DETECTED
SERVICE CMNT-IMP: NORMAL
SERVICE CMNT-IMP: NORMAL
SOURCE: NORMAL

## 2022-07-14 PROCEDURE — 1100000000 HC RM PRIVATE

## 2022-07-14 PROCEDURE — 51798 US URINE CAPACITY MEASURE: CPT

## 2022-07-14 PROCEDURE — 6370000000 HC RX 637 (ALT 250 FOR IP): Performed by: NURSE PRACTITIONER

## 2022-07-14 PROCEDURE — 6370000000 HC RX 637 (ALT 250 FOR IP): Performed by: INTERNAL MEDICINE

## 2022-07-14 PROCEDURE — 2580000003 HC RX 258: Performed by: NURSE PRACTITIONER

## 2022-07-14 PROCEDURE — 92526 ORAL FUNCTION THERAPY: CPT

## 2022-07-14 PROCEDURE — 87635 SARS-COV-2 COVID-19 AMP PRB: CPT

## 2022-07-14 PROCEDURE — 6360000002 HC RX W HCPCS: Performed by: HOSPITALIST

## 2022-07-14 PROCEDURE — 74176 CT ABD & PELVIS W/O CONTRAST: CPT

## 2022-07-14 RX ORDER — METOPROLOL SUCCINATE 50 MG/1
200 TABLET, EXTENDED RELEASE ORAL DAILY
Status: DISCONTINUED | OUTPATIENT
Start: 2022-07-15 | End: 2022-07-17 | Stop reason: HOSPADM

## 2022-07-14 RX ORDER — METOPROLOL SUCCINATE 50 MG/1
50 TABLET, EXTENDED RELEASE ORAL ONCE
Status: DISCONTINUED | OUTPATIENT
Start: 2022-07-14 | End: 2022-07-17 | Stop reason: HOSPADM

## 2022-07-14 RX ADMIN — PANTOPRAZOLE SODIUM 40 MG: 40 TABLET, DELAYED RELEASE ORAL at 10:27

## 2022-07-14 RX ADMIN — LISINOPRIL 40 MG: 20 TABLET ORAL at 10:27

## 2022-07-14 RX ADMIN — HYDRALAZINE HYDROCHLORIDE 10 MG: 20 INJECTION INTRAMUSCULAR; INTRAVENOUS at 20:57

## 2022-07-14 RX ADMIN — DULOXETINE HYDROCHLORIDE 60 MG: 60 CAPSULE, DELAYED RELEASE ORAL at 10:26

## 2022-07-14 RX ADMIN — DIVALPROEX SODIUM 125 MG: 125 CAPSULE ORAL at 20:56

## 2022-07-14 RX ADMIN — HYDROXYCHLOROQUINE SULFATE 200 MG: 200 TABLET ORAL at 10:26

## 2022-07-14 RX ADMIN — MEMANTINE 10 MG: 5 TABLET ORAL at 10:26

## 2022-07-14 RX ADMIN — SODIUM CHLORIDE: 900 INJECTION, SOLUTION INTRAVENOUS at 14:31

## 2022-07-14 RX ADMIN — SODIUM CHLORIDE, PRESERVATIVE FREE 10 ML: 5 INJECTION INTRAVENOUS at 20:57

## 2022-07-14 ASSESSMENT — PAIN SCALES - WONG BAKER
WONGBAKER_NUMERICALRESPONSE: 0
WONGBAKER_NUMERICALRESPONSE: 0

## 2022-07-14 NOTE — PROGRESS NOTES
Therapeutic Intervention:Patient/Family education; Therapeutic PO trials with SLP   Patient continues to require skilled intervention: Yes  D/C Recommendations: Ongoing speech therapy is recommended during this hospitalization,Ongoing speech therapy is recommended at next level of care     ASSESSMENT    Dysphagia Diagnosis: Moderate to severe oral stage dysphagia;Mild pharyngeal stage dysphagia; Suspected needs further assessment  Patient demonstrates poor level of alertness. Per RN Taylor Martin, alertness fluctuates throughout the day as patient was more alert this morning, passed a nursing swallow screen, and a pureed/thin liquid diet was ordered. Patient's safety with oral intake is directly related to altered mentation/level of alertness, which is complicated by dementia. She consumed small amounts of thin liquid via spoon/straw with MAX cueing with inconsistent oral delay, but no overt s/sx aspiration. She licked miniscule amounts of pudding from lips, but would not open mouth to adequately accept a bite via spoon. Recommend ongoing discussion regarding goals of care related to nutrition if altered mental status and poor intake persist and continue to impact ability to safety/consistently accept po. In the setting of alzheimer's dementia, patient at increased risk of dysphagia and decline in intake. It would be appropriate to focus on identifying \"safest\" oral diet and only feed patient when awake/alert with 1:1 assistance and implementation of precautions to reduce risk of aspiration/aspiration complications. At this time, safest would be puree consistencies/thin liquids. messaged MD via [email or web address]. GENERAL    History of Present Injury/Illness: Ms. Elma Garcia  has a past medical history of Alcohol abuse, daily use, Alzheimer's dementia ([facility] [address] Depression, Hypertension, Lumbar stenosis, Osteopenia, Sinus problem, Spondylolisthesis of lumbar region, and Vitamin D deficiency. . She also  has a past surgical of hospitalization and/or until goals met    Dysphagia Outcome and Severity Scale (ROM)  Dysphagia Outcome Severity Scale: Level 2: Moderate Severe dysphagia- Maximum assistance or maximum use of strategies with partial PO only  Interpretation of Tool: The Dysphagia Outcome and Severity Scale (ROM) is a simple, easy-to-use, 7-point scale developed to systematically rate the functional severity of dysphagia based on objective assessment and make recommendations for diet level, independence level, and type of nutrition. Normal(7), Functional(6), Mild(5), Mild-Moderate(4), Moderate(3), Moderate-Severe(2), Severe(1)    Speech Therapy Prognosis  Prognosis: Fair  Prognosis Considerations: Previous Level of Function; Family/Community Support    Education: Rita Lacy  Patient Education: NPO status, aspiration risk  Patient Education Response: No evidence of learning    Current Medications:   No current facility-administered medications on file prior to encounter. Current Outpatient Medications on File Prior to Encounter   Medication Sig Dispense Refill    magnesium oxide (MAG-OX) 400 MG tablet Take 400 mg by mouth daily      lisinopril (PRINIVIL;ZESTRIL) 20 MG tablet Take 1 tablet by mouth daily 90 tablet 3    diclofenac sodium (VOLTAREN) 1 % GEL Apply 4 g topically 4 times daily 100 g 3    divalproex (DEPAKOTE) 125 MG DR tablet Take 125 mg by mouth 3 times daily as needed       hydroxychloroquine (PLAQUENIL) 200 mg tablet Take 1 pill once a day after food. 90 tablet 0    TURMERIC PO Take 1 capsule by mouth daily      azelastine (ASTELIN) 0.1 % nasal spray 1 spray by Nasal route 2 times daily      vitamin D 25 MCG (1000 UT) CAPS Take 1,000 Units by mouth daily      diphenhydrAMINE (SOMINEX) 25 MG tablet Take 25 mg by mouth every 6 hours as needed      donepezil (ARICEPT) 10 MG tablet TAKE ONE TABLET AT BEDTIME.  hydroCHLOROthiazide (HYDRODIURIL) 25 MG tablet TAKE 1 TABLET BY MOUTH EACH DAY.

## 2022-07-14 NOTE — CONSULTS
Relation Age of Onset    Osteoarthritis Mother     Heart Disease Mother     Stroke Mother     Breast Cancer Neg Hx     Coronary Art Dis Maternal Uncle     Heart Attack Mother     Hypertension Sister     Alzheimer's Disease Father 48    Dementia Father     Stroke Father     Heart Disease Sister         stent and AVR     Social History     Tobacco Use    Smoking status: Former Smoker     Packs/day: 1.00     Quit date: 1972     Years since quittin.5    Smokeless tobacco: Never Used   Substance Use Topics    Alcohol use: Yes     Alcohol/week: 14.0 standard drinks      Prior to Admission medications    Medication Sig Start Date End Date Taking? Authorizing Provider   DULoxetine (CYMBALTA) 60 MG extended release capsule Take 1 capsule by mouth daily TAKE 1 CAPSULES DAILY. 22  Yes Ceci Lantigua MD   magnesium oxide (MAG-OX) 400 MG tablet Take 400 mg by mouth daily    Historical Provider, MD   lisinopril (PRINIVIL;ZESTRIL) 20 MG tablet Take 1 tablet by mouth daily 22   Arianna Thomas MD   diclofenac sodium (VOLTAREN) 1 % GEL Apply 4 g topically 4 times daily 22   Arianna Thomas MD   divalproex (DEPAKOTE) 125 MG DR tablet Take 125 mg by mouth 3 times daily as needed     Historical Provider, MD   hydroxychloroquine (PLAQUENIL) 200 mg tablet Take 1 pill once a day after food.  22   Ricco Colón MD   TURMERIC PO Take 1 capsule by mouth daily    Ar Automatic Reconciliation   azelastine (ASTELIN) 0.1 % nasal spray 1 spray by Nasal route 2 times daily 4/3/19   Ar Automatic Reconciliation   vitamin D 25 MCG (1000 UT) CAPS Take 1,000 Units by mouth daily    Ar Automatic Reconciliation   diphenhydrAMINE (SOMINEX) 25 MG tablet Take 25 mg by mouth every 6 hours as needed    Ar Automatic Reconciliation   donepezil (ARICEPT) 10 MG tablet TAKE ONE TABLET AT BEDTIME. 21   Ar Automatic Reconciliation   hydroCHLOROthiazide (HYDRODIURIL) 25 MG tablet TAKE 1 TABLET BY MOUTH EACH DAY. 22   Ar Automatic Reconciliation   memantine (NAMENDA) 10 MG tablet Take 10 mg by mouth 2 times daily 1/18/21   Ar Automatic Reconciliation   metoprolol succinate (TOPROL XL) 50 MG extended release tablet TAKE ONE TABLET DAILY FOR BLOOD PRESSURE 4/19/21   Ar Automatic Reconciliation   Naproxen Sodium 220 MG CAPS Take 1 capsule by mouth 2 times daily    Ar Automatic Reconciliation   omeprazole (PRILOSEC) 40 MG delayed release capsule TAKE 1 CAPSULE EACH DAY. 1/5/22   Ar Automatic Reconciliation        Allergies   Allergen Reactions    Levofloxacin Other (See Comments)    Penicillins Shortness Of Breath and Swelling    Poison Oak Extract Anaphylaxis    Bupropion Other (See Comments)     Other anxiety      Sulfamethoxazole Hives and Other (See Comments)     She thinks she does not do well with this    Montelukast Anxiety           Objective:     Vitals:    07/14/22 0412 07/14/22 0757 07/14/22 0820 07/14/22 1215   BP: (!) 179/91 (!) 180/95 (!) 173/90 (!) 152/81   Pulse: (!) 118 (!) 113  (!) 110   Resp: 20 16  16   Temp: 98.6 °F (37 °C) 97.7 °F (36.5 °C)  97.3 °F (36.3 °C)   TempSrc: Axillary Oral  Axillary   SpO2: 100% 98%  97%   Weight:       Height:              Assessment:     L non obs renal stones/recurrent UTI    Chart reviewed, pt not seen in person, also spoke with pt's  for H&P    Plan:     Check PVR to ensure pt emptyting her bladder well. Continue treatment for UTI. Will follow PVR. She has non obs stones on the L and minimal hydro. Kidney fxn, WBC normal.  Would observe. Likely had recently passed stone. Follow up as outpatient for urine check and possible repeat imaging to closely monitor known renal stones. It should be noted that patient's family is not interested in and hesitant to have surgery d/t anesthesia problems. Will cont to follow. Thank you for the opportunity to assist in the care of this patient.        Signed By: EWA Mazariegos - REMEDIOS     July 14, 2022    CT does not show significant hydronephrosis on the left. , no urologic intervention indicated. I have reviewed the above note and examined the patient. I agree with the exam, assessment and plan.     Chema Arguelles MD

## 2022-07-15 PROBLEM — F02.80 ALZHEIMER'S DEMENTIA WITHOUT BEHAVIORAL DISTURBANCE (HCC): Status: ACTIVE | Noted: 2022-05-03

## 2022-07-15 PROBLEM — G30.9 ALZHEIMER'S DEMENTIA WITHOUT BEHAVIORAL DISTURBANCE (HCC): Status: ACTIVE | Noted: 2022-05-03

## 2022-07-15 LAB
ALBUMIN SERPL-MCNC: 2.4 G/DL (ref 3.2–4.6)
ALBUMIN/GLOB SERPL: 0.6 {RATIO} (ref 1.2–3.5)
ALP SERPL-CCNC: 71 U/L (ref 50–130)
ALT SERPL-CCNC: 43 U/L (ref 12–65)
ANION GAP SERPL CALC-SCNC: 8 MMOL/L (ref 7–16)
APPEARANCE UR: CLEAR
AST SERPL-CCNC: 22 U/L (ref 15–37)
BACTERIA URNS QL MICRO: NEGATIVE /HPF
BASOPHILS # BLD: 0.1 K/UL (ref 0–0.2)
BASOPHILS NFR BLD: 1 % (ref 0–2)
BILIRUB SERPL-MCNC: 0.4 MG/DL (ref 0.2–1.1)
BILIRUB UR QL: NEGATIVE
BUN SERPL-MCNC: 11 MG/DL (ref 8–23)
CALCIUM SERPL-MCNC: 9 MG/DL (ref 8.3–10.4)
CASTS URNS QL MICRO: ABNORMAL /LPF
CHLORIDE SERPL-SCNC: 105 MMOL/L (ref 98–107)
CO2 SERPL-SCNC: 26 MMOL/L (ref 21–32)
COLOR UR: ABNORMAL
CREAT SERPL-MCNC: 0.58 MG/DL (ref 0.6–1)
DIFFERENTIAL METHOD BLD: ABNORMAL
EOSINOPHIL # BLD: 0.1 K/UL (ref 0–0.8)
EOSINOPHIL NFR BLD: 1 % (ref 0.5–7.8)
EPI CELLS #/AREA URNS HPF: ABNORMAL /HPF
ERYTHROCYTE [DISTWIDTH] IN BLOOD BY AUTOMATED COUNT: 12.1 % (ref 11.9–14.6)
GLOBULIN SER CALC-MCNC: 3.8 G/DL (ref 2.3–3.5)
GLUCOSE SERPL-MCNC: 119 MG/DL (ref 65–100)
GLUCOSE UR STRIP.AUTO-MCNC: NEGATIVE MG/DL
HCT VFR BLD AUTO: 32.6 % (ref 35.8–46.3)
HGB BLD-MCNC: 11 G/DL (ref 11.7–15.4)
HGB UR QL STRIP: NEGATIVE
IMM GRANULOCYTES # BLD AUTO: 0.2 K/UL (ref 0–0.5)
IMM GRANULOCYTES NFR BLD AUTO: 2 % (ref 0–5)
KETONES UR QL STRIP.AUTO: NEGATIVE MG/DL
LEUKOCYTE ESTERASE UR QL STRIP.AUTO: ABNORMAL
LYMPHOCYTES # BLD: 1.1 K/UL (ref 0.5–4.6)
LYMPHOCYTES NFR BLD: 9 % (ref 13–44)
MAGNESIUM SERPL-MCNC: 1.7 MG/DL (ref 1.8–2.4)
MCH RBC QN AUTO: 30.7 PG (ref 26.1–32.9)
MCHC RBC AUTO-ENTMCNC: 33.7 G/DL (ref 31.4–35)
MCV RBC AUTO: 91.1 FL (ref 79.6–97.8)
MONOCYTES # BLD: 0.8 K/UL (ref 0.1–1.3)
MONOCYTES NFR BLD: 7 % (ref 4–12)
NEUTS SEG # BLD: 9.7 K/UL (ref 1.7–8.2)
NEUTS SEG NFR BLD: 81 % (ref 43–78)
NITRITE UR QL STRIP.AUTO: NEGATIVE
NRBC # BLD: 0 K/UL (ref 0–0.2)
PH UR STRIP: 7.5 [PH] (ref 5–9)
PLATELET # BLD AUTO: 381 K/UL (ref 150–450)
PMV BLD AUTO: 8.1 FL (ref 9.4–12.3)
POTASSIUM SERPL-SCNC: 3.4 MMOL/L (ref 3.5–5.1)
PROT SERPL-MCNC: 6.2 G/DL (ref 6.3–8.2)
PROT UR STRIP-MCNC: NEGATIVE MG/DL
RBC # BLD AUTO: 3.58 M/UL (ref 4.05–5.2)
RBC #/AREA URNS HPF: ABNORMAL /HPF
SODIUM SERPL-SCNC: 139 MMOL/L (ref 136–145)
SP GR UR REFRACTOMETRY: 1.01 (ref 1–1.02)
UROBILINOGEN UR QL STRIP.AUTO: 2 EU/DL (ref 0.2–1)
VALPROATE SERPL-MCNC: <3 UG/ML (ref 50–100)
WBC # BLD AUTO: 12 K/UL (ref 4.3–11.1)
WBC URNS QL MICRO: ABNORMAL /HPF

## 2022-07-15 PROCEDURE — 81001 URINALYSIS AUTO W/SCOPE: CPT

## 2022-07-15 PROCEDURE — 87086 URINE CULTURE/COLONY COUNT: CPT

## 2022-07-15 PROCEDURE — 6370000000 HC RX 637 (ALT 250 FOR IP): Performed by: INTERNAL MEDICINE

## 2022-07-15 PROCEDURE — 51798 US URINE CAPACITY MEASURE: CPT

## 2022-07-15 PROCEDURE — 92526 ORAL FUNCTION THERAPY: CPT

## 2022-07-15 PROCEDURE — 80164 ASSAY DIPROPYLACETIC ACD TOT: CPT

## 2022-07-15 PROCEDURE — 97530 THERAPEUTIC ACTIVITIES: CPT

## 2022-07-15 PROCEDURE — 6370000000 HC RX 637 (ALT 250 FOR IP): Performed by: FAMILY MEDICINE

## 2022-07-15 PROCEDURE — 83735 ASSAY OF MAGNESIUM: CPT

## 2022-07-15 PROCEDURE — 6370000000 HC RX 637 (ALT 250 FOR IP): Performed by: NURSE PRACTITIONER

## 2022-07-15 PROCEDURE — 80053 COMPREHEN METABOLIC PANEL: CPT

## 2022-07-15 PROCEDURE — 97112 NEUROMUSCULAR REEDUCATION: CPT

## 2022-07-15 PROCEDURE — 97535 SELF CARE MNGMENT TRAINING: CPT

## 2022-07-15 PROCEDURE — 1100000000 HC RM PRIVATE

## 2022-07-15 PROCEDURE — 2580000003 HC RX 258: Performed by: FAMILY MEDICINE

## 2022-07-15 PROCEDURE — 85025 COMPLETE CBC W/AUTO DIFF WBC: CPT

## 2022-07-15 PROCEDURE — 36415 COLL VENOUS BLD VENIPUNCTURE: CPT

## 2022-07-15 PROCEDURE — 2580000003 HC RX 258: Performed by: NURSE PRACTITIONER

## 2022-07-15 RX ORDER — HYDROCHLOROTHIAZIDE 25 MG/1
50 TABLET ORAL DAILY
Status: DISCONTINUED | OUTPATIENT
Start: 2022-07-16 | End: 2022-07-17 | Stop reason: HOSPADM

## 2022-07-15 RX ORDER — SODIUM CHLORIDE, SODIUM LACTATE, POTASSIUM CHLORIDE, CALCIUM CHLORIDE 600; 310; 30; 20 MG/100ML; MG/100ML; MG/100ML; MG/100ML
INJECTION, SOLUTION INTRAVENOUS CONTINUOUS
Status: DISCONTINUED | OUTPATIENT
Start: 2022-07-15 | End: 2022-07-17 | Stop reason: HOSPADM

## 2022-07-15 RX ORDER — DULOXETIN HYDROCHLORIDE 20 MG/1
40 CAPSULE, DELAYED RELEASE ORAL DAILY
Status: DISCONTINUED | OUTPATIENT
Start: 2022-07-16 | End: 2022-07-17 | Stop reason: HOSPADM

## 2022-07-15 RX ADMIN — METOPROLOL SUCCINATE 200 MG: 50 TABLET, EXTENDED RELEASE ORAL at 10:04

## 2022-07-15 RX ADMIN — DULOXETINE HYDROCHLORIDE 60 MG: 60 CAPSULE, DELAYED RELEASE ORAL at 10:04

## 2022-07-15 RX ADMIN — HYDROXYCHLOROQUINE SULFATE 200 MG: 200 TABLET ORAL at 10:05

## 2022-07-15 RX ADMIN — SODIUM CHLORIDE: 900 INJECTION, SOLUTION INTRAVENOUS at 04:04

## 2022-07-15 RX ADMIN — HYDROCHLOROTHIAZIDE 25 MG: 25 TABLET ORAL at 10:04

## 2022-07-15 RX ADMIN — SODIUM CHLORIDE, POTASSIUM CHLORIDE, SODIUM LACTATE AND CALCIUM CHLORIDE: 600; 310; 30; 20 INJECTION, SOLUTION INTRAVENOUS at 10:09

## 2022-07-15 RX ADMIN — LISINOPRIL 40 MG: 20 TABLET ORAL at 10:05

## 2022-07-15 ASSESSMENT — PAIN SCALES - GENERAL: PAINLEVEL_OUTOF10: 0

## 2022-07-15 NOTE — PROGRESS NOTES
Comprehensive Nutrition Assessment    Type and Reason for Visit: Initial, RD Nutrition Re-Screen/LOS  Malnutrition Screening Tool: Malnutrition Screen  Have you recently lost weight without trying?: 0 to 1 pound (0 points)  Have you been eating poorly because of a decreased appetite?: Yes (1 point)  Malnutrition Screening Tool Score: 1    Nutrition Recommendations/Plan:   Food and/or Nutrient Delivery: Continue NPO   Await POC per GOC. Coordination of Nutrition Care: Continue to monitor while inpatient, Interdisciplinary Rounds          Malnutrition Assessment:  Malnutrition Status: At risk for malnutrition (Comment) (minimal po since at last admission, NFPE deferred, no current weight)    Nutrition Assessment:  Nutrition History: 7/15: Unable to obtain        Nutrition Background:   H/O: HTN, GERD, Depression, past alcohol abuse, Alzeimer's Dementia  P/W AMS, recurrent UTIs over the last 3 months.,s continued to decline in mentation, now does not communicate. Also declined in ambulation. Found to have a UTI, renal stone  Nutrition Interval:  Diet orders:   7/8: Regular  7/9: SB6 per SLP eval  7/13: NPO per SLP eval-limited alertness  7/14: MM5, then PU4. SLP recommended NPO  7/15: NPO per SLP    Pt in bed with eyes closed. No family present. Per SLP-hopeful expectation is that with tx of acute medical issues, mentation and alertness with improve but if persist then discussion of [address] r/t nutrition is recommended. NFPE deferred      Current Nutrition Therapies:  Diet NPO Exceptions are: Sips of Clear Liquids    Current Intake:   Average Meal Intake: NPO        Anthropometric Measures:  Height: 5' 6\" (167.6 cm)  Current Body Wt: 160 lb (72.6 kg) (7/8), Weight source:  Other (Comment) (estimated)  BMI: 25.8  Admission Body Weight: 160 lb (72.6 kg) (estimated)  Ideal Body Weight (Kg) (Calculated): 59 kg (130 lbs),    Usual Body Wt:  (see below),   Weight History Weight Weight Weight Method   7/8/2022 160 lbs 72.6 kg Estimated   6/28/2022 144 lbs 6 oz 65.5 kg Cardiology OV   6/1/2022 147 lbs 66.7 kg PCP OV   5/26/2022 144 lbs 65.3 kg Rheumatology OV   1/6/2022 146 lbs 66.2 kg Rheumatology OV   11/8/2021 146 lbs 8 oz 66.5 kg PCP OV   Percent weight change:         Edema: No data recorded  BMI Category Normal Weight (BMI 22.0 to 24.9) age over 72 (based on UBW)  Estimated Daily Nutrient Needs:  Energy (kcal/day): 4405-9408 (20-25 kcal/kg) (Kcal/kg Weight used: 65.5 kg Usual (per EMR OV)  Protein (g/day): 66-82 (1-1.25 g/kg) Weight Used: (Usual) 65.5 kg  Fluid (ml/day):   (1 ml/kcal)    Nutrition Diagnosis:   Inadequate oral intake related to cognitive or neurological impairment, swallowing difficulty as evidenced by swallow study results, NPO or clear liquid status due to medical condition    Goals: Active Goal:  (Await POC/GOC)       Nutrition Monitoring and Evaluation:      Food/Nutrient Intake Outcomes: Diet Advancement/Tolerance  Physical Signs/Symptoms Outcomes: Chewing or Swallowing, Meal Time Behavior    Discharge Planning:     Too soon to determine    Michael Johnson RD,LD, [address] [facility]

## 2022-07-15 NOTE — PROGRESS NOTES
OCCUPATIONAL THERAPY Daily Note and AM     OT Visit Days: 2   Time  OT Charge Capture  Rehab Caseload Tracker  OT Orders    Corwin Perez is a 68 y.o. female   PRIMARY DIAGNOSIS: UTI (urinary tract infection)  Dehydration [E86.0]  UTI (urinary tract infection) [N39.0]  Urinary tract infection without hematuria, site unspecified [N39.0]  Altered mental status, unspecified altered mental status type [R41.82]       Inpatient: Payor: MEDICARE / Plan: MEDICARE PART A AND B / Product Type: *No Product type* /     ASSESSMENT:     REHAB RECOMMENDATIONS: CURRENT LEVEL OF FUNCTION:  (Most Recently Demonstrated)   Recommendation to date pending progress:  Setting:  Short-term Rehab    Equipment:    To Be Determined Bathing:  Not Tested  Dressing:  Not Tested  Feeding/Grooming:  Minimal Assist  Toileting:  Not Tested  Functional Mobility:  Total Assist     ASSESSMENT:  Ms. Carlton Adame presents supine in bed resting, She required total assistance to sit EOB, but was able to maintain good sitting balance at EOB after a few minutes. She preformed grooming task with heavy cueing to complete. Not following many commands at all. She performed 3 sit<>stand attempts with mod/max x2. She verbalized a few times during session, but therapists were unable to understand much of what she said. She did not open her eyes once during this session. She required total assistance to return to supine and was left with all needs met and in reach.        SUBJECTIVE:     Ms. Carlton Adame states, \"oh god damn\"     Social/Functional Lives With: Spouse  Type of Home: House  Home Layout: Able to Live on Main level with bedroom/bathroom  Home Access: Stairs to enter with rails  Entrance Stairs - Number of Steps: 6  Entrance Stairs - Rails: Left  Receives Help From:  (pt not left alone)  Ambulation Assistance:  (supervision)  Transfer Assistance:  (supervision)    OBJECTIVE:     Tanya Alvarado / Ashlyn Dye / Candy Dotson: IV and Purewick    RESTRICTIONS/PRECAUTIONS:  Restrictions/Precautions  Restrictions/Precautions: Fall Risk        PAIN: VITALS / O2:   Pre Treatment:    Did not verbalize pain but did grimace a couple of times during session      Post Treatment:  Vitals          Oxygen            MOBILITY: I Mod I S SBA CGA Min Mod Max Total  NT x2 Comments:   Bed Mobility    Rolling [] [] [] [] [] [] [] [] [x] [] []    Supine to Sit [] [] [] [] [] [] [] [] [x] [] []    Scooting [] [] [] [] [] [] [] [] [x] [] []    Sit to Supine [] [] [] [] [] [] [] [] [x] [] []    Transfers    Sit to Stand [] [] [] [] [] [] [] [x] [] [] []    Bed to Chair [] [] [] [] [] [] [] [] [] [] []    Stand to Sit [] [] [] [] [] [] [] [x] [] [] []    Tub/Shower [] [] [] [] [] [] [] [] [] [] []     Toilet [] [] [] [] [] [] [] [] [] [] []      [] [] [] [] [] [] [] [] [] [] []    I=Independent, Mod I=Modified Independent, S=Supervision/Setup, SBA=Standby Assistance, CGA=Contact Guard Assistance, Min=Minimal Assistance, Mod=Moderate Assistance, Max=Maximal Assistance, Total=Total Assistance, NT=Not Tested    ACTIVITIES OF DAILY LIVING: I Mod I S SBA CGA Min Mod Max Total NT Comments   BASIC ADLs:              Upper Body   Bathing [] [] [] [] [] [] [] [] [] []    Lower Body Bathing [] [] [] [] [] [] [] [] [] []    Toileting [] [] [] [] [] [] [] [] [] []    Upper Body Dressing [] [] [] [] [] [] [] [] [] []    Lower Body Dressing [] [] [] [] [] [] [] [] [] []    Feeding [] [] [] [] [] [] [] [] [] []    Grooming [] [] [] [] [] [x] [] [] [] []    Personal Device Care [] [] [] [] [] [] [] [] [] []    Functional Mobility [] [] [] [] [] [] [] [] [] []    I=Independent, Mod I=Modified Independent, S=Supervision/Setup, SBA=Standby Assistance, CGA=Contact Guard Assistance, Min=Minimal Assistance, Mod=Moderate Assistance, Max=Maximal Assistance, Total=Total Assistance, NT=Not Tested    BALANCE: Good Fair+ Fair Fair- Poor NT Comments   Sitting Static [] [x] [] [] [] []    Sitting Dynamic [] [x] [] [] [] []              Standing Static [] [] [] [] [x] []    Standing Dynamic [] [] [] [] [x] []        PLAN:     FREQUENCY/DURATION   OT Plan of Care: 3 times/week for duration of hospital stay or until stated goals are met, whichever comes first.    ACUTE OCCUPATIONAL THERAPY GOALS:   (Developed with and agreed upon by patient and/or caregiver.)  1. Patient will perform grooming with mod assist.   2. Patient will perform upper body dressing with mod assist.   3. Patient will perform feeding with mod assist.  4. Patient will perform toilet transfer with min assist          TREATMENT:     TREATMENT:   Neuromuscular Re-education (15 Minutes): Neuromuscular Re-education included Balance Training, Coordination training, Functional mobility with facilitation, Sitting balance training, and Standing balance training to improve Balance, Coordination, Functional Mobility, and Postural Control. Self Care (10 minutes): Patient participated in grooming ADLs in unsupported sitting with minimal verbal, manual, and tactile cueing to decrease assistance required and increase activity tolerance. Patient also participated in functional mobility and functional transfer training to decrease assistance required, increase activity tolerance, and increase safety awareness. TREATMENT GRID:  N/A    AFTER TREATMENT PRECAUTIONS: Alarm Activated, Bed, Bed/Chair Locked, Call light within reach, Needs within reach, RN notified, and Side rails x3    INTERDISCIPLINARY COLLABORATION:  RN/ PCT and PT/ PTA    EDUCATION:  Education Given To: Patient  Education Provided: Role of Therapy; ADL Adaptive Strategies;Transfer Training; Fall Prevention Strategies  Barriers to Learning: Cognition  Education Outcome: Unable to verbalize; Unable to demonstrate understanding;Continued education needed    TOTAL TREATMENT DURATION AND TIME:  Time In: 1125  Time Out: 1150  Minutes: [address] [place], [place]

## 2022-07-15 NOTE — CARE COORDINATION
Patient discussed during rounds, once her mentation improves she will be medically ready for discharge per MD. Saw Santana at [facility] advised SW that the patient has a bed and will be available over the weekend if she's discharged.      [address] [facility], [place] [facility] [place]    [address] [facility]

## 2022-07-15 NOTE — PROGRESS NOTES
PHYSICAL THERAPY Daily Note and AM  (Link to Caseload Tracking: PT Visit Days : 4  Acknowledge Orders  Time In/Out  PT Charge Capture  Rehab Caseload Tracker    Janessa Orellana is a 68 y.o. female   PRIMARY DIAGNOSIS: UTI (urinary tract infection)  Dehydration [E86.0]  UTI (urinary tract infection) [N39.0]  Urinary tract infection without hematuria, site unspecified [N39.0]  Altered mental status, unspecified altered mental status type [R41.82]       Reason for Referral: Generalized Muscle Weakness (M62.81)  Other lack of cordination (R27.8)  Difficulty in walking, Not elsewhere classified (R26.2)  Other abnormalities of gait and mobility (R26.89)  Inpatient: Payor: MEDICARE / Plan: MEDICARE PART A AND B / Product Type: *No Product type* /     ASSESSMENT:     REHAB RECOMMENDATIONS:   Recommendation to date pending progress:  Setting:  pt mauricio need 24/7 assist, home setting is ideal with advance dementia    Equipment:    To Be Determined     ASSESSMENT:  Ms. Tatum Gray supine upon arrival with eyes closed. Had some verbal speaking , but some time could not understand pt. Work on bed mobility with Total A x 2, but continue to keep her eyes closed. Sat on the eob and work on balance shifting from side><side maintaining her balance, but continue to keep eyes closed. Then work on sit><stand x 3 with HHA x 2 and verbal cues, but pt could not follow, eyes remain closed. Return to supine with needs in reach and alarm on.      [address] [facility] [address] [id number] AM-PAC 6 Clicks Basic Mobility Inpatient Short Form  AM-PAC Mobility Inpatient   How much difficulty turning over in bed?: Unable  How much difficulty sitting down on / standing up from a chair with arms?: A Lot  How much difficulty moving from lying on back to sitting on side of bed?: Unable  How much help from another person moving to and from a bed to a chair?: A Lot  How much help from another person needed to walk in hospital room?: A Lot  How much help from another person for climbing 3-5 steps with a railing?: Total  AM-PAC Inpatient Mobility Raw Score : 9  AM-PAC Inpatient T-Scale Score : 30.55  Mobility Inpatient CMS 0-100% Score: 81.38  Mobility Inpatient CMS G-Code Modifier : CM    SUBJECTIVE:   Ms. Ellen Marte states, \" oh god damn\"     Social/Functional Lives With: Spouse  Type of Home: House  Home Layout: Able to Live on Main level with bedroom/bathroom  Home Access: Stairs to enter with rails  Entrance Stairs - Number of Steps: 6  Entrance Stairs - Rails: Left  Receives Help From:  (pt not left alone)  Ambulation Assistance:  (supervision)  Transfer Assistance:  (supervision)    OBJECTIVE:     PAIN: Isaac Mungo / O2: Francisco Reyes / Usama Saha / Rupa May:   Pre Treatment: non-verbal 0/10         Post Treatment: 0/10 Vitals NT       Oxygen NT      IV    RESTRICTIONS/PRECAUTIONS:  Restrictions/Precautions: Fall Risk                 GROSS EVALUATION: Intact Impaired (Comments):   AROM []  non-functional throughout but some purposeful & reflexive movement observed   PROM []    Strength []    non-functional throughout, but some purposeful muscle activity observed   Balance []    non-functional standing but purposeful strength or muscle activity, better with sitting balance with arms legs & core   Posture [] N/A   Sensation []  unable to fully assess   Coordination []   non-functional   Tone [x]     Edema []    Activity Tolerance [] poor    []      COGNITION/  PERCEPTION: Intact Impaired (Comments):   Orientation []  not oriented x 4   Vision []  unable to assess   Hearing []  questionable   Cognition  []  significantly impaired, can not care for self     MOBILITY: I Mod I S SBA CGA Min Mod Max Total  NT x2 Comments:   Bed Mobility    Rolling [] [] [] [] [] [] [] [] [x] [] []    Supine to Sit [] [] [] [] [] [] [] [] [x] [] []    Scooting [] [] [] [] [] [] [] [] [x] [] []    Sit to Supine [] [] [] [] [] [] [] [] [x] [] []    Transfers    Sit to Stand [] [] [] [] [] [] [] [] [x] [] [x] HHA not following commands   Bed to Chair [] [] [] [] [] [] [] [] [] [] []    Stand to Sit [] [] [] [] [] [] [] [] [] [] []     [] [] [] [] [] [] [] [] [] [] []    I=Independent, Mod I=Modified Independent, S=Supervision, SBA=Standby Assistance, CGA=Contact Guard Assistance,   Min=Minimal Assistance, Mod=Moderate Assistance, Max=Maximal Assistance, Total=Total Assistance, NT=Not Tested    GAIT: I Mod I S SBA CGA Min Mod Max Total  NT x2 Comments:   Level of Assistance [] [] [] [] [] [] [] [] [] [x] []    Distance N/A    DME None    Gait Quality N/A    Weightbearing Status      Stairs      I=Independent, Mod I=Modified Independent, S=Supervision, SBA=Standby Assistance, CGA=Contact Guard Assistance,   Min=Minimal Assistance, Mod=Moderate Assistance, Max=Maximal Assistance, Total=Total Assistance, NT=Not Tested    PLAN:   ACUTE PHYSICAL THERAPY GOALS:   (Developed with and agreed upon by patient and/or caregiver.)  DISCHARGE GOALS :  (1.)Ms. Dee Ohara will move from supine to sit and sit to supine  with MINIMAL ASSIST.    (2.)Ms. Dee Ohara will transfer from bed to chair and chair to bed with MINIMAL ASSIST using a walker. (3.)Ms. Lockhart will ambulate with MINIMAL ASSIST for 50-60 feet with a rolling walker. 4) pt able to participate (consistently) with exercises on cue.     WILL RE-ASSESS PROGRESS IN 1 WEEK & MODIFY GOALS PRN    ________________________________________________________________________________________________    FREQUENCY AND DURATION: Daily for duration of hospital stay or until stated goals are met, whichever comes first.    THERAPY PROGNOSIS: Fair    PROBLEM LIST:   (Skilled intervention is medically necessary to address:)  Decreased ADL/Functional Activities  Decreased Activity Tolerance  Decreased Balance  Decreased Cognition  Decreased Coordination  Decreased Gait Ability  Decreased Safety Awareness  Decreased Strength  Decreased Transfer Abilities INTERVENTIONS PLANNED:   (Benefits and precautions of physical therapy have been discussed with the patient.)  Therapeutic Activity  Therapeutic Exercise/HEP  Gait Training  Education       TREATMENT:       TREATMENT:   Therapeutic Activity (40 Minutes): Therapeutic activity included Rolling, Sit to Supine, Scooting, and Transfer Training to improve functional Activity tolerance, Balance, Coordination, Mobility, and Strength.       AFTER TREATMENT PRECAUTIONS: Alarm Activated, Bed, Bed/Chair Locked, Call light within reach, Needs within reach, RN notified and Visitors at bedside    INTERDISCIPLINARY COLLABORATION:  RN/ PCT    EDUCATION:  N/A    TIME IN/OUT:  Time In: 1100  Time Out: [address]  Minutes: [address]

## 2022-07-15 NOTE — PROGRESS NOTES
Hospitalist Progress Note   Admit Date:  2022 11:08 AM   Name:  Alex Tucker   Age:  68 y.o. Sex:  female  :  1945   MRN:  [id number]   Room:  [facility]/    Presenting Complaint: Fever     Reason(s) for Admission: Dehydration [E86.0]  UTI (urinary tract infection) [N39.0]  Urinary tract infection without hematuria, site unspecified [N39.0]  Altered mental status, unspecified altered mental status type [facility]     Hospital Course & Interval History:   68 y.o. female with a PMH of HTN, GERD, depression, past alcohol abuse, Alzeimer's dementia who presented with altered mental status.  reports recurrent UTIs over the last 3 months. Pt has continued to decline in mentation, now does not communicate. Also declined in ambulation per . Found to have a UTI. Started on cefepime given allergy hx. CT head without acute findings. CXR without acute findings. UCx with Pseudomas aeruginosa. Subjective/24hr Events (07/15/22): Briefly interactive this morning. Worked with PT, OT today. Able to follow some commands. Checking Depakote level. No new complaints. Adjusting psych meds. Increased WBC> UA. Assessment & Plan:   UTI due to Pseudomas aeruginosa  UA (+) nitrites, leuk est. CT AP with L renal stone  - continue with cefepime ( - )  - consult urology  7/15/2022: consult urology: OP FU     Electrolyte or Fluid Disorder  7/15/2022: hypokalemia, HypoMg; replete; recheck     Leukocytosis  7/15/2022: no fever overnight, off antibiotics, increase WBC, check UA     Acute metabolic encephalopathy with underlying Dementia/Alzheimer's   Likely secondary to UTI. CT head without acute findings  - cymbalta titrated to lower daily dose  - neurology outpatient follow up her underlying alzheimer's dementia.   7/15/2022: holding depakote     Hypertension  -As needed hydralazine   -HCTZ, metoprolol  -increase lisinopril  7/15/2022: remains hypertensive, increase HCTZ      Discharge Planning:      Pending improvment    Diet:  ADULT DIET; Dysphagia - Pureed  DVT PPx: SCDs  Code status: Full Code    Hospital Problems:  Principal Problem:    UTI (urinary tract infection)  Active Problems:    Pseudomonas aeruginosa infection    Anxiety    HLD (hyperlipidemia)    Depression    Dementia of the Alzheimer's type, with late onset, uncomplicated ([address]    Hypertension  Resolved Problems:    * No resolved hospital problems. *      Objective:     Patient Vitals for the past 24 hrs:   Temp Pulse Resp BP SpO2   07/15/22 1148 98.2 °F (36.8 °C) 93 16 (!) 148/86 95 %   07/15/22 0741 98.2 °F (36.8 °C) (!) 125 16 (!) 175/97 95 %   07/15/22 0320 97.6 °F (36.4 °C) (!) 105 20 (!) 144/74 93 %   07/15/22 0000 -- 100 -- -- --   07/14/22 2317 97.3 °F (36.3 °C) (!) 124 20 (!) 146/78 94 %   07/14/22 1922 97.5 °F (36.4 °C) (!) 116 20 (!) 174/81 97 %   07/14/22 1631 97.2 °F (36.2 °C) (!) 107 16 119/89 96 %         Oxygen Therapy  SpO2: 95 %  Pulse via Oximetry: 93 beats per minute  O2 Device: None (Room air)    Estimated body mass index is 25.82 kg/m² as calculated from the following:    Height as of this encounter: 5' 6\" (1.676 m). Weight as of this encounter: 160 lb (72.6 kg). Intake/Output Summary (Last 24 hours) at 7/15/2022 1348  Last data filed at 7/15/2022 0320  Gross per 24 hour   Intake --   Output 1657 ml   Net -1657 ml           Blood pressure (!) 148/86, pulse 93, temperature 98.2 °F (36.8 °C), temperature source Axillary, resp. rate 16, height 5' 6\" (1.676 m), weight 160 lb (72.6 kg), SpO2 95 %. Physical Exam  Vitals and nursing note reviewed. Constitutional:       General: She is in acute distress. Appearance: She is normal weight. She is ill-appearing. She is not diaphoretic. Eyes:      Extraocular Movements: Extraocular movements intact. Cardiovascular:      Rate and Rhythm: Normal rate and regular rhythm. Pulmonary:      Effort: Pulmonary effort is normal. No respiratory distress. Abdominal:      General: There is no distension. Palpations: Abdomen is soft. Tenderness: There is no abdominal tenderness. Musculoskeletal:         General: No deformity. Skin:     Coloration: Skin is not jaundiced or pale. Neurological:      General: No focal deficit present. Mental Status: She is alert. She is disoriented. Psychiatric:         Attention and Perception: She is attentive. Mood and Affect: Mood is depressed. Affect is flat. Behavior: Behavior is slowed and withdrawn. Behavior is cooperative. Cognition and Memory: Cognition is impaired.        I have reviewed ordered lab tests and independently visualized imaging below:    Recent Labs:  Recent Results (from the past 48 hour(s))   COVID-19, Rapid    Collection Time: 07/14/22 11:23 AM    Specimen: Nasopharyngeal   Result Value Ref Range    Source Nasopharyngeal      SARS-CoV-2, Rapid Not detected NOTD     CBC with Auto Differential    Collection Time: 07/15/22  9:49 AM   Result Value Ref Range    WBC 12.0 (H) 4.3 - 11.1 K/uL    RBC 3.58 (L) 4.05 - 5.2 M/uL    Hemoglobin 11.0 (L) 11.7 - 15.4 g/dL    Hematocrit 32.6 (L) 35.8 - 46.3 %    MCV 91.1 79.6 - 97.8 FL    MCH 30.7 26.1 - 32.9 PG    MCHC 33.7 31.4 - 35.0 g/dL    RDW 12.1 11.9 - 14.6 %    Platelets 431 697 - 452 K/uL    MPV 8.1 (L) 9.4 - 12.3 FL    nRBC 0.00 0.0 - 0.2 K/uL    Differential Type AUTOMATED      Seg Neutrophils 81 (H) 43 - 78 %    Lymphocytes 9 (L) 13 - 44 %    Monocytes 7 4.0 - 12.0 %    Eosinophils % 1 0.5 - 7.8 %    Basophils 1 0.0 - 2.0 %    Immature Granulocytes 2 0.0 - 5.0 %    Segs Absolute 9.7 (H) 1.7 - 8.2 K/UL    Absolute Lymph # 1.1 0.5 - 4.6 K/UL    Absolute Mono # 0.8 0.1 - 1.3 K/UL    Absolute Eos # 0.1 0.0 - 0.8 K/UL    Basophils Absolute 0.1 0.0 - 0.2 K/UL    Absolute Immature Granulocyte 0.2 0.0 - 0.5 K/UL   Comprehensive Metabolic Panel w/ Reflex to MG    Collection Time: 07/15/22  9:49 AM   Result Value Ref Range Sodium 139 136 - 145 mmol/L    Potassium 3.4 (L) 3.5 - 5.1 mmol/L    Chloride 105 98 - 107 mmol/L    CO2 26 21 - 32 mmol/L    Anion Gap 8 7 - 16 mmol/L    Glucose 119 (H) 65 - 100 mg/dL    BUN 11 8 - 23 MG/DL    CREATININE 0.58 (L) 0.6 - 1.0 MG/DL    GFR African American >60 >60 ml/min/1.73m2    GFR Non- >60 >60 ml/min/1.73m2    Calcium 9.0 8.3 - 10.4 MG/DL    Total Bilirubin 0.4 0.2 - 1.1 MG/DL    ALT 43 12 - 65 U/L    AST 22 15 - 37 U/L    Alk Phosphatase 71 50 - 130 U/L    Total Protein 6.2 (L) 6.3 - 8.2 g/dL    Albumin 2.4 (L) 3.2 - 4.6 g/dL    Globulin 3.8 (H) 2.3 - 3.5 g/dL    Albumin/Globulin Ratio 0.6 (L) 1.2 - 3.5     Magnesium    Collection Time: 07/15/22  9:49 AM   Result Value Ref Range    Magnesium 1.7 (L) 1.8 - 2.4 mg/dL       Other Studies:  CT ABDOMEN PELVIS RENAL STONE Additional Contrast? Radiologist Recommendation   Final Result      1. There is mild distention of the left renal pelvis and proximal left ureter,   with left periureteric and perinephric stranding. There are multiple stones in   the left lower pole, measuring up to 1 cm. There is no definite evidence of a   stone within the left ureter. The collecting system and proximal ureteral   distention and perinephric and perienteric stranding could be due to recent   passage of a now nonvisualized stone or ascending infection. 2.  No evidence of right-sided urinary tract stones. 3.  No bladder calculi. MRI BRAIN WO CONTRAST   Final Result   1. Moderate temporal lobe predominant volume loss. 2. Relatively mild chronic small vessel ischemic change. CT Head W/O Contrast   Final Result   1. No acute intracranial abnormality. 2. Stable atrophy and white matter microangiopathic changes. XR CHEST PORTABLE   Final Result   Slightly underexpanded lungs, otherwise no acute abnormality.           Current Meds:  Current Facility-Administered Medications   Medication Dose Route Frequency lactated ringers infusion   IntraVENous Continuous    [START ON 7/16/2022] DULoxetine (CYMBALTA) extended release capsule 40 mg  40 mg Oral Daily    metoprolol succinate (TOPROL XL) extended release tablet 200 mg  200 mg Oral Daily    metoprolol succinate (TOPROL XL) extended release tablet 50 mg  50 mg Oral Once    hydrALAZINE (APRESOLINE) injection 10 mg  10 mg IntraVENous Q4H PRN    potassium chloride (KLOR-CON M) extended release tablet 40 mEq  40 mEq Oral PRN    Or    potassium bicarb-citric acid (EFFER-K) effervescent tablet 40 mEq  40 mEq Oral PRN    Or    potassium chloride 10 mEq/100 mL IVPB (Peripheral Line)  10 mEq IntraVENous PRN    magnesium sulfate 2000 mg in 50 mL IVPB premix  2,000 mg IntraVENous PRN    sodium phosphate 10 mmol in sodium chloride 0.9 % 250 mL IVPB  10 mmol IntraVENous PRN    Or    sodium phosphate 15 mmol in sodium chloride 0.9 % 250 mL IVPB  15 mmol IntraVENous PRN    Or    sodium phosphate 20 mmol in sodium chloride 0.9 % 500 mL IVPB  20 mmol IntraVENous PRN    hydrALAZINE (APRESOLINE) tablet 25 mg  25 mg Oral Q8H PRN    lisinopril (PRINIVIL;ZESTRIL) tablet 40 mg  40 mg Oral Daily    hydroCHLOROthiazide (HYDRODIURIL) tablet 25 mg  25 mg Oral Daily    [Held by provider] divalproex (DEPAKOTE SPRINKLE) capsule 125 mg  125 mg Oral 3 times per day    hydroxychloroquine (PLAQUENIL) tablet 200 mg  200 mg Oral Daily    pantoprazole (PROTONIX) tablet 40 mg  40 mg Oral QAM AC    sodium chloride flush 0.9 % injection 5-40 mL  5-40 mL IntraVENous 2 times per day    sodium chloride flush 0.9 % injection 5-40 mL  5-40 mL IntraVENous PRN    0.9 % sodium chloride infusion   IntraVENous PRN    ondansetron (ZOFRAN-ODT) disintegrating tablet 4 mg  4 mg Oral Q8H PRN    Or    ondansetron (ZOFRAN) injection 4 mg  4 mg IntraVENous Q6H PRN    acetaminophen (TYLENOL) tablet 650 mg  650 mg Oral Q6H PRN    Or    acetaminophen (TYLENOL) suppository 650 mg  650 mg Rectal Q6H PRN    polyethylene glycol (GLYCOLAX) packet 17 g  17 g Oral Daily PRN       Signed:  Kellie Love MD

## 2022-07-15 NOTE — PROGRESS NOTES
LTG: patient will tolerate safest, least restrictive oral diet without s/sx airway compromise. STG:  Discontinued   STG: Patient will tolerate ongoing po trials in efforts to advance diet  STG: Patient will participate in modified barium swallow study to objectively assess oropharyngeal swallow if indicated    SPEECH LANGUAGE PATHOLOGY: DYSPHAGIA  Daily Note #3    NAME: Jose Martin Coronado  : 1945  MRN: [id number]    ADMISSION DATE: 2022  PRIMARY DIAGNOSIS: UTI (urinary tract infection)  Dehydration [E86.0]  UTI (urinary tract infection) [N39.0]  Urinary tract infection without hematuria, site unspecified [N39.0]  Altered mental status, unspecified altered mental status type [R41.82]    ICD-10: Treatment Diagnosis: R13.12 Dysphagia, Oropharyngeal Phase    RECOMMENDATIONS   Diet:  Diet Solids Recommendation: NPO  Liquid Consistency Recommendation: NPO    Sips of thin liquid for pleasure with 1:1 assist    Medications: Crushed in puree as able     Recommendations: Dysphagia treatment; Therapeutic feeds with SLP only   Compensatory Swallowing Strategies:Upright as possible for all oral intake;Small bites/sips;Eat/Feed slowly; Total feed;Assist feed   Therapeutic Intervention:Patient/Family education; Therapeutic PO trials with SLP   Patient continues to require skilled intervention: Yes  D/C Recommendations: Ongoing speech therapy is recommended during this hospitalization, Ongoing speech therapy is recommended at next level of care     ASSESSMENT    Dysphagia Diagnosis: Severe oral stage dysphagia;Mild pharyngeal stage dysphagia; Suspected needs further assessment  Patient demonstrates poor alertness impacting oral intake and increasing risk of possible aspiration. Acceptance and degree of oral dysphagia is directly related to level of alertness.  Patient certainly at increased risk of aspiration given altered mentation and associated fluctuations in oral phase; however, when awake and accepting thin liquid, no overt pharyngeal dysphagia. Bigger issue appears to be poor alertness and thus, poor ability to accept po trials. Recommend NPO with sips of thin liquid for pleasure with aspiration precautions and 1:1 assistance when awake/alert and meds crushed in puree when alert. Hopefully with tx of acute medical issues, mentation and alertness will improve. However, if persist, Recommend discussion regarding goals of care related to nutrition. In the setting of alzheimer's dementia, patient at increased risk of dysphagia and decline in intake. May need to consider resuming\"safest\" oral diet and only feed patient when awake/alert with 1:1 assistance and implementation of precautions to reduce risk of aspiration/aspiration complications. Will continue to follow. GENERAL    History of Present Injury/Illness: Ms. Ivana Sage  has a past medical history of Alcohol abuse, daily use, Alzheimer's dementia ([facility] [address] Depression, Hypertension, Lumbar stenosis, Osteopenia, Sinus problem, Spondylolisthesis of lumbar region, and Vitamin D deficiency. . She also  has a past surgical history that includes heent (Bilateral, 2015); Breast lumpectomy (Bilateral); back surgery (5/31/12); other surgical history; Wrist fracture surgery; Tonsillectomy; and Hysterectomy. Chart Reviewed: Yes  General Comment  Comments: RN reports patient has been able to take a few sips of liquid and meds crushed in puree. otherwise, has not been alert enough for oral intake. Subjective: not alert. would not open eyes with MAX cueing.   Behavior/Cognition: Lethargic;Doesn't follow directions  Communication Observation: Non-verbal  Follows Directions: None  Respiratory Status: Room air                    Current Diet : Pureed  Current Liquid Diet : Thin  Pain:   Patient does not appear in pain                                        OBJECTIVE    Patient Positioning: Upright in bed      Dentition: Adequate           Oropharyngeal Phase:   Patient presented with thin liquid via tsp/straw and pudding. Patient demonstrated absent labial pursuit. Presented minimal amounts via spoon to increased awareness/alertness/acceptance. Patient biting on straw. Eventually took 2 small sips. Mild oral delay, piecemeal swallow and no overt s/sx aspiration. No purposeful attempt to accept pudding via spoon, despite max cues. Patient licked small amounts from lips. No further trials given. PLAN    Duration/Frequency: Continue to follow patient 3x/week for duration of hospitalization and/or until goals met    Dysphagia Outcome and Severity Scale (ROM)  Dysphagia Outcome Severity Scale: Level 1: Severe dysphagia- NPO. Unable to tolerate any PO safely  Interpretation of Tool: The Dysphagia Outcome and Severity Scale (ROM) is a simple, easy-to-use, 7-point scale developed to systematically rate the functional severity of dysphagia based on objective assessment and make recommendations for diet level, independence level, and type of nutrition. Normal(7), Functional(6), Mild(5), Mild-Moderate(4), Moderate(3), Moderate-Severe(2), Severe(1)    Speech Therapy Prognosis  Prognosis: Fair  Prognosis Considerations: Previous Level of Function; Family/Community Support    Education: Patient, Physician, RN  Patient Education: NPO status, aspiration risk  Patient Education Response: No evidence of learning    Current Medications:   No current facility-administered medications on file prior to encounter.      Current Outpatient Medications on File Prior to Encounter   Medication Sig Dispense Refill    magnesium oxide (MAG-OX) 400 MG tablet Take 400 mg by mouth daily      lisinopril (PRINIVIL;ZESTRIL) 20 MG tablet Take 1 tablet by mouth daily 90 tablet 3    diclofenac sodium (VOLTAREN) 1 % GEL Apply 4 g topically 4 times daily 100 g 3    divalproex (DEPAKOTE) 125 MG DR tablet Take 125 mg by mouth 3 times daily as needed       hydroxychloroquine (PLAQUENIL) 200 mg tablet Take 1 pill once a day after food. 90 tablet 0    TURMERIC PO Take 1 capsule by mouth daily      azelastine (ASTELIN) 0.1 % nasal spray 1 spray by Nasal route 2 times daily      vitamin D 25 MCG (1000 UT) CAPS Take 1,000 Units by mouth daily      diphenhydrAMINE (SOMINEX) 25 MG tablet Take 25 mg by mouth every 6 hours as needed      donepezil (ARICEPT) 10 MG tablet TAKE ONE TABLET AT BEDTIME. hydroCHLOROthiazide (HYDRODIURIL) 25 MG tablet TAKE 1 TABLET BY MOUTH EACH DAY. memantine (NAMENDA) 10 MG tablet Take 10 mg by mouth 2 times daily      metoprolol succinate (TOPROL XL) 50 MG extended release tablet TAKE ONE TABLET DAILY FOR BLOOD PRESSURE      Naproxen Sodium 220 MG CAPS Take 1 capsule by mouth 2 times daily      omeprazole (PRILOSEC) 40 MG delayed release capsule TAKE 1 CAPSULE EACH DAY.          PRECAUTIONS/ALLERGIES: Levofloxacin, Penicillins, Poison oak extract, Bupropion, Sulfamethoxazole, and Montelukast   Safety Devices in place: Yes  Type of devices: Nurse notified, Left in bed  Restraints Initially in Place: No    Therapy Time  SLP Individual Minutes  Time In: 4811  Time Out: 1521  Minutes: [address], [place]  7/15/2022 2:35 PM

## 2022-07-16 PROCEDURE — 2580000003 HC RX 258: Performed by: NURSE PRACTITIONER

## 2022-07-16 PROCEDURE — 2580000003 HC RX 258: Performed by: FAMILY MEDICINE

## 2022-07-16 PROCEDURE — 6360000002 HC RX W HCPCS: Performed by: HOSPITALIST

## 2022-07-16 PROCEDURE — 6370000000 HC RX 637 (ALT 250 FOR IP): Performed by: NURSE PRACTITIONER

## 2022-07-16 PROCEDURE — 51798 US URINE CAPACITY MEASURE: CPT

## 2022-07-16 PROCEDURE — 97530 THERAPEUTIC ACTIVITIES: CPT

## 2022-07-16 PROCEDURE — 97535 SELF CARE MNGMENT TRAINING: CPT

## 2022-07-16 PROCEDURE — 1100000000 HC RM PRIVATE

## 2022-07-16 RX ADMIN — SODIUM CHLORIDE, PRESERVATIVE FREE 10 ML: 5 INJECTION INTRAVENOUS at 09:35

## 2022-07-16 RX ADMIN — PANTOPRAZOLE SODIUM 40 MG: 40 TABLET, DELAYED RELEASE ORAL at 06:23

## 2022-07-16 RX ADMIN — SODIUM CHLORIDE, PRESERVATIVE FREE 10 ML: 5 INJECTION INTRAVENOUS at 23:52

## 2022-07-16 RX ADMIN — HYDRALAZINE HYDROCHLORIDE 10 MG: 20 INJECTION INTRAMUSCULAR; INTRAVENOUS at 03:59

## 2022-07-16 RX ADMIN — SODIUM CHLORIDE, POTASSIUM CHLORIDE, SODIUM LACTATE AND CALCIUM CHLORIDE: 600; 310; 30; 20 INJECTION, SOLUTION INTRAVENOUS at 20:54

## 2022-07-16 ASSESSMENT — PAIN SCALES - GENERAL: PAINLEVEL_OUTOF10: 4

## 2022-07-16 NOTE — CARE COORDINATION
MD advises that patient is medically ready for discharge. LISA contacted Renetta Turner at [facility] at 2:30 pm and has made two additional calls since then with no answer. Hopeful to transfer to [facility] today if the facility can accept the patient.  If not, plan to tx tomorrow am.     Fernanda Dejesus, RANDI    [facility]

## 2022-07-16 NOTE — PROGRESS NOTES
Continues to rest quietly, arousing at intervals. No c/o, no distress noted. Call light in reach, bed alarm set, door open. Report given to Homero Hyde RN.

## 2022-07-16 NOTE — PROGRESS NOTES
PHYSICAL THERAPY Daily Note and PM  (Link to Caseload Tracking: PT Visit Days : 5  Acknowledge Orders  Time In/Out  PT Charge Capture  Rehab Caseload Tracker    Hailee Gagnon is a 68 y.o. female   PRIMARY DIAGNOSIS: UTI (urinary tract infection)  Dehydration [E86.0]  UTI (urinary tract infection) [N39.0]  Urinary tract infection without hematuria, site unspecified [N39.0]  Altered mental status, unspecified altered mental status type [R41.82]       Reason for Referral: Generalized Muscle Weakness (M62.81)  Other lack of cordination (R27.8)  Difficulty in walking, Not elsewhere classified (R26.2)  Other abnormalities of gait and mobility (R26.89)  Inpatient: Payor: MEDICARE / Plan: MEDICARE PART A AND B / Product Type: *No Product type* /     ASSESSMENT:     REHAB RECOMMENDATIONS:   Recommendation to date pending progress:  Setting:  STR if not back to prior level of mobility    Equipment:    To Be Determined     ASSESSMENT:  Ms. Terry Laws participated more today. Awake and alert throughout session with eyes closed. Needed most assist for bed mobility (mod A) but sat without support on edge of  bed for hygiene and gown change. Patient stood multiple times at the side of bed-with and without a walker. She seemed to do better with the walker and only required CGA. She transitioned between sitting and standing multiple times with mod A to initiate the movement but continued to require only CGGA to sustain. Patient would sit suddenly so not comfortable stepping away from the bed today. She did take steps along the side of bed with min A to guide the walker and encourage weight shifting. Patient returned to bed after session and positioned her in R sidelying. Patient with good progress today.        [address] [facility] [address] [address] AM-PAC 6 Clicks Basic Mobility Inpatient Short Form  AM-PAC Mobility Inpatient   How much difficulty turning over in bed?: Unable  How much difficulty sitting down on / standing up from a chair with arms?: A Lot  How much difficulty moving from lying on back to sitting on side of bed?: Unable  How much help from another person moving to and from a bed to a chair?: A Lot  How much help from another person needed to walk in hospital room?: A Lot  How much help from another person for climbing 3-5 steps with a railing?: Total  AM-PAC Inpatient Mobility Raw Score : 9  AM-PAC Inpatient T-Scale Score : 30.55  Mobility Inpatient CMS 0-100% Score: 81.38  Mobility Inpatient CMS G-Code Modifier : CM    SUBJECTIVE:   Ms. Welch Mantel agreeable.     Social/Functional Lives With: Spouse  Type of Home: House  Home Layout: Able to Live on Main level with bedroom/bathroom  Home Access: Stairs to enter with rails  Entrance Stairs - Number of Steps: 6  Entrance Stairs - Rails: Left  Receives Help From:  (pt not left alone)  Ambulation Assistance:  (supervision)  Transfer Assistance:  (supervision)    OBJECTIVE:     PAIN: Marilynn Chimera / O2: Thermon Benjamín / [employer] / DRAINS:   Pre Treatment:   Pain Assessment: Face, Legs, Activity, Cry, and Consolability (FLACC)  Pain Level: 4      Post Treatment: 0/10 Vitals NT       Oxygen       IV and Purewick    RESTRICTIONS/PRECAUTIONS:  Restrictions/Precautions: Fall Risk                 GROSS EVALUATION: Intact Impaired (Comments):   AROM []  Decreased, functional   PROM []    Strength []  Decreased, functional   Balance [] Sitting - Static: Good  Sitting - Dynamic: Fair  Standing - Static: Fair  Standing - Dynamic: Fair    Posture [] N/A   Sensation []  unable to fully assess   Coordination []  Decreased     Tone [x]     Edema []    Activity Tolerance []     []      COGNITION/  PERCEPTION: Intact Impaired (Comments):   Orientation []  Unable to assess but appears disoriented x 4   Vision []  unable to assess   Hearing []     Cognition  []       MOBILITY: I Mod I S SBA CGA Min Mod Max Total  NT x2 Comments:   Bed Mobility    Rolling [] [] [] [] [] [] [x] [] [] [] []    Supine to Sit [] [] [] [] [] [] [x] [] [] [] []    Scooting [] [] [] [] [] [] [] [] [] [] []    Sit to Supine [] [] [] [] [] [] [x] [] [] [] []    Transfers    Sit to Stand [] [] [] [] [] [] [x] [] [] [] []    Bed to Chair [] [] [] [] [] [] [] [] [] [] []    Stand to Sit [] [] [] [] [x] [] [] [] [] [] []     [] [] [] [] [] [] [] [] [] [] []    I=Independent, Mod I=Modified Independent, S=Supervision, SBA=Standby Assistance, CGA=Contact Guard Assistance,   Min=Minimal Assistance, Mod=Moderate Assistance, Max=Maximal Assistance, Total=Total Assistance, NT=Not Tested    GAIT: I Mod I S SBA CGA Min Mod Max Total  NT x2 Comments:   Level of Assistance [] [] [] [] [] [x] [] [] [] [] []    Distance 3' along the side of the bed    DME Gait Belt and Rolling Walker    Gait Quality Decreased step clearance and Decreased step length    Weightbearing Status      Stairs      I=Independent, Mod I=Modified Independent, S=Supervision, SBA=Standby Assistance, CGA=Contact Guard Assistance,   Min=Minimal Assistance, Mod=Moderate Assistance, Max=Maximal Assistance, Total=Total Assistance, NT=Not Tested    PLAN:   ACUTE PHYSICAL THERAPY GOALS:   (Developed with and agreed upon by patient and/or caregiver.)  DISCHARGE GOALS :  (1.)Ms. Karen Childs will move from supine to sit and sit to supine  with MINIMAL ASSIST.    (2.)Ms. Karen Childs will transfer from bed to chair and chair to bed with MINIMAL ASSIST using a walker. (3.)Ms. Lockhart will ambulate with MINIMAL ASSIST for 50-60 feet with a rolling walker. 4) pt able to participate (consistently) with exercises on cue.     WILL RE-ASSESS PROGRESS IN 1 WEEK & MODIFY GOALS PRN    ________________________________________________________________________________________________    FREQUENCY AND DURATION: Daily for duration of hospital stay or until stated goals are met, whichever comes first.    THERAPY PROGNOSIS: Fair    PROBLEM LIST:   (Skilled intervention is medically necessary to address:)  Decreased ADL/Functional Activities  Decreased Activity Tolerance  Decreased Balance  Decreased Cognition  Decreased Coordination  Decreased Gait Ability  Decreased Safety Awareness  Decreased Strength  Decreased Transfer Abilities INTERVENTIONS PLANNED:   (Benefits and precautions of physical therapy have been discussed with the patient.)  Therapeutic Activity  Therapeutic Exercise/HEP  Gait Training  Education       TREATMENT:       TREATMENT:   Therapeutic Activity (30 Minutes): Therapeutic activity included Rolling, Supine to Sit, Sit to Supine, Scooting, Transfer Training, Ambulation on level ground, Sitting balance , and Standing balance to improve functional Activity tolerance, Balance, Coordination, Mobility, and Strength.       AFTER TREATMENT PRECAUTIONS: Alarm Activated, Bed, Bed/Chair Locked, Call light within reach, Needs within reach, and RN notified    INTERDISCIPLINARY COLLABORATION:  MD/ PA/ NP , RN/ PCT, and OT/ HOBSON    EDUCATION: Education Given To: Patient  Education Provided: Transfer Training  Barriers to Learning: Cognition  Education Outcome: Continued education needed    TIME IN/OUT:  Time In: 1250  Time Out: 830 Dale Johnson  Minutes: 3424 Eligio Johnson, PT

## 2022-07-16 NOTE — PROGRESS NOTES
Hospitalist Progress Note   Admit Date:  2022 11:08 AM   Name:  Sander Medina   Age:  68 y.o. Sex:  female  :  1945   MRN:  [id number]   Room:  [facility]/01    Presenting Complaint: Fever     Reason(s) for Admission: Dehydration [E86.0]  UTI (urinary tract infection) [N39.0]  Urinary tract infection without hematuria, site unspecified [N39.0]  Altered mental status, unspecified altered mental status type [facility]     Hospital Course & Interval History:   68 y.o. female with a PMH of HTN, GERD, depression, past alcohol abuse, Alzeimer's dementia who presented with altered mental status.  reports recurrent UTIs over the last 3 months. Pt has continued to decline in mentation, now does not communicate. Also declined in ambulation per . Found to have a UTI. Started on cefepime given allergy hx. CT head without acute findings. CXR without acute findings. UCx with Pseudomas aeruginosa. Subjective/24hr Events (22): More interactive today. Worked with PT, OT today. Able to follow some commands. No new complaints. Assessment & Plan:   UTI due to Pseudomas aeruginosa  UA (+) nitrites, leuk est. CT AP with L renal stone  - continue with cefepime ( - )  - consult urology  2022: consult urology: OP FU     Electrolyte or Fluid Disorder  2022: hypokalemia, HypoMg; replete; recheck     Leukocytosis  2022: no fever overnight, off antibiotics, increase WBC, check UA     Acute metabolic encephalopathy with underlying Dementia/Alzheimer's   Likely secondary to UTI. CT head without acute findings  - cymbalta titrated to lower daily dose  - neurology outpatient follow up her underlying alzheimer's dementia.   2022: holding depakote     Hypertension  -As needed hydralazine   -HCTZ, metoprolol  -increase lisinopril  2022: remains mildly hypertensive, no change      Discharge Planning:      Likely rehab tomorrow    Diet:  Diet NPO Exceptions are: Sips of Clear Liquids  DVT PPx: SCDs  Code status: Full Code    Hospital Problems:  Principal Problem:    UTI (urinary tract infection)  Active Problems:    Pseudomonas aeruginosa infection    Anxiety    HLD (hyperlipidemia)    Depression    Dementia of the Alzheimer's type, with late onset, uncomplicated ([facility] [address]    Hypertension    Alzheimer's dementia without behavioral disturbance ([facility] [address]  Resolved Problems:    * No resolved hospital problems. *      Objective:     Patient Vitals for the past 24 hrs:   Temp Pulse Resp BP SpO2   07/16/22 1150 97.9 °F (36.6 °C) 56 18 (!) 136/54 96 %   07/16/22 0915 98 °F (36.7 °C) 66 18 (!) 152/72 97 %   07/16/22 0340 97.3 °F (36.3 °C) 86 16 (!) 189/80 98 %   07/15/22 2338 97.9 °F (36.6 °C) 84 16 (!) 153/89 96 %   07/15/22 1913 97.7 °F (36.5 °C) 85 16 (!) 146/84 95 %   07/15/22 1614 98.4 °F (36.9 °C) 92 16 (!) 147/85 96 %         Oxygen Therapy  SpO2: 96 %  Pulse via Oximetry: 93 beats per minute  O2 Device: None (Room air)    Estimated body mass index is 25.82 kg/m² as calculated from the following:    Height as of this encounter: 5' 6\" (1.676 m). Weight as of this encounter: 160 lb (72.6 kg). Intake/Output Summary (Last 24 hours) at 7/16/2022 1554  Last data filed at 7/16/2022 0550  Gross per 24 hour   Intake 1477 ml   Output 1375 ml   Net 102 ml           Blood pressure (!) 136/54, pulse 56, temperature 97.9 °F (36.6 °C), temperature source Oral, resp. rate 18, height 5' 6\" (1.676 m), weight 160 lb (72.6 kg), SpO2 96 %. Physical Exam  Vitals and nursing note reviewed. Constitutional:       General: She is not in acute distress. Appearance: She is normal weight. She is ill-appearing. She is not diaphoretic. Eyes:      Extraocular Movements: Extraocular movements intact. Cardiovascular:      Rate and Rhythm: Normal rate and regular rhythm. Pulmonary:      Effort: Pulmonary effort is normal. No respiratory distress. Abdominal:      General: There is no distension. Palpations: Abdomen is soft. Tenderness: There is no abdominal tenderness. Musculoskeletal:         General: No deformity. Skin:     Coloration: Skin is not jaundiced or pale. Neurological:      General: No focal deficit present. Mental Status: She is alert. She is disoriented. Psychiatric:         Attention and Perception: She is attentive. Mood and Affect: Mood is depressed. Affect is flat. Behavior: Behavior is slowed and withdrawn. Behavior is cooperative. Cognition and Memory: Cognition is impaired.        I have reviewed ordered lab tests and independently visualized imaging below:    Recent Labs:  Recent Results (from the past 48 hour(s))   CBC with Auto Differential    Collection Time: 07/15/22  9:49 AM   Result Value Ref Range    WBC 12.0 (H) 4.3 - 11.1 K/uL    RBC 3.58 (L) 4.05 - 5.2 M/uL    Hemoglobin 11.0 (L) 11.7 - 15.4 g/dL    Hematocrit 32.6 (L) 35.8 - 46.3 %    MCV 91.1 79.6 - 97.8 FL    MCH 30.7 26.1 - 32.9 PG    MCHC 33.7 31.4 - 35.0 g/dL    RDW 12.1 11.9 - 14.6 %    Platelets 544 718 - 500 K/uL    MPV 8.1 (L) 9.4 - 12.3 FL    nRBC 0.00 0.0 - 0.2 K/uL    Differential Type AUTOMATED      Seg Neutrophils 81 (H) 43 - 78 %    Lymphocytes 9 (L) 13 - 44 %    Monocytes 7 4.0 - 12.0 %    Eosinophils % 1 0.5 - 7.8 %    Basophils 1 0.0 - 2.0 %    Immature Granulocytes 2 0.0 - 5.0 %    Segs Absolute 9.7 (H) 1.7 - 8.2 K/UL    Absolute Lymph # 1.1 0.5 - 4.6 K/UL    Absolute Mono # 0.8 0.1 - 1.3 K/UL    Absolute Eos # 0.1 0.0 - 0.8 K/UL    Basophils Absolute 0.1 0.0 - 0.2 K/UL    Absolute Immature Granulocyte 0.2 0.0 - 0.5 K/UL   Comprehensive Metabolic Panel w/ Reflex to MG    Collection Time: 07/15/22  9:49 AM   Result Value Ref Range    Sodium 139 136 - 145 mmol/L    Potassium 3.4 (L) 3.5 - 5.1 mmol/L    Chloride 105 98 - 107 mmol/L    CO2 26 21 - 32 mmol/L    Anion Gap 8 7 - 16 mmol/L    Glucose 119 (H) 65 - 100 mg/dL    BUN 11 8 - 23 MG/DL    CREATININE 0.58 (L) 0.6 - 1.0 MG/DL    GFR African American >60 >60 ml/min/1.73m2    GFR Non- >60 >60 ml/min/1.73m2    Calcium 9.0 8.3 - 10.4 MG/DL    Total Bilirubin 0.4 0.2 - 1.1 MG/DL    ALT 43 12 - 65 U/L    AST 22 15 - 37 U/L    Alk Phosphatase 71 50 - 130 U/L    Total Protein 6.2 (L) 6.3 - 8.2 g/dL    Albumin 2.4 (L) 3.2 - 4.6 g/dL    Globulin 3.8 (H) 2.3 - 3.5 g/dL    Albumin/Globulin Ratio 0.6 (L) 1.2 - 3.5     Magnesium    Collection Time: 07/15/22  9:49 AM   Result Value Ref Range    Magnesium 1.7 (L) 1.8 - 2.4 mg/dL   Urinalysis    Collection Time: 07/15/22  3:07 PM   Result Value Ref Range    Color, UA YELLOW/STRAW      Appearance CLEAR      Specific Gravity, UA 1.013 1.001 - 1.023      pH, Urine 7.5 5.0 - 9.0      Protein, UA Negative NEG mg/dL    Glucose, UA Negative mg/dL    Ketones, Urine Negative NEG mg/dL    Bilirubin Urine Negative NEG      Blood, Urine Negative NEG      Urobilinogen, Urine 2.0 (H) 0.2 - 1.0 EU/dL    Nitrite, Urine Negative NEG      Leukocyte Esterase, Urine TRACE (A) NEG      WBC, UA 10-20 0 /hpf    RBC, UA 0-5 0 /hpf    Epithelial Cells UA 0-5 0 /hpf    BACTERIA, URINE Negative 0 /hpf    Casts 0-2 0 /lpf   Culture, Urine    Collection Time: 07/15/22  3:07 PM    Specimen: Urine   Result Value Ref Range    Special Requests NO SPECIAL REQUESTS      Culture        No growth after short period of incubation. Further results to follow after overnight incubation. Valproic Acid Level, Total    Collection Time: 07/15/22  3:38 PM   Result Value Ref Range    VALPROIC ACID,VALAC <3 (L) 50 - 100 ug/ml       Other Studies:  CT ABDOMEN PELVIS RENAL STONE Additional Contrast? Radiologist Recommendation   Final Result      1. There is mild distention of the left renal pelvis and proximal left ureter,   with left periureteric and perinephric stranding. There are multiple stones in   the left lower pole, measuring up to 1 cm. There is no definite evidence of a   stone within the left ureter.  The collecting system and proximal ureteral   distention and perinephric and perienteric stranding could be due to recent   passage of a now nonvisualized stone or ascending infection. 2.  No evidence of right-sided urinary tract stones. 3.  No bladder calculi. MRI BRAIN WO CONTRAST   Final Result   1. Moderate temporal lobe predominant volume loss. 2. Relatively mild chronic small vessel ischemic change. CT Head W/O Contrast   Final Result   1. No acute intracranial abnormality. 2. Stable atrophy and white matter microangiopathic changes. XR CHEST PORTABLE   Final Result   Slightly underexpanded lungs, otherwise no acute abnormality.           Current Meds:  Current Facility-Administered Medications   Medication Dose Route Frequency    lactated ringers infusion   IntraVENous Continuous    DULoxetine (CYMBALTA) extended release capsule 40 mg  40 mg Oral Daily    hydroCHLOROthiazide (HYDRODIURIL) tablet 50 mg  50 mg Oral Daily    metoprolol succinate (TOPROL XL) extended release tablet 200 mg  200 mg Oral Daily    metoprolol succinate (TOPROL XL) extended release tablet 50 mg  50 mg Oral Once    hydrALAZINE (APRESOLINE) injection 10 mg  10 mg IntraVENous Q4H PRN    potassium chloride (KLOR-CON M) extended release tablet 40 mEq  40 mEq Oral PRN    Or    potassium bicarb-citric acid (EFFER-K) effervescent tablet 40 mEq  40 mEq Oral PRN    Or    potassium chloride 10 mEq/100 mL IVPB (Peripheral Line)  10 mEq IntraVENous PRN    magnesium sulfate 2000 mg in 50 mL IVPB premix  2,000 mg IntraVENous PRN    sodium phosphate 10 mmol in sodium chloride 0.9 % 250 mL IVPB  10 mmol IntraVENous PRN    Or    sodium phosphate 15 mmol in sodium chloride 0.9 % 250 mL IVPB  15 mmol IntraVENous PRN    Or    sodium phosphate 20 mmol in sodium chloride 0.9 % 500 mL IVPB  20 mmol IntraVENous PRN    lisinopril (PRINIVIL;ZESTRIL) tablet 40 mg  40 mg Oral Daily    [Held by provider] divalproex (DEPAKOTE SPRINKLE) capsule 125 mg  125 mg Oral 3 times per day    hydroxychloroquine (PLAQUENIL) tablet 200 mg  200 mg Oral Daily    pantoprazole (PROTONIX) tablet 40 mg  40 mg Oral QAM AC    sodium chloride flush 0.9 % injection 5-40 mL  5-40 mL IntraVENous 2 times per day    sodium chloride flush 0.9 % injection 5-40 mL  5-40 mL IntraVENous PRN    0.9 % sodium chloride infusion   IntraVENous PRN    ondansetron (ZOFRAN-ODT) disintegrating tablet 4 mg  4 mg Oral Q8H PRN    Or    ondansetron (ZOFRAN) injection 4 mg  4 mg IntraVENous Q6H PRN    acetaminophen (TYLENOL) tablet 650 mg  650 mg Oral Q6H PRN    Or    acetaminophen (TYLENOL) suppository 650 mg  650 mg Rectal Q6H PRN    polyethylene glycol (GLYCOLAX) packet 17 g  17 g Oral Daily PRN       Signed:  Jing Justice MD

## 2022-07-16 NOTE — PROGRESS NOTES
NT=Not Tested    ACTIVITIES OF DAILY LIVING: I Mod I S SBA CGA Min Mod Max Total NT Comments   BASIC ADLs:              Upper Body   Bathing [] [] [] [] [] [] [] [] [] []    Lower Body Bathing [] [] [] [] [] [] [] [] [] []    Toileting [] [] [] [] [] [] [] [] [x] [] Brief change and periwick in place   Upper Body Dressing [] [] [] [] [] [] [] [x] [x] [] Max /total to change gown   Lower Body Dressing [] [] [] [] [] [] [] [] [x] [] Total don socks   Feeding [] [] [] [] [] [] [] [] [] []    Grooming [] [] [] [] [] [] [] [x] [x] [] Wash hands   Personal Device Care [] [] [] [] [] [] [] [] [] []    Functional Mobility [] [] [] [] [] [] [x] [] [] [] Mod sit to stand, min to take side steps with RW   I=Independent, Mod I=Modified Independent, S=Supervision/Setup, SBA=Standby Assistance, CGA=Contact Guard Assistance, Min=Minimal Assistance, Mod=Moderate Assistance, Max=Maximal Assistance, Total=Total Assistance, NT=Not Tested    BALANCE: Good Fair+ Fair Fair- Poor NT Comments   Sitting Static [] [x] [] [] [] []    Sitting Dynamic [] [x] [] [] [] []              Standing Static [] [] [] [x] [] []    Standing Dynamic [] [] [] [x] [] []        PLAN:     FREQUENCY/DURATION   OT Plan of Care: 3 times/week for duration of hospital stay or until stated goals are met, whichever comes first.    ACUTE OCCUPATIONAL THERAPY GOALS:   (Developed with and agreed upon by patient and/or caregiver.)  1. Patient will perform grooming with mod assist.   2. Patient will perform upper body dressing with mod assist.   3. Patient will perform feeding with mod assist.  4. Patient will perform toilet transfer with min assist          TREATMENT:     TREATMENT:    SELF CARE: (30 minutes):   Procedure(s) (per grid) utilized to improve and/or restore self-care/home management as related to dressing, toileting, grooming, and functional mobility and standing ADLs .  Required maximal visual, verbal, manual, and tactile cueing to facilitate activities of daily living skills, compensatory activities, and direction following . AFTER TREATMENT PRECAUTIONS: Alarm Activated, Bed, Bed/Chair Locked, Call light within reach, Needs within reach, RN notified, and Side rails x3    INTERDISCIPLINARY COLLABORATION:  RN/ PCT, PT/ PTA, and OT/ HOBSON    EDUCATION:  Education Given To: Patient  Education Provided: ADL Adaptive Strategies;Transfer Training;Equipment; Fall Prevention Strategies  Education Method: Verbal;Demonstration  Barriers to Learning: Cognition  Education Outcome: Unable to verbalize;Continued education needed    TOTAL TREATMENT DURATION AND TIME:  Time In: 1250  Time Out: [place]  Minutes: -Richi Washburn OT

## 2022-07-16 NOTE — PROGRESS NOTES
SPEECH PATHOLOGY NOTE    Dysphagia treatment attempted this morning, however, pt would not wake to verbal/tactile stim from SLP or from RN. Pt not appropriate for po trials if not fully awake/alert. SLP will see pt tomorrow as schedules allow.     Thank you,  Dinh Werner MA, CCC-SLP

## 2022-07-16 NOTE — PROGRESS NOTES
Resting quietly, resp even, unlab, skin warm, dry. Initially during assessment, no verbal response, eyes remaining closed, then minimal verbal interaction, saying short replies, \"yes, of course\", she said in response to nurse's question, eyes opened briefly. Facial expression relaxed with no distress noted. No c/o. Followed some simple commands. Call light in reach, bed alarm set, door open. No distress noted.

## 2022-07-17 VITALS
DIASTOLIC BLOOD PRESSURE: 98 MMHG | BODY MASS INDEX: 25.71 KG/M2 | HEART RATE: 105 BPM | HEIGHT: 66 IN | TEMPERATURE: 98.2 F | OXYGEN SATURATION: 95 % | WEIGHT: 160 LBS | RESPIRATION RATE: 18 BRPM | SYSTOLIC BLOOD PRESSURE: 134 MMHG

## 2022-07-17 PROBLEM — N39.0 UTI (URINARY TRACT INFECTION): Status: RESOLVED | Noted: 2022-07-08 | Resolved: 2022-07-17

## 2022-07-17 PROBLEM — G92.8 DRUG-INDUCED ENCEPHALOPATHY: Status: RESOLVED | Noted: 2022-07-17 | Resolved: 2022-07-17

## 2022-07-17 PROBLEM — A49.8 PSEUDOMONAS AERUGINOSA INFECTION: Status: RESOLVED | Noted: 2022-07-11 | Resolved: 2022-07-17

## 2022-07-17 PROBLEM — G92.8 DRUG-INDUCED ENCEPHALOPATHY: Status: ACTIVE | Noted: 2022-07-17

## 2022-07-17 PROCEDURE — 6360000002 HC RX W HCPCS: Performed by: HOSPITALIST

## 2022-07-17 PROCEDURE — 2580000003 HC RX 258: Performed by: NURSE PRACTITIONER

## 2022-07-17 PROCEDURE — 6370000000 HC RX 637 (ALT 250 FOR IP): Performed by: NURSE PRACTITIONER

## 2022-07-17 PROCEDURE — 6370000000 HC RX 637 (ALT 250 FOR IP): Performed by: FAMILY MEDICINE

## 2022-07-17 RX ORDER — LISINOPRIL 40 MG/1
40 TABLET ORAL DAILY
Qty: 30 TABLET | Refills: 0 | Status: SHIPPED | OUTPATIENT
Start: 2022-07-17

## 2022-07-17 RX ORDER — METOPROLOL SUCCINATE 200 MG/1
200 TABLET, EXTENDED RELEASE ORAL DAILY
Qty: 30 TABLET | Refills: 0 | Status: SHIPPED | OUTPATIENT
Start: 2022-07-17

## 2022-07-17 RX ADMIN — DULOXETINE HYDROCHLORIDE 40 MG: 20 CAPSULE, DELAYED RELEASE ORAL at 10:26

## 2022-07-17 RX ADMIN — PANTOPRAZOLE SODIUM 40 MG: 40 TABLET, DELAYED RELEASE ORAL at 06:51

## 2022-07-17 RX ADMIN — HYDRALAZINE HYDROCHLORIDE 10 MG: 20 INJECTION INTRAMUSCULAR; INTRAVENOUS at 00:20

## 2022-07-17 RX ADMIN — HYDROCHLOROTHIAZIDE 50 MG: 25 TABLET ORAL at 10:26

## 2022-07-17 RX ADMIN — SODIUM CHLORIDE, PRESERVATIVE FREE 10 ML: 5 INJECTION INTRAVENOUS at 10:26

## 2022-07-17 RX ADMIN — HYDROXYCHLOROQUINE SULFATE 200 MG: 200 TABLET ORAL at 10:26

## 2022-07-17 RX ADMIN — METOPROLOL SUCCINATE 200 MG: 50 TABLET, EXTENDED RELEASE ORAL at 10:26

## 2022-07-17 NOTE — DISCHARGE SUMMARY
Hospitalist Discharge Summary   Admit Date:  2022 11:08 AM   DC Note date: 2022  Name:  Hailey Bailey   Age:  68 y.o. Sex:  female  :  1945   MRN:  [id number]   Room:  [facility]  PCP:  Erich Prader, MD    Presenting Complaint: Fever     Initial Admission Diagnosis: Dehydration [E86.0]  UTI (urinary tract infection) [N39.0]  Urinary tract infection without hematuria, site unspecified [N39.0]  Altered mental status, unspecified altered mental status type [R41.82]     Problem List for this Hospitalization (present on admission):    Principal Problem:    Dementia of the Alzheimer's type, with late onset, uncomplicated ([facility]  Active Problems:    Anxiety    HLD (hyperlipidemia)    Depression    Hypertension    Alzheimer's dementia without behavioral disturbance ([facility]  Resolved Problems:    UTI (urinary tract infection)    Pseudomonas aeruginosa infection    Drug-induced encephalopathy    Did Patient have Sepsis (YES OR NO): No    Hospital Course:  68 y.o. female with a PMH of HTN, GERD, depression, past alcohol abuse, Alzeimer's dementia who presented with altered mental status.  reports recurrent UTIs over the last 3 months. Pt has continued to decline in mentation, now does not communicate. Also declined in ambulation per . Found to have a UTI. Started on cefepime given allergy hx. CT head without acute findings. CXR without acute findings. UCx with Pseudomas aeruginosa. She was treated with cefepime. She developed drug-induced encephalopathy. Cefepime was discontinued. Her mentation returned to its baseline state. She worked with PT, OT. On  she was determined to be appropriate for discharge to short term rehab.     Disposition: Short term rehab  Diet: Diet NPO Exceptions are: Sips of Clear Liquids  Code Status: Full Code    Follow Ups:   Conrad Sanabria MD .    Specialty: Internal Medicine  Why: As needed  Contact information:  [address] In 2 weeks. Specialty: Neurology  Why: Alzheimer's demenita  Contact information:  [address] [facility] [address] [facility] [address] [phone]           Fawn Edmonds MD. Schedule an appointment as soon as possible for a   visit in 3 week(s). Specialty: Urology  Why: Routine progression of care  Contact information:  [address] [facility]  [phone]                       Time spent in patient discharge and coordination 42 minutes. Follow up labs/diagnostics (ultimately defer to outpatient provider):  Discontinuation of ALZ meds for late stage disease  Blood pressure medication changes  Decrease in dose of duloxetine     Plan was discussed with nurse, family, . Patient is essentially non-verbal.  All questions answered. Patient was stable at time of discharge. Instructions given to call a physician or return if any concerns. Current Discharge Medication List        CONTINUE these medications which have CHANGED    Details   lisinopril (PRINIVIL;ZESTRIL) 40 MG tablet Take 1 tablet by mouth in the morning. Qty: 30 tablet, Refills: 0      metoprolol succinate (TOPROL XL) 200 MG extended release tablet Take 1 tablet by mouth in the morning. Qty: 30 tablet, Refills: 0      DULoxetine (CYMBALTA) 60 MG extended release capsule Take 1 capsule by mouth daily TAKE 1 CAPSULES DAILY.   Qty: 30 capsule, Refills: 0           CONTINUE these medications which have NOT CHANGED    Details   magnesium oxide (MAG-OX) 400 MG tablet Take 400 mg by mouth daily      diclofenac sodium (VOLTAREN) 1 % GEL Apply 4 g topically 4 times daily  Qty: 100 g, Refills: 3    Associated Diagnoses: Trochanteric bursitis of right hip      divalproex (DEPAKOTE) 125 MG DR tablet Take 125 mg by mouth 3 times daily as needed       hydroxychloroquine (PLAQUENIL) 200 mg tablet Take 1 pill once a day after food. Qty: 90 tablet, Refills: 0    Associated Diagnoses: Pseudogout of knee, unspecified laterality      TURMERIC PO Take 1 capsule by mouth daily      azelastine (ASTELIN) 0.1 % nasal spray 1 spray by Nasal route 2 times daily      vitamin D 25 MCG (1000 UT) CAPS Take 1,000 Units by mouth daily      diphenhydrAMINE (SOMINEX) 25 MG tablet Take 25 mg by mouth every 6 hours as needed      hydroCHLOROthiazide (HYDRODIURIL) 25 MG tablet TAKE 1 TABLET BY MOUTH EACH DAY. omeprazole (PRILOSEC) 40 MG delayed release capsule TAKE 1 CAPSULE EACH DAY. STOP taking these medications       donepezil (ARICEPT) 10 MG tablet Comments:   Reason for Stopping:         memantine (NAMENDA) 10 MG tablet Comments:   Reason for Stopping:         Naproxen Sodium 220 MG CAPS Comments:   Reason for Stopping:               Procedures done this admission:  * No surgery found *    Consults this admission:  IP CONSULT TO CASE MANAGEMENT  IP CONSULT TO UROLOGY    Echocardiogram results:  06/14/22    TRANSTHORACIC ECHOCARDIOGRAM (TTE) COMPLETE (CONTRAST/BUBBLE/3D PRN) 06/14/2022  6:14 PM (Final)    Interpretation Summary  Formatting of this result is different from the original.      Left Ventricle: Normal left ventricular systolic function with a visually estimated EF of 60 - 65%. Left ventricle size is normal. Normal wall thickness. Signed by: Jalil Tracey MD on 6/14/2022  6:14 PM      Diagnostic Imaging/Tests:   CT Head W/O Contrast    Result Date: 7/8/2022  1. No acute intracranial abnormality. 2. Stable atrophy and white matter microangiopathic changes. XR CHEST PORTABLE    Result Date: 7/8/2022  Slightly underexpanded lungs, otherwise no acute abnormality. MRI BRAIN WO CONTRAST    Result Date: 7/12/2022  1. Moderate temporal lobe predominant volume loss. 2. Relatively mild chronic small vessel ischemic change.      CT ABDOMEN PELVIS RENAL STONE Additional Contrast? Radiologist 07/15/2022 03:07 PM    SPECGRAV 1.013 07/15/2022 03:07 PM    LABPH 7.5 07/15/2022 03:07 PM    PROTEINU Negative 07/15/2022 03:07 PM    GLUCOSEU Negative 07/15/2022 03:07 PM    KETUA Negative 07/15/2022 03:07 PM    BILIRUBINUR Negative 07/15/2022 03:07 PM    BLOODU Negative 07/15/2022 03:07 PM    UROBILINOGEN 2.0 07/15/2022 03:07 PM    NITRU Negative 07/15/2022 03:07 PM    LEUKOCYTESUR TRACE 07/15/2022 03:07 PM    WBCUA 10-20 07/15/2022 03:07 PM    RBCUA 0-5 07/15/2022 03:07 PM    EPITHUA 0-5 07/15/2022 03:07 PM    BACTERIA Negative 07/15/2022 03:07 PM    LABCAST 0-2 07/15/2022 03:07 PM    MUCUS 0 07/08/2022 11:46 AM          All Labs from Last 24 Hrs:  No results found for this or any previous visit (from the past 24 hour(s)).     Allergies   Allergen Reactions    Levofloxacin Other (See Comments)    Penicillins Shortness Of Breath and Swelling    Poison Oak Extract Anaphylaxis    Bupropion Other (See Comments)     Other anxiety      Cephalosporins Other (See Comments)     encephalopathy    Sulfamethoxazole Hives and Other (See Comments)     She thinks she does not do well with this    Montelukast Anxiety     Immunization History   Administered Date(s) Administered    Influenza Virus Vaccine 01/01/2014, 12/30/2016, 11/18/2017    Influenza, High Dose (Fluzone 65 yrs and older) 11/13/2018    Influenza, MDCK Quadv, IM, PF (Flucelvax 2 yrs and older) 11/18/2017    Influenza, Quadv, adjuvanted, 65 yrs +, IM, PF (Fluad) 11/05/2020    Influenza, Triv, inactivated, subunit, adjuvanted, IM (Fluad 65 yrs and older) 10/03/2019    PPD Test 07/08/2022    Pneumococcal Conjugate 13-valent (Kojo Clyde) 04/10/2012, 02/06/2014, 05/20/2015    Pneumococcal Polysaccharide (Fwzbaspwa69) 01/01/2011    Pneumococcal Vaccine 01/01/2011, 10/01/2011    Tdap (Boostrix, Adacel) 04/16/2016, 05/09/2021    Zoster Live (Zostavax) 01/01/2010       Recent Vital Data:  Patient Vitals for the past 24 hrs:   Temp Pulse Resp BP SpO2   07/17/22 0717 98.2 °F (36.8 °C) (!) 105 18 (!) 134/98 95 %   07/17/22 0421 98.1 °F (36.7 °C) 76 18 (!) 174/86 98 %   07/17/22 0016 -- 84 -- (!) 160/85 --   07/16/22 2310 98.4 °F (36.9 °C) 65 16 (!) 169/81 98 %   07/16/22 1932 97.5 °F (36.4 °C) 78 18 (!) 141/88 98 %   07/16/22 1635 98 °F (36.7 °C) 98 18 (!) 158/91 98 %   07/16/22 1150 97.9 °F (36.6 °C) 56 18 (!) 136/54 96 %       Oxygen Therapy  SpO2: 95 %  Pulse via Oximetry: 93 beats per minute  O2 Device: None (Room air)    Estimated body mass index is 25.82 kg/m² as calculated from the following:    Height as of this encounter: 5' 6\" (1.676 m). Weight as of this encounter: 160 lb (72.6 kg). Intake/Output Summary (Last 24 hours) at 7/17/2022 0929  Last data filed at 7/17/2022 0649  Gross per 24 hour   Intake 1374 ml   Output 2000 ml   Net -626 ml       Physical Exam  Vitals and nursing note reviewed. Constitutional:       General: She is not in acute distress. Appearance: She is normal weight. She is not ill-appearing or diaphoretic. Eyes:      Extraocular Movements: Extraocular movements intact. Cardiovascular:      Rate and Rhythm: Normal rate and regular rhythm. Pulmonary:      Effort: Pulmonary effort is normal. No respiratory distress. Abdominal:      General: There is no distension. Palpations: Abdomen is soft. Tenderness: There is no abdominal tenderness. Musculoskeletal:         General: No deformity. Skin:     Coloration: Skin is not jaundiced or pale. Neurological:      General: No focal deficit present. Mental Status: She is alert. She is disoriented. Psychiatric:         Attention and Perception: She is attentive. Mood and Affect: Mood is depressed. Affect is flat. Behavior: Behavior is slowed and withdrawn. Behavior is cooperative. Cognition and Memory: Cognition is impaired.          Signed:  Erika Fountain MD

## 2022-07-17 NOTE — PROGRESS NOTES
Report called to the [facility], all questions answered. Call back number provided for any questions.

## 2022-07-17 NOTE — PLAN OF CARE
Problem: Discharge Planning  Goal: Discharge to home or other facility with appropriate resources  Recent Flowsheet Documentation  Taken 7/17/2022 0753 by Ninoska Christopher RN  Discharge to home or other facility with appropriate resources: Identify barriers to discharge with patient and caregiver     Problem: Pain  Goal: Verbalizes/displays adequate comfort level or baseline comfort level  Recent Flowsheet Documentation  Taken 7/17/2022 0717 by Ninoska Christopher RN  Verbalizes/displays adequate comfort level or baseline comfort level: Encourage patient to monitor pain and request assistance     Problem: Safety - Adult  Goal: Free from fall injury  Recent Flowsheet Documentation  Taken 7/17/2022 0755 by Ninoska Christopher RN  Free From Fall Injury: Instruct family/caregiver on patient safety

## 2022-07-17 NOTE — CARE COORDINATION
SW spoke with Messi Iverson last night at 9 pm, was advised that the patient could come to [facility] today. EMS transport arranged at 12 pm. Discharge packet prepared and RN given number for report. Patient's  advised of transfer, all questions answered.      ASSESSMENT NOTE    Attending Physician: Benson Hodgkin, MD  Admit Problem: Dehydration [E86.0]  UTI (urinary tract infection) [N39.0]  Urinary tract infection without hematuria, site unspecified [N39.0]  Altered mental status, unspecified altered mental status type [R41.82]  Date/Time of Admission: 7/8/2022 11:08 AM  Problem List:  Patient Active Problem List   Diagnosis    Vitamin D deficiency    Anxiety    Chronic pain of both knees    HLD (hyperlipidemia)    Trochanteric bursitis of right hip    Dehydration    Chronic maxillary sinusitis    Fall    Depression    Pure hypercholesterolemia    Perennial allergic rhinitis    Dementia of the Alzheimer's type, with late onset, uncomplicated ([address]    Nonallergic rhinitis    Acquired hypothyroidism    Balance disorder    Spondylolisthesis of lumbar region    HOLLY (dyspnea on exertion)    Exposure to viral disease    Primary osteoarthritis    Spinal stenosis of lumbar region without neurogenic claudication    Gait difficulty    Hyperglycemia    Depression with anxiety    Gastroesophageal reflux disease with esophagitis    Osteopenia    Hypertension    Alzheimer's dementia without behavioral disturbance ([address]    Recurrent UTI    UTI (urinary tract infection)    Pseudomonas aeruginosa infection       Service Assessment  Patient Orientation     Cognition     History Provided By     Primary Caregiver Family   Accompanied By/Relationship     Support Systems Spouse/Significant Other, Home Care Staff, Friends/Neighbors, Family Members   Patient's Healthcare Decision Maker is: Legal Next of Kin   PCP Verified by CM     Last Visit to PCP     Prior Functional Level     Current Functional Level     Can patient return to prior living arrangement Unknown at present   Ability to make needs known:     Family able to assist with home care needs: Yes   Would you like for me to discuss the discharge plan with any other family members/significant others, and if so, who? Financial Resources     Community Resources     CM/SW Referral       Social/Functional History  Lives With Spouse   Type of [address] to Live on Main level with bedroom/bathroom   Home Access Stairs to enter with rails   Entrance Stairs - Number of Steps 6   Entrance Stairs - Rails Left   Bathroom Shower/Tub     Bathroom Toilet     [id number] Help From  (pt not left alone)   ADL Assistance     Bath     Dressing     Grooming     Feeding     [address] Work     Driving     Shopping          Other (Comment)     [address] [place] Paying/Finance Responsibility     [facility] Management     Other (Comment)     Ambuation Assistance  (supervision)   Transfer Assisstance  (supervision)   Active      Patient's  Info     Mode of Transportation     Education     Occupation     Type of Occupation       Discharge Planning   Type of [address] Spouse/Significant Other, [address] [employer] Staff, Friends/Neighbors, Family Members   Current Services Prior To Admission [facility]   DME     DME     DME Ordered? No   Potential Assistance Purchasing Medications     Meds-to-Beds: Does the patient want to have any new prescriptions delivered to bedside prior to discharge?      Type of [facility] Patient expects to be discharged to: Unknown   Follow Up Appointment: Best Day/Time     One/Two Story Residence:     # of Interior Steps     Height of Each Step (in)     [employer] Available     History of Falls? Yes     Services At/After Discharge  Transition of Care Consult (CM Consult): Internal Home Health     Internal Hospice     Reason Outside Agency [address]     Partner SNF     Reason Why Partner SNF Not Chosen     Internal Comfort Care     Reason Outside [address] Discharge     [address] Provided? Mode of Transport at [facility] Time of Discharge     Confirm Follow Up Transport       Condition of Participation: Discharge Planning  The plan for Transition of Care is related to the following treatment goals: The Patient and/or Patient Representative was provided with a Choice of Provider? Name of the Patient Representative who was provided with the Choice of Provider and agrees with the Discharge Plan? The Patient and/or Patient Representative Agree with the Discharge Plan? Freedom of Choice list was provided with basic dialogue that supports the individualized plan of care/goals, treatment preferences, and shares the quality data associated with the providers?        Documentation for Discharge Appeal  Discharge Appealed by     Date notified by [facility] of appeal request:     Time notified by [facility] of appeal request:     Detailed Notice of Discharge given to:     Date Notice of Discharge given:     Time Notice of Discharge given:     Date records sent to [facility]     Time records sent to Leslie Garner     Date Notified of Outcome     Time Notified of Outcome     Outcome of appeal           CONOR Bruno 07/17/22 8:37 AM    [address] [facility]    [address] [facility]

## 2022-07-18 ENCOUNTER — CARE COORDINATION (OUTPATIENT)
Dept: CARE COORDINATION | Facility: CLINIC | Age: 77
End: 2022-07-18

## 2022-07-18 LAB
BACTERIA SPEC CULT: NORMAL
SERVICE CMNT-IMP: NORMAL

## 2022-07-29 NOTE — PROGRESS NOTES
Physician Progress Note      Shelli Arredondo  [facility] #:                  [id number]  :                       1945  ADMIT DATE:       2022 11:08 AM  DISCH DATE:        2022 11:57 AM  RESPONDING  PROVIDER #:        Albert Medina MD          QUERY TEXT:    Patient admitted with a UTI and met sepsis criteria. Noted documentation of   sepsis poa per query and the if the patient had sepsis -yes or no--box on the   dc summary was stated as \"No.\"  If possible, please document in progress notes and discharge summary if you   are evaluating and /or treating any of the following: The medical record reflects the following:  Risk Factors: UTI, Alzeimer's dementia, recurrent UTI's, AMS  Clinical Indicators: acute metabolic encephalopathy,   cr 1.6, wbc 20.4,  ua   +2 bact, wbc 20-50, large LE, pos nitrite, pulse  103, resps 40. urince   culture-- Pseudomas aeruginosa. CT head negative for acute process. mentation   worsened per . improved with treatment. Treatment: labs, cultures, iv fluids, iv cefepime, neuro consult, ct, xray,   essential home meds. Options provided:  -- Sepsis confirmed POA  -- Sepsis ruled out  -- Other - I will add my own diagnosis  -- Disagree - Not applicable / Not valid  -- Disagree - Clinically unable to determine / Unknown  -- Refer to Clinical Documentation Reviewer    PROVIDER RESPONSE TEXT:    This patient has sepsis due to UTI confirmed POA .     Query created by: Roberta Jurado on 2022 8:01 AM      Electronically signed by:  Albert Medina MD 2022 8:21 AM

## 2022-08-03 ENCOUNTER — CARE COORDINATION (OUTPATIENT)
Dept: CARE COORDINATION | Facility: CLINIC | Age: 77
End: 2022-08-03

## 2022-08-03 NOTE — CARE COORDINATION
Message left with [facility] with Admissions, regarding discharge date and plan. Will await return call.

## 2022-08-17 ENCOUNTER — CARE COORDINATION (OUTPATIENT)
Dept: CARE COORDINATION | Facility: CLINIC | Age: 77
End: 2022-08-17

## 2022-08-17 NOTE — CARE COORDINATION
DAVONTE outreach follow up call. Continues to reside at [facility]. No anticipated discharge at this time. No further outreach indicated at this time.

## 2022-09-16 ENCOUNTER — HOSPITAL ENCOUNTER (EMERGENCY)
Dept: GENERAL RADIOLOGY | Age: 77
Discharge: HOME | DRG: 690 | End: 2022-09-19
Payer: MEDICARE

## 2022-09-16 ENCOUNTER — HOSPITAL ENCOUNTER (EMERGENCY)
Dept: CT IMAGING | Age: 77
DRG: 690 | End: 2022-09-16
Payer: MEDICARE

## 2022-09-16 ENCOUNTER — HOSPITAL ENCOUNTER (INPATIENT)
Age: 77
LOS: 4 days | Discharge: NURSING, REHAB OR LONG-TERM CARE | DRG: 690 | End: 2022-09-20
Attending: STUDENT IN AN ORGANIZED HEALTH CARE EDUCATION/TRAINING PROGRAM | Admitting: INTERNAL MEDICINE
Payer: MEDICARE

## 2022-09-16 DIAGNOSIS — R41.82 ALTERED MENTAL STATUS, UNSPECIFIED ALTERED MENTAL STATUS TYPE: Primary | ICD-10-CM

## 2022-09-16 PROBLEM — N39.0 UTI (URINARY TRACT INFECTION): Status: ACTIVE | Noted: 2022-09-16

## 2022-09-16 LAB
ALBUMIN SERPL-MCNC: 3.5 G/DL (ref 3.2–4.6)
ALBUMIN/GLOB SERPL: 1 {RATIO} (ref 1.2–3.5)
ALP SERPL-CCNC: 72 U/L (ref 50–130)
ALT SERPL-CCNC: 18 U/L (ref 12–65)
AMMONIA PLAS-SCNC: <10 UMOL/L (ref 24.9–68)
ANION GAP SERPL CALC-SCNC: 3 MMOL/L (ref 4–13)
AST SERPL-CCNC: 21 U/L (ref 15–37)
BACTERIA URNS QL MICRO: NEGATIVE /HPF
BASOPHILS # BLD: 0.1 K/UL (ref 0–0.2)
BASOPHILS NFR BLD: 1 % (ref 0–2)
BILIRUB SERPL-MCNC: 0.4 MG/DL (ref 0.2–1.1)
BUN SERPL-MCNC: 25 MG/DL (ref 8–23)
CALCIUM SERPL-MCNC: 9.3 MG/DL (ref 8.3–10.4)
CASTS URNS QL MICRO: NORMAL /LPF
CHLORIDE SERPL-SCNC: 103 MMOL/L (ref 101–110)
CO2 SERPL-SCNC: 30 MMOL/L (ref 21–32)
CREAT SERPL-MCNC: 0.86 MG/DL (ref 0.6–1)
DIFFERENTIAL METHOD BLD: ABNORMAL
EOSINOPHIL # BLD: 0.5 K/UL (ref 0–0.8)
EOSINOPHIL NFR BLD: 6 % (ref 0.5–7.8)
EPI CELLS #/AREA URNS HPF: NORMAL /HPF
ERYTHROCYTE [DISTWIDTH] IN BLOOD BY AUTOMATED COUNT: 12.6 % (ref 11.9–14.6)
GLOBULIN SER CALC-MCNC: 3.4 G/DL (ref 2.3–3.5)
GLUCOSE SERPL-MCNC: 89 MG/DL (ref 65–100)
HCT VFR BLD AUTO: 36.4 % (ref 35.8–46.3)
HGB BLD-MCNC: 12 G/DL (ref 11.7–15.4)
IMM GRANULOCYTES # BLD AUTO: 0 K/UL (ref 0–0.5)
IMM GRANULOCYTES NFR BLD AUTO: 0 % (ref 0–5)
LACTATE SERPL-SCNC: 0.8 MMOL/L (ref 0.4–2)
LACTATE SERPL-SCNC: 0.8 MMOL/L (ref 0.5–2)
LIPASE SERPL-CCNC: 138 U/L (ref 73–393)
LYMPHOCYTES # BLD: 1.3 K/UL (ref 0.5–4.6)
LYMPHOCYTES NFR BLD: 16 % (ref 13–44)
MCH RBC QN AUTO: 30.3 PG (ref 26.1–32.9)
MCHC RBC AUTO-ENTMCNC: 33 G/DL (ref 31.4–35)
MCV RBC AUTO: 91.9 FL (ref 79.6–97.8)
MONOCYTES # BLD: 0.8 K/UL (ref 0.1–1.3)
MONOCYTES NFR BLD: 9 % (ref 4–12)
NEUTS SEG # BLD: 5.6 K/UL (ref 1.7–8.2)
NEUTS SEG NFR BLD: 69 % (ref 43–78)
NRBC # BLD: 0 K/UL (ref 0–0.2)
PLATELET # BLD AUTO: 273 K/UL (ref 150–450)
PMV BLD AUTO: 9 FL (ref 9.4–12.3)
POTASSIUM SERPL-SCNC: 4.1 MMOL/L (ref 3.5–5.1)
PROT SERPL-MCNC: 6.9 G/DL (ref 6.3–8.2)
RBC # BLD AUTO: 3.96 M/UL (ref 4.05–5.2)
RBC #/AREA URNS HPF: NORMAL /HPF
SODIUM SERPL-SCNC: 136 MMOL/L (ref 136–145)
TSH W FREE THYROID IF ABNORMAL: 2.82 UIU/ML (ref 0.36–3.74)
WBC # BLD AUTO: 8.1 K/UL (ref 4.3–11.1)
WBC URNS QL MICRO: NORMAL /HPF

## 2022-09-16 PROCEDURE — 80053 COMPREHEN METABOLIC PANEL: CPT

## 2022-09-16 PROCEDURE — 87088 URINE BACTERIA CULTURE: CPT

## 2022-09-16 PROCEDURE — 83605 ASSAY OF LACTIC ACID: CPT

## 2022-09-16 PROCEDURE — 87086 URINE CULTURE/COLONY COUNT: CPT

## 2022-09-16 PROCEDURE — 2580000003 HC RX 258: Performed by: NURSE PRACTITIONER

## 2022-09-16 PROCEDURE — 84443 ASSAY THYROID STIM HORMONE: CPT

## 2022-09-16 PROCEDURE — 51701 INSERT BLADDER CATHETER: CPT

## 2022-09-16 PROCEDURE — 87186 SC STD MICRODIL/AGAR DIL: CPT

## 2022-09-16 PROCEDURE — 1100000000 HC RM PRIVATE

## 2022-09-16 PROCEDURE — 83690 ASSAY OF LIPASE: CPT

## 2022-09-16 PROCEDURE — 85025 COMPLETE CBC W/AUTO DIFF WBC: CPT

## 2022-09-16 PROCEDURE — 74018 RADEX ABDOMEN 1 VIEW: CPT

## 2022-09-16 PROCEDURE — 87040 BLOOD CULTURE FOR BACTERIA: CPT

## 2022-09-16 PROCEDURE — 6360000002 HC RX W HCPCS: Performed by: NURSE PRACTITIONER

## 2022-09-16 PROCEDURE — 2500000003 HC RX 250 WO HCPCS: Performed by: NURSE PRACTITIONER

## 2022-09-16 PROCEDURE — 81015 MICROSCOPIC EXAM OF URINE: CPT

## 2022-09-16 PROCEDURE — 6370000000 HC RX 637 (ALT 250 FOR IP): Performed by: NURSE PRACTITIONER

## 2022-09-16 PROCEDURE — 82140 ASSAY OF AMMONIA: CPT

## 2022-09-16 PROCEDURE — 99285 EMERGENCY DEPT VISIT HI MDM: CPT

## 2022-09-16 RX ORDER — SODIUM CHLORIDE 9 MG/ML
INJECTION, SOLUTION INTRAVENOUS CONTINUOUS
Status: DISCONTINUED | OUTPATIENT
Start: 2022-09-16 | End: 2022-09-20 | Stop reason: HOSPADM

## 2022-09-16 RX ORDER — ACETAMINOPHEN 325 MG/1
650 TABLET ORAL EVERY 6 HOURS PRN
Status: DISCONTINUED | OUTPATIENT
Start: 2022-09-16 | End: 2022-09-20 | Stop reason: HOSPADM

## 2022-09-16 RX ORDER — ONDANSETRON 4 MG/1
4 TABLET, ORALLY DISINTEGRATING ORAL EVERY 8 HOURS PRN
Status: DISCONTINUED | OUTPATIENT
Start: 2022-09-16 | End: 2022-09-20 | Stop reason: HOSPADM

## 2022-09-16 RX ORDER — METOPROLOL SUCCINATE 100 MG/1
200 TABLET, EXTENDED RELEASE ORAL DAILY
Status: DISCONTINUED | OUTPATIENT
Start: 2022-09-17 | End: 2022-09-20 | Stop reason: HOSPADM

## 2022-09-16 RX ORDER — DIVALPROEX SODIUM 125 MG/1
125 TABLET, DELAYED RELEASE ORAL 3 TIMES DAILY PRN
Status: DISCONTINUED | OUTPATIENT
Start: 2022-09-16 | End: 2022-09-20 | Stop reason: HOSPADM

## 2022-09-16 RX ORDER — PANTOPRAZOLE SODIUM 40 MG/1
40 TABLET, DELAYED RELEASE ORAL
Status: DISCONTINUED | OUTPATIENT
Start: 2022-09-17 | End: 2022-09-20 | Stop reason: HOSPADM

## 2022-09-16 RX ORDER — SODIUM CHLORIDE 0.9 % (FLUSH) 0.9 %
5-40 SYRINGE (ML) INJECTION PRN
Status: DISCONTINUED | OUTPATIENT
Start: 2022-09-16 | End: 2022-09-20 | Stop reason: HOSPADM

## 2022-09-16 RX ORDER — LANOLIN ALCOHOL/MO/W.PET/CERES
400 CREAM (GRAM) TOPICAL DAILY
Status: DISCONTINUED | OUTPATIENT
Start: 2022-09-17 | End: 2022-09-20 | Stop reason: HOSPADM

## 2022-09-16 RX ORDER — SODIUM CHLORIDE 0.9 % (FLUSH) 0.9 %
5-40 SYRINGE (ML) INJECTION EVERY 12 HOURS SCHEDULED
Status: DISCONTINUED | OUTPATIENT
Start: 2022-09-16 | End: 2022-09-20 | Stop reason: HOSPADM

## 2022-09-16 RX ORDER — ENOXAPARIN SODIUM 100 MG/ML
40 INJECTION SUBCUTANEOUS DAILY
Status: DISCONTINUED | OUTPATIENT
Start: 2022-09-17 | End: 2022-09-20 | Stop reason: HOSPADM

## 2022-09-16 RX ORDER — POLYETHYLENE GLYCOL 3350 17 G/17G
17 POWDER, FOR SOLUTION ORAL DAILY
Status: DISCONTINUED | OUTPATIENT
Start: 2022-09-16 | End: 2022-09-20 | Stop reason: HOSPADM

## 2022-09-16 RX ORDER — ACETAMINOPHEN 650 MG/1
650 SUPPOSITORY RECTAL EVERY 6 HOURS PRN
Status: DISCONTINUED | OUTPATIENT
Start: 2022-09-16 | End: 2022-09-20 | Stop reason: HOSPADM

## 2022-09-16 RX ORDER — LISINOPRIL 20 MG/1
40 TABLET ORAL DAILY
Status: DISCONTINUED | OUTPATIENT
Start: 2022-09-17 | End: 2022-09-20 | Stop reason: HOSPADM

## 2022-09-16 RX ORDER — HYDROXYCHLOROQUINE SULFATE 200 MG/1
200 TABLET, FILM COATED ORAL DAILY
Status: DISCONTINUED | OUTPATIENT
Start: 2022-09-17 | End: 2022-09-20 | Stop reason: HOSPADM

## 2022-09-16 RX ORDER — HYDROCHLOROTHIAZIDE 25 MG/1
25 TABLET ORAL DAILY
Status: DISCONTINUED | OUTPATIENT
Start: 2022-09-17 | End: 2022-09-20 | Stop reason: HOSPADM

## 2022-09-16 RX ORDER — SODIUM CHLORIDE 9 MG/ML
INJECTION, SOLUTION INTRAVENOUS PRN
Status: DISCONTINUED | OUTPATIENT
Start: 2022-09-16 | End: 2022-09-20 | Stop reason: HOSPADM

## 2022-09-16 RX ORDER — ONDANSETRON 2 MG/ML
4 INJECTION INTRAMUSCULAR; INTRAVENOUS EVERY 6 HOURS PRN
Status: DISCONTINUED | OUTPATIENT
Start: 2022-09-16 | End: 2022-09-20 | Stop reason: HOSPADM

## 2022-09-16 RX ORDER — DULOXETIN HYDROCHLORIDE 60 MG/1
60 CAPSULE, DELAYED RELEASE ORAL DAILY
Status: DISCONTINUED | OUTPATIENT
Start: 2022-09-17 | End: 2022-09-20 | Stop reason: HOSPADM

## 2022-09-16 RX ADMIN — SODIUM CHLORIDE, PRESERVATIVE FREE 10 ML: 5 INJECTION INTRAVENOUS at 22:24

## 2022-09-16 RX ADMIN — TUBERCULIN PURIFIED PROTEIN DERIVATIVE 5 UNITS: 5 INJECTION, SOLUTION INTRADERMAL at 22:29

## 2022-09-16 RX ADMIN — MEROPENEM 1000 MG: 1 INJECTION, POWDER, FOR SOLUTION INTRAVENOUS at 16:59

## 2022-09-16 RX ADMIN — POLYETHYLENE GLYCOL (3350) 17 G: 17 POWDER, FOR SOLUTION ORAL at 18:11

## 2022-09-16 RX ADMIN — SODIUM CHLORIDE: 9 INJECTION, SOLUTION INTRAVENOUS at 18:03

## 2022-09-16 ASSESSMENT — ENCOUNTER SYMPTOMS
NAUSEA: 0
VOMITING: 0
TROUBLE SWALLOWING: 0
EYE DISCHARGE: 0
CHOKING: 0
SHORTNESS OF BREATH: 0
VOICE CHANGE: 0
COUGH: 0
CONSTIPATION: 1
ABDOMINAL PAIN: 0

## 2022-09-16 NOTE — ED PROVIDER NOTES
Emergency Department Provider Note                   PCP:                Preeti Miranda MD               Age: 68 y.o. Sex: female       ICD-10-CM    1. Altered mental status, unspecified altered mental status type  R41.82           DISPOSITION Decision To Admit 09/16/2022 02:14:26 PM        MDM  Number of Diagnoses or Management Options  Altered mental status, unspecified altered mental status type  Diagnosis management comments: Basic labs. Abdominal x-ray to screen for constipation. Urine to assess for UTI.    2:30 PM  Urine did show small leukocytes. Abdominal x-ray did show some colonic stool. Rest of lab work and work-up unremarkable. Given patient's history of altered mental status more than baseline and history of multiple allergies to antibiotics including history of drug-induced encephalopathy after treatment of UTI, decision has been made to admit the patient for treatment of UTI and further observation.   Patient history, ROS, physical exam, labs, radiological workup and all pertinent findings were discussed with attending Jeniffer Zepeda, DO        Amount and/or Complexity of Data Reviewed  Clinical lab tests: ordered and reviewed  Tests in the radiology section of CPT®: ordered and reviewed  Discuss the patient with other providers: yes  Independent visualization of images, tracings, or specimens: yes    Risk of Complications, Morbidity, and/or Mortality  Presenting problems: moderate  Diagnostic procedures: moderate  Management options: moderate         ED Course as of 09/16/22 1432   Fri Sep 16, 2022   1403 2:03 PM  Urine shows small leukocytes [NR]      ED Course User Index  [NR] VIMAL Florian        Orders Placed This Encounter   Procedures    XR ABDOMEN (KUB) (SINGLE AP VIEW)    CBC with Diff    CMP    Lipase    Lactate, Sepsis (Select if patient is over 65 to rule out mesenteric ischemia)    Urinalysis, Micro    Diet NPO    POCT Urine Dipstick    Straight cath    Saline lock IV        Medications - No data to display    New Prescriptions    No medications on file        Romulo Cedeno is a 68 y.o. female who presents to the Emergency Department with chief complaint of    Chief Complaint   Patient presents with    Abdominal Pain      59-year-old female with history of hypertension and dementia presenting today after her  visited her at her memory care facility and was concerned about her having a UTI. At this time the  is unavailable and we are unsure why the  thinks she has a UTI. Per EMS, the facility think she may be constipated as she has not been passing much stool but they state the patient has not been complaining of any pain or any concerns. The patient at baseline does not talk much but currently voices no concerns. The  is unavailable at this time for further information. Denies fever, chills. Denies difficulty breathing, chest pain. Denies abdominal pain. 12:11 PM   has arrived and I was able to discuss with him regarding Ms. Lockhart. He reports that 2 days ago she started having her mood changed and seemed \"sleepier\" than usual.  He reports she has not been complaining of anything out right but has been holding her lower abdomen and her suprapubic region. He states she normally is able to use the bathroom herself and feed herself but in the past 3 days has not been able to. He reports she has not been talking as much as usual.    The history is provided by the patient, the nursing home and the EMS personnel. The history is limited by the absence of a caregiver and the condition of the patient. No  was used. Review of Systems   Constitutional:  Negative for activity change and fever. HENT:  Negative for trouble swallowing and voice change. Eyes:  Negative for discharge. Respiratory:  Negative for cough, choking and shortness of breath. Cardiovascular:  Negative for chest pain. Gastrointestinal:  Positive for constipation. Negative for abdominal pain, nausea and vomiting. Musculoskeletal: Negative. Skin: Negative. Neurological: Negative. All other systems reviewed and are negative. Past Medical History:   Diagnosis Date    Alcohol abuse, daily use 2014    Alzheimer's dementia ([facility] [address]     Depression 2014    Drug-induced encephalopathy 2022    Due to cefepime    Hypertension     Lumbar stenosis 2012    Osteopenia 3/19/2015    Pseudomonas aeruginosa infection 2022    Sinus problem     Spondylolisthesis of lumbar region 2012    UTI (urinary tract infection) 2022    Vitamin D deficiency 2014        Past Surgical History:   Procedure Laterality Date    BACK SURGERY  12    L3-L4 Metrix TLIF with sextant/bilateral decompression - Dr. Shepard Neither LUMPECTOMY Bilateral     HEENT Bilateral     sinus    HYSTERECTOMY (CERVIX STATUS UNKNOWN)      complete    OTHER SURGICAL HISTORY      sinuses x2    TONSILLECTOMY      WRIST FRACTURE SURGERY          Family History   Problem Relation Age of Onset    Osteoarthritis Mother     Heart Disease Mother     Stroke Mother     Breast Cancer Neg Hx     Coronary Art Dis Maternal Uncle     Heart Attack Mother     Hypertension Sister     Alzheimer's Disease Father 48    Dementia Father     Stroke Father     Heart Disease Sister         stent and AVR        Social History     Socioeconomic History    Marital status:      Spouse name: None    Number of children: None    Years of education: 1 post HS    Highest education level: None   Tobacco Use    Smoking status: Former     Packs/day: 1.00     Types: Cigarettes     Quit date: 1972     Years since quittin.6    Smokeless tobacco: Never   Substance and Sexual Activity    Alcohol use:  Yes     Alcohol/week: 14.0 standard drinks    Drug use: No   Social History Narrative    , not active, gets up around 12 noon and then does things in the afternoon if a friend comes over. Unable to watch TV as cant figure out the controls. Levofloxacin, Penicillins, Poison oak extract, Bupropion, Cephalosporins, Sulfamethoxazole, and Montelukast     Previous Medications    AZELASTINE (ASTELIN) 0.1 % NASAL SPRAY    1 spray by Nasal route 2 times daily    DICLOFENAC SODIUM (VOLTAREN) 1 % GEL    Apply 4 g topically 4 times daily    DIPHENHYDRAMINE (SOMINEX) 25 MG TABLET    Take 25 mg by mouth every 6 hours as needed    DIVALPROEX (DEPAKOTE) 125 MG DR TABLET    Take 125 mg by mouth 3 times daily as needed     DULOXETINE (CYMBALTA) 60 MG EXTENDED RELEASE CAPSULE    Take 1 capsule by mouth daily TAKE 1 CAPSULES DAILY. HYDROCHLOROTHIAZIDE (HYDRODIURIL) 25 MG TABLET    TAKE 1 TABLET BY MOUTH EACH DAY. HYDROXYCHLOROQUINE (PLAQUENIL) 200 MG TABLET    Take 1 pill once a day after food. LISINOPRIL (PRINIVIL;ZESTRIL) 40 MG TABLET    Take 1 tablet by mouth in the morning. MAGNESIUM OXIDE (MAG-OX) 400 MG TABLET    Take 400 mg by mouth daily    METOPROLOL SUCCINATE (TOPROL XL) 200 MG EXTENDED RELEASE TABLET    Take 1 tablet by mouth in the morning. OMEPRAZOLE (PRILOSEC) 40 MG DELAYED RELEASE CAPSULE    TAKE 1 CAPSULE EACH DAY. TURMERIC PO    Take 1 capsule by mouth daily    VITAMIN D 25 MCG (1000 UT) CAPS    Take 1,000 Units by mouth daily        Vitals signs and nursing note reviewed. Patient Vitals for the past 4 hrs:   Temp Pulse Resp BP SpO2   09/16/22 1140 97.7 °F (36.5 °C) 58 18 128/83 97 %          Physical Exam  Vitals and nursing note reviewed. Constitutional:       General: She is not in acute distress. Appearance: Normal appearance. She is well-developed and normal weight. She is not ill-appearing, toxic-appearing or diaphoretic. HENT:      Head: Normocephalic and atraumatic.       Right Ear: Tympanic membrane, ear canal and external ear normal.      Left Ear: Tympanic membrane, ear canal and external ear normal.      Nose: Nose normal. the abdomen was obtained. FINDINGS:     Prior right-sided posterior fusion and intervertebral body fusion at L3-L4. Nonobstructive bowel gas pattern. Moderate volume colonic stool. Clear lung  bases. No radiopaque foreign body. Impression    1. Moderate volume of colonic stool. 2. Nonobstructive bowel gas pattern.            CBC with Diff   Result Value Ref Range    WBC 8.1 4.3 - 11.1 K/uL    RBC 3.96 (L) 4.05 - 5.2 M/uL    Hemoglobin 12.0 11.7 - 15.4 g/dL    Hematocrit 36.4 35.8 - 46.3 %    MCV 91.9 79.6 - 97.8 FL    MCH 30.3 26.1 - 32.9 PG    MCHC 33.0 31.4 - 35.0 g/dL    RDW 12.6 11.9 - 14.6 %    Platelets 347 052 - 542 K/uL    MPV 9.0 (L) 9.4 - 12.3 FL    nRBC 0.00 0.0 - 0.2 K/uL    Differential Type AUTOMATED      Seg Neutrophils 69 43 - 78 %    Lymphocytes 16 13 - 44 %    Monocytes 9 4.0 - 12.0 %    Eosinophils % 6 0.5 - 7.8 %    Basophils 1 0.0 - 2.0 %    Immature Granulocytes 0 0.0 - 5.0 %    Segs Absolute 5.6 1.7 - 8.2 K/UL    Absolute Lymph # 1.3 0.5 - 4.6 K/UL    Absolute Mono # 0.8 0.1 - 1.3 K/UL    Absolute Eos # 0.5 0.0 - 0.8 K/UL    Basophils Absolute 0.1 0.0 - 0.2 K/UL    Absolute Immature Granulocyte 0.0 0.0 - 0.5 K/UL   CMP   Result Value Ref Range    Sodium 136 136 - 145 mmol/L    Potassium 4.1 3.5 - 5.1 mmol/L    Chloride 103 101 - 110 mmol/L    CO2 30 21 - 32 mmol/L    Anion Gap 3 (L) 4 - 13 mmol/L    Glucose 89 65 - 100 mg/dL    BUN 25 (H) 8 - 23 MG/DL    Creatinine 0.86 0.6 - 1.0 MG/DL    GFR African American >60 >60 ml/min/1.73m2    GFR Non- >60 >60 ml/min/1.73m2    Calcium 9.3 8.3 - 10.4 MG/DL    Total Bilirubin 0.4 0.2 - 1.1 MG/DL    ALT 18 12 - 65 U/L    AST 21 15 - 37 U/L    Alk Phosphatase 72 50 - 130 U/L    Total Protein 6.9 6.3 - 8.2 g/dL    Albumin 3.5 3.2 - 4.6 g/dL    Globulin 3.4 2.3 - 3.5 g/dL    Albumin/Globulin Ratio 1.0 (L) 1.2 - 3.5     Lipase   Result Value Ref Range    Lipase 138 73 - 393 U/L   Lactate, Sepsis (Select if patient is over 65 to rule out mesenteric ischemia)   Result Value Ref Range    Lactic Acid, Sepsis 0.8 0.5 - 2.0 MMOL/L   Urinalysis, Micro   Result Value Ref Range    WBC, UA 20-50 0 /hpf    RBC, UA 0-5 0 /hpf    Epithelial Cells UA 0-5 0 /hpf    BACTERIA, URINE Negative 0 /hpf    Casts 0-2 0 /lpf        XR ABDOMEN (KUB) (SINGLE AP VIEW)   Final Result      1. Moderate volume of colonic stool. 2. Nonobstructive bowel gas pattern. Voice dictation software was used during the making of this note. This software is not perfect and grammatical and other typographical errors may be present. This note has not been completely proofread for errors.      [place], [place]  09/16/22 3172

## 2022-09-16 NOTE — H&P
Hospitalist History and Physical   Admit Date:  2022 11:36 AM   Name:  Alex Tucker   Age:  68 y.o. Sex:  female  :  1945   MRN:  [id number]   Room:  [place] [address]    Presenting Complaint: Abdominal Pain     Reason(s) for Admission: UTI (urinary tract infection) [N39.0]     History of Present Illness:   Alex Tucker is a 68 y.o. female with medical history of recurrent UTIs, anxiety, Alzheimer's,  HTN, depression/anxiety who presented from [facility] with acute change in mentation starting yesterday.  accompanies pt and gives all history. Pt became more confused and somnolent yesterday. Has recurrent UTIs and states this is her typical presentation for UTIs. UA with 10-20 WBC. Afebrile. No leukocytosis. KUB with constipation. Pt somnolent and opens eyes to tactile stimuli. Review of Systems:  10 systems reviewed and negative except as noted in HPI. Assessment & Plan:     Principal Problem:  UTI  Send urine cx, UA not overtly convincing however will start merrem based on past cultures and allergies until cx results are final  Send BC  Merrem   IVF    Acute encephalopathy/Dementia  Check TSH, LFTs, ammonia, glucose    Alzheimer's dementia  Home meds     Constipation  Start daily miralax  Enema         Anticipated discharge needs:     Back to [facility]    Diet: Diet NPO  VTE ppx: lovenox  Code status: partial, no intubation  Surrogate decision maker:      Hospital Problems:  Principal Problem:    UTI (urinary tract infection)  Resolved Problems:    * No resolved hospital problems.  *       Past History:     Past Medical History:   Diagnosis Date    Alcohol abuse, daily use 2014    Alzheimer's dementia ([facility] [address]     Depression 2014    Drug-induced encephalopathy 2022    Due to cefepime    Hypertension     Lumbar stenosis 2012    Osteopenia 3/19/2015    Pseudomonas aeruginosa infection 2022    Sinus problem     Spondylolisthesis of lumbar region 2012    UTI (urinary tract infection) 2022    Vitamin D deficiency 2014       Past Surgical History:   Procedure Laterality Date    BACK SURGERY  12    L3-L4 Metrix TLIF with sextant/bilateral decompression - Dr. Goldstein Bolus LUMPECTOMY Bilateral     HEENT Bilateral 2015    sinus    HYSTERECTOMY (CERVIX STATUS UNKNOWN)      complete    OTHER SURGICAL HISTORY      sinuses x2    TONSILLECTOMY      WRIST FRACTURE SURGERY          Social History     Tobacco Use    Smoking status: Former     Packs/day: 1.00     Types: Cigarettes     Quit date: 1972     Years since quittin.6    Smokeless tobacco: Never   Substance Use Topics    Alcohol use:  Yes     Alcohol/week: 14.0 standard drinks      Social History     Substance and Sexual Activity   Drug Use No       Family History   Problem Relation Age of Onset    Osteoarthritis Mother     Heart Disease Mother     Stroke Mother     Breast Cancer Neg Hx     Coronary Art Dis Maternal Uncle     Heart Attack Mother     Hypertension Sister     Alzheimer's Disease Father 48    Dementia Father     Stroke Father     Heart Disease Sister         stent and AVR        Immunization History   Administered Date(s) Administered    Influenza Virus Vaccine 2014, 2016, 2017    Influenza, FLUAD, (age 72 y+), Adjuvanted, 0.5mL 2020    Influenza, FLUCELVAX, (age 10 mo+), MDCK, PF, 0.5mL 2017    Influenza, High Dose (Fluzone 65 yrs and older) 2018    Influenza, Triv, inactivated, subunit, adjuvanted, IM (Fluad 65 yrs and older) 10/03/2019    PPD Test 2022    Pneumococcal Conjugate 13-valent (Anselm Adams) 04/10/2012, 2014, 2015    Pneumococcal Polysaccharide ([id number]) 2011    Pneumococcal Vaccine 2011, 10/01/2011    Tdap (Boostrix, Adacel) 2016, 2021    Zoster Live (Zostavax) 2010     Allergies   Allergen Reactions    Levofloxacin Other (See Comments)    Penicillins Shortness Moist oral mucosa  Neck:  No restricted ROM. Trachea midline   CV:   RRR. No m/r/g. No jugular venous distension. Lungs:   CTAB. No wheezing, rhonchi, or rales. Symmetric expansion. Abdomen: Bowel sounds present. Soft, nontender, nondistended. Extremities: No cyanosis or clubbing. No edema  Skin:     No rashes and normal coloration. Warm and dry. Neuro:  Somonelent, opends eyes to tactile stimuli, does not follow commands   Psych:  Normal mood and affect.       I have personally reviewed labs and tests showing:  Recent Labs:  Recent Results (from the past 24 hour(s))   CBC with Diff    Collection Time: 09/16/22 12:14 PM   Result Value Ref Range    WBC 8.1 4.3 - 11.1 K/uL    RBC 3.96 (L) 4.05 - 5.2 M/uL    Hemoglobin 12.0 11.7 - 15.4 g/dL    Hematocrit 36.4 35.8 - 46.3 %    MCV 91.9 79.6 - 97.8 FL    MCH 30.3 26.1 - 32.9 PG    MCHC 33.0 31.4 - 35.0 g/dL    RDW 12.6 11.9 - 14.6 %    Platelets 483 116 - 804 K/uL    MPV 9.0 (L) 9.4 - 12.3 FL    nRBC 0.00 0.0 - 0.2 K/uL    Differential Type AUTOMATED      Seg Neutrophils 69 43 - 78 %    Lymphocytes 16 13 - 44 %    Monocytes 9 4.0 - 12.0 %    Eosinophils % 6 0.5 - 7.8 %    Basophils 1 0.0 - 2.0 %    Immature Granulocytes 0 0.0 - 5.0 %    Segs Absolute 5.6 1.7 - 8.2 K/UL    Absolute Lymph # 1.3 0.5 - 4.6 K/UL    Absolute Mono # 0.8 0.1 - 1.3 K/UL    Absolute Eos # 0.5 0.0 - 0.8 K/UL    Basophils Absolute 0.1 0.0 - 0.2 K/UL    Absolute Immature Granulocyte 0.0 0.0 - 0.5 K/UL   CMP    Collection Time: 09/16/22 12:14 PM   Result Value Ref Range    Sodium 136 136 - 145 mmol/L    Potassium 4.1 3.5 - 5.1 mmol/L    Chloride 103 101 - 110 mmol/L    CO2 30 21 - 32 mmol/L    Anion Gap 3 (L) 4 - 13 mmol/L    Glucose 89 65 - 100 mg/dL    BUN 25 (H) 8 - 23 MG/DL    Creatinine 0.86 0.6 - 1.0 MG/DL    GFR African American >60 >60 ml/min/1.73m2    GFR Non- >60 >60 ml/min/1.73m2    Calcium 9.3 8.3 - 10.4 MG/DL    Total Bilirubin 0.4 0.2 - 1.1 MG/DL    ALT 18 12 - 65 U/L    AST 21 15 - 37 U/L    Alk Phosphatase 72 50 - 130 U/L    Total Protein 6.9 6.3 - 8.2 g/dL    Albumin 3.5 3.2 - 4.6 g/dL    Globulin 3.4 2.3 - 3.5 g/dL    Albumin/Globulin Ratio 1.0 (L) 1.2 - 3.5     Lipase    Collection Time: 09/16/22 12:14 PM   Result Value Ref Range    Lipase 138 73 - 393 U/L   Lactate, Sepsis (Select if patient is over 65 to rule out mesenteric ischemia)    Collection Time: 09/16/22 12:24 PM   Result Value Ref Range    Lactic Acid, Sepsis 0.8 0.5 - 2.0 MMOL/L   Urinalysis, Micro    Collection Time: 09/16/22  1:58 PM   Result Value Ref Range    WBC, UA 20-50 0 /hpf    RBC, UA 0-5 0 /hpf    Epithelial Cells UA 0-5 0 /hpf    BACTERIA, URINE Negative 0 /hpf    Casts 0-2 0 /lpf       I have personally reviewed imaging studies showing:  XR ABDOMEN (KUB) (SINGLE AP VIEW)    Result Date: 9/16/2022  EXAMINATION: XR ABDOMEN (KUB) (SINGLE AP VIEW) 9/16/2022 12:35 PM ACCESSION NUMBER: [id number] COMPARISON: CT abdomen and pelvis 7/14/2022 INDICATION: constipation TECHNIQUE: A single AP supine view of the abdomen was obtained. FINDINGS: Prior right-sided posterior fusion and intervertebral body fusion at L3-L4. Nonobstructive bowel gas pattern. Moderate volume colonic stool. Clear lung bases. No radiopaque foreign body. 1. Moderate volume of colonic stool. 2. Nonobstructive bowel gas pattern. Echocardiogram:  06/14/22    TRANSTHORACIC ECHOCARDIOGRAM (TTE) COMPLETE (CONTRAST/BUBBLE/3D PRN) 06/14/2022  6:14 PM (Final)    Interpretation Summary  Formatting of this result is different from the original.      Left Ventricle: Normal left ventricular systolic function with a visually estimated EF of 60 - 65%. Left ventricle size is normal. Normal wall thickness.     Signed by: Jalil Tracey MD on 6/14/2022  6:14 PM        Orders Placed This Encounter   Medications    meropenem (MERREM) 1,000 mg in sodium chloride 0.9 % 100 mL IVPB (mini-bag)     See previous cultures, numerous allergies Order Specific Question:   Antimicrobial Indications     Answer:   Urinary Tract Infection     Order Specific Question:   UTI duration of therapy     Answer:   7 days         Signed:  EWA Fontenot - CNP    Notes, labs, VS, diagnotic testing reviewed  Case discussed with pt  at bedside, care team ,Dr. Jaciel Bhakta

## 2022-09-16 NOTE — ED TRIAGE NOTES
Pt is baseline dementia and is in memory care at the [facility].  visited her today and is concerned for a UTI but the facility thinks that the patient might be constipated as she hasn't been passing much stool. Per facility pt will grimace on movement but does not voice pain or concerns.

## 2022-09-16 NOTE — ED NOTES
said that she had a UTI back in July and this is looking about the same. She normally is confused from dementia but she is alert and can walk around. The last couple days she has been more tired and sleeping.       Roxanne Sanchez RN  09/16/22 2424

## 2022-09-16 NOTE — ED NOTES
TRANSFER - OUT REPORT:    Verbal report given to April  on Linette Martin  being transferred to 0 for routine progression of patient care       Report consisted of patient's Situation, Background, Assessment and   Recommendations(SBAR). Information from the following report(s) ED SBAR was reviewed with the receiving nurse. Lines:   Peripheral IV 09/16/22 Left Antecubital (Active)   Site Assessment Clean, dry & intact 09/16/22 1215   Line Status Blood return noted 09/16/22 1215   Phlebitis Assessment No symptoms 09/16/22 1215   Infiltration Assessment 0 09/16/22 1215   Dressing Status New dressing applied;Clean, dry & intact 09/16/22 1215   Dressing Type Transparent 09/16/22 1215        Opportunity for questions and clarification was provided.       Patient transported with:  Ihsan Singh RN  09/16/22 1571

## 2022-09-17 ENCOUNTER — APPOINTMENT (OUTPATIENT)
Dept: CT IMAGING | Age: 77
DRG: 690 | End: 2022-09-17
Payer: MEDICARE

## 2022-09-17 LAB
ALBUMIN SERPL-MCNC: 2.8 G/DL (ref 3.2–4.6)
ALBUMIN/GLOB SERPL: 1 {RATIO} (ref 1.2–3.5)
ALP SERPL-CCNC: 62 U/L (ref 50–136)
ALT SERPL-CCNC: 14 U/L (ref 12–65)
ANION GAP SERPL CALC-SCNC: 6 MMOL/L (ref 4–13)
AST SERPL-CCNC: 14 U/L (ref 15–37)
BASOPHILS # BLD: 0 K/UL (ref 0–0.2)
BASOPHILS NFR BLD: 1 % (ref 0–2)
BILIRUB SERPL-MCNC: 0.6 MG/DL (ref 0.2–1.1)
BUN SERPL-MCNC: 21 MG/DL (ref 8–23)
CALCIUM SERPL-MCNC: 8.5 MG/DL (ref 8.3–10.4)
CHLORIDE SERPL-SCNC: 108 MMOL/L (ref 101–110)
CO2 SERPL-SCNC: 26 MMOL/L (ref 21–32)
CREAT SERPL-MCNC: 0.88 MG/DL (ref 0.6–1)
DIFFERENTIAL METHOD BLD: ABNORMAL
EOSINOPHIL # BLD: 0.4 K/UL (ref 0–0.8)
EOSINOPHIL NFR BLD: 6 % (ref 0.5–7.8)
ERYTHROCYTE [DISTWIDTH] IN BLOOD BY AUTOMATED COUNT: 12.4 % (ref 11.9–14.6)
GLOBULIN SER CALC-MCNC: 2.9 G/DL (ref 2.3–3.5)
GLUCOSE SERPL-MCNC: 83 MG/DL (ref 65–100)
HCT VFR BLD AUTO: 31.6 % (ref 35.8–46.3)
HGB BLD-MCNC: 10.4 G/DL (ref 11.7–15.4)
IMM GRANULOCYTES # BLD AUTO: 0 K/UL (ref 0–0.5)
IMM GRANULOCYTES NFR BLD AUTO: 0 % (ref 0–5)
LYMPHOCYTES # BLD: 1.4 K/UL (ref 0.5–4.6)
LYMPHOCYTES NFR BLD: 23 % (ref 13–44)
MCH RBC QN AUTO: 30.1 PG (ref 26.1–32.9)
MCHC RBC AUTO-ENTMCNC: 32.9 G/DL (ref 31.4–35)
MCV RBC AUTO: 91.3 FL (ref 79.6–97.8)
MM INDURATION, POC: 0 MM (ref 0–5)
MONOCYTES # BLD: 0.7 K/UL (ref 0.1–1.3)
MONOCYTES NFR BLD: 11 % (ref 4–12)
NEUTS SEG # BLD: 3.7 K/UL (ref 1.7–8.2)
NEUTS SEG NFR BLD: 59 % (ref 43–78)
NRBC # BLD: 0 K/UL (ref 0–0.2)
PLATELET # BLD AUTO: 226 K/UL (ref 150–450)
PMV BLD AUTO: 9.1 FL (ref 9.4–12.3)
POTASSIUM SERPL-SCNC: 3.6 MMOL/L (ref 3.5–5.1)
PPD, POC: NEGATIVE
PROT SERPL-MCNC: 5.7 G/DL (ref 6.3–8.2)
RBC # BLD AUTO: 3.46 M/UL (ref 4.05–5.2)
SODIUM SERPL-SCNC: 140 MMOL/L (ref 136–145)
WBC # BLD AUTO: 6.2 K/UL (ref 4.3–11.1)

## 2022-09-17 PROCEDURE — 1100000000 HC RM PRIVATE

## 2022-09-17 PROCEDURE — 97165 OT EVAL LOW COMPLEX 30 MIN: CPT

## 2022-09-17 PROCEDURE — 6370000000 HC RX 637 (ALT 250 FOR IP): Performed by: NURSE PRACTITIONER

## 2022-09-17 PROCEDURE — 97161 PT EVAL LOW COMPLEX 20 MIN: CPT

## 2022-09-17 PROCEDURE — 97530 THERAPEUTIC ACTIVITIES: CPT

## 2022-09-17 PROCEDURE — 99221 1ST HOSP IP/OBS SF/LOW 40: CPT | Performed by: UROLOGY

## 2022-09-17 PROCEDURE — 97535 SELF CARE MNGMENT TRAINING: CPT

## 2022-09-17 PROCEDURE — 36415 COLL VENOUS BLD VENIPUNCTURE: CPT

## 2022-09-17 PROCEDURE — 6360000002 HC RX W HCPCS: Performed by: NURSE PRACTITIONER

## 2022-09-17 PROCEDURE — 80053 COMPREHEN METABOLIC PANEL: CPT

## 2022-09-17 PROCEDURE — 2580000003 HC RX 258: Performed by: NURSE PRACTITIONER

## 2022-09-17 PROCEDURE — 74176 CT ABD & PELVIS W/O CONTRAST: CPT

## 2022-09-17 PROCEDURE — 85025 COMPLETE CBC W/AUTO DIFF WBC: CPT

## 2022-09-17 PROCEDURE — 70450 CT HEAD/BRAIN W/O DYE: CPT

## 2022-09-17 RX ADMIN — DULOXETINE HYDROCHLORIDE 60 MG: 60 CAPSULE, DELAYED RELEASE ORAL at 15:15

## 2022-09-17 RX ADMIN — HYDROXYCHLOROQUINE SULFATE 200 MG: 200 TABLET ORAL at 15:16

## 2022-09-17 RX ADMIN — MEROPENEM 1000 MG: 1 INJECTION, POWDER, FOR SOLUTION INTRAVENOUS at 00:47

## 2022-09-17 RX ADMIN — POLYETHYLENE GLYCOL (3350) 17 G: 17 POWDER, FOR SOLUTION ORAL at 15:21

## 2022-09-17 RX ADMIN — PANTOPRAZOLE SODIUM 40 MG: 40 TABLET, DELAYED RELEASE ORAL at 06:10

## 2022-09-17 RX ADMIN — SODIUM CHLORIDE 250 ML: 9 INJECTION, SOLUTION INTRAVENOUS at 09:34

## 2022-09-17 RX ADMIN — SODIUM CHLORIDE, PRESERVATIVE FREE 4 ML: 5 INJECTION INTRAVENOUS at 15:25

## 2022-09-17 RX ADMIN — SODIUM CHLORIDE: 9 INJECTION, SOLUTION INTRAVENOUS at 22:38

## 2022-09-17 RX ADMIN — MEROPENEM 1000 MG: 1 INJECTION, POWDER, FOR SOLUTION INTRAVENOUS at 09:17

## 2022-09-17 RX ADMIN — MEROPENEM 1000 MG: 1 INJECTION, POWDER, FOR SOLUTION INTRAVENOUS at 17:21

## 2022-09-17 RX ADMIN — ENOXAPARIN SODIUM 40 MG: 100 INJECTION SUBCUTANEOUS at 09:06

## 2022-09-17 NOTE — CARE COORDINATION
LISA familiar with patient and situation from previous admission. Per chart, patient is now residing in memory care at [facility]. LISA called the patient's  Marcos Roman to confirm, conduct a baseline assessment, complete an ACP conversation, and discuss dispo. No answer, LVM requesting a return call. Update:     LISA spoke with the patient's  Marcos Roman who advises that the patient moved from [address] [facility] to [address] [facility]. The patient does not use assistive devices to ambulate, fell last week (no injuries sustained), and requires ADL assistance. The patient is currently seen by primary care at [facility]. ACP conversation completed, clarified that the patient's  wishes for the patient to be a DNR. He advised SW \"I am okay with shocking, but I do not want CPR or a vent. \" DO updated. Plan is to return to [facility] at discharge.      ASSESSMENT NOTE    Attending Physician: Maggie Gallegos,   Admit Problem: UTI (urinary tract infection) [N39.0]  Altered mental status, unspecified altered mental status type [R41.82]  Date/Time of Admission: 9/16/2022 11:36 AM  Problem List:  Patient Active Problem List   Diagnosis    Vitamin D deficiency    Anxiety    Chronic pain of both knees    HLD (hyperlipidemia)    Trochanteric bursitis of right hip    Dehydration    Chronic maxillary sinusitis    Fall    Depression    Pure hypercholesterolemia    Perennial allergic rhinitis    Dementia of the Alzheimer's type, with late onset, uncomplicated ([address]    Nonallergic rhinitis    Acquired hypothyroidism    Balance disorder    Spondylolisthesis of lumbar region    HOLLY (dyspnea on exertion)    Exposure to viral disease    Primary osteoarthritis    Spinal stenosis of lumbar region without neurogenic claudication    Gait difficulty    Hyperglycemia    Depression with anxiety    Gastroesophageal reflux disease with esophagitis    Osteopenia    Hypertension    Alzheimer's dementia without behavioral disturbance ([address] Recurrent UTI    UTI (urinary tract infection)       Service Assessment  Patient Orientation     Cognition Alert   History Provided By Significant Other   Primary Caregiver Spouse   Accompanied By/Relationship     Support Systems Spouse/Significant Other, Family Members   Patient's Healthcare Decision Maker is:     PCP Verified by CM Yes   Last Visit to PCP     Prior Functional Level Assistance with the following:, Bathing, Dressing, Toileting, Cooking, Housework, Shopping   Current Functional Level     Can patient return to prior living arrangement Yes   Ability to make needs known: Good   Family able to assist with home care needs: Yes   Would you like for me to discuss the discharge plan with any other family members/significant others, and if so, who?      Financial Resources     Community Resources     CM/SW Referral       Social/Functional History  Lives With Other (comment) (Memory Care)   Type of Home Assisted living   [address] - Number of Steps     Entrance Stairs - Rails     Bathroom Shower/Tub     Bathroom Toilet     Bathroom Equipment     Bathroom Accessibility     Home Equipment     Receives Help From Family, Personal care attendant   [address] Work     Driving     Shopping          Other (Comment)     [address] [facility] Paying/Finance [address] [facility] Management     Other (Comment)     Ambulation Assistance     Transfer Assistance     Active  No   Patient's  Info     Mode of Transportation     Education     Occupation Retired   Type of Occupation       Discharge [address] Care   Living Arrangements Other (Comment)   Support Systems Spouse/Significant Other, Family Members   Current Services Prior To Admission Extended Care Placement   Potential Assistance Needed Long Term Acute Care   DME     DME     DME Ordered? No   Potential Assistance Purchasing Medications No   Meds-to-Beds: Does the patient want to have any new prescriptions delivered to bedside prior to discharge? Type of Home Care Services None   Patient expects to be discharged to: Long-term care (Memory Care at [facility])   Follow Up Appointment: Best Day/Time     One/Two Story Residence: Other (comment)   # of Interior Steps     Height of Each Step (in)     [employer] Available No   History of Falls? Yes     Services At/After Discharge  Transition of Care Consult (CM Consult): Internal Home Health     Internal Hospice     Reason Outside Agency [address]     Partner SNF     Reason Why Partner SNF Not Chosen     Internal Comfort Care     Reason Outside [address] Discharge     [address] Provided? Mode of Transport at [facility] Time of Discharge     Confirm Follow Up Transport       Condition of Participation: Discharge Planning  The plan for Transition of Care is related to the following treatment goals: The Patient and/or Patient Representative was provided with a Choice of Provider? Name of the Patient Representative who was provided with the Choice of Provider and agrees with the Discharge Plan? The Patient and/or Patient Representative Agree with the Discharge Plan? Freedom of Choice list was provided with basic dialogue that supports the individualized plan of care/goals, treatment preferences, and shares the quality data associated with the providers?        Documentation for Discharge Appeal  Discharge Appealed by     Date notified by PAOLA of appeal request:     Time notified by Theresa Farah of appeal request: Detailed Notice of Discharge given to:     Date Notice of Discharge given:     Time Notice of Discharge given:     Date records sent to O     Time records sent to Leslie Garner     Date Notified of Outcome     Time Notified of Outcome     Outcome of appeal           CONOR Flores 09/17/22 9:46 AM        [address] [facility]    [facility]

## 2022-09-17 NOTE — PROGRESS NOTES
care attendant  Ambulation Assistance: Needs assistance  Transfer Assistance: Needs assistance  Active : No  Occupation: Retired    OBJECTIVE:     PAIN: VITALS / O2: PRECAUTION / [facility] / DRAINS:   Pre Treatment:   Pain Assessment: Adult Nonverbal Pain Scale (NPVS) 0/10      Post Treatment: 0/10 Vitals NT       Oxygen NT      IV    RESTRICTIONS/PRECAUTIONS:  Restrictions/Precautions: Fall Risk                 GROSS EVALUATION: Intact Impaired (Comments):   AROM []  Decreased but functional   PROM []    Strength []  Decreased but functional   Balance []  Decreased but functional   Posture [] N/A   Sensation [x]  grossly   Coordination []   Decreased but functional   Tone [x]     Edema []    Activity Tolerance []  Fair-    []      COGNITION/  PERCEPTION: Intact Impaired (Comments):   Orientation []  Not oriented x 4   Vision []  Unable to assess   Hearing []  Unable to assess   Cognition  []  No awareness of her situation, people around her & no understanding of purpose for function, self care & no safety awareness     MOBILITY: I Mod I S SBA CGA Min Mod Max Total  NT x2 Comments:   Bed Mobility    Rolling [] [] [] [] [] [x] [] [] [] [] []    Supine to Sit [] [] [] [] [] [x] [] [] [] [] []    Scooting [] [] [] [] [] [x] [] [] [] [] []    Sit to Supine [] [] [] [] [] [] [] [] [] [] []    Transfers    Sit to Stand [] [] [] [] [] [x] [] [] [] [] []    Bed to Chair [] [] [] [] [] [x] [] [] [] [] []    Stand to Sit [] [] [] [] [] [x] [] [] [] [] []     [] [] [] [] [] [] [] [] [] [] []    I=Independent, Mod I=Modified Independent, S=Supervision, SBA=Standby Assistance, CGA=Contact Guard Assistance,   Min=Minimal Assistance, Mod=Moderate Assistance, Max=Maximal Assistance, Total=Total Assistance, NT=Not Tested    GAIT: I Mod I S SBA CGA Min Mod Max Total  NT x2 Comments:   Level of Assistance [] [] [] [] [] [x] [] [] [] [] []    Distance additional 100ft after an initial 50ft gait assessment feet    DME None    Gait Quality Decreased ella , Decreased step clearance, and mild sway    Weightbearing Status Restrictions/Precautions  Restrictions/Precautions: Fall Risk    Stairs NT     I=Independent, Mod I=Modified Independent, S=Supervision, SBA=Standby Assistance, CGA=Contact Guard Assistance,   Min=Minimal Assistance, Mod=Moderate Assistance, Max=Maximal Assistance, Total=Total Assistance, NT=Not Tested    PLAN:   ACUTE PHYSICAL THERAPY GOALS:   (Developed with and agreed upon by patient and/or caregiver.)  ALL GOALS MET 9/17/22 :  (1.)Ms. Adi Francis will move from supine to sit and sit to supine at her baseline function. (2.)Ms. Adi Francis will transfer from bed to chair and chair to bed  at her baseline function. (3.)Ms. Lockhart will ambulate 100 feet at her baseline function. _____________________________     FREQUENCY AND DURATION:  (Eval, Treat & DC)    THERAPY PROGNOSIS:  NA    PROBLEM LIST:   (Skilled intervention is medically necessary to address:)  NA INTERVENTIONS PLANNED:   (Benefits and precautions of physical therapy have been discussed with the patient.)  NA       TREATMENT:   EVALUATION: LOW COMPLEXITY: (Untimed Charge)    TREATMENT:   Therapeutic Activity (25 Minutes): Therapeutic activity included review with spouse Role & purpose for PT, sitting balance static & dynamic with repeated wt shift multiple directions, standing balance with repeated wt shift, trunk rotations & reaching multiple directions, progressive gait an additional 100 ft after an initial 50 ft gait assessment to improve functional Activity tolerance, Balance, Coordination, Mobility, Strength, and ROM. TREATMENT GRID:  N/A    AFTER TREATMENT PRECAUTIONS: Alarm Activated, Call light within reach, Chair, Needs within reach, RN at bedside, RN notified, and Visitors at bedside    INTERDISCIPLINARY COLLABORATION:  RN/ PCT and OT/ HOBSON    EDUCATION: Education Given To: Patient; Family  Education Provided: Role of Therapy;Transfer Training;IADL Safety; Fall Prevention Strategies  Education Method: Demonstration;Verbal;Teach Back  Barriers to Learning: Cognition  Education Outcome: Unable to verbalize; Unable to demonstrate understanding    TIME IN/OUT:  Time In: 1340  Time Out: [address]  Minutes: [address].  Chrissy Rosado

## 2022-09-17 NOTE — ACP (ADVANCE CARE PLANNING)
Advance Care Planning     Advance Care Planning Activator (Inpatient)  Conversation Note      Date of ACP Conversation: 9/17/2022     Conversation Conducted with:  Healthcare Decision Maker: Next of Kin by law (only applies in absence of above) (name) Mairo Jara    ACP Activator: CONOR Williamson      Health Care Decision Maker:     Current Designated Health Care Decision Maker:     Click here to complete Healthcare Decision Makers including section of the Healthcare Decision Maker Relationship (ie \"Primary\")  Today we documented Decision Maker(s) consistent with Legal Next of Kin hierarchy. Care Preferences    Ventilation: \"If you were in your present state of health and suddenly became very ill and were unable to breathe on your own, what would your preference be about the use of a ventilator (breathing machine) if it were available to you? \"      Would the patient desire the use of ventilator (breathing machine)?: no    \"If your health worsens and it becomes clear that your chance of recovery is unlikely, what would your preference be about the use of a ventilator (breathing machine) if it were available to you? \"     Would the patient desire the use of ventilator (breathing machine)?: No      Resuscitation  \"CPR works best to restart the heart when there is a sudden event, like a heart attack, in someone who is otherwise healthy. Unfortunately, CPR does not typically restart the heart for people who have serious health conditions or who are very sick. \"    \"In the event your heart stopped as a result of an underlying serious health condition, would you want attempts to be made to restart your heart (answer \"yes\" for attempt to resuscitate) or would you prefer a natural death (answer \"no\" for do not attempt to resuscitate)? \" no       [x] Yes   [] No   Educated Patient / Rafi Plasencia regarding differences between Advance Directives and portable DNR orders.     Length of ACP Conversation in minutes: 10    Conversation Outcomes:  [x] ACP discussion completed  [] Existing advance directive reviewed with patient; no changes to patient's previously recorded wishes  [] New Advance Directive completed  [] Portable Do Not Rescitate prepared for Provider review and signature  [] POLST/POST/MOLST/MOST prepared for Provider review and signature      Follow-up plan:    [] Schedule follow-up conversation to continue planning  [x] Referred individual to Provider for additional questions/concerns   [] Advised patient/agent/surrogate to review completed ACP document and update if needed with changes in condition, patient preferences or care setting    [] This note routed to one or more involved healthcare providers

## 2022-09-17 NOTE — PROGRESS NOTES
Hospitalist Progress Note   Admit Date:  2022 11:36 AM   Name:  Chela Prabhakar   Age:  68 y.o. Sex:  female  :  1945   MRN:  [id number]   Room:  [facility]    Presenting Complaint: Abdominal Pain     Reason(s) for Admission: UTI (urinary tract infection) [N39.0]  Altered mental status, unspecified altered mental status type [R41.82]     Hospital Course:   Chela Prabhakar is a 68 y.o. female with medical history of recurrent UTIs, anxiety, Alzheimer's,  HTN, depression/anxiety who presented from [facility] with acute change in mentation starting yesterday.  accompanies pt and gives all history. Pt became more confused and somnolent yesterday. Has recurrent UTIs and states this is her typical presentation for UTIs. UA with 10-20 WBC. Afebrile. No leukocytosis. KUB with constipation. Given history of MDR UTIs, patient was started on empiric meropenem and Ucx/Bcx were collected with no growth overnight. A KUB was obtained that showed the following:  Impression       1. Moderate volume of colonic stool. 2. Nonobstructive bowel gas pattern. LFTs/ TSH wnl, ammonia wnl, and glucose wnl. Subjective & 24hr Events (22): Seen at bedside with sister and . Did well overnight and sister states she slept well. But this AM sits in bed with her eyes closed. Will open her eyes intermittently but otherwise will not take PO or participate in conversation. According to  she had an unwitnessed fall several days ago and did seen to have some L buttock pain and a small red area on her forehead but did not sustain any known injuries. He is concerned about her chronic UTIs and renal stone and is asking for urology consult and chronic prophylactic antibiotics for patient.  Discussed my concerns about complications related to chronic abx treatment and risk for c diff, etc along with concerns that pt already has MDR colonization and likely would not benefit from chronic antibiotic therapy. Unable to obtain ROS as pt will not participate in discussion. Assessment & Plan:     Principal Problem:    UTI (urinary tract infection)  - following Ucx, Bcx  - continue IVF  - given history of renal stone will recheck CT of abdomen and consider urology c/s pending results    Altered mental status  - given recent fall will chest non con head CT  - her AMS seems to be waxing/waning and likely related to her recent changes to the memory care unity at the facility and ongoing worsening of her underlying dementia - I discussed this at length with family at bedside today      Anticipated discharge needs:      Back to [facility] care, likely Monday    Diet:  ADULT DIET; Regular  DVT PPx: lovenox  Code status: Full Code    Hospital Problems:  Principal Problem:    UTI (urinary tract infection)  Resolved Problems:    * No resolved hospital problems. *      Objective:   Patient Vitals for the past 24 hrs:   Temp Pulse Resp BP SpO2   09/17/22 0748 97.7 °F (36.5 °C) 55 15 (!) 142/77 96 %   09/17/22 0326 97.9 °F (36.6 °C) 60 18 (!) 138/96 --   09/16/22 2324 97.5 °F (36.4 °C) 63 16 (!) 108/54 96 %   09/16/22 1943 97.2 °F (36.2 °C) 62 18 134/74 --   09/16/22 1900 -- 64 -- -- --   09/16/22 1625 97.3 °F (36.3 °C) 58 17 (!) 148/81 94 %   09/16/22 1140 97.7 °F (36.5 °C) 58 18 128/83 97 %       Oxygen Therapy  SpO2: 96 %  O2 Device: None (Room air)    Estimated body mass index is 25.82 kg/m² as calculated from the following:    Height as of this encounter: 5' 6\" (1.676 m). Weight as of this encounter: 160 lb (72.6 kg). No intake or output data in the 24 hours ending 09/17/22 4743      Physical Exam:   Blood pressure (!) 142/77, pulse 55, temperature 97.7 °F (36.5 °C), temperature source Axillary, resp. rate 15, height 5' 6\" (1.676 m), weight 160 lb (72.6 kg), SpO2 96 %. General:    Well nourished.     Head:  Normocephalic, atraumatic  Eyes:  Sclerae appear normal.  Pupils equally round.  ENT:  Nares appear normal, no drainage. Moist oral mucosa  Neck:  No restricted ROM. Trachea midline   CV:   RRR. No m/r/g. No jugular venous distension. Lungs:   CTAB. No wheezing, rhonchi, or rales. Symmetric expansion. Abdomen: Bowel sounds present. Soft, nondistended. Mild upper abdominal/epigastric tenderness  Extremities: No cyanosis or clubbing. No edema  Skin:     No rashes and normal coloration. Warm and dry. Neuro:  CN II-XII grossly intact. Sensation intact.   A&Ox0  Psych:  Sits in bed with eyes closed    I have personally reviewed labs and tests showing:  Recent Labs:  Recent Results (from the past 48 hour(s))   CBC with Diff    Collection Time: 09/16/22 12:14 PM   Result Value Ref Range    WBC 8.1 4.3 - 11.1 K/uL    RBC 3.96 (L) 4.05 - 5.2 M/uL    Hemoglobin 12.0 11.7 - 15.4 g/dL    Hematocrit 36.4 35.8 - 46.3 %    MCV 91.9 79.6 - 97.8 FL    MCH 30.3 26.1 - 32.9 PG    MCHC 33.0 31.4 - 35.0 g/dL    RDW 12.6 11.9 - 14.6 %    Platelets 056 033 - 823 K/uL    MPV 9.0 (L) 9.4 - 12.3 FL    nRBC 0.00 0.0 - 0.2 K/uL    Differential Type AUTOMATED      Seg Neutrophils 69 43 - 78 %    Lymphocytes 16 13 - 44 %    Monocytes 9 4.0 - 12.0 %    Eosinophils % 6 0.5 - 7.8 %    Basophils 1 0.0 - 2.0 %    Immature Granulocytes 0 0.0 - 5.0 %    Segs Absolute 5.6 1.7 - 8.2 K/UL    Absolute Lymph # 1.3 0.5 - 4.6 K/UL    Absolute Mono # 0.8 0.1 - 1.3 K/UL    Absolute Eos # 0.5 0.0 - 0.8 K/UL    Basophils Absolute 0.1 0.0 - 0.2 K/UL    Absolute Immature Granulocyte 0.0 0.0 - 0.5 K/UL   CMP    Collection Time: 09/16/22 12:14 PM   Result Value Ref Range    Sodium 136 136 - 145 mmol/L    Potassium 4.1 3.5 - 5.1 mmol/L    Chloride 103 101 - 110 mmol/L    CO2 30 21 - 32 mmol/L    Anion Gap 3 (L) 4 - 13 mmol/L    Glucose 89 65 - 100 mg/dL    BUN 25 (H) 8 - 23 MG/DL    Creatinine 0.86 0.6 - 1.0 MG/DL    GFR African American >60 >60 ml/min/1.73m2    GFR Non- >60 >60 ml/min/1.73m2    Calcium 9.3 8.3 - 10.4 MG/DL    Total Bilirubin 0.4 0.2 - 1.1 MG/DL    ALT 18 12 - 65 U/L    AST 21 15 - 37 U/L    Alk Phosphatase 72 50 - 130 U/L    Total Protein 6.9 6.3 - 8.2 g/dL    Albumin 3.5 3.2 - 4.6 g/dL    Globulin 3.4 2.3 - 3.5 g/dL    Albumin/Globulin Ratio 1.0 (L) 1.2 - 3.5     Lipase    Collection Time: 09/16/22 12:14 PM   Result Value Ref Range    Lipase 138 73 - 393 U/L   TSH with Reflex    Collection Time: 09/16/22 12:14 PM   Result Value Ref Range    TSH w Free Thyroid if Abnormal 2.82 0.358 - 3.740 UIU/ML   Lactate, Sepsis (Select if patient is over 65 to rule out mesenteric ischemia)    Collection Time: 09/16/22 12:24 PM   Result Value Ref Range    Lactic Acid, Sepsis 0.8 0.5 - 2.0 MMOL/L   Urinalysis, Micro    Collection Time: 09/16/22  1:58 PM   Result Value Ref Range    WBC, UA 20-50 0 /hpf    RBC, UA 0-5 0 /hpf    Epithelial Cells UA 0-5 0 /hpf    BACTERIA, URINE Negative 0 /hpf    Casts 0-2 0 /lpf   Culture, Urine    Collection Time: 09/16/22  1:58 PM    Specimen: URINE-CLEAN CATCH   Result Value Ref Range    Special Requests NO SPECIAL REQUESTS      Culture        No growth after short period of incubation. Further results to follow after overnight incubation.    Culture, Blood 1    Collection Time: 09/16/22  3:32 PM    Specimen: Blood   Result Value Ref Range    Special Requests RIGHT  Antecubital        Culture NO GROWTH AFTER 16 HOURS     Culture, Blood 1    Collection Time: 09/16/22  7:02 PM    Specimen: Blood   Result Value Ref Range    Special Requests LEFT  FOREARM        Culture NO GROWTH AFTER 12 HOURS     Ammonia    Collection Time: 09/16/22  7:02 PM   Result Value Ref Range    Ammonia <10 (L) 24.9 - 68 UMOL/L   Lactic Acid    Collection Time: 09/16/22  7:02 PM   Result Value Ref Range    Lactic Acid, Plasma 0.8 0.4 - 2.0 MMOL/L   Comprehensive Metabolic Panel w/ Reflex to MG    Collection Time: 09/17/22  5:00 AM   Result Value Ref Range    Sodium 140 136 - 145 mmol/L    Potassium 3.6 3.5 - 5.1 mmol/L    Chloride 108 101 - 110 mmol/L    CO2 26 21 - 32 mmol/L    Anion Gap 6 4 - 13 mmol/L    Glucose 83 65 - 100 mg/dL    BUN 21 8 - 23 MG/DL    Creatinine 0.88 0.6 - 1.0 MG/DL    GFR African American >60 >60 ml/min/1.73m2    GFR Non- >60 >60 ml/min/1.73m2    Calcium 8.5 8.3 - 10.4 MG/DL    Total Bilirubin 0.6 0.2 - 1.1 MG/DL    ALT 14 12 - 65 U/L    AST 14 (L) 15 - 37 U/L    Alk Phosphatase 62 50 - 136 U/L    Total Protein 5.7 (L) 6.3 - 8.2 g/dL    Albumin 2.8 (L) 3.2 - 4.6 g/dL    Globulin 2.9 2.3 - 3.5 g/dL    Albumin/Globulin Ratio 1.0 (L) 1.2 - 3.5     CBC with Auto Differential    Collection Time: 09/17/22  5:00 AM   Result Value Ref Range    WBC 6.2 4.3 - 11.1 K/uL    RBC 3.46 (L) 4.05 - 5.2 M/uL    Hemoglobin 10.4 (L) 11.7 - 15.4 g/dL    Hematocrit 31.6 (L) 35.8 - 46.3 %    MCV 91.3 79.6 - 97.8 FL    MCH 30.1 26.1 - 32.9 PG    MCHC 32.9 31.4 - 35.0 g/dL    RDW 12.4 11.9 - 14.6 %    Platelets 258 411 - 280 K/uL    MPV 9.1 (L) 9.4 - 12.3 FL    nRBC 0.00 0.0 - 0.2 K/uL    Differential Type AUTOMATED      Seg Neutrophils 59 43 - 78 %    Lymphocytes 23 13 - 44 %    Monocytes 11 4.0 - 12.0 %    Eosinophils % 6 0.5 - 7.8 %    Basophils 1 0.0 - 2.0 %    Immature Granulocytes 0 0.0 - 5.0 %    Segs Absolute 3.7 1.7 - 8.2 K/UL    Absolute Lymph # 1.4 0.5 - 4.6 K/UL    Absolute Mono # 0.7 0.1 - 1.3 K/UL    Absolute Eos # 0.4 0.0 - 0.8 K/UL    Basophils Absolute 0.0 0.0 - 0.2 K/UL    Absolute Immature Granulocyte 0.0 0.0 - 0.5 K/UL       I have personally reviewed imaging studies showing: Other Studies:  XR ABDOMEN (KUB) (SINGLE AP VIEW)   Final Result      1. Moderate volume of colonic stool. 2. Nonobstructive bowel gas pattern.                   CT HEAD WO CONTRAST    (Results Pending)       Current Meds:  Current Facility-Administered Medications   Medication Dose Route Frequency    meropenem (MERREM) 1,000 mg in sodium chloride 0.9 % 100 mL IVPB (mini-bag)  1,000 mg IntraVENous Q8H divalproex (DEPAKOTE) DR tablet 125 mg  125 mg Oral TID PRN    DULoxetine (CYMBALTA) extended release capsule 60 mg  60 mg Oral Daily    hydroCHLOROthiazide (HYDRODIURIL) tablet 25 mg  25 mg Oral Daily    hydroxychloroquine (PLAQUENIL) tablet 200 mg  200 mg Oral Daily    lisinopril (PRINIVIL;ZESTRIL) tablet 40 mg  40 mg Oral Daily    magnesium oxide (MAG-OX) tablet 400 mg  400 mg Oral Daily    metoprolol succinate (TOPROL XL) extended release tablet 200 mg  200 mg Oral Daily    pantoprazole (PROTONIX) tablet 40 mg  40 mg Oral QAM AC    sodium chloride flush 0.9 % injection 5-40 mL  5-40 mL IntraVENous 2 times per day    sodium chloride flush 0.9 % injection 5-40 mL  5-40 mL IntraVENous PRN    0.9 % sodium chloride infusion   IntraVENous PRN    enoxaparin (LOVENOX) injection 40 mg  40 mg SubCUTAneous Daily    ondansetron (ZOFRAN-ODT) disintegrating tablet 4 mg  4 mg Oral Q8H PRN    Or    ondansetron (ZOFRAN) injection 4 mg  4 mg IntraVENous Q6H PRN    acetaminophen (TYLENOL) tablet 650 mg  650 mg Oral Q6H PRN    Or    acetaminophen (TYLENOL) suppository 650 mg  650 mg Rectal Q6H PRN    0.9 % sodium chloride infusion   IntraVENous Continuous    polyethylene glycol (GLYCOLAX) packet 17 g  17 g Oral Daily    tuberculin injection 5 Units  5 Units IntraDERmal Once       Signed:  VIMAL Elise

## 2022-09-17 NOTE — PROGRESS NOTES
OCCUPATIONAL THERAPY Initial Assessment, Daily Note, Discharge, and PM       OT Visit Days: 1  Acknowledge Orders  Time  OT Charge Capture  Rehab Caseload Tracker      Carmen Vallejo is a 68 y.o. female   PRIMARY DIAGNOSIS: UTI (urinary tract infection)  UTI (urinary tract infection) [N39.0]  Altered mental status, unspecified altered mental status type [R41.82]       Reason for Referral: Generalized Muscle Weakness (M62.81)  Inpatient: Payor: MEDICARE / Plan: MEDICARE PART A AND B / Product Type: *No Product type* /     ASSESSMENT:     REHAB RECOMMENDATIONS:   Recommendation to date pending progress:  Setting:  No further skilled therapy after discharge from hospital    Equipment:    None     ASSESSMENT:  Ms. Elma Garcia was admitted with above diagnosis from ED. Her  brought her in as he was concerned about her having a UTI. Pt lives at the [place] in [facility] with advanced dementia. Pt is noted to move quite with hand held assistance when not distracted. Pt is not oriented and only follows simple commands with verbal, tactile, and visual cueing. Per , after much time spent with the patient, she is at her baseline and at this level can return to [facility]. No further OT warranted. Pt was assisted to the edge of bed, donned a hospital gown and ambulated household (facility) distance with hand held assistance (only for direction not for support). Pt was verbal but without purpose and very preoccupied with her hands. Pt was assisted with putting her hair in a ponytail while standing in room and is dependent with all self care. Pt to recliner with alarm pad in place, CNA present,  and another family member.       [address] [facility] [address] [id number] AM-PAC 6 Clicks Daily Activity Inpatient Short Form:    AM-PAC Daily Activity Inpatient   How much help for putting on and taking off regular lower body clothing?: Total  How much help for Bathing?: Total  How much help for Toileting?: Total  How much help for putting on and taking off regular upper body clothing?: Total  How much help for taking care of personal grooming?: Total  How much help for eating meals?: None  AM-PAC Inpatient Daily Activity Raw Score: 9  AM-PAC Inpatient ADL T-Scale Score : 25.33  ADL Inpatient CMS 0-100% Score: 79.59  ADL Inpatient CMS G-Code Modifier : CL           SUBJECTIVE:     Ms. Terry Laws states I need to get over there     Social/Functional Lives With:  (facility staff)  Type of Home:  (memory care)  Home Layout: One level  Home Access: Level entry  Receives Help From: Family, Personal care attendant  Ambulation Assistance: Needs assistance  Transfer Assistance: Needs assistance  Active : No  Occupation: Retired    OBJECTIVE:     Anthony Mena / Jonathan Dorsey / Shauna Wheeler: IV    RESTRICTIONS/PRECAUTIONS:  Restrictions/Precautions: Fall Risk    PAIN: Drena Bi / O2:   Pre Treatment:          Post Treatment: none expressed or noted        Vitals          Oxygen            GROSS EVALUATION: INTACT IMPAIRED   (See Comments)   UE AROM [x] []   UE PROM [x] []   Strength [x]       Posture / Balance [x]     Sensation [x]     Coordination [x]       Tone [x]       Edema []    Activity Tolerance [x]       Hand Dominance R [] L []      COGNITION/  PERCEPTION: INTACT IMPAIRED   (See Comments)   Orientation []  Not oriented even to self    Vision [x]     Hearing [x]     Cognition  []  Advanced dementia    Perception [x]       MOBILITY: I Mod I S SBA CGA Min Mod Max Total  NT x2 Comments:   Bed Mobility    Rolling [] [] [] [] [] [] [] [] [] [] []    Supine to Sit [] [] [] [] [] [x] [] [] [] [] []    Scooting [] [] [] [] [] [x] [] [] [] [] []    Sit to Supine [] [] [] [] [] [] [] [] [] [] []    Transfers    Sit to Stand [] [] [] [] [x] [] [] [] [] [] []    Bed to Chair [] [] [] [] [x] [] [] [] [] [] []    Stand to Sit [] [] [] [] [x] [] [] [] [] [] []    Tub/Shower [] [] [] [] [] [] [] [] [] [] []     Toilet [] [] [] [] [] [] [] [] [] [] []      [] [] [] [] [] [] [] [] [] [] []    I=Independent, Mod I=Modified Independent, S=Supervision/Setup, SBA=Standby Assistance, CGA=Contact Guard Assistance, Min=Minimal Assistance, Mod=Moderate Assistance, Max=Maximal Assistance, Total=Total Assistance, NT=Not Tested    ACTIVITIES OF DAILY LIVING: I Mod I S SBA CGA Min Mod Max Total NT Comments   BASIC ADLs:              Upper Body Bathing  [] [] [] [] [] [] [] [] [x] []    Lower Body Bathing [] [] [] [] [] [] [] [] [x] []    Toileting [] [] [] [] [] [] [] [] [x] []    Upper Body Dressing [] [] [] [] [] [] [] [] [x] []    Lower Body Dressing [] [] [] [] [] [] [] [] [x] []    Feeding [] [] [] [] [] [] [] [] [] [] Set up    Grooming [] [] [] [] [] [] [] [] [] []    Personal Device Care [] [] [] [] [] [] [] [] [] []    Functional Mobility [] [] [] [] [x] [] [] [] [] []    I=Independent, Mod I=Modified Independent, S=Supervision/Setup, SBA=Standby Assistance, CGA=Contact Guard Assistance, Min=Minimal Assistance, Mod=Moderate Assistance, Max=Maximal Assistance, Total=Total Assistance, NT=Not Tested    PLAN:     FREQUENCY/DURATION   OT Plan of Care:  (1 treatment) for duration of hospital stay or until stated goals are met, whichever comes first.    ACUTE OCCUPATIONAL THERAPY GOALS:   (Developed with and agreed upon by patient and/or caregiver.)  Pt will be at baseline of mobility to return to the [facility].        PROBLEM LIST:   (Skilled intervention is medically necessary to address:)  Decreased Activity Tolerance   INTERVENTIONS PLANNED:  (Benefits and precautions of occupational therapy have been discussed with the patient.)  Self Care Training  Therapeutic Activity         TREATMENT:     EVALUATION: LOW COMPLEXITY: (Untimed Charge)    TREATMENT:   Co-Treatment PT/OT necessary due to patient's decreased overall endurance/tolerance levels, as well as need for high level skilled assistance to complete functional transfers/mobility and functional tasks  Self Care: (30 min): Procedure(s) (per grid) utilized to improve and/or restore self-care/home management as related to dressing, grooming, and functional mobility . Required maximal visual, verbal, manual, tactile, and   cueing to facilitate activities of daily living skills.       AFTER TREATMENT PRECAUTIONS: Alarm Activated, Bed/Chair Locked, Call light within reach, Chair, Needs within reach, Visitors at bedside, and CNA at bedside     INTERDISCIPLINARY COLLABORATION:  RN/ PCT, PT/ PTA, and OT/ HOBSON    EDUCATION:  Education Given To: Family  Education Provided: Role of [address]:  Time In: [address] [facility]  Time Out: [address]  Minutes: [address] [address], OT

## 2022-09-18 ENCOUNTER — APPOINTMENT (OUTPATIENT)
Dept: GENERAL RADIOLOGY | Age: 77
DRG: 690 | End: 2022-09-18
Payer: MEDICARE

## 2022-09-18 PROBLEM — E87.6 HYPOKALEMIA: Status: ACTIVE | Noted: 2022-09-18

## 2022-09-18 PROBLEM — N20.0 NEPHROLITHIASIS: Status: ACTIVE | Noted: 2022-09-18

## 2022-09-18 LAB
ANION GAP SERPL CALC-SCNC: 4 MMOL/L (ref 4–13)
BASOPHILS # BLD: 0 K/UL (ref 0–0.2)
BASOPHILS NFR BLD: 0 % (ref 0–2)
BUN SERPL-MCNC: 15 MG/DL (ref 8–23)
CALCIUM SERPL-MCNC: 8.5 MG/DL (ref 8.3–10.4)
CHLORIDE SERPL-SCNC: 110 MMOL/L (ref 101–110)
CO2 SERPL-SCNC: 26 MMOL/L (ref 21–32)
CREAT SERPL-MCNC: 0.66 MG/DL (ref 0.6–1)
DIFFERENTIAL METHOD BLD: ABNORMAL
EOSINOPHIL # BLD: 0.3 K/UL (ref 0–0.8)
EOSINOPHIL NFR BLD: 6 % (ref 0.5–7.8)
ERYTHROCYTE [DISTWIDTH] IN BLOOD BY AUTOMATED COUNT: 12.3 % (ref 11.9–14.6)
GLUCOSE SERPL-MCNC: 92 MG/DL (ref 65–100)
HCT VFR BLD AUTO: 30.7 % (ref 35.8–46.3)
HGB BLD-MCNC: 10.2 G/DL (ref 11.7–15.4)
IMM GRANULOCYTES # BLD AUTO: 0 K/UL (ref 0–0.5)
IMM GRANULOCYTES NFR BLD AUTO: 0 % (ref 0–5)
LYMPHOCYTES # BLD: 1.2 K/UL (ref 0.5–4.6)
LYMPHOCYTES NFR BLD: 22 % (ref 13–44)
MAGNESIUM SERPL-MCNC: 1.8 MG/DL (ref 1.8–2.4)
MCH RBC QN AUTO: 30 PG (ref 26.1–32.9)
MCHC RBC AUTO-ENTMCNC: 33.2 G/DL (ref 31.4–35)
MCV RBC AUTO: 90.3 FL (ref 79.6–97.8)
MM INDURATION, POC: 0 MM (ref 0–5)
MONOCYTES # BLD: 0.6 K/UL (ref 0.1–1.3)
MONOCYTES NFR BLD: 11 % (ref 4–12)
NEUTS SEG # BLD: 3.2 K/UL (ref 1.7–8.2)
NEUTS SEG NFR BLD: 61 % (ref 43–78)
NRBC # BLD: 0 K/UL (ref 0–0.2)
PLATELET # BLD AUTO: 232 K/UL (ref 150–450)
PMV BLD AUTO: 9 FL (ref 9.4–12.3)
POTASSIUM SERPL-SCNC: 3.4 MMOL/L (ref 3.5–5.1)
PPD, POC: NEGATIVE
RBC # BLD AUTO: 3.4 M/UL (ref 4.05–5.2)
SODIUM SERPL-SCNC: 140 MMOL/L (ref 136–145)
WBC # BLD AUTO: 5.3 K/UL (ref 4.3–11.1)

## 2022-09-18 PROCEDURE — 1100000000 HC RM PRIVATE

## 2022-09-18 PROCEDURE — 83735 ASSAY OF MAGNESIUM: CPT

## 2022-09-18 PROCEDURE — 6370000000 HC RX 637 (ALT 250 FOR IP): Performed by: STUDENT IN AN ORGANIZED HEALTH CARE EDUCATION/TRAINING PROGRAM

## 2022-09-18 PROCEDURE — 36415 COLL VENOUS BLD VENIPUNCTURE: CPT

## 2022-09-18 PROCEDURE — 2580000003 HC RX 258: Performed by: NURSE PRACTITIONER

## 2022-09-18 PROCEDURE — 6370000000 HC RX 637 (ALT 250 FOR IP): Performed by: NURSE PRACTITIONER

## 2022-09-18 PROCEDURE — 6360000002 HC RX W HCPCS: Performed by: NURSE PRACTITIONER

## 2022-09-18 PROCEDURE — 73030 X-RAY EXAM OF SHOULDER: CPT

## 2022-09-18 PROCEDURE — 80048 BASIC METABOLIC PNL TOTAL CA: CPT

## 2022-09-18 PROCEDURE — 85025 COMPLETE CBC W/AUTO DIFF WBC: CPT

## 2022-09-18 RX ADMIN — SODIUM CHLORIDE: 9 INJECTION, SOLUTION INTRAVENOUS at 23:49

## 2022-09-18 RX ADMIN — MEROPENEM 1000 MG: 1 INJECTION, POWDER, FOR SOLUTION INTRAVENOUS at 09:33

## 2022-09-18 RX ADMIN — HYDROCHLOROTHIAZIDE 25 MG: 25 TABLET ORAL at 09:31

## 2022-09-18 RX ADMIN — POLYETHYLENE GLYCOL (3350) 17 G: 17 POWDER, FOR SOLUTION ORAL at 09:34

## 2022-09-18 RX ADMIN — POTASSIUM BICARBONATE 20 MEQ: 782 TABLET, EFFERVESCENT ORAL at 09:32

## 2022-09-18 RX ADMIN — DULOXETINE HYDROCHLORIDE 60 MG: 60 CAPSULE, DELAYED RELEASE ORAL at 09:31

## 2022-09-18 RX ADMIN — LISINOPRIL 40 MG: 20 TABLET ORAL at 09:33

## 2022-09-18 RX ADMIN — PANTOPRAZOLE SODIUM 40 MG: 40 TABLET, DELAYED RELEASE ORAL at 06:48

## 2022-09-18 RX ADMIN — HYDROXYCHLOROQUINE SULFATE 200 MG: 200 TABLET ORAL at 09:31

## 2022-09-18 RX ADMIN — ENOXAPARIN SODIUM 40 MG: 100 INJECTION SUBCUTANEOUS at 09:32

## 2022-09-18 RX ADMIN — POTASSIUM BICARBONATE 20 MEQ: 782 TABLET, EFFERVESCENT ORAL at 23:30

## 2022-09-18 RX ADMIN — SODIUM CHLORIDE: 9 INJECTION, SOLUTION INTRAVENOUS at 09:49

## 2022-09-18 RX ADMIN — MEROPENEM 1000 MG: 1 INJECTION, POWDER, FOR SOLUTION INTRAVENOUS at 17:57

## 2022-09-18 RX ADMIN — MAGNESIUM GLUCONATE 500 MG ORAL TABLET 400 MG: 500 TABLET ORAL at 09:32

## 2022-09-18 RX ADMIN — MEROPENEM 1000 MG: 1 INJECTION, POWDER, FOR SOLUTION INTRAVENOUS at 02:23

## 2022-09-18 RX ADMIN — METOPROLOL SUCCINATE 200 MG: 100 TABLET, FILM COATED, EXTENDED RELEASE ORAL at 09:31

## 2022-09-18 RX ADMIN — ACETAMINOPHEN 650 MG: 325 TABLET, FILM COATED ORAL at 23:29

## 2022-09-18 RX ADMIN — SODIUM CHLORIDE, PRESERVATIVE FREE 10 ML: 5 INJECTION INTRAVENOUS at 09:34

## 2022-09-18 ASSESSMENT — PAIN DESCRIPTION - ORIENTATION: ORIENTATION: RIGHT

## 2022-09-18 ASSESSMENT — PAIN SCALES - PAIN ASSESSMENT IN ADVANCED DEMENTIA (PAINAD)
BODYLANGUAGE: 1
FACIALEXPRESSION: 0
BREATHING: 0
TOTALSCORE: 3
CONSOLABILITY: 2
NEGVOCALIZATION: 0

## 2022-09-18 ASSESSMENT — PAIN DESCRIPTION - LOCATION: LOCATION: SHOULDER

## 2022-09-18 NOTE — PROGRESS NOTES
Hospitalist Progress Note   Admit Date:  2022 11:36 AM   Name:  Chela Prabhakar   Age:  68 y.o. Sex:  female  :  1945   MRN:  [id number]   Room:  [facility]/    Presenting Complaint: Abdominal Pain     Reason(s) for Admission: UTI (urinary tract infection) [N39.0]  Altered mental status, unspecified altered mental status type [R41.82]     Hospital Course:   Patient is a 67 y/o female with medical history of recurrent MDR UTI, Alzheimers, HTN, depression, anxiety, resident of [facility] who presented to ED with cc confusion and somnolence.  suspects UTI as etiology given her history of UTI's with similar presentation. ED workup: afebrile, no leukocytosis, UA with 10-20 WBC/trace LE/neg nitrite/neg bacteria, KUB with constipation. Initiated on empiric Meropenem given hx of MDR organisms. Urine and blood cultures were obtained. LFT's, TSH, ammonia, glucose wnl. CT head without acute abnormality. CT A/P with non-obstructing left renal stones and constipation. Urology consultation , no indication for urologic surgery or further treatment of intrarenal stones. Bcx remain negative to date. Ucx with Enterococcus species, final ID pending. PT/OT ongoing during hospitalization, d/c plan to return to memory care at [facility], anticipate 24-48 hours when urine culture finalizes. Subjective & 24hr Events (22): Patient alert, aide at bedside assisting with lunch. Patient will not respond to verbal questions at this time or follow commands, but she is alert, calm, and eating lunch. ROS limited by mental status.     Assessment & Plan:     Urinary Tract Infection / hx of MDR recurrent UTI  -UA with 10-20 WBC/trace LE/neg nitrite/neg bacteria   -Ucx with presumptive Enterococcus species, subculture pending, continue Meropenem and follow culture  -Remains afebrile without leukocytosis or SIRS    Constipation  -s/p enema  with appropriate response    Non-obstructing nephrolithiasis  -Urology consultation 9/17, no indication for urologic surgery or further treatment of intrarenal stones    Memory Impairment / Alzheimers  -Resident of [facility]  -Non-pharmacologic interventions to minimize acute inpatient delirium including re-orientation as needed, window open and lights on during day and off at night, frequent therapies, avoid restraints, use hearing aids and glasses as needed, minimize interruptions at night to promote adequate sleep    Hypertension  -Cont HCTZ, lisinopril, toprol-xl    Hypokalemia  -K 3.4, Mg 1.8, oral K and Mg supplementation, repeat labs in am    Discharge planning: return to memory care at [facility] 24-48 hours    Diet:  ADULT DIET; Regular  DVT PPx: Lovenox  Code status: Full Code    Hospital Problems:  Principal Problem:    UTI (urinary tract infection)  Active Problems:    Nephrolithiasis    Hypokalemia    Dementia of the Alzheimer's type, with late onset, uncomplicated ([address]    Hypertension  Resolved Problems:    * No resolved hospital problems. *      Objective:   Patient Vitals for the past 24 hrs:   Temp Pulse Resp BP SpO2   09/18/22 0746 98.6 °F (37 °C) 73 -- -- 94 %   09/18/22 0745 -- 78 15 (!) 161/73 93 %   09/18/22 0247 97.9 °F (36.6 °C) 72 18 (!) 151/70 95 %   09/17/22 2306 97.7 °F (36.5 °C) 84 18 (!) 156/76 98 %   09/17/22 2025 -- 78 -- -- --   09/17/22 1928 97.9 °F (36.6 °C) 86 18 (!) 148/82 94 %   09/17/22 1546 98.1 °F (36.7 °C) 63 18 137/78 99 %   09/17/22 1215 -- 62 -- 120/71 --   09/17/22 1121 -- 63 14 107/81 97 %       Oxygen Therapy  SpO2: 94 %  O2 Device: None (Room air)    Estimated body mass index is 25.82 kg/m² as calculated from the following:    Height as of this encounter: 5' 6\" (1.676 m). Weight as of this encounter: 160 lb (72.6 kg). No intake or output data in the 24 hours ending 09/18/22 0814      Physical Exam:     Blood pressure (!) 161/73, pulse 73, temperature 98.6 °F (37 °C), resp.  rate 15, height 5' 6\" (1.676 m), weight 160 lb (72.6 kg), SpO2 94 %. General:    Alert. Frail. No acute distress  Head:  Normocephalic, atraumatic  Eyes:  Sclerae appear normal.  Pupils equally round. ENT:  Nares appear normal, no drainage. Moist oral mucosa  Neck:  No restricted ROM. Trachea midline   CV:   RRR. No m/r/g. No jugular venous distension. Lungs:   CTAB. No wheezing, rhonchi. Respirations even and unlabored on RA. Abdomen: Bowel sounds present. Soft, nontender, nondistended. Extremities: No cyanosis or clubbing. No edema  Skin:     No rashes and normal coloration. Warm and dry. Neuro:  Alert, moves all extremities. Will not follow commands or verbally communicate at present.   Psych:  Calm    I have personally reviewed labs and tests showing:  Recent Labs:  Recent Results (from the past 48 hour(s))   CBC with Diff    Collection Time: 09/16/22 12:14 PM   Result Value Ref Range    WBC 8.1 4.3 - 11.1 K/uL    RBC 3.96 (L) 4.05 - 5.2 M/uL    Hemoglobin 12.0 11.7 - 15.4 g/dL    Hematocrit 36.4 35.8 - 46.3 %    MCV 91.9 79.6 - 97.8 FL    MCH 30.3 26.1 - 32.9 PG    MCHC 33.0 31.4 - 35.0 g/dL    RDW 12.6 11.9 - 14.6 %    Platelets 036 085 - 207 K/uL    MPV 9.0 (L) 9.4 - 12.3 FL    nRBC 0.00 0.0 - 0.2 K/uL    Differential Type AUTOMATED      Seg Neutrophils 69 43 - 78 %    Lymphocytes 16 13 - 44 %    Monocytes 9 4.0 - 12.0 %    Eosinophils % 6 0.5 - 7.8 %    Basophils 1 0.0 - 2.0 %    Immature Granulocytes 0 0.0 - 5.0 %    Segs Absolute 5.6 1.7 - 8.2 K/UL    Absolute Lymph # 1.3 0.5 - 4.6 K/UL    Absolute Mono # 0.8 0.1 - 1.3 K/UL    Absolute Eos # 0.5 0.0 - 0.8 K/UL    Basophils Absolute 0.1 0.0 - 0.2 K/UL    Absolute Immature Granulocyte 0.0 0.0 - 0.5 K/UL   CMP    Collection Time: 09/16/22 12:14 PM   Result Value Ref Range    Sodium 136 136 - 145 mmol/L    Potassium 4.1 3.5 - 5.1 mmol/L    Chloride 103 101 - 110 mmol/L    CO2 30 21 - 32 mmol/L    Anion Gap 3 (L) 4 - 13 mmol/L    Glucose 89 65 - 100 mg/dL    BUN 25 (H) 8 - 23 MG/DL Creatinine 0.86 0.6 - 1.0 MG/DL    GFR African American >60 >60 ml/min/1.73m2    GFR Non- >60 >60 ml/min/1.73m2    Calcium 9.3 8.3 - 10.4 MG/DL    Total Bilirubin 0.4 0.2 - 1.1 MG/DL    ALT 18 12 - 65 U/L    AST 21 15 - 37 U/L    Alk Phosphatase 72 50 - 130 U/L    Total Protein 6.9 6.3 - 8.2 g/dL    Albumin 3.5 3.2 - 4.6 g/dL    Globulin 3.4 2.3 - 3.5 g/dL    Albumin/Globulin Ratio 1.0 (L) 1.2 - 3.5     Lipase    Collection Time: 09/16/22 12:14 PM   Result Value Ref Range    Lipase 138 73 - 393 U/L   TSH with Reflex    Collection Time: 09/16/22 12:14 PM   Result Value Ref Range    TSH w Free Thyroid if Abnormal 2.82 0.358 - 3.740 UIU/ML   Lactate, Sepsis (Select if patient is over 65 to rule out mesenteric ischemia)    Collection Time: 09/16/22 12:24 PM   Result Value Ref Range    Lactic Acid, Sepsis 0.8 0.5 - 2.0 MMOL/L   Urinalysis, Micro    Collection Time: 09/16/22  1:58 PM   Result Value Ref Range    WBC, UA 20-50 0 /hpf    RBC, UA 0-5 0 /hpf    Epithelial Cells UA 0-5 0 /hpf    BACTERIA, URINE Negative 0 /hpf    Casts 0-2 0 /lpf   Culture, Urine    Collection Time: 09/16/22  1:58 PM    Specimen: URINE-CLEAN CATCH   Result Value Ref Range    Special Requests NO SPECIAL REQUESTS      Culture        No growth after short period of incubation. Further results to follow after overnight incubation.    Culture, Blood 1    Collection Time: 09/16/22  3:32 PM    Specimen: Blood   Result Value Ref Range    Special Requests RIGHT  Antecubital        Culture NO GROWTH AFTER 16 HOURS     Culture, Blood 1    Collection Time: 09/16/22  7:02 PM    Specimen: Blood   Result Value Ref Range    Special Requests LEFT  FOREARM        Culture NO GROWTH AFTER 12 HOURS     Ammonia    Collection Time: 09/16/22  7:02 PM   Result Value Ref Range    Ammonia <10 (L) 24.9 - 68 UMOL/L   Lactic Acid    Collection Time: 09/16/22  7:02 PM   Result Value Ref Range    Lactic Acid, Plasma 0.8 0.4 - 2.0 MMOL/L   PLEASE READ & DOCUMENT PPD TEST IN 24 HRS    Collection Time: 09/17/22 12:00 AM   Result Value Ref Range    PPD, (POC) Negative Negative    mm Induration 0 0 - 5 mm   Comprehensive Metabolic Panel w/ Reflex to MG    Collection Time: 09/17/22  5:00 AM   Result Value Ref Range    Sodium 140 136 - 145 mmol/L    Potassium 3.6 3.5 - 5.1 mmol/L    Chloride 108 101 - 110 mmol/L    CO2 26 21 - 32 mmol/L    Anion Gap 6 4 - 13 mmol/L    Glucose 83 65 - 100 mg/dL    BUN 21 8 - 23 MG/DL    Creatinine 0.88 0.6 - 1.0 MG/DL    GFR African American >60 >60 ml/min/1.73m2    GFR Non- >60 >60 ml/min/1.73m2    Calcium 8.5 8.3 - 10.4 MG/DL    Total Bilirubin 0.6 0.2 - 1.1 MG/DL    ALT 14 12 - 65 U/L    AST 14 (L) 15 - 37 U/L    Alk Phosphatase 62 50 - 136 U/L    Total Protein 5.7 (L) 6.3 - 8.2 g/dL    Albumin 2.8 (L) 3.2 - 4.6 g/dL    Globulin 2.9 2.3 - 3.5 g/dL    Albumin/Globulin Ratio 1.0 (L) 1.2 - 3.5     CBC with Auto Differential    Collection Time: 09/17/22  5:00 AM   Result Value Ref Range    WBC 6.2 4.3 - 11.1 K/uL    RBC 3.46 (L) 4.05 - 5.2 M/uL    Hemoglobin 10.4 (L) 11.7 - 15.4 g/dL    Hematocrit 31.6 (L) 35.8 - 46.3 %    MCV 91.3 79.6 - 97.8 FL    MCH 30.1 26.1 - 32.9 PG    MCHC 32.9 31.4 - 35.0 g/dL    RDW 12.4 11.9 - 14.6 %    Platelets 883 480 - 855 K/uL    MPV 9.1 (L) 9.4 - 12.3 FL    nRBC 0.00 0.0 - 0.2 K/uL    Differential Type AUTOMATED      Seg Neutrophils 59 43 - 78 %    Lymphocytes 23 13 - 44 %    Monocytes 11 4.0 - 12.0 %    Eosinophils % 6 0.5 - 7.8 %    Basophils 1 0.0 - 2.0 %    Immature Granulocytes 0 0.0 - 5.0 %    Segs Absolute 3.7 1.7 - 8.2 K/UL    Absolute Lymph # 1.4 0.5 - 4.6 K/UL    Absolute Mono # 0.7 0.1 - 1.3 K/UL    Absolute Eos # 0.4 0.0 - 0.8 K/UL    Basophils Absolute 0.0 0.0 - 0.2 K/UL    Absolute Immature Granulocyte 0.0 0.0 - 0.5 K/UL   Basic Metabolic Panel w/ Reflex to MG    Collection Time: 09/18/22  5:03 AM   Result Value Ref Range    Sodium 140 136 - 145 mmol/L    Potassium 3.4 (L) 3.5 - 5.1 mmol/L    Chloride 110 101 - 110 mmol/L    CO2 26 21 - 32 mmol/L    Anion Gap 4 4 - 13 mmol/L    Glucose 92 65 - 100 mg/dL    BUN 15 8 - 23 MG/DL    Creatinine 0.66 0.6 - 1.0 MG/DL    GFR African American >60 >60 ml/min/1.73m2    GFR Non- >60 >60 ml/min/1.73m2    Calcium 8.5 8.3 - 10.4 MG/DL   CBC with Auto Differential    Collection Time: 09/18/22  5:03 AM   Result Value Ref Range    WBC 5.3 4.3 - 11.1 K/uL    RBC 3.40 (L) 4.05 - 5.2 M/uL    Hemoglobin 10.2 (L) 11.7 - 15.4 g/dL    Hematocrit 30.7 (L) 35.8 - 46.3 %    MCV 90.3 79.6 - 97.8 FL    MCH 30.0 26.1 - 32.9 PG    MCHC 33.2 31.4 - 35.0 g/dL    RDW 12.3 11.9 - 14.6 %    Platelets 068 605 - 397 K/uL    MPV 9.0 (L) 9.4 - 12.3 FL    nRBC 0.00 0.0 - 0.2 K/uL    Differential Type AUTOMATED      Seg Neutrophils 61 43 - 78 %    Lymphocytes 22 13 - 44 %    Monocytes 11 4.0 - 12.0 %    Eosinophils % 6 0.5 - 7.8 %    Basophils 0 0.0 - 2.0 %    Immature Granulocytes 0 0.0 - 5.0 %    Segs Absolute 3.2 1.7 - 8.2 K/UL    Absolute Lymph # 1.2 0.5 - 4.6 K/UL    Absolute Mono # 0.6 0.1 - 1.3 K/UL    Absolute Eos # 0.3 0.0 - 0.8 K/UL    Basophils Absolute 0.0 0.0 - 0.2 K/UL    Absolute Immature Granulocyte 0.0 0.0 - 0.5 K/UL   Magnesium    Collection Time: 09/18/22  5:03 AM   Result Value Ref Range    Magnesium 1.8 1.8 - 2.4 mg/dL       I have personally reviewed imaging studies showing: Other Studies:  CT HEAD WO CONTRAST   Final Result   No CT evidence of acute intracranial abnormality. CT ABDOMEN PELVIS RENAL STONE Additional Contrast? Radiologist Recommendation   Final Result   1. Nonobstructing stones in the left kidney. No hydronephrosis. 2.  Constipation. ** If there are any questions about this report, I can be reached on   [employer] or at [phone] **      XR ABDOMEN (KUB) (SINGLE AP VIEW)   Final Result      1. Moderate volume of colonic stool. 2. Nonobstructive bowel gas pattern.                       Current Meds:  Current Facility-Administered Medications   Medication Dose Route Frequency    potassium bicarb-citric acid (EFFER-K) effervescent tablet 20 mEq  20 mEq Oral BID    meropenem (MERREM) 1,000 mg in sodium chloride 0.9 % 100 mL IVPB (mini-bag)  1,000 mg IntraVENous Q8H    divalproex (DEPAKOTE) DR tablet 125 mg  125 mg Oral TID PRN    DULoxetine (CYMBALTA) extended release capsule 60 mg  60 mg Oral Daily    hydroCHLOROthiazide (HYDRODIURIL) tablet 25 mg  25 mg Oral Daily    hydroxychloroquine (PLAQUENIL) tablet 200 mg  200 mg Oral Daily    lisinopril (PRINIVIL;ZESTRIL) tablet 40 mg  40 mg Oral Daily    magnesium oxide (MAG-OX) tablet 400 mg  400 mg Oral Daily    metoprolol succinate (TOPROL XL) extended release tablet 200 mg  200 mg Oral Daily    pantoprazole (PROTONIX) tablet 40 mg  40 mg Oral QAM AC    sodium chloride flush 0.9 % injection 5-40 mL  5-40 mL IntraVENous 2 times per day    sodium chloride flush 0.9 % injection 5-40 mL  5-40 mL IntraVENous PRN    0.9 % sodium chloride infusion   IntraVENous PRN    enoxaparin (LOVENOX) injection 40 mg  40 mg SubCUTAneous Daily    ondansetron (ZOFRAN-ODT) disintegrating tablet 4 mg  4 mg Oral Q8H PRN    Or    ondansetron (ZOFRAN) injection 4 mg  4 mg IntraVENous Q6H PRN    acetaminophen (TYLENOL) tablet 650 mg  650 mg Oral Q6H PRN    Or    acetaminophen (TYLENOL) suppository 650 mg  650 mg Rectal Q6H PRN    0.9 % sodium chloride infusion   IntraVENous Continuous    polyethylene glycol (GLYCOLAX) packet 17 g  17 g Oral Daily     Signed: Donna Aguero AGACNP-BC

## 2022-09-18 NOTE — PROGRESS NOTES
Spouse reports being having issue with shoulder. Upon assessment pt moaning when posterior Right shoulder touched, even lightly. No bruising or swelling note. No known injury reported. EWA Castellon aware and new order replaced for XR. Hourly rounding completed on this shift. All needs met. Pt is currently resting in bed with spouse at bedside. Will give report to oncoming nurse.

## 2022-09-18 NOTE — CONSULTS
Consult Note            Date:9/17/2022        Patient Name:Karen Lockhart     YOB: 1945     Age:76 y.o. Consults    History of Present Illness     The patient was admitted yesterday with lower abdominal pain. This was present presentations that she has had in the past with UTIs. Although there were some white cells on UA there was no bacteriuria. Her urine culture is currently showing no growth after a short period of incubation. She did have several UTIs earlier in the year the most recent one having been in July. She has not had fever on this admission. WBCs were 8.1 on admission and her lactic acid was 0.8. CT urogram showed stable nonobstructing left lower pole stones. She did have a significant amount of stool within the colon. This evening she received an enema and according to her sister who was at the bedside she had a tremendous bowel movement. She now seems to be feeling better. Past Medical History     Past Medical History:   Diagnosis Date    Alcohol abuse, daily use 11/21/2014    Alzheimer's dementia ([facility] [address]     Depression 11/21/2014    Drug-induced encephalopathy 7/17/2022    Due to cefepime    Hypertension     Lumbar stenosis 7/4/2012    Osteopenia 3/19/2015    Pseudomonas aeruginosa infection 7/11/2022    Sinus problem     Spondylolisthesis of lumbar region 7/4/2012    UTI (urinary tract infection) 7/8/2022    Vitamin D deficiency 11/21/2014        Past Surgical History     Past Surgical History:   Procedure Laterality Date    BACK SURGERY  5/31/12    L3-L4 Metrix TLIF with sextant/bilateral decompression - Dr. Tiago Joseph LUMPECTOMY Bilateral     HEENT Bilateral 2015    sinus    HYSTERECTOMY (CERVIX STATUS UNKNOWN)      complete    OTHER SURGICAL HISTORY      sinuses x2    TONSILLECTOMY      WRIST FRACTURE SURGERY          Medications     Prior to Admission medications    Medication Sig Start Date End Date Taking?  Authorizing Provider   lisinopril (PRINIVIL;ZESTRIL) 40 MG tablet Take 1 tablet by mouth in the morning. 7/17/22   Miriam Dunn MD   metoprolol succinate (TOPROL XL) 200 MG extended release tablet Take 1 tablet by mouth in the morning. 7/17/22   Miriam Dunn MD   DULoxetine (CYMBALTA) 60 MG extended release capsule Take 1 capsule by mouth daily TAKE 1 CAPSULES DAILY. 7/11/22   Juan Lopez MD   magnesium oxide (MAG-OX) 400 MG tablet Take 400 mg by mouth daily    Historical Provider, MD   diclofenac sodium (VOLTAREN) 1 % GEL Apply 4 g topically 4 times daily 6/1/22   Brannon Holden MD   divalproex (DEPAKOTE) 125 MG DR tablet Take 125 mg by mouth 3 times daily as needed     Historical Provider, MD   hydroxychloroquine (PLAQUENIL) 200 mg tablet Take 1 pill once a day after food.  5/26/22   Logan Coe MD   TURMERIC PO Take 1 capsule by mouth daily    Ar Automatic Reconciliation   azelastine (ASTELIN) 0.1 % nasal spray 1 spray by Nasal route 2 times daily 4/3/19   Ar Automatic Reconciliation   vitamin D 25 MCG (1000 UT) CAPS Take 1,000 Units by mouth daily    Ar Automatic Reconciliation   diphenhydrAMINE (SOMINEX) 25 MG tablet Take 25 mg by mouth every 6 hours as needed    Ar Automatic Reconciliation   hydroCHLOROthiazide (HYDRODIURIL) 25 MG tablet TAKE 1 TABLET BY MOUTH EACH DAY. 4/6/22   Ar Automatic Reconciliation   omeprazole (PRILOSEC) 40 MG delayed release capsule TAKE 1 CAPSULE EACH DAY. 1/5/22   Ar Automatic Reconciliation   donepezil (ARICEPT) 10 MG tablet TAKE ONE TABLET AT BEDTIME. 12/30/21 7/17/22  Ar Automatic Reconciliation   memantine (NAMENDA) 10 MG tablet Take 10 mg by mouth 2 times daily 1/18/21 7/17/22  Ar Automatic Reconciliation   Naproxen Sodium 220 MG CAPS Take 1 capsule by mouth 2 times daily  7/17/22  Ar Automatic Reconciliation        meropenem (MERREM) 1,000 mg in sodium chloride 0.9 % 100 mL IVPB (mini-bag), Q8H  divalproex (DEPAKOTE) DR tablet 125 mg, TID PRN  DULoxetine (CYMBALTA) extended release capsule 60 mg, Daily  hydroCHLOROthiazide (HYDRODIURIL) tablet 25 mg, Daily  hydroxychloroquine (PLAQUENIL) tablet 200 mg, Daily  lisinopril (PRINIVIL;ZESTRIL) tablet 40 mg, Daily  magnesium oxide (MAG-OX) tablet 400 mg, Daily  metoprolol succinate (TOPROL XL) extended release tablet 200 mg, Daily  pantoprazole (PROTONIX) tablet 40 mg, QAM AC  sodium chloride flush 0.9 % injection 5-40 mL, 2 times per day  sodium chloride flush 0.9 % injection 5-40 mL, PRN  0.9 % sodium chloride infusion, PRN  enoxaparin (LOVENOX) injection 40 mg, Daily  ondansetron (ZOFRAN-ODT) disintegrating tablet 4 mg, Q8H PRN   Or  ondansetron (ZOFRAN) injection 4 mg, Q6H PRN  acetaminophen (TYLENOL) tablet 650 mg, Q6H PRN   Or  acetaminophen (TYLENOL) suppository 650 mg, Q6H PRN  0.9 % sodium chloride infusion, Continuous  polyethylene glycol (GLYCOLAX) packet 17 g, Daily  tuberculin injection 5 Units, Once          Allergies   Levofloxacin, Penicillins, Poison oak extract, Bupropion, Cephalosporins, Sulfamethoxazole, and Montelukast    Social History     Social History     Socioeconomic History    Marital status:      Spouse name: None    Number of children: None    Years of education: 1 post HS    Highest education level: None   Tobacco Use    Smoking status: Former     Packs/day: 1.00     Types: Cigarettes     Quit date: 1972     Years since quittin.6    Smokeless tobacco: Never   Substance and Sexual Activity    Alcohol use: Yes     Alcohol/week: 14.0 standard drinks    Drug use: No   Social History Narrative    , not active, gets up around 12 noon and then does things in the afternoon if a friend comes over. Unable to watch TV as cant figure out the controls.          Family History     Family History   Problem Relation Age of Onset    Osteoarthritis Mother     Heart Disease Mother     Stroke Mother     Breast Cancer Neg Hx     Coronary Art Dis Maternal Uncle     Heart Attack Mother     Hypertension Sister     Alzheimer's Disease Father 48    Dementia Father     Stroke Father     Heart Disease Sister         stent and AVR       Review of Systems   Review of Systems     Physical Exam   BP (!) 148/82   Pulse 86   Temp 97.9 °F (36.6 °C) (Axillary)   Resp 18   Ht 5' 6\" (1.676 m)   Wt 160 lb (72.6 kg)   SpO2 94%   BMI 25.82 kg/m²      Physical Exam Abdomen:Soft, non-tender, active bowel sounds,Bladder is not palpable. There is no CVA tenderness. External genitalia normal female. There is no peripheral edema. Labs    CBC:  Recent Labs     09/16/22  1214 09/17/22  0500   WBC 8.1 6.2   RBC 3.96* 3.46*   HGB 12.0 10.4*   HCT 36.4 31.6*   MCV 91.9 91.3   RDW 12.6 12.4    226     CHEMISTRIES:  Recent Labs     09/16/22  1214 09/17/22  0500    140   K 4.1 3.6    108   CO2 30 26   BUN 25* 21   CREATININE 0.86 0.88   GLUCOSE 89 83     PT/INR:No results for input(s): PROTIME, INR in the last 72 hours. APTT:No results for input(s): APTT in the last 72 hours. LIVER PROFILE:  Recent Labs     09/16/22  1214 09/17/22  0500   AST 21 14*   ALT 18 14   BILITOT 0.4 0.6   ALKPHOS 72 62       Imaging/Diagnostics   No results found. Assessment      Hospital Problems             Last Modified POA    * (Principal) UTI (urinary tract infection) 9/16/2022 Yes   Constipation    Plan   1. I suspect her abdominal pain was related to constipation. She certainly appears to be in no distress at this point. I would continue antibiotics until her culture is final but I suspect she does not have a UTI at this point. There is no indication for any urologic surgery or other treatment for her intrarenal stones.     Electronically signed by Kris Trivedi MD on 5/24/22 at 8:20 AM EDT

## 2022-09-19 LAB
HCT VFR BLD AUTO: 31.9 % (ref 35.8–46.3)
HGB BLD-MCNC: 10.8 G/DL (ref 11.7–15.4)
POTASSIUM SERPL-SCNC: 3.9 MMOL/L (ref 3.5–5.1)

## 2022-09-19 PROCEDURE — 1100000000 HC RM PRIVATE

## 2022-09-19 PROCEDURE — 6360000002 HC RX W HCPCS: Performed by: NURSE PRACTITIONER

## 2022-09-19 PROCEDURE — 84132 ASSAY OF SERUM POTASSIUM: CPT

## 2022-09-19 PROCEDURE — 6370000000 HC RX 637 (ALT 250 FOR IP): Performed by: NURSE PRACTITIONER

## 2022-09-19 PROCEDURE — 85018 HEMOGLOBIN: CPT

## 2022-09-19 PROCEDURE — 36415 COLL VENOUS BLD VENIPUNCTURE: CPT

## 2022-09-19 PROCEDURE — 2580000003 HC RX 258: Performed by: NURSE PRACTITIONER

## 2022-09-19 RX ADMIN — POLYETHYLENE GLYCOL (3350) 17 G: 17 POWDER, FOR SOLUTION ORAL at 09:09

## 2022-09-19 RX ADMIN — MAGNESIUM GLUCONATE 500 MG ORAL TABLET 400 MG: 500 TABLET ORAL at 09:09

## 2022-09-19 RX ADMIN — SODIUM CHLORIDE, PRESERVATIVE FREE 10 ML: 5 INJECTION INTRAVENOUS at 09:10

## 2022-09-19 RX ADMIN — HYDROCHLOROTHIAZIDE 25 MG: 25 TABLET ORAL at 09:09

## 2022-09-19 RX ADMIN — ENOXAPARIN SODIUM 40 MG: 100 INJECTION SUBCUTANEOUS at 09:08

## 2022-09-19 RX ADMIN — HYDROXYCHLOROQUINE SULFATE 200 MG: 200 TABLET ORAL at 09:10

## 2022-09-19 RX ADMIN — LISINOPRIL 40 MG: 20 TABLET ORAL at 09:09

## 2022-09-19 RX ADMIN — MEROPENEM 1000 MG: 1 INJECTION, POWDER, FOR SOLUTION INTRAVENOUS at 02:43

## 2022-09-19 RX ADMIN — MEROPENEM 1000 MG: 1 INJECTION, POWDER, FOR SOLUTION INTRAVENOUS at 09:19

## 2022-09-19 RX ADMIN — MEROPENEM 1000 MG: 1 INJECTION, POWDER, FOR SOLUTION INTRAVENOUS at 17:43

## 2022-09-19 RX ADMIN — PANTOPRAZOLE SODIUM 40 MG: 40 TABLET, DELAYED RELEASE ORAL at 06:24

## 2022-09-19 RX ADMIN — METOPROLOL SUCCINATE 200 MG: 100 TABLET, FILM COATED, EXTENDED RELEASE ORAL at 09:09

## 2022-09-19 RX ADMIN — SODIUM CHLORIDE: 9 INJECTION, SOLUTION INTRAVENOUS at 17:43

## 2022-09-19 RX ADMIN — SODIUM CHLORIDE, PRESERVATIVE FREE 5 ML: 5 INJECTION INTRAVENOUS at 20:59

## 2022-09-19 RX ADMIN — DULOXETINE HYDROCHLORIDE 60 MG: 60 CAPSULE, DELAYED RELEASE ORAL at 09:09

## 2022-09-19 NOTE — PROGRESS NOTES
problems. *      Objective:   Patient Vitals for the past 24 hrs:   Temp Pulse Resp BP SpO2   09/19/22 1143 97.5 °F (36.4 °C) 60 16 (!) 143/96 96 %   09/19/22 0740 97.3 °F (36.3 °C) 63 16 (!) 168/79 96 %   09/19/22 0359 97.7 °F (36.5 °C) 62 18 (!) 157/76 96 %   09/18/22 2313 99 °F (37.2 °C) 77 17 (!) 156/82 94 %   09/18/22 1950 98.6 °F (37 °C) 81 18 (!) 138/96 95 %   09/1945 -- 80 -- -- --   09/18/22 1631 98.3 °F (36.8 °C) 71 16 126/65 95 %       Oxygen Therapy  SpO2: 96 %  O2 Device: None (Room air)    Estimated body mass index is 22.2 kg/m² as calculated from the following:    Height as of this encounter: 5' 6\" (1.676 m). Weight as of this encounter: 137 lb 9.1 oz (62.4 kg). Intake/Output Summary (Last 24 hours) at 9/19/2022 1453  Last data filed at 9/19/2022 0848  Gross per 24 hour   Intake 6748 ml   Output --   Net 6748 ml         Physical Exam:   Blood pressure (!) 143/96, pulse 60, temperature 97.5 °F (36.4 °C), temperature source Axillary, resp. rate 16, height 5' 6\" (1.676 m), weight 137 lb 9.1 oz (62.4 kg), SpO2 96 %. General:    Well nourished. Head:  Normocephalic, atraumatic  Eyes:  Sclerae appear normal.  Pupils equally round. ENT:  Nares appear normal, no drainage. Moist oral mucosa  Neck:  No restricted ROM. Trachea midline   CV:   RRR. No m/r/g. No jugular venous distension. Lungs:   CTAB. No wheezing, rhonchi, or rales. Symmetric expansion. Abdomen: Bowel sounds present. Soft, nondistended, nontender  Extremities: No cyanosis or clubbing. No edema  Skin:     No rashes and normal coloration. Warm and dry. Neuro:  CN II-XII grossly intact. Sensation intact.   A&Ox0  Psych:  Flat affect    I have personally reviewed labs and tests showing:  Recent Labs:  Recent Results (from the past 48 hour(s))   PLEASE READ & DOCUMENT PPD TEST IN 48 HRS    Collection Time: 09/18/22 12:00 AM   Result Value Ref Range    PPD, (POC) Negative Negative    mm Induration 0 0 - 5 mm   Basic Metabolic Panel w/ Reflex to MG    Collection Time: 09/18/22  5:03 AM   Result Value Ref Range    Sodium 140 136 - 145 mmol/L    Potassium 3.4 (L) 3.5 - 5.1 mmol/L    Chloride 110 101 - 110 mmol/L    CO2 26 21 - 32 mmol/L    Anion Gap 4 4 - 13 mmol/L    Glucose 92 65 - 100 mg/dL    BUN 15 8 - 23 MG/DL    Creatinine 0.66 0.6 - 1.0 MG/DL    GFR African American >60 >60 ml/min/1.73m2    GFR Non- >60 >60 ml/min/1.73m2    Calcium 8.5 8.3 - 10.4 MG/DL   CBC with Auto Differential    Collection Time: 09/18/22  5:03 AM   Result Value Ref Range    WBC 5.3 4.3 - 11.1 K/uL    RBC 3.40 (L) 4.05 - 5.2 M/uL    Hemoglobin 10.2 (L) 11.7 - 15.4 g/dL    Hematocrit 30.7 (L) 35.8 - 46.3 %    MCV 90.3 79.6 - 97.8 FL    MCH 30.0 26.1 - 32.9 PG    MCHC 33.2 31.4 - 35.0 g/dL    RDW 12.3 11.9 - 14.6 %    Platelets 010 828 - 615 K/uL    MPV 9.0 (L) 9.4 - 12.3 FL    nRBC 0.00 0.0 - 0.2 K/uL    Differential Type AUTOMATED      Seg Neutrophils 61 43 - 78 %    Lymphocytes 22 13 - 44 %    Monocytes 11 4.0 - 12.0 %    Eosinophils % 6 0.5 - 7.8 %    Basophils 0 0.0 - 2.0 %    Immature Granulocytes 0 0.0 - 5.0 %    Segs Absolute 3.2 1.7 - 8.2 K/UL    Absolute Lymph # 1.2 0.5 - 4.6 K/UL    Absolute Mono # 0.6 0.1 - 1.3 K/UL    Absolute Eos # 0.3 0.0 - 0.8 K/UL    Basophils Absolute 0.0 0.0 - 0.2 K/UL    Absolute Immature Granulocyte 0.0 0.0 - 0.5 K/UL   Magnesium    Collection Time: 09/18/22  5:03 AM   Result Value Ref Range    Magnesium 1.8 1.8 - 2.4 mg/dL   Potassium    Collection Time: 09/19/22  5:02 AM   Result Value Ref Range    Potassium 3.9 3.5 - 5.1 mmol/L   Hemoglobin and Hematocrit    Collection Time: 09/19/22  5:02 AM   Result Value Ref Range    Hemoglobin 10.8 (L) 11.7 - 15.4 g/dL    Hematocrit 31.9 (L) 35.8 - 46.3 %       I have personally reviewed imaging studies showing: Other Studies:  XR SHOULDER RIGHT (MIN 2 VIEWS)   Final Result   No acute osseous abnormality.       CT HEAD WO CONTRAST   Final Result   No CT evidence of acute intracranial abnormality. CT ABDOMEN PELVIS RENAL STONE Additional Contrast? Radiologist Recommendation   Final Result   1. Nonobstructing stones in the left kidney. No hydronephrosis. 2.  Constipation. ** If there are any questions about this report, I can be reached on   [employer] or at [phone] **      XR ABDOMEN (KUB) (SINGLE AP VIEW)   Final Result      1. Moderate volume of colonic stool. 2. Nonobstructive bowel gas pattern.                       Current Meds:  Current Facility-Administered Medications   Medication Dose Route Frequency    meropenem (MERREM) 1,000 mg in sodium chloride 0.9 % 100 mL IVPB (mini-bag)  1,000 mg IntraVENous Q8H    divalproex (DEPAKOTE) DR tablet 125 mg  125 mg Oral TID PRN    DULoxetine (CYMBALTA) extended release capsule 60 mg  60 mg Oral Daily    hydroCHLOROthiazide (HYDRODIURIL) tablet 25 mg  25 mg Oral Daily    hydroxychloroquine (PLAQUENIL) tablet 200 mg  200 mg Oral Daily    lisinopril (PRINIVIL;ZESTRIL) tablet 40 mg  40 mg Oral Daily    magnesium oxide (MAG-OX) tablet 400 mg  400 mg Oral Daily    metoprolol succinate (TOPROL XL) extended release tablet 200 mg  200 mg Oral Daily    pantoprazole (PROTONIX) tablet 40 mg  40 mg Oral QAM AC    sodium chloride flush 0.9 % injection 5-40 mL  5-40 mL IntraVENous 2 times per day    sodium chloride flush 0.9 % injection 5-40 mL  5-40 mL IntraVENous PRN    0.9 % sodium chloride infusion   IntraVENous PRN    enoxaparin (LOVENOX) injection 40 mg  40 mg SubCUTAneous Daily    ondansetron (ZOFRAN-ODT) disintegrating tablet 4 mg  4 mg Oral Q8H PRN    Or    ondansetron (ZOFRAN) injection 4 mg  4 mg IntraVENous Q6H PRN    acetaminophen (TYLENOL) tablet 650 mg  650 mg Oral Q6H PRN    Or    acetaminophen (TYLENOL) suppository 650 mg  650 mg Rectal Q6H PRN    0.9 % sodium chloride infusion   IntraVENous Continuous    polyethylene glycol (GLYCOLAX) packet 17 g  17 g Oral Daily       Signed:  Laureano Triplett MALENA PA

## 2022-09-19 NOTE — CARE COORDINATION
Patient discussed during rounds. Potential need for IV abx at d/c pending sensitives. Patient lives in [address] [facility] at [facility]. Referral to SNF to start admission process should the patient require IV abx at D/C.      Fernanda Dejesus LMSW    [address] [facility]

## 2022-09-20 ENCOUNTER — APPOINTMENT (OUTPATIENT)
Dept: GENERAL RADIOLOGY | Age: 77
DRG: 690 | End: 2022-09-20
Payer: MEDICARE

## 2022-09-20 VITALS
DIASTOLIC BLOOD PRESSURE: 90 MMHG | HEIGHT: 66 IN | OXYGEN SATURATION: 94 % | BODY MASS INDEX: 22.11 KG/M2 | TEMPERATURE: 97.2 F | SYSTOLIC BLOOD PRESSURE: 144 MMHG | WEIGHT: 137.57 LBS | HEART RATE: 63 BPM | RESPIRATION RATE: 18 BRPM

## 2022-09-20 PROBLEM — N39.0 UTI (URINARY TRACT INFECTION): Status: RESOLVED | Noted: 2022-09-16 | Resolved: 2022-09-20

## 2022-09-20 PROBLEM — E87.6 HYPOKALEMIA: Status: RESOLVED | Noted: 2022-09-18 | Resolved: 2022-09-20

## 2022-09-20 PROBLEM — N20.0 NEPHROLITHIASIS: Status: RESOLVED | Noted: 2022-09-18 | Resolved: 2022-09-20

## 2022-09-20 LAB
BACTERIA SPEC CULT: ABNORMAL
BACTERIA SPEC CULT: ABNORMAL
SARS-COV-2 RDRP RESP QL NAA+PROBE: NOT DETECTED
SERVICE CMNT-IMP: ABNORMAL
SOURCE: NORMAL

## 2022-09-20 PROCEDURE — 2580000003 HC RX 258: Performed by: NURSE PRACTITIONER

## 2022-09-20 PROCEDURE — 6370000000 HC RX 637 (ALT 250 FOR IP): Performed by: NURSE PRACTITIONER

## 2022-09-20 PROCEDURE — 87635 SARS-COV-2 COVID-19 AMP PRB: CPT

## 2022-09-20 PROCEDURE — 71045 X-RAY EXAM CHEST 1 VIEW: CPT

## 2022-09-20 PROCEDURE — 6360000002 HC RX W HCPCS: Performed by: NURSE PRACTITIONER

## 2022-09-20 RX ORDER — NITROFURANTOIN MACROCRYSTALS 50 MG/1
50 CAPSULE ORAL EVERY 6 HOURS SCHEDULED
Status: DISCONTINUED | OUTPATIENT
Start: 2022-09-20 | End: 2022-09-20 | Stop reason: HOSPADM

## 2022-09-20 RX ORDER — POLYETHYLENE GLYCOL 3350 17 G/17G
17 POWDER, FOR SOLUTION ORAL DAILY
Qty: 527 G | Refills: 1 | Status: SHIPPED | OUTPATIENT
Start: 2022-09-21 | End: 2022-10-21

## 2022-09-20 RX ORDER — NITROFURANTOIN MACROCRYSTALS 50 MG/1
50 CAPSULE ORAL 4 TIMES DAILY
Qty: 28 CAPSULE | Refills: 0 | Status: SHIPPED | OUTPATIENT
Start: 2022-09-20 | End: 2022-09-27

## 2022-09-20 RX ADMIN — ENOXAPARIN SODIUM 40 MG: 100 INJECTION SUBCUTANEOUS at 09:39

## 2022-09-20 RX ADMIN — LISINOPRIL 40 MG: 20 TABLET ORAL at 09:36

## 2022-09-20 RX ADMIN — POLYETHYLENE GLYCOL (3350) 17 G: 17 POWDER, FOR SOLUTION ORAL at 09:40

## 2022-09-20 RX ADMIN — HYDROXYCHLOROQUINE SULFATE 200 MG: 200 TABLET ORAL at 09:35

## 2022-09-20 RX ADMIN — DULOXETINE HYDROCHLORIDE 60 MG: 60 CAPSULE, DELAYED RELEASE ORAL at 09:38

## 2022-09-20 RX ADMIN — MEROPENEM 1000 MG: 1 INJECTION, POWDER, FOR SOLUTION INTRAVENOUS at 01:47

## 2022-09-20 RX ADMIN — MAGNESIUM GLUCONATE 500 MG ORAL TABLET 400 MG: 500 TABLET ORAL at 09:39

## 2022-09-20 RX ADMIN — PANTOPRAZOLE SODIUM 40 MG: 40 TABLET, DELAYED RELEASE ORAL at 06:22

## 2022-09-20 RX ADMIN — HYDROCHLOROTHIAZIDE 25 MG: 25 TABLET ORAL at 09:38

## 2022-09-20 NOTE — PROGRESS NOTES
HPI: Lionel House is a 74 year old gentleman who comes in today complaining of   Chief Complaint   Patient presents with   • Follow-up     CT Chest   • Sarcoidosis   .    I have been following him in account of a right lower lobe mass with small pleural effusion.  The 1st biopsy revealed minute noncaseating granulomas.  He did not respond to steroid treatment, he developed a small right-sided pleural effusion.  I attempted to sample this small loculated effusion only obtaining 20 mL or so.  Analysis showed no evidence of infection.  He has developed worsening dyspnea and a nonproductive cough.  A repeat CT scan of the chest performed 5 days ago revealed worsening of the lesion in the right lower lobe as well as persistent of a small loculated pleural effusion.    Current Outpatient Medications   Medication Sig   • sertraline (ZOLOFT) 50 MG tablet Take 1 tablet by mouth daily.   • hydrochlorothiazide (HYDRODIURIL) 25 MG tablet Take 1 tablet by mouth daily.   • pravastatin (PRAVACHOL) 40 MG tablet Take 1 tablet by mouth daily.   • famotidine (Pepcid) 40 MG tablet Take 1 tablet by mouth nightly as needed (heartburn).   • rivaroxaban (Xarelto) 20 MG Tab Take 1 tablet by mouth daily (with dinner).   • nystatin (MYCOSTATIN) 094044 UNIT/GM cream APPLY EXTERNALLY TO RASH THREE TIMES DAILY FOR 2 WEEKS   • Cholecalciferol (VITAMIN D) 2000 UNITS Cap Take 1 capsule by mouth daily.   • COVID-19 mRNA, Pfizer, 30 MCG/0.3ML vaccine Inject 0.3 mLs into the muscle.     No current facility-administered medications for this visit.       Past Medical History:   Diagnosis Date   • Anxiety and depression    • Cataract    • Erectile dysfunction    • Glaucoma (increased eye pressure)     Narrow angles, pt unsure   • Obesity    • Pulmonary embolism (CMS/HCC) 07/2016           PHYSICAL EXAMINATION:  Body mass index is 35.1 kg/m².  Weight    11/09/21 0915   Weight: 110.9 kg (244 lb 9.6 oz)     I reviewed the vital signs for this  TRANSFER - OUT REPORT:    Verbal report given to nurse Altamirano  on Milli Aguilar  being transferred to [facility] for routine progression of patient care       Report consisted of patient's Situation, Background, Assessment and   Recommendations(SBAR). Information from the following report(s) Nurse Handoff Report was reviewed with the receiving nurse. Opportunity for questions and clarification was provided.       Patient transported with:   visit.  Constitutional:  No acute distress, Non-toxic appearance.  HENT: Normocephalic, Atraumatic. Hearing is slightly diminished .  Mouth and nose were not inspected in account of COVID-19 precautions.  Eyes:  PERRLA, EOMI, Conjunctiva normal, No discharge.  Neck: Normal range of motion, No tenderness, Supple, No lymphadenopathy, No stridor.  Cardiovascular:  Normal heart rate, Normal rhythm, no obvious murmur auscultated at left parasternal border, No rubs, No gallops.  Pulmonary/Chest:  Diminished breath sounds on the right lung base. Symmetric expansion of the chest.  Extremities:  Moves all 4 extremities, fully ambulatory.  Skin:  Warm, Dry, No erythema, No rash.  Psychiatric:  Affect normal, Judgement normal, Mood normal.    EXAM: CT CHEST WO CONTRAST     INDICATION: Follow-up right lower lobe lung mass.     COMPARISON: 9/9/2021 and 7/26/2021     TECHNIQUE: Unenhanced CT chest     FINDINGS: Top normal sized mediastinal lymph nodes. No new or enlarging  lymph node. Fatty infiltration of the liver. No acute findings in the  imaged portions of the upper abdomen. Moderate right pleural effusion with  pleural thickening, similar to the prior. Right lower lobe masslike opacity  measures approximately 7 x 5.3 cm on series 3 image 69. On retrospective  measurements this was 6.5 x 5 cm on the most recent exam and 5.1 x 4.2 cm  on 7/26/2021.     There is associated atelectasis or scarring in the right lung base. Stable  minor scarring and nodularity in the left lung.     Degenerative changes in the spine with ankylosis in the thoracic spine. No  acute bony abnormality.     Coronary artery calcification.        IMPRESSION:   1. Mild progressive increase in size of the indeterminate masslike opacity  in the right lower lobe with associated moderate-sized thick-walled pleural  effusion. Consider repeat sampling as neoplasm remains a differential  consideration.  2. Otherwise no definite change since the most recent  study.  Lionel was seen today for follow-up and sarcoidosis.    Diagnoses and all orders for this visit:    Right lower lobe lung mass       A repeat robotic assisted bronchoscopy has been proposed.  The procedure will be done under general anesthesia and may include additional technologies such as radial ultrasound and fluoroscopy.  Different modes of obtaining biopsies will be use and may include:  Cytologic brushing, fine needle aspirate, transbronchial biopsy forceps alone or in combination.  Rapid on-site evaluation will be available.  Also, convex ultrasound will be used to sample the lymph nodes if needed.    Also, while sedated, he will undergo for a repeat right side, ultrasound-guided thoracentesis.    Risk and benefits of this procedure which include inherent risks to anesthesia, pneumothorax, pulmonary hemorrhage have been discussed and this patient is given me permission to proceed.    Total visit time: 30 minutes.

## 2022-09-20 NOTE — DISCHARGE SUMMARY
Hospitalist Discharge Summary   Admit Date:  2022 11:36 AM   DC Note date: 2022  Name:  Alex Tucker   Age:  68 y.o. Sex:  female  :  1945   MRN:  [id number]   Room:  [facility]  PCP:  Ceci Borja MD    Presenting Complaint: Abdominal Pain     Initial Admission Diagnosis: UTI (urinary tract infection) [N39.0]  Altered mental status, unspecified altered mental status type [R41.82]     Problem List for this Hospitalization (present on admission):    Principal Problem (Resolved):    UTI (urinary tract infection)  Active Problems:    Dementia of the Alzheimer's type, with late onset, uncomplicated ([address]    Hypertension  Resolved Problems:    Nephrolithiasis    Hypokalemia      Hospital Course:  Alex Tucker is a 68 y.o. female with medical history of recurrent UTIs, anxiety, Alzheimer's,  HTN, depression/anxiety who presented from [facility] with acute change in mentation.  accompanies pt and gives all history. Pt became more confused and somnolent. Has recurrent UTIs and states this is her typical presentation for UTIs. UA with 10-20 WBC. Afebrile. No leukocytosis. KUB with constipation. Given history of MDR UTIs, patient was started on empiric meropenem and Ucx/Bcx were collected. A KUB was obtained showing constipation and patient given MOM enema with large bowel movement. LFTs/ TSH wnl, ammonia wnl, and glucose wnl. Urine cx with enterococcus faecalis group D, sensitive to macrobid. Confirmed with pharmacy macrobid can be crushed. Family also encouraged to educate facility staff on the importance of adequate water intake to increase hydration status, decrease constipation, prevent UTIs. 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 Disposition: [facility]   Diet: ADULT DIET; Regular  Code Status: Full Code    Follow Ups:   Follow-up Information     Ceci Borja MD Follow up in 1 week(s).     Specialty: Internal Medicine  Contact information:  [address]  [address] [address] [place] [address]                       Time spent in patient discharge and coordination 60 minutes. Follow up labs/diagnostics (ultimately defer to outpatient provider):      Plan was discussed with pt daughter, , care team, Dr. Jason Alejandra.  All questions answered. Patient was stable at time of discharge. Instructions given to call a physician or return if any concerns. Current Discharge Medication List        START taking these medications    Details   polyethylene glycol (GLYCOLAX) 17 g packet Take 17 g by mouth daily  Qty: 527 g, Refills: 1      nitrofurantoin (MACRODANTIN) 50 MG capsule Take 1 capsule by mouth 4 times daily for 7 days  Qty: 28 capsule, Refills: 0           CONTINUE these medications which have NOT CHANGED    Details   lisinopril (PRINIVIL;ZESTRIL) 40 MG tablet Take 1 tablet by mouth in the morning. Qty: 30 tablet, Refills: 0      metoprolol succinate (TOPROL XL) 200 MG extended release tablet Take 1 tablet by mouth in the morning. Qty: 30 tablet, Refills: 0      DULoxetine (CYMBALTA) 60 MG extended release capsule Take 1 capsule by mouth daily TAKE 1 CAPSULES DAILY. Qty: 30 capsule, Refills: 0      magnesium oxide (MAG-OX) 400 MG tablet Take 400 mg by mouth daily      diclofenac sodium (VOLTAREN) 1 % GEL Apply 4 g topically 4 times daily  Qty: 100 g, Refills: 3    Associated Diagnoses: Trochanteric bursitis of right hip      divalproex (DEPAKOTE) 125 MG DR tablet Take 125 mg by mouth 3 times daily as needed       hydroxychloroquine (PLAQUENIL) 200 mg tablet Take 1 pill once a day after food.   Qty: 90 tablet, Refills: 0    Associated Diagnoses: Pseudogout of knee, unspecified laterality      TURMERIC PO Take 1 capsule by mouth daily      azelastine (ASTELIN) 0.1 % nasal spray 1 spray by Nasal route 2 times daily      vitamin D 25 MCG (1000 UT) CAPS Take 1,000 Units by mouth daily diphenhydrAMINE (SOMINEX) 25 MG tablet Take 25 mg by mouth every 6 hours as needed      hydroCHLOROthiazide (HYDRODIURIL) 25 MG tablet TAKE 1 TABLET BY MOUTH EACH DAY. omeprazole (PRILOSEC) 40 MG delayed release capsule TAKE 1 CAPSULE EACH DAY. STOP taking these medications       donepezil (ARICEPT) 10 MG tablet Comments:   Reason for Stopping:         memantine (NAMENDA) 10 MG tablet Comments:   Reason for Stopping:         Naproxen Sodium 220 MG CAPS Comments:   Reason for Stopping:               Procedures done this admission:  * No surgery found *    Consults this admission:  IP CONSULT TO CASE MANAGEMENT  IP CONSULT TO UROLOGY    Echocardiogram results:  06/14/22    TRANSTHORACIC ECHOCARDIOGRAM (TTE) COMPLETE (CONTRAST/BUBBLE/3D PRN) 06/14/2022  6:14 PM (Final)    Interpretation Summary  Formatting of this result is different from the original.      Left Ventricle: Normal left ventricular systolic function with a visually estimated EF of 60 - 65%. Left ventricle size is normal. Normal wall thickness. Signed by: Pauly Terrell MD on 6/14/2022  6:14 PM      Diagnostic Imaging/Tests:   XR SHOULDER RIGHT (MIN 2 VIEWS)    Result Date: 9/18/2022  No acute osseous abnormality. XR ABDOMEN (KUB) (SINGLE AP VIEW)    Result Date: 9/16/2022  1. Moderate volume of colonic stool. 2. Nonobstructive bowel gas pattern. CT HEAD WO CONTRAST    Result Date: 9/17/2022  No CT evidence of acute intracranial abnormality. CT ABDOMEN PELVIS RENAL STONE Additional Contrast? Radiologist Recommendation    Result Date: 9/17/2022  1. Nonobstructing stones in the left kidney. No hydronephrosis. 2.  Constipation.  ** If there are any questions about this report, I can be reached on [employer] or at [phone] **       Labs: Results:       BMP, Mg, Phos Recent Labs     09/18/22  0503 09/19/22  0502     --    K 3.4* 3.9     --    CO2 26  --    ANIONGAP 4  --    BUN 15  --    CREATININE 0.66  -- LABGLOM >60  --    GFRAA >60  --    CALCIUM 8.5  --    GLUCOSE 92  --    MG 1.8  --       CBC Recent Labs     09/18/22  0503 09/19/22  0502   WBC 5.3  --    RBC 3.40*  --    HGB 10.2* 10.8*   HCT 30.7* 31.9*   MCV 90.3  --    MCH 30.0  --    MCHC 33.2  --    RDW 12.3  --      --    MPV 9.0*  --    NRBC 0.00  --    SEGS 61  --    LYMPHOPCT 22  --    EOSRELPCT 6  --    MONOPCT 11  --    BASOPCT 0  --    IMMGRAN 0  --    SEGSABS 3.2  --    LYMPHSABS 1.2  --    EOSABS 0.3  --    MONOSABS 0.6  --    BASOSABS 0.0  --    ABSIMMGRAN 0.0  --       LFT No results for input(s): BILITOT, BILIDIR, ALKPHOS, AST, ALT, PROT, LABALBU, GLOB in the last 72 hours.    Cardiac  Lab Results   Component Value Date/Time    TROPHS 9.5 07/08/2022 11:28 AM      Coags No results found for: PROTIME, INR, APTT   A1c Lab Results   Component Value Date/Time    LABA1C 5.6 05/05/2021 03:56 PM    LABA1C 5.5 10/03/2019 09:18 AM     05/05/2021 03:56 PM     10/03/2019 09:18 AM      Lipids Lab Results   Component Value Date/Time    CHOL 265 11/05/2020 04:27 PM    LDLCALC 177 11/05/2020 04:27 PM    LABVLDL 22 10/03/2019 09:18 AM    HDL 52 11/05/2020 04:27 PM    TRIG 195 11/05/2020 04:27 PM      Thyroid  Lab Results   Component Value Date/Time    TSHELE 2.82 09/16/2022 12:14 PM        Most Recent UA Lab Results   Component Value Date/Time    COLORU YELLOW/STRAW 07/15/2022 03:07 PM    APPEARANCE CLEAR 07/15/2022 03:07 PM    SPECGRAV 1.013 07/15/2022 03:07 PM    LABPH 7.5 07/15/2022 03:07 PM    PROTEINU Negative 07/15/2022 03:07 PM    GLUCOSEU Negative 07/15/2022 03:07 PM    KETUA Negative 07/15/2022 03:07 PM    BILIRUBINUR Negative 07/15/2022 03:07 PM    BLOODU Negative 07/15/2022 03:07 PM    UROBILINOGEN 2.0 07/15/2022 03:07 PM    NITRU Negative 07/15/2022 03:07 PM    LEUKOCYTESUR TRACE 07/15/2022 03:07 PM    WBCUA 20-50 09/16/2022 01:58 PM    RBCUA 0-5 09/16/2022 01:58 PM    EPITHUA 0-5 09/16/2022 01:58 PM    BACTERIA Negative 09/16/2022 01:58 PM    LABCAST 0-2 09/16/2022 01:58 PM    MUCUS 0 07/08/2022 11:46 AM        Recent Labs     09/16/22  1902 09/16/22  1532 09/16/22  1358   CULTURE NO GROWTH 4 DAYS NO GROWTH 4 DAYS >100,000 COLONIES/mL ENTEROCOCCUS FAECALIS GROUP D*  <10,000 COLONIES/mL NORMAL SKIN KIERA ISOLATED       All Labs from Last 24 Hrs:  No results found for this or any previous visit (from the past 24 hour(s)).     Allergies   Allergen Reactions    Levofloxacin Other (See Comments)    Penicillins Shortness Of Breath and Swelling    Poison Oak Extract Anaphylaxis    Bupropion Other (See Comments)     Other anxiety      Cephalosporins Other (See Comments)     encephalopathy    Sulfamethoxazole Hives and Other (See Comments)     She thinks she does not do well with this    Montelukast Anxiety     Immunization History   Administered Date(s) Administered    Influenza Virus Vaccine 01/01/2014, 12/30/2016, 11/18/2017    Influenza, FLUAD, (age 72 y+), Adjuvanted, 0.5mL 11/05/2020    Influenza, FLUCELVAX, (age 10 mo+), MDCK, PF, 0.5mL 11/18/2017    Influenza, High Dose (Fluzone 65 yrs and older) 11/13/2018    Influenza, Triv, inactivated, subunit, adjuvanted, IM (Fluad 65 yrs and older) 10/03/2019    PPD Test 07/08/2022, 09/16/2022    Pneumococcal Conjugate 13-valent ([place]) 04/10/2012, 02/06/2014, 05/20/2015    Pneumococcal Polysaccharide ([id number]) 01/01/2011    Pneumococcal Vaccine 01/01/2011, 10/01/2011    Tdap (Boostrix, Adacel) 04/16/2016, 05/09/2021    Zoster Live (Zostavax) 01/01/2010       Recent Vital Data:  Patient Vitals for the past 24 hrs:   Temp Pulse Resp BP SpO2   09/20/22 1132 97.2 °F (36.2 °C) 63 18 (!) 144/90 94 %   09/20/22 0935 -- 57 -- (!) 157/77 --   09/20/22 0809 -- 58 -- (!) 157/77 --   09/20/22 0750 98.9 °F (37.2 °C) 60 19 (!) 150/82 98 %   09/20/22 0338 97.7 °F (36.5 °C) 63 18 110/87 99 %   09/19/22 2320 98.6 °F (37 °C) 60 17 (!) 143/71 94 %   09/19/22 2009 97.9 °F (36.6 °C) 66 18 128/69 94 % 09/19/22 1605 97.7 °F (36.5 °C) 63 16 (!) 149/84 96 %       Oxygen Therapy  SpO2: 94 %  O2 Device: None (Room air)    Estimated body mass index is 22.2 kg/m² as calculated from the following:    Height as of this encounter: 5' 6\" (1.676 m). Weight as of this encounter: 137 lb 9.1 oz (62.4 kg). Intake/Output Summary (Last 24 hours) at 9/20/2022 1227  Last data filed at 9/20/2022 1000  Gross per 24 hour   Intake 700 ml   Output --   Net 700 ml         Physical Exam:    General:    Well nourished. No overt distress  Head:  Normocephalic, atraumatic  Eyes:  Sclerae appear normal.  Pupils equally round. HENT:  Nares appear normal, no drainage. Moist mucous membranes  Neck:  No restricted ROM. Trachea midline  CV:   RRR. No m/r/g. No JVD  Lungs:   CTAB. No wheezing, rhonchi, or rales. Respirations even, unlabored  Abdomen:   Soft, nontender, nondistended. Extremities: Warm and dry. No cyanosis or clubbing. No edema. Skin:     No rashes. Normal coloration  Neuro: Follows some commands A/O X0  Psych:  Normal mood and affect.     Signed:  EWA Wallace CNP    Notes, labs, VS, diagnostic testing reviewed  Case discussed with pt daughter and , care team, Dr. Dom Eugene

## 2022-09-20 NOTE — PROGRESS NOTES
's initial visit to explore spiritual needs and convey care and concern. The visit was welcomed by patient's daughter. I offered spiritual interventions including active listening, affirmation of joseph & emotions, exploration of coping skills & support system, and assurance of prayers. After my visit I met patient's  in the hallway and conveyed care and concern. Family expressed appreciation for my visit.      Sarah 86, Rayne 68  Board Certified

## 2022-09-20 NOTE — CARE COORDINATION
Patient care plan reviewed in interdisciplinary rounds with the following disciplines: MD, nursing, therapy, and case management. Pt is medically stable and ready for discharge back to [facility]. CM spoke with  at [facility] who confirmed that patient can return, but needs to be admitted to their skilled nursing unit. CM spoke with patient's spouse who is in agreement, but would rather patient return to the memory care unit so isolation can be avoided.  stated that the last time patient was admitted to the skilled facility, she was in isolation for 2 weeks due to covid protocols (patient has not been vaccinated). CM advised spouse to reach out to the facility staff to discuss further. He spoke with Shashank Flores,  who agreed to follow up with management to determine if his request can be honored. Decision is pending. CM will proceed with d/c plan as if patient is discharging to a SNF. PPD was not completed, so an order was entered for a chest xray to r/o s/s of TB. CM also requested a rapid covid test.  plans to transport. SNF packet prepared and nursing staff updated on plans. Facility is prepared for patient to be admitted this afternoon. No additional discharge planning needs anticipated. 09/16/22 3097   Service Assessment   Cognition Alert   History Provided By Significant Other   Primary Caregiver Spouse   Support Systems Spouse/Significant Other;Family Members   PCP Verified by CM Yes   Prior Functional Level Assistance with the following:;Bathing;Dressing; Toileting;Cooking;Housework; Shopping   Can patient return to prior living arrangement Yes   Ability to make needs known: Good   Family able to assist with home care needs: Yes   Social/Functional History   Lives With Other (comment)  (Memory Care)   Type of Home Assisted living   Shaun Help From Family;Personal care attendant   Active  No   Occupation Retired   Discharge Planning   Type of [address] Other (Comment)   Current Services Prior To Admission Extended [address]   DME Ordered? No   Potential Assistance Purchasing Medications No   Type of Home Care Services None   Patient expects to be discharged to: Long-term care  (Memory Care at [facility])   One/Two Story Residence Other (comment)   Lift Chair Available No   History of falls?  1

## 2022-09-21 ENCOUNTER — CARE COORDINATION (OUTPATIENT)
Dept: CARE COORDINATION | Facility: CLINIC | Age: 77
End: 2022-09-21

## 2022-09-22 LAB
BACTERIA SPEC CULT: NORMAL
BACTERIA SPEC CULT: NORMAL
SERVICE CMNT-IMP: NORMAL
SERVICE CMNT-IMP: NORMAL

## 2022-10-05 ENCOUNTER — CARE COORDINATION (OUTPATIENT)
Dept: CARE COORDINATION | Facility: CLINIC | Age: 77
End: 2022-10-05

## 2022-10-05 NOTE — CARE COORDINATION
Transition of care outreach postponed for 7 days due to patient's discharge to and continued stay at SNF.

## 2022-10-07 ENCOUNTER — OFFICE VISIT (OUTPATIENT)
Dept: UROLOGY | Age: 77
End: 2022-10-07
Payer: MEDICARE

## 2022-10-07 DIAGNOSIS — K59.00 CONSTIPATION, UNSPECIFIED CONSTIPATION TYPE: ICD-10-CM

## 2022-10-07 DIAGNOSIS — N39.0 RECURRENT UTI: ICD-10-CM

## 2022-10-07 DIAGNOSIS — N20.0 KIDNEY STONE: Primary | ICD-10-CM

## 2022-10-07 LAB — PVR, POC: ABNORMAL CC

## 2022-10-07 PROCEDURE — G8427 DOCREV CUR MEDS BY ELIG CLIN: HCPCS | Performed by: NURSE PRACTITIONER

## 2022-10-07 PROCEDURE — 1123F ACP DISCUSS/DSCN MKR DOCD: CPT | Performed by: NURSE PRACTITIONER

## 2022-10-07 PROCEDURE — G8484 FLU IMMUNIZE NO ADMIN: HCPCS | Performed by: NURSE PRACTITIONER

## 2022-10-07 PROCEDURE — 1111F DSCHRG MED/CURRENT MED MERGE: CPT | Performed by: NURSE PRACTITIONER

## 2022-10-07 PROCEDURE — 99204 OFFICE O/P NEW MOD 45 MIN: CPT | Performed by: NURSE PRACTITIONER

## 2022-10-07 PROCEDURE — 51798 US URINE CAPACITY MEASURE: CPT | Performed by: NURSE PRACTITIONER

## 2022-10-07 PROCEDURE — 1090F PRES/ABSN URINE INCON ASSESS: CPT | Performed by: NURSE PRACTITIONER

## 2022-10-07 PROCEDURE — 1036F TOBACCO NON-USER: CPT | Performed by: NURSE PRACTITIONER

## 2022-10-07 PROCEDURE — G8399 PT W/DXA RESULTS DOCUMENT: HCPCS | Performed by: NURSE PRACTITIONER

## 2022-10-07 PROCEDURE — G8420 CALC BMI NORM PARAMETERS: HCPCS | Performed by: NURSE PRACTITIONER

## 2022-10-07 RX ORDER — DOCUSATE SODIUM 100 MG/1
100 CAPSULE, LIQUID FILLED ORAL DAILY
Qty: 90 CAPSULE | Refills: 1 | Status: SHIPPED | OUTPATIENT
Start: 2022-10-07

## 2022-10-07 RX ORDER — PYRIDOXINE HCL (VITAMIN B6) 100 MG
500 TABLET ORAL 2 TIMES DAILY
Qty: 180 CAPSULE | Refills: 1 | Status: SHIPPED | OUTPATIENT
Start: 2022-10-07

## 2022-10-07 RX ORDER — ONDANSETRON 4 MG/1
4 TABLET, FILM COATED ORAL EVERY 8 HOURS PRN
COMMUNITY

## 2022-10-07 RX ORDER — NITROFURANTOIN MACROCRYSTALS 50 MG/1
50 CAPSULE ORAL 4 TIMES DAILY
COMMUNITY

## 2022-10-07 RX ORDER — MEMANTINE HYDROCHLORIDE 5 MG/1
5 TABLET ORAL 2 TIMES DAILY
COMMUNITY

## 2022-10-07 RX ORDER — ACETAMINOPHEN 500 MG
500 TABLET ORAL EVERY 6 HOURS PRN
COMMUNITY

## 2022-10-07 RX ORDER — DIVALPROEX SODIUM 125 MG/1
125 TABLET, DELAYED RELEASE ORAL 3 TIMES DAILY
COMMUNITY

## 2022-10-07 RX ORDER — NITROFURANTOIN 25; 75 MG/1; MG/1
100 CAPSULE ORAL DAILY
Qty: 90 CAPSULE | Refills: 1 | Status: SHIPPED | OUTPATIENT
Start: 2022-10-07

## 2022-10-07 ASSESSMENT — ENCOUNTER SYMPTOMS
INDIGESTION: 1
HEARTBURN: 1

## 2022-10-07 NOTE — PROGRESS NOTES
[facility]  [address], [address]  : 1945    Chief Complaint   Patient presents with    New Patient    Nephrolithiasis    Urinary Tract Infection          HPI     Eduardo Dotson is a 68 y.o. female w hx of dementia being seen today as a new pt referred by [facility] for recurrent uti and kidney stones. Accompanied by  and he provides all info. She does not speak during visit. Hospitalized at [facility] 22-22 for UTI. Urine cx with enterococcus faecalis group D, sensitive to macrobid. CT A/P wo contrast revealed non obstructing stones to left kidney and sig constipation. Here today for f/u. Upon chart review, she had 3 UTI from May to July of this yr.  reports her main sx is AMS. She is pleasantly confused today. She has no reported flank pain. Afebrile. She has a long hx of constipation. Received milk of molasses enema while inpt. Currently on daily miralax. She is in a pull up, but does use toilet at times. PVR 0 cc today. Cr 0.66. Could not provide a voided urine specimen. KUB deferred today. Lives at [facility] memory care unit.    Past Medical History:   Diagnosis Date    Alcohol abuse, daily use 2014    Alzheimer's dementia ([facility] [address]     Depression 2014    Drug-induced encephalopathy 2022    Due to cefepime    Hypertension     Lumbar stenosis 2012    Osteopenia 3/19/2015    Pseudomonas aeruginosa infection 2022    Sinus problem     Spondylolisthesis of lumbar region 2012    UTI (urinary tract infection) 2022    Vitamin D deficiency 2014     Past Surgical History:   Procedure Laterality Date    BACK SURGERY  12    L3-L4 Metrix TLIF with sextant/bilateral decompression - Dr. Sugey Alcala LUMPECTOMY Bilateral     HEENT Bilateral 2015    sinus    HYSTERECTOMY (CERVIX STATUS UNKNOWN)      complete    OTHER SURGICAL HISTORY      sinuses x2 TONSILLECTOMY      WRIST FRACTURE SURGERY       Current Outpatient Medications   Medication Sig Dispense Refill    acetaminophen (TYLENOL) 500 MG tablet Take 500 mg by mouth every 6 hours as needed for Pain      divalproex (DEPAKOTE) 125 MG DR tablet Take 125 mg by mouth 3 times daily      memantine (NAMENDA) 5 MG tablet Take 5 mg by mouth 2 times daily      nitrofurantoin (MACRODANTIN) 50 MG capsule Take 50 mg by mouth 4 times daily      ondansetron (ZOFRAN) 4 MG tablet Take 4 mg by mouth every 8 hours as needed for Nausea or Vomiting      nitrofurantoin, macrocrystal-monohydrate, (MACROBID) 100 MG capsule Take 1 capsule by mouth daily 90 capsule 1    docusate sodium (COLACE) 100 MG capsule Take 1 capsule by mouth daily 90 capsule 1    Cranberry 500 MG CAPS Take 1 capsule by mouth 2 times daily 180 capsule 1    metoprolol succinate (TOPROL XL) 200 MG extended release tablet Take 1 tablet by mouth in the morning. 30 tablet 0    diclofenac sodium (VOLTAREN) 1 % GEL Apply 4 g topically 4 times daily 100 g 3    hydroxychloroquine (PLAQUENIL) 200 mg tablet Take 1 pill once a day after food. 90 tablet 0    TURMERIC PO Take 1 capsule by mouth daily      azelastine (ASTELIN) 0.1 % nasal spray 1 spray by Nasal route 2 times daily      hydroCHLOROthiazide (HYDRODIURIL) 25 MG tablet TAKE 1 TABLET BY MOUTH EACH DAY. omeprazole (PRILOSEC) 40 MG delayed release capsule TAKE 1 CAPSULE EACH DAY. polyethylene glycol (GLYCOLAX) 17 g packet Take 17 g by mouth daily 527 g 1    lisinopril (PRINIVIL;ZESTRIL) 40 MG tablet Take 1 tablet by mouth in the morning. 30 tablet 0    DULoxetine (CYMBALTA) 60 MG extended release capsule Take 1 capsule by mouth daily TAKE 1 CAPSULES DAILY.  30 capsule 0    magnesium oxide (MAG-OX) 400 MG tablet Take 400 mg by mouth daily      divalproex (DEPAKOTE) 125 MG DR tablet Take 125 mg by mouth 3 times daily as needed       vitamin D 25 MCG (1000 UT) CAPS Take 1,000 Units by mouth daily diphenhydrAMINE (SOMINEX) 25 MG tablet Take 25 mg by mouth every 6 hours as needed       No current facility-administered medications for this visit. Allergies   Allergen Reactions    Levofloxacin Other (See Comments)    Penicillins Shortness Of Breath and Swelling    Poison Oak Extract Anaphylaxis    Bupropion Other (See Comments)     Other anxiety      Cephalosporins Other (See Comments)     encephalopathy    Sulfamethoxazole Hives and Other (See Comments)     She thinks she does not do well with this    Montelukast Anxiety     Social History     Socioeconomic History    Marital status:      Spouse name: Not on file    Number of children: Not on file    Years of education: 1 post HS    Highest education level: Not on file   Occupational History    Not on file   Tobacco Use    Smoking status: Former     Packs/day: 1.00     Types: Cigarettes     Quit date: 1972     Years since quittin.7    Smokeless tobacco: Never   Substance and Sexual Activity    Alcohol use: Yes     Alcohol/week: 14.0 standard drinks    Drug use: No    Sexual activity: Not on file   Other Topics Concern    Not on file   Social History Narrative    , not active, gets up around 12 noon and then does things in the afternoon if a friend comes over. Unable to watch TV as cant figure out the controls.       Social Determinants of Health     Financial Resource Strain: Not on file   Food Insecurity: Not on file   Transportation Needs: Not on file   Physical Activity: Not on file   Stress: Not on file   Social Connections: Not on file   Intimate Partner Violence: Not on file   Housing Stability: Not on file     Family History   Problem Relation Age of Onset    Osteoarthritis Mother     Heart Disease Mother     Stroke Mother     Breast Cancer Neg Hx     Coronary Art Dis Maternal Uncle     Heart Attack Mother     Hypertension Sister     Alzheimer's Disease Father 48    Dementia Father     Stroke Father     Heart Disease Sister stent and AVR       Review of Systems  Cardiovascular: Positive for hypertension, leg pain and leg swelling. GI: Positive for indigestion and heartburn. Genitourinary: Positive for recurrent UTIs, nocturia and hysterectomy. Number of pregnancies: 2. Number of births: 2. Musculoskeletal: Positive for bone pain, arthralgias, tenderness and muscle weakness. Hem/Lymphatic: Positive for easy bruising. Urinalysis  UA - Dipstick  Results for orders placed or performed in visit on 05/24/22   AMB POC URINALYSIS DIP STICK AUTO W/O MICRO   Result Value Ref Range    Color, Urine, POC yell     Clarity, Urine, POC turb     Glucose, Urine, POC Negative Negative    Bilirubin, Urine, POC Negative Negative    Ketones, Urine, POC Negative Negative    Specific Gravity, Urine, POC 1.015 1.001 - 1.035    Blood, Urine, POC neg Negative    pH, Urine, POC 5.0 4.6 - 8.0    Protein, Urine, POC Negative Negative    Urobilinogen, POC 0.2     Nitrate, Urine, POC Positive Negative    Leukocyte Esterase, Urine, POC 2+ Negative       PHYSICAL EXAM    General appearance - well appearing and in no distress  Mental status - alert, nonverbal  Neck - supple, no significant adenopathy  Chest/Lung-  Quiet, even and easy respiratory effort without use of accessory muscles  Skin - normal coloration and turgor, no rashes        Assessment and Plan    ICD-10-CM    1. Kidney stone  N20.0 AMB POC PVR, CHERISE,POST-VOID RES,US,NON-IMAGING     AMB POC XRAY ABDOMEN 1 VIEW      2. Recurrent UTI  N39.0 AMB POC PVR, CHERISE,POST-VOID RES,US,NON-IMAGING     AMB POC XRAY ABDOMEN 1 VIEW     nitrofurantoin, macrocrystal-monohydrate, (MACROBID) 100 MG capsule     Cranberry 500 MG CAPS      3. Constipation, unspecified constipation type  K59.00 docusate sodium (COLACE) 100 MG capsule        She is asx today.  reports he would not want surgery unless stones become obstructing. Will follow on XR. KUB at next visit. Stone prevention discussed.      Start macrobid suppression. Risks, benefits, and alternatives reviewed. I have educated patient to behavioral changes that can decrease the frequency of any urinary infection. These include eliminating constipation, front to back wiping, frequent voiding to keep bladder volumes low, double voiding, avoid soaking in water (including baths, pools, hot tubs), avoiding tight fitting clothes, avoiding douching, use of cranberry products, and use of probiotics. I encouraged consuming minimum of 64 oz of water daily. I also recommend avoiding consumption of sugary beverages and other known bladder irritants. Needs to avoid constipation. Continue miralax. Add daily colace. All scripts printed.  will take back KARTHIKEYAN TALLEY in 3 months. To call sooner if needed. Felix Pringle is supervising physician today and he approves plan of care.

## 2022-10-12 ENCOUNTER — CARE COORDINATION (OUTPATIENT)
Dept: CARE COORDINATION | Facility: CLINIC | Age: 77
End: 2022-10-12

## 2022-10-12 NOTE — CARE COORDINATION
Placed call to [facility] at [phone]. Call directed to message center. Left voice message, identified self, reason for call, return call contact information, Audelia Schwab LPN [address] [place] [address], requested and awaiting return call.

## 2022-10-13 ENCOUNTER — CARE COORDINATION (OUTPATIENT)
Dept: CARE COORDINATION | Facility: CLINIC | Age: 77
End: 2022-10-13

## 2022-10-13 NOTE — CARE COORDINATION
Spoke with Scntchis at the [facility]. No anticipated discharge at this time. No further outreach indicated.

## 2022-11-05 ENCOUNTER — HOSPITAL ENCOUNTER (INPATIENT)
Age: 77
LOS: 5 days | Discharge: NURSING, REHAB OR LONG-TERM CARE | DRG: 689 | End: 2022-11-10
Attending: EMERGENCY MEDICINE | Admitting: INTERNAL MEDICINE
Payer: MEDICARE

## 2022-11-05 ENCOUNTER — HOSPITAL ENCOUNTER (EMERGENCY)
Dept: GENERAL RADIOLOGY | Age: 77
Discharge: HOME | DRG: 689 | End: 2022-11-08
Payer: MEDICARE

## 2022-11-05 ENCOUNTER — HOSPITAL ENCOUNTER (INPATIENT)
Dept: CT IMAGING | Age: 77
DRG: 689 | End: 2022-11-05
Payer: MEDICARE

## 2022-11-05 ENCOUNTER — HOSPITAL ENCOUNTER (INPATIENT)
Dept: CT IMAGING | Age: 77
Discharge: HOME | DRG: 689 | End: 2022-11-08
Payer: MEDICARE

## 2022-11-05 DIAGNOSIS — E86.0 DEHYDRATION: ICD-10-CM

## 2022-11-05 DIAGNOSIS — R41.82 ALTERED MENTAL STATUS, UNSPECIFIED ALTERED MENTAL STATUS TYPE: ICD-10-CM

## 2022-11-05 DIAGNOSIS — N30.00 ACUTE CYSTITIS WITHOUT HEMATURIA: Primary | ICD-10-CM

## 2022-11-05 PROBLEM — N39.0 UTI (URINARY TRACT INFECTION): Status: ACTIVE | Noted: 2022-11-05

## 2022-11-05 PROBLEM — E03.9 ACQUIRED HYPOTHYROIDISM: Status: ACTIVE | Noted: 2018-09-04

## 2022-11-05 PROBLEM — G93.40 ENCEPHALOPATHY: Status: ACTIVE | Noted: 2022-11-05

## 2022-11-05 LAB
ALBUMIN SERPL-MCNC: 3.5 G/DL (ref 3.2–4.6)
ALBUMIN/GLOB SERPL: 1 {RATIO} (ref 0.4–1.6)
ALP SERPL-CCNC: 63 U/L (ref 50–136)
ALT SERPL-CCNC: 15 U/L (ref 12–65)
ANION GAP SERPL CALC-SCNC: 6 MMOL/L (ref 2–11)
APPEARANCE UR: ABNORMAL
AST SERPL-CCNC: 20 U/L (ref 15–37)
BACTERIA URNS QL MICRO: 0 /HPF
BASOPHILS # BLD: 0.1 K/UL (ref 0–0.2)
BASOPHILS NFR BLD: 1 % (ref 0–2)
BILIRUB SERPL-MCNC: 0.4 MG/DL (ref 0.2–1.1)
BILIRUB UR QL: NEGATIVE
BUN SERPL-MCNC: 60 MG/DL (ref 8–23)
CALCIUM SERPL-MCNC: 10.2 MG/DL (ref 8.3–10.4)
CHLORIDE SERPL-SCNC: 106 MMOL/L (ref 101–110)
CO2 SERPL-SCNC: 31 MMOL/L (ref 21–32)
COLOR UR: ABNORMAL
CREAT SERPL-MCNC: 1.25 MG/DL (ref 0.6–1)
DIFFERENTIAL METHOD BLD: NORMAL
EOSINOPHIL # BLD: 0.3 K/UL (ref 0–0.8)
EOSINOPHIL NFR BLD: 3 % (ref 0.5–7.8)
EPI CELLS #/AREA URNS HPF: ABNORMAL /HPF
ERYTHROCYTE [DISTWIDTH] IN BLOOD BY AUTOMATED COUNT: 13.3 % (ref 11.9–14.6)
GLOBULIN SER CALC-MCNC: 3.6 G/DL (ref 2.8–4.5)
GLUCOSE SERPL-MCNC: 130 MG/DL (ref 65–100)
GLUCOSE UR STRIP.AUTO-MCNC: NEGATIVE MG/DL
HCT VFR BLD AUTO: 39.3 % (ref 35.8–46.3)
HGB BLD-MCNC: 12.8 G/DL (ref 11.7–15.4)
HGB UR QL STRIP: NEGATIVE
IMM GRANULOCYTES # BLD AUTO: 0 K/UL (ref 0–0.5)
IMM GRANULOCYTES NFR BLD AUTO: 0 % (ref 0–5)
KETONES UR QL STRIP.AUTO: ABNORMAL MG/DL
LACTATE SERPL-SCNC: 1 MMOL/L (ref 0.4–2)
LEUKOCYTE ESTERASE UR QL STRIP.AUTO: ABNORMAL
LYMPHOCYTES # BLD: 1.7 K/UL (ref 0.5–4.6)
LYMPHOCYTES NFR BLD: 18 % (ref 13–44)
MCH RBC QN AUTO: 30.2 PG (ref 26.1–32.9)
MCHC RBC AUTO-ENTMCNC: 32.6 G/DL (ref 31.4–35)
MCV RBC AUTO: 92.7 FL (ref 82–102)
MONOCYTES # BLD: 0.6 K/UL (ref 0.1–1.3)
MONOCYTES NFR BLD: 7 % (ref 4–12)
NEUTS SEG # BLD: 6.5 K/UL (ref 1.7–8.2)
NEUTS SEG NFR BLD: 71 % (ref 43–78)
NITRITE UR QL STRIP.AUTO: NEGATIVE
NRBC # BLD: 0 K/UL (ref 0–0.2)
PH UR STRIP: 5.5 [PH] (ref 5–9)
PLATELET # BLD AUTO: 267 K/UL (ref 150–450)
PMV BLD AUTO: 9.7 FL (ref 9.4–12.3)
POTASSIUM SERPL-SCNC: 3.7 MMOL/L (ref 3.5–5.1)
PROCALCITONIN SERPL-MCNC: 3.32 NG/ML (ref 0–0.49)
PROT SERPL-MCNC: 7.1 G/DL (ref 6.3–8.2)
PROT UR STRIP-MCNC: ABNORMAL MG/DL
RBC # BLD AUTO: 4.24 M/UL (ref 4.05–5.2)
SODIUM SERPL-SCNC: 143 MMOL/L (ref 133–143)
SP GR UR REFRACTOMETRY: 1.02 (ref 1–1.02)
TROPONIN I SERPL HS-MCNC: 16.3 PG/ML (ref 0–14)
UROBILINOGEN UR QL STRIP.AUTO: 0.2 EU/DL (ref 0.2–1)
WBC # BLD AUTO: 9.2 K/UL (ref 4.3–11.1)
WBC URNS QL MICRO: >100 /HPF

## 2022-11-05 PROCEDURE — 71045 X-RAY EXAM CHEST 1 VIEW: CPT

## 2022-11-05 PROCEDURE — 93005 ELECTROCARDIOGRAM TRACING: CPT | Performed by: INTERNAL MEDICINE

## 2022-11-05 PROCEDURE — 6370000000 HC RX 637 (ALT 250 FOR IP): Performed by: INTERNAL MEDICINE

## 2022-11-05 PROCEDURE — 87040 BLOOD CULTURE FOR BACTERIA: CPT

## 2022-11-05 PROCEDURE — 84145 PROCALCITONIN (PCT): CPT

## 2022-11-05 PROCEDURE — 2580000003 HC RX 258: Performed by: INTERNAL MEDICINE

## 2022-11-05 PROCEDURE — 84484 ASSAY OF TROPONIN QUANT: CPT

## 2022-11-05 PROCEDURE — 87088 URINE BACTERIA CULTURE: CPT

## 2022-11-05 PROCEDURE — 87086 URINE CULTURE/COLONY COUNT: CPT

## 2022-11-05 PROCEDURE — 87186 SC STD MICRODIL/AGAR DIL: CPT

## 2022-11-05 PROCEDURE — 80053 COMPREHEN METABOLIC PANEL: CPT

## 2022-11-05 PROCEDURE — 2500000003 HC RX 250 WO HCPCS: Performed by: INTERNAL MEDICINE

## 2022-11-05 PROCEDURE — 85025 COMPLETE CBC W/AUTO DIFF WBC: CPT

## 2022-11-05 PROCEDURE — 83605 ASSAY OF LACTIC ACID: CPT

## 2022-11-05 PROCEDURE — 70450 CT HEAD/BRAIN W/O DYE: CPT

## 2022-11-05 PROCEDURE — 81001 URINALYSIS AUTO W/SCOPE: CPT

## 2022-11-05 PROCEDURE — 99285 EMERGENCY DEPT VISIT HI MDM: CPT

## 2022-11-05 PROCEDURE — 1100000000 HC RM PRIVATE

## 2022-11-05 PROCEDURE — 6360000002 HC RX W HCPCS: Performed by: INTERNAL MEDICINE

## 2022-11-05 PROCEDURE — 2580000003 HC RX 258: Performed by: EMERGENCY MEDICINE

## 2022-11-05 RX ORDER — LORAZEPAM 2 MG/ML
1 INJECTION INTRAMUSCULAR ONCE
Status: COMPLETED | OUTPATIENT
Start: 2022-11-05 | End: 2022-11-05

## 2022-11-05 RX ORDER — POLYETHYLENE GLYCOL 3350 17 G/17G
17 POWDER, FOR SOLUTION ORAL DAILY PRN
Status: DISCONTINUED | OUTPATIENT
Start: 2022-11-05 | End: 2022-11-10 | Stop reason: HOSPADM

## 2022-11-05 RX ORDER — ONDANSETRON 2 MG/ML
4 INJECTION INTRAMUSCULAR; INTRAVENOUS EVERY 6 HOURS PRN
Status: DISCONTINUED | OUTPATIENT
Start: 2022-11-05 | End: 2022-11-10 | Stop reason: HOSPADM

## 2022-11-05 RX ORDER — LANOLIN ALCOHOL/MO/W.PET/CERES
400 CREAM (GRAM) TOPICAL DAILY
Status: DISCONTINUED | OUTPATIENT
Start: 2022-11-05 | End: 2022-11-10 | Stop reason: HOSPADM

## 2022-11-05 RX ORDER — ACETAMINOPHEN 650 MG/1
650 SUPPOSITORY RECTAL EVERY 6 HOURS PRN
Status: DISCONTINUED | OUTPATIENT
Start: 2022-11-05 | End: 2022-11-10 | Stop reason: HOSPADM

## 2022-11-05 RX ORDER — HYDROCHLOROTHIAZIDE 25 MG/1
25 TABLET ORAL DAILY
Status: DISCONTINUED | OUTPATIENT
Start: 2022-11-06 | End: 2022-11-10 | Stop reason: HOSPADM

## 2022-11-05 RX ORDER — MEMANTINE HYDROCHLORIDE 5 MG/1
5 TABLET ORAL 2 TIMES DAILY
Status: DISCONTINUED | OUTPATIENT
Start: 2022-11-05 | End: 2022-11-10 | Stop reason: HOSPADM

## 2022-11-05 RX ORDER — SODIUM CHLORIDE 0.9 % (FLUSH) 0.9 %
5-40 SYRINGE (ML) INJECTION PRN
Status: DISCONTINUED | OUTPATIENT
Start: 2022-11-05 | End: 2022-11-10 | Stop reason: HOSPADM

## 2022-11-05 RX ORDER — ENOXAPARIN SODIUM 100 MG/ML
30 INJECTION SUBCUTANEOUS NIGHTLY
Status: DISCONTINUED | OUTPATIENT
Start: 2022-11-05 | End: 2022-11-10 | Stop reason: HOSPADM

## 2022-11-05 RX ORDER — LISINOPRIL 20 MG/1
40 TABLET ORAL DAILY
Status: DISCONTINUED | OUTPATIENT
Start: 2022-11-06 | End: 2022-11-06

## 2022-11-05 RX ORDER — ONDANSETRON 4 MG/1
4 TABLET, ORALLY DISINTEGRATING ORAL EVERY 8 HOURS PRN
Status: DISCONTINUED | OUTPATIENT
Start: 2022-11-05 | End: 2022-11-10 | Stop reason: HOSPADM

## 2022-11-05 RX ORDER — DIVALPROEX SODIUM 125 MG/1
125 TABLET, DELAYED RELEASE ORAL 3 TIMES DAILY
Status: DISCONTINUED | OUTPATIENT
Start: 2022-11-05 | End: 2022-11-10 | Stop reason: HOSPADM

## 2022-11-05 RX ORDER — SODIUM CHLORIDE, SODIUM LACTATE, POTASSIUM CHLORIDE, CALCIUM CHLORIDE 600; 310; 30; 20 MG/100ML; MG/100ML; MG/100ML; MG/100ML
INJECTION, SOLUTION INTRAVENOUS CONTINUOUS
Status: DISCONTINUED | OUTPATIENT
Start: 2022-11-05 | End: 2022-11-10 | Stop reason: HOSPADM

## 2022-11-05 RX ORDER — METOPROLOL SUCCINATE 100 MG/1
200 TABLET, EXTENDED RELEASE ORAL DAILY
Status: DISCONTINUED | OUTPATIENT
Start: 2022-11-06 | End: 2022-11-10 | Stop reason: HOSPADM

## 2022-11-05 RX ORDER — 0.9 % SODIUM CHLORIDE 0.9 %
1000 INTRAVENOUS SOLUTION INTRAVENOUS ONCE
Status: COMPLETED | OUTPATIENT
Start: 2022-11-05 | End: 2022-11-05

## 2022-11-05 RX ORDER — PYRIDOXINE HCL (VITAMIN B6) 100 MG
500 TABLET ORAL 2 TIMES DAILY
Status: DISCONTINUED | OUTPATIENT
Start: 2022-11-05 | End: 2022-11-05 | Stop reason: CLARIF

## 2022-11-05 RX ORDER — DULOXETIN HYDROCHLORIDE 60 MG/1
60 CAPSULE, DELAYED RELEASE ORAL DAILY
Status: DISCONTINUED | OUTPATIENT
Start: 2022-11-06 | End: 2022-11-10 | Stop reason: HOSPADM

## 2022-11-05 RX ORDER — HYDROXYCHLOROQUINE SULFATE 200 MG/1
200 TABLET, FILM COATED ORAL DAILY
Status: DISCONTINUED | OUTPATIENT
Start: 2022-11-06 | End: 2022-11-10 | Stop reason: HOSPADM

## 2022-11-05 RX ORDER — PANTOPRAZOLE SODIUM 40 MG/1
40 TABLET, DELAYED RELEASE ORAL
Status: DISCONTINUED | OUTPATIENT
Start: 2022-11-06 | End: 2022-11-10 | Stop reason: HOSPADM

## 2022-11-05 RX ORDER — SODIUM CHLORIDE 0.9 % (FLUSH) 0.9 %
5-40 SYRINGE (ML) INJECTION EVERY 12 HOURS SCHEDULED
Status: DISCONTINUED | OUTPATIENT
Start: 2022-11-05 | End: 2022-11-10 | Stop reason: HOSPADM

## 2022-11-05 RX ORDER — SODIUM CHLORIDE 9 MG/ML
INJECTION, SOLUTION INTRAVENOUS PRN
Status: DISCONTINUED | OUTPATIENT
Start: 2022-11-05 | End: 2022-11-10 | Stop reason: HOSPADM

## 2022-11-05 RX ORDER — ACETAMINOPHEN 325 MG/1
650 TABLET ORAL EVERY 6 HOURS PRN
Status: DISCONTINUED | OUTPATIENT
Start: 2022-11-05 | End: 2022-11-10 | Stop reason: HOSPADM

## 2022-11-05 RX ADMIN — LORAZEPAM 1 MG: 2 INJECTION INTRAMUSCULAR; INTRAVENOUS at 15:47

## 2022-11-05 RX ADMIN — SODIUM CHLORIDE, PRESERVATIVE FREE 5 ML: 5 INJECTION INTRAVENOUS at 20:34

## 2022-11-05 RX ADMIN — AZTREONAM 1000 MG: 1 INJECTION, POWDER, LYOPHILIZED, FOR SOLUTION INTRAMUSCULAR; INTRAVENOUS at 15:30

## 2022-11-05 RX ADMIN — MEMANTINE 5 MG: 5 TABLET ORAL at 20:27

## 2022-11-05 RX ADMIN — AZTREONAM 1000 MG: 1 INJECTION, POWDER, LYOPHILIZED, FOR SOLUTION INTRAMUSCULAR; INTRAVENOUS at 22:35

## 2022-11-05 RX ADMIN — SODIUM CHLORIDE 1000 ML: 9 INJECTION, SOLUTION INTRAVENOUS at 14:43

## 2022-11-05 RX ADMIN — ENOXAPARIN SODIUM 30 MG: 100 INJECTION SUBCUTANEOUS at 20:27

## 2022-11-05 RX ADMIN — DIVALPROEX SODIUM 125 MG: 125 TABLET, DELAYED RELEASE ORAL at 20:27

## 2022-11-05 RX ADMIN — Medication 400 MG: at 18:14

## 2022-11-05 RX ADMIN — SODIUM CHLORIDE, POTASSIUM CHLORIDE, SODIUM LACTATE AND CALCIUM CHLORIDE: 600; 310; 30; 20 INJECTION, SOLUTION INTRAVENOUS at 17:20

## 2022-11-05 RX ADMIN — VANCOMYCIN HYDROCHLORIDE 1000 MG: 1 INJECTION, POWDER, LYOPHILIZED, FOR SOLUTION INTRAVENOUS at 20:19

## 2022-11-05 ASSESSMENT — ENCOUNTER SYMPTOMS
COUGH: 0
RHINORRHEA: 0
SHORTNESS OF BREATH: 0
ABDOMINAL DISTENTION: 0
EYE REDNESS: 0
ANAL BLEEDING: 0
VOMITING: 1
NAUSEA: 0
CONSTIPATION: 0
BLOOD IN STOOL: 0

## 2022-11-05 NOTE — H&P
Admit date: 2022   Name:  Marcy Jefferson   Age:  68 y.o.   :  1945   MRN:  [id number]   PCP:  Jacob Noguera MD   Provider:  Michael Steven MD      ASSESSMENT AND PLAN  72-year-old female with a past medical history of advanced dementia, depression, hypertension, recurrent UTIs, that presents from memory care unit in the setting of altered mental status    1. Acute metabolic encephalopathy likely in the setting of UTI  -CT of the brain without acute intracranial abnormalities  -Start IV fluids  -Start IV antibiotics with aztreonam and vancomycin in the setting of penicillin and cephalosporin allergies  -Prior urine cultures with Pseudomonas and Enterococcus faecalis  -Follow urine culture  -Monitor mental status  -Avoid sedatives  -Delirium precautions    2. Abnormal EKG with concerns of atrial fibrillation, right bundle branch block and T wave inversions in V1, V2, V3  -Telemetry monitoring  -Repeat EKG stat  -Obtain troponin  -We will consider cardiology consultation pending repeat EKG    3. Hypertension  -Continue metoprolol  -Hold hydrochlorothiazide and lisinopril for now since normotensive    4. Depression  -Continue Cymbalta    5. Advanced dementia  -Continue Depakote and Namenda    DVT prophylaxis with Lovenox    Full code     aware of plan      CHIEF COMPLAINT:  Altered mental status      HISTORY OF PRESENT ILLNESS:  72-year-old female with a past medical history of advanced dementia, depression, hypertension, recurrent UTIs, that presents from memory care unit in the setting of altered mental status. Most of the history obtained by  at bedside and by chart since currently the patient unable to provide any detailed history due to her encephalopathy and history of advanced dementia.   According to the  the patient has been presenting with worsening confusion for the past few days that has been getting worse over time so they decided to bring her to the emergency department. Otherwise no nausea or vomiting, no shortness of breath or cough. In the emergency department the patient was found to be hemodynamically stable with urinalysis positive for pyuria and bacteriuria. She will be admitted to the medical floors for further management. Past Medical History:   Diagnosis Date    Alcohol abuse, daily use 11/21/2014    Alzheimer's dementia ([facility] [address]     Depression 11/21/2014    Drug-induced encephalopathy 7/17/2022    Due to cefepime    Hypertension     Lumbar stenosis 7/4/2012    Osteopenia 3/19/2015    Pseudomonas aeruginosa infection 7/11/2022    Sinus problem     Spondylolisthesis of lumbar region 7/4/2012    UTI (urinary tract infection) 7/8/2022    Vitamin D deficiency 11/21/2014       Past Surgical History:   Procedure Laterality Date    BACK SURGERY  5/31/12    L3-L4 Metrix TLIF with sextant/bilateral decompression - Dr. Marco Crenshaw LUMPECTOMY Bilateral     HEENT Bilateral 2015    sinus    HYSTERECTOMY (CERVIX STATUS UNKNOWN)      complete    OTHER SURGICAL HISTORY      sinuses x2    TONSILLECTOMY      WRIST FRACTURE SURGERY            HOME MEDICATION:  Prior to Admission medications    Medication Sig Start Date End Date Taking?  Authorizing Provider   acetaminophen (TYLENOL) 500 MG tablet Take 500 mg by mouth every 6 hours as needed for Pain    Historical Provider, MD   divalproex (DEPAKOTE) 125 MG DR tablet Take 125 mg by mouth 3 times daily    Historical Provider, MD   memantine (NAMENDA) 5 MG tablet Take 5 mg by mouth 2 times daily    Historical Provider, MD   nitrofurantoin (MACRODANTIN) 50 MG capsule Take 50 mg by mouth 4 times daily    Historical Provider, MD   ondansetron (ZOFRAN) 4 MG tablet Take 4 mg by mouth every 8 hours as needed for Nausea or Vomiting    Historical Provider, MD   nitrofurantoin, macrocrystal-monohydrate, (MACROBID) 100 MG capsule Take 1 capsule by mouth daily 10/7/22   EWA Castro - CNP   docusate sodium (COLACE) 100 MG capsule Take 1 capsule by mouth daily 10/7/22   EWA Kuo CNP   Cranberry 500 MG CAPS Take 1 capsule by mouth 2 times daily 10/7/22   EWA Kuo CNP   lisinopril (PRINIVIL;ZESTRIL) 40 MG tablet Take 1 tablet by mouth in the morning. 7/17/22   Alix Barton MD   metoprolol succinate (TOPROL XL) 200 MG extended release tablet Take 1 tablet by mouth in the morning. 7/17/22   Alix Barton MD   DULoxetine (CYMBALTA) 60 MG extended release capsule Take 1 capsule by mouth daily TAKE 1 CAPSULES DAILY. 7/11/22   Yeyo Mendes MD   magnesium oxide (MAG-OX) 400 MG tablet Take 400 mg by mouth daily    Historical Provider, MD   diclofenac sodium (VOLTAREN) 1 % GEL Apply 4 g topically 4 times daily 6/1/22   Ludwin Myers MD   divalproex (DEPAKOTE) 125 MG DR tablet Take 125 mg by mouth 3 times daily as needed     Historical Provider, MD   hydroxychloroquine (PLAQUENIL) 200 mg tablet Take 1 pill once a day after food.  5/26/22   Jorje Bernheim, MD   TURMERIC PO Take 1 capsule by mouth daily    Ar Automatic Reconciliation   azelastine (ASTELIN) 0.1 % nasal spray 1 spray by Nasal route 2 times daily 4/3/19   Ar Automatic Reconciliation   vitamin D 25 MCG (1000 UT) CAPS Take 1,000 Units by mouth daily    Ar Automatic Reconciliation   diphenhydrAMINE (SOMINEX) 25 MG tablet Take 25 mg by mouth every 6 hours as needed    Ar Automatic Reconciliation   hydroCHLOROthiazide (HYDRODIURIL) 25 MG tablet TAKE 1 TABLET BY MOUTH EACH DAY. 4/6/22   Ar Automatic Reconciliation   omeprazole (PRILOSEC) 40 MG delayed release capsule TAKE 1 CAPSULE EACH DAY. 1/5/22   Ar Automatic Reconciliation   donepezil (ARICEPT) 10 MG tablet TAKE ONE TABLET AT BEDTIME. 12/30/21 7/17/22  Ar Automatic Reconciliation   Naproxen Sodium 220 MG CAPS Take 1 capsule by mouth 2 times daily  7/17/22  Ar Automatic Reconciliation         REVIEW OF SYSTEMS:  14 ROS negative except from stated on HPI      Social History     Tobacco Use    Smoking status: Former     Packs/day: 1.00     Types: Cigarettes     Quit date: 1972     Years since quittin.8    Smokeless tobacco: Never   Substance Use Topics    Alcohol use:  Yes     Alcohol/week: 14.0 standard drinks    Drug use: No         Family History   Problem Relation Age of Onset    Osteoarthritis Mother     Heart Disease Mother     Stroke Mother     Breast Cancer Neg Hx     Coronary Art Dis Maternal Uncle     Heart Attack Mother     Hypertension Sister     Alzheimer's Disease Father 48    Dementia Father     Stroke Father     Heart Disease Sister         stent and AVR       Allergies   Allergen Reactions    Levofloxacin Other (See Comments)    Penicillins Shortness Of Breath and Swelling    Poison Oak Extract Anaphylaxis    Bupropion Other (See Comments)     Other anxiety      Cephalosporins Other (See Comments)     encephalopathy    Sulfamethoxazole Hives and Other (See Comments)     She thinks she does not do well with this    Montelukast Anxiety         Vitals:    22 1125 22 1439 22 1615   BP: (!) 140/79 113/71 118/88   Pulse: 74 87 81   Resp: 18     Temp: 97.9 °F (36.6 °C)  98.4 °F (36.9 °C)   TempSrc: Oral     SpO2: 95%           PHYSICAL EXAM:  General: Somnolent, NAD  HEENT: NC/AT, EOM are intact  Neck: supple, no JVD  Cardiovascular: RRR, S1, S2, no murmurs  Respiratory: Lungs are clear, no wheezes or rales  Abdomen: Soft, NT, ND  Back: No CVA tenderness, no paraspinal tenderness  Extremities: LE without pedal edema, no erythema  Neuro: CN are intact, no focal deficits  Skin: no rash or ulcers      I have personally reviewed patients laboratory data showing  Lab Results   Component Value Date    WBC 9.2 2022    HGB 12.8 2022    HCT 39.3 2022    MCV 92.7 2022     2022     No results found for: CKTOTAL, CKMB, CKMBINDEX, TROPONINI  Lab Results   Component Value Date    ANIONGAP 6 2022    CALCIUM 10.2 2022     2022    K 3.7 11/05/2022    CO2 31 11/05/2022     11/05/2022    BUN 60 (H) 11/05/2022    CREATININE 1.25 (H) 11/05/2022         I have personally reviewed patients EKG showing  Right bundle branch block, T wave inversion in V1 V2 V3      I have personally reviewed patients imaging showing  CT HEAD WO CONTRAST   Final Result   Chronic appearing white matter change without acute intracranial   abnormality.             XR CHEST PORTABLE   Final Result   No acute findings in the chest               Likely length of stay more than 2 midnights

## 2022-11-05 NOTE — ED NOTES
TRANSFER - OUT REPORT:    Verbal report given to Leti on Alice Brower  being transferred to [phone] for routine progression of patient care       Report consisted of patient's Situation, Background, Assessment and   Recommendations(SBAR). Information from the following report(s) Nurse Handoff Report, ED SBAR, and [facility] was reviewed with the receiving nurse. Lines:   Peripheral IV 11/05/22 Distal;Left;Dorsal Forearm (Active)   Site Assessment Clean, dry & intact 11/05/22 1446   Line Status Blood return noted;Normal saline locked;Specimen collected; Flushed;Capped 11/05/22 1446   Phlebitis Assessment No symptoms 11/05/22 1446   Infiltration Assessment 0 11/05/22 1446   Dressing Status Clean, dry & intact; New dressing applied 11/05/22 1446   Dressing Type Transparent 11/05/22 1446   Dressing Intervention New 11/05/22 1446        Opportunity for questions and clarification was provided.       Patient transported with:  Registered Nurse       Wilbert Parisi RN  11/05/22 8235

## 2022-11-05 NOTE — ED NOTES
Pt arrives via [facility] coming [facility] memory care unit care unit c/o AMS. Pt has hx of dementia. Per EMS, pt is altered past her baseline mentation and patient has been more lethargic. Pt was biting staff en route to facility. VSS en route by EMS.       Nayeli Ashford RN  11/05/22 4222

## 2022-11-05 NOTE — ED PROVIDER NOTES
Emergency Department Provider Note                   PCP:                Meka Guaman MD               Age: 68 y.o. Sex: female       ICD-10-CM    1. Acute cystitis without hematuria  N30.00       2. Dehydration  E86.0       3. Altered mental status, unspecified altered mental status type  R41.82           DISPOSITION          MDM  Number of Diagnoses or Management Options  Acute cystitis without hematuria: new, needed workup  Altered mental status, unspecified altered mental status type: new, needed workup  Dehydration: new, needed workup  Diagnosis management comments: 63-year-old female with history of dementia and recurrent UTI presents with intermittent altered mental status over the past 2 to 3 days. Chest x-ray clear. Patient with HERON. Creatinine 1.25. Baseline creatinine 0.66. Urine cloudy with trace ketones, large LE.  2 previous urine cultures positive for Enterococcus and Pseudomonas. Blood cultures, lactic acid added on cefepime 1 g IV ordered. We will consult hospitalist for admission. IV fluid bolus ordered.        Amount and/or Complexity of Data Reviewed  Clinical lab tests: ordered and reviewed  Tests in the radiology section of CPT®: ordered and reviewed  Tests in the medicine section of CPT®: ordered and reviewed  Review and summarize past medical records: yes  Discuss the patient with other providers: yes  Independent visualization of images, tracings, or specimens: yes    Risk of Complications, Morbidity, and/or Mortality  Presenting problems: moderate  Diagnostic procedures: moderate  Management options: moderate    Patient Progress  Patient progress: stable             Orders Placed This Encounter   Procedures    Culture, Urine    Blood Culture 1    Blood Culture 2    XR CHEST PORTABLE    CBC with Auto Differential    CMP    Urinalysis w rflx microscopic    Lactate, Sepsis    Procalcitonin    Straight cath    EKG 12 Lead    Saline lock IV        Medications 0.9 % sodium chloride bolus (has no administration in time range)   cefepime (MAXIPIME) 1,000 mg in sodium chloride 0.9 % 50 mL IVPB (mini-bag) (has no administration in time range)       New Prescriptions    No medications on file        Ronna Santacruz is a 68 y.o. female who presents to the Emergency Department with chief complaint of    Chief Complaint   Patient presents with    Altered Mental Status      77-year-old female with history of hypertension, Alzheimer's dementia, depression, recurrent UTI presents via EMS from memory care unit with complaint of worsening altered mental status this been intermittent over the past 3 days. Patient with baseline dementia but states the patient is more somnolent intermittently throughout the day.  later present at bedside. States that she is a decreased p.o. intake. Denies any recent trauma or injury. States that she had 1 episode of vomiting. Patient unable to provide historical information at this time. No reported fever, chills, cough, shortness of breath. States symptoms are intermittent, worsening. Denies any seizure-like activity. The history is provided by the patient and the spouse. The history is limited by the condition of the patient. No  was used. Review of Systems   Unable to perform ROS: Dementia   Constitutional:  Negative for chills, fatigue and fever. HENT:  Negative for congestion and rhinorrhea. Eyes:  Negative for redness. Respiratory:  Negative for cough and shortness of breath. Cardiovascular:  Negative for chest pain. Gastrointestinal:  Positive for vomiting. Negative for abdominal distention, anal bleeding, blood in stool, constipation and nausea. Genitourinary:  Positive for decreased urine volume. Negative for flank pain. Musculoskeletal:  Negative for neck pain and neck stiffness. Skin:  Negative for rash.    Neurological:  Negative for seizures, facial asymmetry, speech difficulty and weakness. Psychiatric/Behavioral:  Positive for confusion. Past Medical History:   Diagnosis Date    Alcohol abuse, daily use 2014    [facility]'s dementia ([facility] [address]     Depression 2014    Drug-induced encephalopathy 2022    Due to cefepime    Hypertension     Lumbar stenosis 2012    Osteopenia 3/19/2015    Pseudomonas aeruginosa infection 2022    Sinus problem     Spondylolisthesis of lumbar region 2012    UTI (urinary tract infection) 2022    Vitamin D deficiency 2014        Past Surgical History:   Procedure Laterality Date    BACK SURGERY  12    L3-L4 Metrix TLIF with sextant/bilateral decompression - Dr. Deloria Hatchet LUMPECTOMY Bilateral     HEENT Bilateral 2015    sinus    HYSTERECTOMY (CERVIX STATUS UNKNOWN)      complete    OTHER SURGICAL HISTORY      sinuses x2    TONSILLECTOMY      WRIST FRACTURE SURGERY          Family History   Problem Relation Age of Onset    Osteoarthritis Mother     Heart Disease Mother     Stroke Mother     Breast Cancer Neg Hx     Coronary Art Dis Maternal Uncle     Heart Attack Mother     Hypertension Sister     Alzheimer's Disease Father 48    Dementia Father     Stroke Father     Heart Disease Sister         stent and AVR        Social History     Socioeconomic History    Marital status:     Years of education: 1 post HS   Tobacco Use    Smoking status: Former     Packs/day: 1.00     Types: Cigarettes     Quit date: 1972     Years since quittin.8    Smokeless tobacco: Never   Substance and Sexual Activity    Alcohol use: Yes     Alcohol/week: 14.0 standard drinks    Drug use: No   Social History Narrative    , not active, gets up around 12 noon and then does things in the afternoon if a friend comes over. Unable to watch TV as cant figure out the controls.           Levofloxacin, Penicillins, Poison oak extract, Bupropion, Cephalosporins, Sulfamethoxazole, and Montelukast     Previous Medications    ACETAMINOPHEN (TYLENOL) 500 MG TABLET    Take 500 mg by mouth every 6 hours as needed for Pain    AZELASTINE (ASTELIN) 0.1 % NASAL SPRAY    1 spray by Nasal route 2 times daily    CRANBERRY 500 MG CAPS    Take 1 capsule by mouth 2 times daily    DICLOFENAC SODIUM (VOLTAREN) 1 % GEL    Apply 4 g topically 4 times daily    DIPHENHYDRAMINE (SOMINEX) 25 MG TABLET    Take 25 mg by mouth every 6 hours as needed    DIVALPROEX (DEPAKOTE) 125 MG DR TABLET    Take 125 mg by mouth 3 times daily as needed     DIVALPROEX (DEPAKOTE) 125 MG DR TABLET    Take 125 mg by mouth 3 times daily    DOCUSATE SODIUM (COLACE) 100 MG CAPSULE    Take 1 capsule by mouth daily    DULOXETINE (CYMBALTA) 60 MG EXTENDED RELEASE CAPSULE    Take 1 capsule by mouth daily TAKE 1 CAPSULES DAILY. HYDROCHLOROTHIAZIDE (HYDRODIURIL) 25 MG TABLET    TAKE 1 TABLET BY MOUTH EACH DAY. HYDROXYCHLOROQUINE (PLAQUENIL) 200 MG TABLET    Take 1 pill once a day after food. LISINOPRIL (PRINIVIL;ZESTRIL) 40 MG TABLET    Take 1 tablet by mouth in the morning. MAGNESIUM OXIDE (MAG-OX) 400 MG TABLET    Take 400 mg by mouth daily    MEMANTINE (NAMENDA) 5 MG TABLET    Take 5 mg by mouth 2 times daily    METOPROLOL SUCCINATE (TOPROL XL) 200 MG EXTENDED RELEASE TABLET    Take 1 tablet by mouth in the morning. NITROFURANTOIN (MACRODANTIN) 50 MG CAPSULE    Take 50 mg by mouth 4 times daily    NITROFURANTOIN, MACROCRYSTAL-MONOHYDRATE, (MACROBID) 100 MG CAPSULE    Take 1 capsule by mouth daily    OMEPRAZOLE (PRILOSEC) 40 MG DELAYED RELEASE CAPSULE    TAKE 1 CAPSULE EACH DAY. ONDANSETRON (ZOFRAN) 4 MG TABLET    Take 4 mg by mouth every 8 hours as needed for Nausea or Vomiting    TURMERIC PO    Take 1 capsule by mouth daily    VITAMIN D 25 MCG (1000 UT) CAPS    Take 1,000 Units by mouth daily        Vitals signs and nursing note reviewed.    Patient Vitals for the past 4 hrs:   Temp Pulse Resp BP SpO2   11/05/22 1125 97.9 °F (36.6 °C) 74 18 (!) 140/79 95 %          Physical Exam  Vitals and nursing note reviewed. Constitutional:       Appearance: Normal appearance. Comments: Altered. Demented. HENT:      Head: Normocephalic and atraumatic. Comments: Atraumatic. Right Ear: Tympanic membrane and ear canal normal.      Left Ear: Tympanic membrane and ear canal normal.      Nose: Nose normal.      Mouth/Throat:      Pharynx: No oropharyngeal exudate or posterior oropharyngeal erythema. Comments: Dry mucous membranes. Eyes:      Extraocular Movements: Extraocular movements intact. Pupils: Pupils are equal, round, and reactive to light. Neck:      Comments: No midline C-spine tenderness. No nuchal rigidity. Cardiovascular:      Rate and Rhythm: Normal rate. Pulses: Normal pulses. Pulmonary:      Effort: Pulmonary effort is normal.      Breath sounds: Normal breath sounds. Abdominal:      General: Bowel sounds are normal.      Palpations: Abdomen is soft. Tenderness: There is no abdominal tenderness. There is no guarding or rebound. Comments: Soft, nontender, nondistended. No rebound or guarding. Musculoskeletal:         General: No deformity. Normal range of motion. Cervical back: Normal range of motion. No rigidity. Skin:     General: Skin is warm. Findings: No erythema or rash. Neurological:      General: No focal deficit present. Cranial Nerves: No cranial nerve deficit. Sensory: No sensory deficit. Motor: No weakness. Comments: Demented, altered. Moving all extremities equally. No meningismus.    Psychiatric:         Mood and Affect: Mood normal.         Behavior: Behavior normal.        EKG 12 Lead    Date/Time: 11/5/2022 2:09 PM  Performed by: Avril Martines MD  Authorized by: Avril Martines MD     ECG reviewed by ED Physician in the absence of a cardiologist: yes    Rate:     ECG rate:  74    ECG rate assessment: normal Rhythm:     Rhythm: sinus rhythm    Ectopy:     Ectopy: none    QRS:     QRS axis:  Normal    QRS intervals:  Normal    Details:  Incomplete RBBB  ST segments:     ST segments:  Normal  T waves:     T waves: normal      Results for orders placed or performed during the hospital encounter of 11/05/22   CBC with Auto Differential   Result Value Ref Range    WBC 9.2 4.3 - 11.1 K/uL    RBC 4.24 4.05 - 5.2 M/uL    Hemoglobin 12.8 11.7 - 15.4 g/dL    Hematocrit 39.3 35.8 - 46.3 %    MCV 92.7 82.0 - 102.0 FL    MCH 30.2 26.1 - 32.9 PG    MCHC 32.6 31.4 - 35.0 g/dL    RDW 13.3 11.9 - 14.6 %    Platelets 417 731 - 797 K/uL    MPV 9.7 9.4 - 12.3 FL    nRBC 0.00 0.0 - 0.2 K/uL    Differential Type AUTOMATED      Seg Neutrophils 71 43 - 78 %    Lymphocytes 18 13 - 44 %    Monocytes 7 4.0 - 12.0 %    Eosinophils % 3 0.5 - 7.8 %    Basophils 1 0.0 - 2.0 %    Immature Granulocytes 0 0.0 - 5.0 %    Segs Absolute 6.5 1.7 - 8.2 K/UL    Absolute Lymph # 1.7 0.5 - 4.6 K/UL    Absolute Mono # 0.6 0.1 - 1.3 K/UL    Absolute Eos # 0.3 0.0 - 0.8 K/UL    Basophils Absolute 0.1 0.0 - 0.2 K/UL    Absolute Immature Granulocyte 0.0 0.0 - 0.5 K/UL   CMP   Result Value Ref Range    Sodium 143 133 - 143 mmol/L    Potassium 3.7 3.5 - 5.1 mmol/L    Chloride 106 101 - 110 mmol/L    CO2 31 21 - 32 mmol/L    Anion Gap 6 2 - 11 mmol/L    Glucose 130 (H) 65 - 100 mg/dL    BUN 60 (H) 8 - 23 MG/DL    Creatinine 1.25 (H) 0.6 - 1.0 MG/DL    Est, Glom Filt Rate 45 (L) >60 ml/min/1.73m2    Calcium 10.2 8.3 - 10.4 MG/DL    Total Bilirubin 0.4 0.2 - 1.1 MG/DL    ALT 15 12 - 65 U/L    AST 20 15 - 37 U/L    Alk Phosphatase 63 50 - 136 U/L    Total Protein 7.1 6.3 - 8.2 g/dL    Albumin 3.5 3.2 - 4.6 g/dL    Globulin 3.6 2.8 - 4.5 g/dL    Albumin/Globulin Ratio 1.0 0.4 - 1.6     Urinalysis w rflx microscopic   Result Value Ref Range    Color, UA YELLOW/STRAW      Appearance CLOUDY      Specific [place], UA 1.021 1.001 - 1.023      pH, Urine 5.5 5.0 - 9.0 Protein, UA TRACE (A) NEG mg/dL    Glucose, UA Negative mg/dL    Ketones, Urine TRACE (A) NEG mg/dL    Bilirubin Urine Negative NEG      Blood, Urine Negative NEG      Urobilinogen, Urine 0.2 0.2 - 1.0 EU/dL    Nitrite, Urine Negative NEG      Leukocyte Esterase, Urine LARGE (A) NEG      WBC, UA >100 0 /hpf    Epithelial Cells UA 5-10 0 /hpf    BACTERIA, URINE 0 0 /hpf   EKG 12 Lead   Result Value Ref Range    Ventricular Rate 74 BPM    Atrial Rate 241 BPM    QRS Duration 118 ms    Q-T Interval 416 ms    QTc Calculation (Bazett) 461 ms    P Axis 69 degrees    R Axis 78 degrees    T Axis 44 degrees    Diagnosis !! AGE AND GENDER SPECIFIC ECG ANALYSIS !!         XR CHEST PORTABLE    (Results Pending)                       Voice dictation software was used during the making of this note. This software is not perfect and grammatical and other typographical errors may be present. This note has not been completely proofread for errors.      Jonathan Dumas MD  11/05/22 1408       Paul Teixeira MD  11/05/22 1417

## 2022-11-05 NOTE — PROGRESS NOTES
[facility] INSUFFICIENCY NOTE 36[facility] Pharmacokinetic Monitoring Service - Vancomycin    Luis Gray is a 68 y.o. female starting on vancomycin therapy for UTI. Pharmacy consulted by Dr Brittany Medel for monitoring and adjustment. Target Concentration: Random level ? 15 mg/L  Additional Antimicrobials: Aztreonam    Patient eligible for piperacillin-tazobactam to cefepime auto-substitution per P&T approved protocol? N/A    Pertinent Laboratory Values: Wt Readings from Last 1 Encounters:   11/05/22 107 lb (48.5 kg)     Temp Readings from Last 1 Encounters:   11/05/22 98.4 °F (36.9 °C)     Recent Labs     11/05/22  1124   BUN 60*   CREATININE 1.25*   WBC 9.2   PROCAL 3.32*     Lab Results   Component Value Date/Time    BUN 60 11/05/2022 11:24 AM    BUN 15 09/18/2022 05:03 AM    BUN 21 09/17/2022 05:00 AM    CREATININE 1.25 11/05/2022 11:24 AM    CREATININE 0.66 09/18/2022 05:03 AM    CREATININE 0.88 09/17/2022 05:00 AM        No results found for: Maryellen Ruff    MRSA Nasal Swab: N/A. Non-respiratory infection. .    Plan:  Concentration-guided dosing due to renal impairment  Start vancomycin 1000 mg load x1, then dose per levels  Vancomycin concentrations will be ordered as clinically appropriate   Pharmacy will continue to monitor patient and adjust therapy as indicated    Thank you for the consult,  Kristopher Hernandez, PharmD, BCPS  Clinical Pharmacist

## 2022-11-06 LAB
CREAT SERPL-MCNC: 0.68 MG/DL (ref 0.6–1)
EKG ATRIAL RATE: 241 BPM
EKG ATRIAL RATE: 70 BPM
EKG DIAGNOSIS: NORMAL
EKG DIAGNOSIS: NORMAL
EKG P AXIS: 51 DEGREES
EKG P AXIS: 69 DEGREES
EKG P-R INTERVAL: 184 MS
EKG Q-T INTERVAL: 416 MS
EKG Q-T INTERVAL: 470 MS
EKG QRS DURATION: 118 MS
EKG QRS DURATION: 130 MS
EKG QTC CALCULATION (BAZETT): 461 MS
EKG QTC CALCULATION (BAZETT): 507 MS
EKG R AXIS: 78 DEGREES
EKG R AXIS: 82 DEGREES
EKG T AXIS: 18 DEGREES
EKG T AXIS: 44 DEGREES
EKG VENTRICULAR RATE: 70 BPM
EKG VENTRICULAR RATE: 74 BPM
VANCOMYCIN SERPL-MCNC: 3.7 UG/ML

## 2022-11-06 PROCEDURE — 1100000000 HC RM PRIVATE

## 2022-11-06 PROCEDURE — 97530 THERAPEUTIC ACTIVITIES: CPT

## 2022-11-06 PROCEDURE — 2500000003 HC RX 250 WO HCPCS: Performed by: INTERNAL MEDICINE

## 2022-11-06 PROCEDURE — 6370000000 HC RX 637 (ALT 250 FOR IP): Performed by: INTERNAL MEDICINE

## 2022-11-06 PROCEDURE — 97165 OT EVAL LOW COMPLEX 30 MIN: CPT

## 2022-11-06 PROCEDURE — 6360000002 HC RX W HCPCS: Performed by: INTERNAL MEDICINE

## 2022-11-06 PROCEDURE — 36415 COLL VENOUS BLD VENIPUNCTURE: CPT

## 2022-11-06 PROCEDURE — 82565 ASSAY OF CREATININE: CPT

## 2022-11-06 PROCEDURE — 97161 PT EVAL LOW COMPLEX 20 MIN: CPT

## 2022-11-06 PROCEDURE — 2580000003 HC RX 258: Performed by: INTERNAL MEDICINE

## 2022-11-06 PROCEDURE — 80202 ASSAY OF VANCOMYCIN: CPT

## 2022-11-06 PROCEDURE — 97535 SELF CARE MNGMENT TRAINING: CPT

## 2022-11-06 PROCEDURE — 97112 NEUROMUSCULAR REEDUCATION: CPT

## 2022-11-06 RX ORDER — LISINOPRIL 20 MG/1
40 TABLET ORAL DAILY
Status: DISCONTINUED | OUTPATIENT
Start: 2022-11-06 | End: 2022-11-10 | Stop reason: HOSPADM

## 2022-11-06 RX ADMIN — MEMANTINE 5 MG: 5 TABLET ORAL at 20:46

## 2022-11-06 RX ADMIN — ENOXAPARIN SODIUM 30 MG: 100 INJECTION SUBCUTANEOUS at 20:46

## 2022-11-06 RX ADMIN — MEMANTINE 5 MG: 5 TABLET ORAL at 09:33

## 2022-11-06 RX ADMIN — VANCOMYCIN HYDROCHLORIDE 1000 MG: 1 INJECTION, POWDER, LYOPHILIZED, FOR SOLUTION INTRAVENOUS at 23:20

## 2022-11-06 RX ADMIN — PANTOPRAZOLE SODIUM 40 MG: 40 TABLET, DELAYED RELEASE ORAL at 05:52

## 2022-11-06 RX ADMIN — DIVALPROEX SODIUM 125 MG: 125 TABLET, DELAYED RELEASE ORAL at 14:57

## 2022-11-06 RX ADMIN — SODIUM CHLORIDE, POTASSIUM CHLORIDE, SODIUM LACTATE AND CALCIUM CHLORIDE: 600; 310; 30; 20 INJECTION, SOLUTION INTRAVENOUS at 02:16

## 2022-11-06 RX ADMIN — AZTREONAM 1000 MG: 1 INJECTION, POWDER, LYOPHILIZED, FOR SOLUTION INTRAMUSCULAR; INTRAVENOUS at 14:57

## 2022-11-06 RX ADMIN — Medication 400 MG: at 09:33

## 2022-11-06 RX ADMIN — METOPROLOL SUCCINATE 200 MG: 100 TABLET, EXTENDED RELEASE ORAL at 09:33

## 2022-11-06 RX ADMIN — DULOXETINE HYDROCHLORIDE 60 MG: 60 CAPSULE, DELAYED RELEASE ORAL at 09:33

## 2022-11-06 RX ADMIN — DIVALPROEX SODIUM 125 MG: 125 TABLET, DELAYED RELEASE ORAL at 20:46

## 2022-11-06 RX ADMIN — DIVALPROEX SODIUM 125 MG: 125 TABLET, DELAYED RELEASE ORAL at 09:33

## 2022-11-06 RX ADMIN — AZTREONAM 1000 MG: 1 INJECTION, POWDER, LYOPHILIZED, FOR SOLUTION INTRAMUSCULAR; INTRAVENOUS at 22:34

## 2022-11-06 RX ADMIN — LISINOPRIL 40 MG: 20 TABLET ORAL at 15:30

## 2022-11-06 RX ADMIN — SODIUM CHLORIDE, POTASSIUM CHLORIDE, SODIUM LACTATE AND CALCIUM CHLORIDE: 600; 310; 30; 20 INJECTION, SOLUTION INTRAVENOUS at 18:37

## 2022-11-06 RX ADMIN — SODIUM CHLORIDE, PRESERVATIVE FREE 5 ML: 5 INJECTION INTRAVENOUS at 20:56

## 2022-11-06 RX ADMIN — HYDROXYCHLOROQUINE SULFATE 200 MG: 200 TABLET ORAL at 09:33

## 2022-11-06 RX ADMIN — AZTREONAM 1000 MG: 1 INJECTION, POWDER, LYOPHILIZED, FOR SOLUTION INTRAMUSCULAR; INTRAVENOUS at 05:52

## 2022-11-06 NOTE — PROGRESS NOTES
ACUTE PHYSICAL THERAPY GOALS:   (Developed with and agreed upon by patient and/or caregiver.)   Patient will ambulate 76' with minimum assist without a device within 1 treatment day. GOAL MET 11/6/2022    PHYSICAL THERAPY Initial Assessment, Discharge, and PM  (Link to Caseload Tracking: PT Visit Days : 1  Acknowledge Orders  Time In/Out  PT Charge Capture  Rehab Caseload Tracker    Eduardo Dotson is a 68 y.o. female   PRIMARY DIAGNOSIS: Encephalopathy  Dehydration [E86.0]  Encephalopathy [G93.40]  Acute cystitis without hematuria [N30.00]  Altered mental status, unspecified altered mental status type [R41.82]       Reason for Referral: Generalized Muscle Weakness (M62.81)  Other lack of cordination (R27.8)  Inpatient: Payor: MEDICARE / Plan: MEDICARE PART A AND B / Product Type: *No Product type* /     ASSESSMENT:     REHAB RECOMMENDATIONS:   Recommendation to date pending progress:  Setting:  No further skilled therapy after discharge from hospital    Equipment:    None     ASSESSMENT:  Ms. Vee Benítez admitted with above diagnoses. Patient known to therapy from prior admissions. She has advanced dementia and resides in a memory care facility. Patient ambulates with assistance at baseline and demonstrated this today during therapy assessment. She is unable to follow verbal directions or respond in conversation but she is able to follow some directions with manual cues. Patient does not have any acute or d/c therapy needs and will be discharged at this time. Recommend patient return to her memory care facility.        [facility] [address] [address] AM-PAC 6 Clicks Basic Mobility Inpatient Short Form  AM-PAC Mobility Inpatient   How much difficulty turning over in bed?: A Little  How much difficulty sitting down on / standing up from a chair with arms?: A Little  How much difficulty moving from lying on back to sitting on side of bed?: A Little  How much help from another person moving to and from a bed to a chair?: A Little  How much help from another person needed to walk in hospital room?: A Little  How much help from another person for climbing 3-5 steps with a railing?: A Little  AM-PAC Inpatient Mobility Raw Score : 18  AM-PAC Inpatient T-Scale Score : 43.63  Mobility Inpatient CMS 0-100% Score: 46.58  Mobility Inpatient CMS G-Code Modifier : CK    SUBJECTIVE:   Ms. Reji Urbano alert in bed.     Social/Functional Lives With: Other (comment)  Type of Home: Facility ([address])  ADL Assistance: Needs assistance  Ambulation Assistance: Needs assistance    OBJECTIVE:     PAIN: VITALS / O2: Calvin Sinning / Roxanne Leer / DRAINS:   Pre Treatment:   Pain Assessment: Face, Legs, Activity, Cry, and Consolability (FLACC)      Post Treatment: 0 Vitals        Oxygen      None    RESTRICTIONS/PRECAUTIONS:  Restrictions/Precautions: Fall Risk                 GROSS EVALUATION: Intact Impaired (Comments):   AROM []  Decreased functional   PROM []    Strength []  Decreased functional   Balance [] Sitting - Static: Good  Sitting - Dynamic: Fair, +  Standing - Static: Fair  Standing - Dynamic: Fair   Posture [] N/A   Sensation []     Coordination []   Decreased functional   Tone [x]     Edema []    Activity Tolerance [] Treatment limited secondary to decreased cognition    []      COGNITION/  PERCEPTION: Intact Impaired (Comments):   Orientation [] Disoriented X4   Vision []     Hearing []     Cognition  []       MOBILITY: I Mod I S SBA CGA Min Mod Max Total  NT x2 Comments:   Bed Mobility    Rolling [] [] [] [] [] [] [] [] [] [] []    Supine to Sit [] [] [] [] [] [x] [] [] [] [] []    Scooting [] [] [] [] [] [x] [] [] [] [] []    Sit to Supine [] [] [] [] [] [] [x] [] [] [] []    Transfers    Sit to Stand [] [] [] [] [] [x] [] [] [] [] []    Bed to Chair [] [] [] [] [] [] [] [] [] [] []    Stand to Sit [] [] [] [] [] [x] [] [] [] [] []     [] [] [] [] [] [] [] [] [] [] []    I=Independent, Mod I=Modified Independent, S=Supervision, SBA=Standby Assistance, CGA=Contact Ozzie Schwab,   Min=Minimal Assistance, Mod=Moderate Assistance, Max=Maximal Assistance, Total=Total Assistance, NT=Not Tested    GAIT: I Mod I S SBA CGA Min Mod Max Total  NT x2 Comments:   Level of Assistance [] [] [] [] [] [x] [] [] [] [] []    Distance 75 feet    DME Hand hold    Gait Quality Decreased ella , Decreased step clearance, and Decreased step length    Weightbearing Status Restrictions/Precautions  Restrictions/Precautions: Fall Risk    Stairs      I=Independent, Mod I=Modified Independent, S=Supervision, SBA=Standby Assistance, CGA=Contact Guard Assistance,   Min=Minimal Assistance, Mod=Moderate Assistance, Max=Maximal Assistance, Total=Total Assistance, NT=Not Tested    PLAN:   FREQUENCY AND DURATION:   for duration of hospital stay or until stated goals are met, whichever comes first.    THERAPY PROGNOSIS: Fair    PROBLEM LIST:   (Skilled intervention is medically necessary to address:)  Decreased ADL/Functional Activities  Decreased Activity Tolerance  Decreased Balance  Decreased Cognition  Decreased Coordination  Decreased Gait Ability  Decreased Strength  Decreased Transfer Abilities INTERVENTIONS PLANNED:   (Benefits and precautions of physical therapy have been discussed with the patient.)  Therapeutic Activity       TREATMENT:   EVALUATION: LOW COMPLEXITY: (Untimed Charge)    TREATMENT:   Therapeutic Activity (25 Minutes): Therapeutic activity included Supine to Sit, Sit to Supine, Scooting, Transfer Training, Ambulation on level ground, Sitting balance , and Standing balance to improve functional Activity tolerance, Balance, Coordination, Mobility, and Strength.     TREATMENT GRID:  N/A    AFTER TREATMENT PRECAUTIONS: Bed, Bed/Chair Locked, Call light within reach, Needs within reach, and Visitors at bedside    INTERDISCIPLINARY COLLABORATION:  RN/ PCT and OT/ HOBSON    EDUCATION: Education Given To: Patient  Education Provided: Plan of Care;Role of Therapy  Education Method: Verbal  Education Outcome: Unable to verbalize; Unable to demonstrate understanding    TIME IN/OUT:  Time In: 1400  Time Out: [facility]  Minutes: 606/706 Britta Watters PT

## 2022-11-06 NOTE — PROGRESS NOTES
Reviewed notes for new spiritual concerns. Voodoo     [place] [facility]     [facility]     DEPRESSION     ALZHEIMER'S     FALL    HIGH RISK FOR READMISSION          Will follow as needed.

## 2022-11-06 NOTE — PROGRESS NOTES
ACUTE OCCUPATIONAL THERAPY GOALS:   (Developed with and agreed upon by patient and/or caregiver.)  Patient will perform grooming task with MOD A and adaptive equipment as needed. -GOAL MET 11/6/22    Patient will perform functional household mobility and transfers with MIN A and adaptive equipment as needed. -GOAL MET 11/6/22      OCCUPATIONAL THERAPY Initial Assessment, Daily Note, Discharge, and PM       OT Visit Days: 1  Acknowledge Orders  Time  OT Charge Capture  Rehab Caseload Tracker      Reji Erickson is a 68 y.o. female   PRIMARY DIAGNOSIS: Encephalopathy  Dehydration [E86.0]  Encephalopathy [G93.40]  Acute cystitis without hematuria [N30.00]  Altered mental status, unspecified altered mental status type [R41.82]       Reason for Referral: Generalized Muscle Weakness (M62.81)  Inpatient: Payor: MEDICARE / Plan: MEDICARE PART A AND B / Product Type: *No Product type* /     ASSESSMENT:     REHAB RECOMMENDATIONS:   Recommendation to date pending progress:  Setting:  Return to memory care at [facility]    Equipment:    None     ASSESSMENT:  Ms. Jem Walter admitted with the above diagnoses. She currently resides at the [facility] memory care unit. She has advanced dementia and has had recent hospitalizations with therapy evaluations. Patient's sitter at bedside. Today, she required minimal assistance for functional household mobility and remains at her baseline of mod-max A for ADLs. She is able to verbalize but unable to carry on conversation or follow verbal cues. She responds somewhat to manual cues and redirection. She appears to be at her functional baseline in regards to ADLs and functional mobility. No further skilled OT indicated at this time. Anticipate plan to return to her memory care facility.       [facility] [address] [address] AM-PAC 6 Clicks Daily Activity Inpatient Short Form:    AM-PAC Daily Activity Inpatient   How much help for putting on and taking off regular lower body clothing?: A Lot  How much help for Bathing?: A Lot  How much help for Toileting?: A Lot  How much help for putting on and taking off regular upper body clothing?: A Lot  How much help for taking care of personal grooming?: A Lot  How much help for eating meals?: A Lot  AM-PAC Inpatient Daily Activity Raw Score: 12  AM-PAC Inpatient ADL T-Scale Score : 30.6  ADL Inpatient CMS 0-100% Score: 66.57  ADL Inpatient CMS G-Code Modifier : CL           SUBJECTIVE:     Ms. Iggy Middleton agreeable to get up     Social/Functional Lives With: Other (comment)  Type of Home: Facility ([address])  ADL Assistance: Needs assistance  Ambulation Assistance: Needs assistance    OBJECTIVE:     Cal Wilkinson / Fely Mckeon / Lang Credit: None    RESTRICTIONS/PRECAUTIONS:  Restrictions/Precautions: Fall Risk    PAIN: VITALS / O2:   Pre Treatment:          Post Treatment: no c/o pain       Vitals          Oxygen            GROSS EVALUATION: INTACT IMPAIRED   (See Comments)   UE AROM [x] []   UE PROM [x] []   Strength [x]       Posture / Balance []  Guiding assistance during mobility, good sitting balance   Sensation []     Coordination []       Tone []       Edema []    Activity Tolerance []       Hand Dominance R [] L []      COGNITION/  PERCEPTION: INTACT IMPAIRED   (See Comments)   Orientation [] Disoriented [id number] with advanced dementia   Vision []     Hearing []     Cognition  []     Perception []       MOBILITY: I Mod I S SBA CGA Min Mod Max Total  NT x2 Comments:   Bed Mobility    Rolling [] [] [] [] [] [] [] [] [] [] []    Supine to Sit [] [] [] [] [] [x] [] [] [] [] []    Scooting [] [] [] [] [] [x] [] [] [] [] []    Sit to Supine [] [] [] [] [] [] [x] [] [] [] []    Transfers    Sit to Stand [] [] [] [] [] [x] [] [] [] [] []    Bed to Chair [] [] [] [] [] [] [] [] [] [] []    Stand to Sit [] [] [] [] [] [x] [] [] [] [] []    Tub/Shower [] [] [] [] [] [] [] [] [] [] []     Toilet [] [] [] [] [] [] [] [] [] [] []      [] [] [] [] [] [] [] [] [] [] []    I=Independent, Mod (16 Minutes): Neuromuscular Re-education included Balance Training, Functional mobility with facilitation, and Standing balance training to improve Balance, Coordination, and Functional Mobility. Self Care (10 minutes): Patient participated in grooming ADLs in unsupported sitting and standing with moderate verbal, manual, and tactile cueing to decrease assistance required and increase activity tolerance. Patient also participated in functional mobility and functional transfer training to decrease assistance required, increase activity tolerance, and increase safety awareness. TREATMENT GRID:  N/A    AFTER TREATMENT PRECAUTIONS: Bed, Bed/Chair Locked, Call light within reach, Needs within reach, RN notified, and Visitors at bedside    INTERDISCIPLINARY COLLABORATION:  RN/ PCT and PT/ PTA    EDUCATION:  Education Given To: Patient;Caregiver  Education Provided: Role of Therapy;Plan of Care;Transfer Training;Energy Conservation; ADL Adaptive Strategies; Fall Prevention Strategies  Education Method: Verbal;Demonstration  Barriers to Learning: Cognition    TOTAL TREATMENT DURATION AND TIME:  Time In: 7323  Time Out: [facility]  Minutes: [place], [place]

## 2022-11-06 NOTE — PLAN OF CARE
Problem: Discharge Planning  Goal: Discharge to home or other facility with appropriate resources  Outcome: Progressing  Flowsheets (Taken 11/5/2022 1615 by Nick Morillo RN)  Discharge to home or other facility with appropriate resources:   Identify barriers to discharge with patient and caregiver   Refer to discharge planning if patient needs post-hospital services based on physician order or complex needs related to functional status, cognitive ability or social support system   Arrange for needed discharge resources and transportation as appropriate     Problem: Pain  Goal: Verbalizes/displays adequate comfort level or baseline comfort level  Outcome: Progressing

## 2022-11-06 NOTE — CARE COORDINATION
SW familiar with the patient and her family from previous admissions. Patient lives at [address] [place] [address].  at the bedside, states that the patient's ambulation is at baseline and he anticipates that she'll return to [facility] at discharge. Patient is seen by the provider at [facility] but is still technically a patient of Dr. Floresita García. ACP conversation completed. Patient to return to [facility] at d/c. Will follow PT/OT evals for additional recommendations.      ASSESSMENT NOTE    Attending Physician: Jacque Fong MD  Admit Problem: Dehydration [E86.0]  Encephalopathy [G93.40]  Acute cystitis without hematuria [N30.00]  Altered mental status, unspecified altered mental status type [R41.82]  Date/Time of Admission: 11/5/2022 11:12 AM  Problem List:  Patient Active Problem List   Diagnosis    Vitamin D deficiency    Anxiety    Chronic pain of both knees    HLD (hyperlipidemia)    Trochanteric bursitis of right hip    Dehydration    Chronic maxillary sinusitis    Fall    Depression    Pure hypercholesterolemia    Perennial allergic rhinitis    Dementia of the Alzheimer's type, with late onset, uncomplicated ([address]    Nonallergic rhinitis    Acquired hypothyroidism    Balance disorder    Spondylolisthesis of lumbar region    HOLLY (dyspnea on exertion)    Exposure to viral disease    Primary osteoarthritis    Spinal stenosis of lumbar region without neurogenic claudication    Gait difficulty    Hyperglycemia    Depression with anxiety    Gastroesophageal reflux disease with esophagitis    Osteopenia    Hypertension    Alzheimer's dementia without behavioral disturbance ([address]    Recurrent UTI    Encephalopathy    UTI (urinary tract infection)       Service Assessment  Patient Orientation Unresponsive   Cognition Dementia / Early Alzheimer's   History Provided By Significant Other   Primary Caregiver Other (Comment) (Memory Care Staff)   Accompanied By/Relationship     Support Systems Spouse/Significant Other, Other (Comment) (Memory Care)   [address] [facility] is: Legal Next of Rosa 69   PCP Verified by CM Yes   Last Visit to PCP     Prior Functional Level Bathing, Dressing, Toileting, Cooking, Shopping, Housework, Assistance with the following:   Current Functional Level Assistance with the following:, Bathing, Dressing, Cooking, Toileting, Shopping, Housework   Can patient return to prior living arrangement Yes   Ability to make needs known: Poor   Family able to assist with home care needs: Yes   Would you like for me to discuss the discharge plan with any other family members/significant others, and if so, who?  Yes ()   [address] ([address])   CM/SW Referral       Social/Functional History  Lives With Other (comment)   Type of Home Facility (The [address] [place] [address])   Home Layout     Home Access     Entrance Stairs - Number of Steps     Entrance Stairs - Rails     Bathroom Shower/Tub     Bathroom Toilet     Bathroom Equipment     Bathroom Accessibility     Home Equipment     Receives Help From     [address] Work     Driving     Shopping          Other ([place])     [address] [facility] Paying/Finance [place]     Other (Comment)     Ambulation Assistance Needs assistance   Transfer Assistance     Active      Patient's  Info     Mode of Transportation     Education     Occupation Retired   Type of Occupation       Discharge [facility] ([address] [facility] [place])   Living Arrangements Other ([place]) ([address] [place] [address])   Support Systems Spouse/Significant Other, Other (Comment) (Memory Care)   Current Services Prior To Admission [address]   DME     DME     DME Ordered? No   Potential Assistance Purchasing Medications No   Meds-to-Beds: Does the patient want to have any new prescriptions delivered to bedside prior to discharge? Type of Home Care Services None   Patient expects to be discharged to: Assisted living (Memory Care at [facility])   Follow Up Appointment: Best Day/Time     One/Two Story Residence:     # of Interior Steps     Height of Each Step (in)     [employer] Available     History of Falls? Yes     Services At/After Discharge  Transition of Care Consult (CM Consult): Internal Home Health     Internal Hospice     Reason Outside Agency [address]     Partner SNF     Reason Why Partner SNF Not Chosen     Internal Comfort Care     Reason Outside [address] Discharge     [address] Provided? Mode of Transport at Discharge [address] Time of Discharge     Confirm Follow Up Transport Family     Condition of Participation: Discharge Planning  The plan for Transition of Care is related to the following treatment goals: The Patient and/or Patient Representative was provided with a Choice of Provider? Name of the Patient Representative who was provided with the Choice of Provider and agrees with the Discharge Plan? The Patient and/or Patient Representative Agree with the Discharge Plan? Freedom of Choice list was provided with basic dialogue that supports the individualized plan of care/goals, treatment preferences, and shares the quality data associated with the providers?        Documentation for Discharge Appeal  Discharge Appealed by     Date notified by QIO of appeal request:     Time notified by QIO of appeal request:     Detailed Notice of Discharge given to: Date Notice of Discharge given:     Time Notice of Discharge given:     Date records sent to O     Time records sent to Leslie Garner     Date Notified of Outcome     Time Notified of Outcome     Outcome of appeal           Wesley Meigs, MSW 11/06/22 4:27 PM      [facility]    [facility]

## 2022-11-06 NOTE — PROGRESS NOTES
VANCO DAILY FOLLOW UP RENAL INSUFFICIENCY PATIENT   [facility] Pharmacokinetic Monitoring Service - Vancomycin    Consulting Provider: Dr Gracie Soliz   Indication: UTI  Target Concentration: Random level ? 15 mg/L  Day of Therapy: Day 2 of therapy  Additional Antimicrobials:  Azactam 1 Gram IV every 8 hours    Patient eligible for piperacillin-tazobactam to cefepime auto-substitution per P&T approved protocol? N/A    Pertinent Laboratory Values: Wt Readings from Last 1 Encounters:   11/05/22 107 lb (48.5 kg)     Temp Readings from Last 1 Encounters:   11/06/22 97.5 °F (36.4 °C) (Oral)     Recent Labs     11/05/22  1124 11/06/22  0550   BUN 60*  --    CREATININE 1.25* 0.68   WBC 9.2  --    PROCAL 3.32*  --        No results found for: Minnie Wolff    MRSA Nasal Swab: N/A. Non-respiratory infection. .      Plan:  Concentration-guided dosing due to renal impairment  Random level  ordered to be drawn  on 11/06/2022 @ 20:00     Give vancomycin 1000 mg IVPB if Concentration level is below 15  Repeat vancomycin concentrations will be ordered as clinically appropriate   Pharmacy will continue to monitor patient and adjust therapy as indicated    Thank you for the consult,  Lilia Rodriguez, [facility]

## 2022-11-06 NOTE — PROGRESS NOTES
Progress Note    Patient: Erika Petersen MRN: [id number]  SSN: [id number]    YOB: 1945  Age: 68 y.o. Sex: female      Admit Date: 11/5/2022    LOS: 1 day     Assessment and Plan:   70-year-old female with a past medical history of advanced dementia, depression, hypertension, recurrent UTIs, that presents from memory care unit in the setting of altered mental status     1. Acute metabolic encephalopathy likely in the setting of UTI  -CT of the brain without acute intracranial abnormalities  -Continue IV fluids  -Continue IV antibiotics with aztreonam and vancomycin in the setting of penicillin and cephalosporin allergies  -Prior urine cultures with Pseudomonas and Enterococcus faecalis  -Follow urine culture  -Monitor mental status  -Avoid sedatives  -Delirium precautions    2. Abnormal EKG with concerns of atrial fibrillation, right bundle branch block and T wave inversions in V1, V2, V3 on admission likely from poor quality EKG and possibly demand ischemia  -Telemetry monitoring  -Repeated EKG with sinus rhythm, right bundle branch block  -Troponin 16.3  -Currently no chest pain  -EKG as needed    3. Hypertension  -Continue metoprolol  -Hold hydrochlorothiazide  -Resume lisinopril    4. Depression  -Continue Cymbalta    5. Advanced dementia  -Continue Depakote and Namenda    DVT prophylaxis with Lovenox     Subjective:   70-year-old female with a past medical history of advanced dementia, depression, hypertension, recurrent UTIs, that presents from memory care unit in the setting of altered mental status. Patient seen and examined at bedside. This morning more awake. Confused. Denies any chest pain or belly pain.     Objective:     Vitals:    11/05/22 1919 11/05/22 2317 11/06/22 0422 11/06/22 0844   BP:  124/67 (!) 161/84 (!) 152/88   Pulse:  76 73 78   Resp:  16 16 16   Temp:  97.3 °F (36.3 °C) 97.5 °F (36.4 °C) 97.5 °F (36.4 °C)   TempSrc:  Oral Axillary Oral   SpO2:  97% 96% 96% Weight: 107 lb (48.5 kg)           Intake and Output:  Current Shift: No intake/output data recorded. Last three shifts: 11/04 1901 - 11/06 0700  In: 100 [P.O.:100]  Out: 0     ROS  10 ROS negative except from stated on subjective    Physical Exam:   General: Alert, confused, NAD  HEENT: NC/AT, EOM are intact  Neck: supple, no JVD  Cardiovascular: RRR, S1, S2, no murmurs  Respiratory: Lungs are clear, no wheezes or rales  Abdomen: Soft, NT, ND  Back: No CVA tenderness, no paraspinal tenderness  Extremities: LE without pedal edema, no erythema  Neuro: CN are intact, no focal deficits  Skin: no rash or ulcers    Lab/Data Review:  I have personally reviewed patients laboratory data showing  Recent Results (from the past 24 hour(s))   Troponin    Collection Time: 11/05/22  5:00 PM   Result Value Ref Range    Troponin, High Sensitivity 16.3 (H) 0 - 14 pg/mL   Blood Culture 2    Collection Time: 11/05/22  5:01 PM    Specimen: Blood   Result Value Ref Range    Special Requests RIGHT  Antecubital        Culture NO GROWTH AFTER 14 HOURS     EKG 12 Lead    Collection Time: 11/05/22 10:25 PM   Result Value Ref Range    Ventricular Rate 70 BPM    Atrial Rate 70 BPM    P-R Interval 184 ms    QRS Duration 130 ms    Q-T Interval 470 ms    QTc Calculation (Bazett) 507 ms    P Axis 51 degrees    R Axis 82 degrees    T Axis 18 degrees    Diagnosis Normal sinus rhythm    Creatinine    Collection Time: 11/06/22  5:50 AM   Result Value Ref Range    Creatinine 0.68 0.6 - 1.0 MG/DL        Image:  I have personally reviewed patients imaging showing  CT HEAD WO CONTRAST   Final Result   Chronic appearing white matter change without acute intracranial   abnormality.             XR CHEST PORTABLE   Final Result   No acute findings in the chest              Current Facility-Administered Medications   Medication Dose Route Frequency Provider Last Rate Last Admin    sodium chloride flush 0.9 % injection 5-40 mL  5-40 mL IntraVENous 2 times per day Gracie Soliz MD   5 mL at 11/05/22 2034    sodium chloride flush 0.9 % injection 5-40 mL  5-40 mL IntraVENous PRN Gracie Soliz MD        0.9 % sodium chloride infusion   IntraVENous PRN Gracie Soliz MD        enoxaparin Sodium (LOVENOX) injection 30 mg  30 mg SubCUTAneous Nightly Gracie Soliz MD   30 mg at 11/05/22 2027    ondansetron (ZOFRAN-ODT) disintegrating tablet 4 mg  4 mg Oral Q8H PRN Gracie Soliz MD        Or    ondansetron [facility]) injection 4 mg  4 mg IntraVENous Q6H PRN Gracie Soliz MD        polyethylene glycol (GLYCOLAX) packet 17 g  17 g Oral Daily PRN Gracie Soliz MD        acetaminophen (TYLENOL) tablet 650 mg  650 mg Oral Q6H PRN Gracie Soliz MD        Or    acetaminophen (TYLENOL) suppository 650 mg  650 mg Rectal Q6H PRN Gracie Soliz MD        aztreonam (AZACTAM) 1000 mg in sodium chloride 0.9% 100 mL IVPB mini-bag  1,000 mg IntraVENous Q8H Gracie Soliz  mL/hr at 11/06/22 0552 1,000 mg at 11/06/22 0552    divalproex (DEPAKOTE) DR tablet 125 mg  125 mg Oral TID Gracie Soliz MD   125 mg at 11/06/22 0933    DULoxetine (CYMBALTA) extended release capsule 60 mg  60 mg Oral Daily Gracie Soliz MD   60 mg at 11/06/22 0933    [Held by provider] hydroCHLOROthiazide (HYDRODIURIL) tablet 25 mg  25 mg Oral Daily Gracie Soliz MD        hydroxychloroquine (PLAQUENIL) tablet 200 mg  200 mg Oral Daily Gracie Soliz MD   200 mg at 11/06/22 0933    [Held by provider] lisinopril (PRINIVIL;ZESTRIL) tablet 40 mg  40 mg Oral Daily Gracie Soliz MD        magnesium oxide (MAG-OX) tablet 400 mg  400 mg Oral Daily Gracie Soliz MD   400 mg at 11/06/22 0933    memantine (NAMENDA) tablet 5 mg  5 mg Oral BID Gracie Soliz MD   5 mg at 11/06/22 0933    metoprolol succinate (TOPROL XL) extended release tablet 200 mg  200 mg Oral Daily Gracie Soliz MD   200 mg at 11/06/22 0933    pantoprazole (PROTONIX) tablet 40 mg  40 mg Oral QAM AC Aime Rasheed MD   40 mg at 11/06/22 3773    lactated ringers infusion   IntraVENous Continuous Ryann Rm  mL/hr at 11/06/22 0216 New Bag at 11/06/22 0216    vancomycin (VANCOCIN) intermittent dosing (placeholder)   Other RX Placeholder Ryann Rm MD            Hospital problems     Patient Active Problem List   Diagnosis    Vitamin D deficiency    Anxiety    Chronic pain of both knees    HLD (hyperlipidemia)    Trochanteric bursitis of right hip    Dehydration    Chronic maxillary sinusitis    Fall    Depression    Pure hypercholesterolemia    Perennial allergic rhinitis    Dementia of the Alzheimer's type, with late onset, uncomplicated ([address]    Nonallergic rhinitis    Acquired hypothyroidism    Balance disorder    Spondylolisthesis of lumbar region    HOLLY (dyspnea on exertion)    Exposure to viral disease    Primary osteoarthritis    Spinal stenosis of lumbar region without neurogenic claudication    Gait difficulty    Hyperglycemia    Depression with anxiety    Gastroesophageal reflux disease with esophagitis    Osteopenia    Hypertension    Alzheimer's dementia without behavioral disturbance (HCC)    Recurrent UTI    Encephalopathy    UTI (urinary tract infection)        I have reviewed, updated, and verified this note's content and spent 38 minutes of my 42 minutes visit performing counseling and coordination of care regarding medical management.        Signed By: Ryann Rm MD     November 6, 2022

## 2022-11-07 LAB
ANION GAP SERPL CALC-SCNC: 6 MMOL/L (ref 2–11)
BACTERIA SPEC CULT: ABNORMAL
BASOPHILS # BLD: 0 K/UL (ref 0–0.2)
BASOPHILS NFR BLD: 1 % (ref 0–2)
BUN SERPL-MCNC: 19 MG/DL (ref 8–23)
CALCIUM SERPL-MCNC: 8.9 MG/DL (ref 8.3–10.4)
CHLORIDE SERPL-SCNC: 107 MMOL/L (ref 101–110)
CO2 SERPL-SCNC: 28 MMOL/L (ref 21–32)
CREAT SERPL-MCNC: 0.46 MG/DL (ref 0.6–1)
DIFFERENTIAL METHOD BLD: ABNORMAL
EOSINOPHIL # BLD: 0.3 K/UL (ref 0–0.8)
EOSINOPHIL NFR BLD: 6 % (ref 0.5–7.8)
ERYTHROCYTE [DISTWIDTH] IN BLOOD BY AUTOMATED COUNT: 12.6 % (ref 11.9–14.6)
GLUCOSE SERPL-MCNC: 86 MG/DL (ref 65–100)
HCT VFR BLD AUTO: 31.5 % (ref 35.8–46.3)
HGB BLD-MCNC: 10.3 G/DL (ref 11.7–15.4)
IMM GRANULOCYTES # BLD AUTO: 0 K/UL (ref 0–0.5)
IMM GRANULOCYTES NFR BLD AUTO: 0 % (ref 0–5)
LYMPHOCYTES # BLD: 1.6 K/UL (ref 0.5–4.6)
LYMPHOCYTES NFR BLD: 30 % (ref 13–44)
MCH RBC QN AUTO: 30.1 PG (ref 26.1–32.9)
MCHC RBC AUTO-ENTMCNC: 32.7 G/DL (ref 31.4–35)
MCV RBC AUTO: 92.1 FL (ref 82–102)
MONOCYTES # BLD: 0.5 K/UL (ref 0.1–1.3)
MONOCYTES NFR BLD: 9 % (ref 4–12)
NEUTS SEG # BLD: 2.9 K/UL (ref 1.7–8.2)
NEUTS SEG NFR BLD: 54 % (ref 43–78)
NRBC # BLD: 0 K/UL (ref 0–0.2)
PLATELET # BLD AUTO: 178 K/UL (ref 150–450)
PMV BLD AUTO: 9.8 FL (ref 9.4–12.3)
POTASSIUM SERPL-SCNC: 3.3 MMOL/L (ref 3.5–5.1)
RBC # BLD AUTO: 3.42 M/UL (ref 4.05–5.2)
SERVICE CMNT-IMP: ABNORMAL
SODIUM SERPL-SCNC: 141 MMOL/L (ref 133–143)
WBC # BLD AUTO: 5.2 K/UL (ref 4.3–11.1)

## 2022-11-07 PROCEDURE — 2580000003 HC RX 258: Performed by: INTERNAL MEDICINE

## 2022-11-07 PROCEDURE — 6360000002 HC RX W HCPCS: Performed by: INTERNAL MEDICINE

## 2022-11-07 PROCEDURE — 2500000003 HC RX 250 WO HCPCS: Performed by: INTERNAL MEDICINE

## 2022-11-07 PROCEDURE — 6370000000 HC RX 637 (ALT 250 FOR IP): Performed by: INTERNAL MEDICINE

## 2022-11-07 PROCEDURE — 36415 COLL VENOUS BLD VENIPUNCTURE: CPT

## 2022-11-07 PROCEDURE — 2580000003 HC RX 258: Performed by: HOSPITALIST

## 2022-11-07 PROCEDURE — 85025 COMPLETE CBC W/AUTO DIFF WBC: CPT

## 2022-11-07 PROCEDURE — 1100000000 HC RM PRIVATE

## 2022-11-07 PROCEDURE — 6360000002 HC RX W HCPCS: Performed by: HOSPITALIST

## 2022-11-07 PROCEDURE — 80048 BASIC METABOLIC PNL TOTAL CA: CPT

## 2022-11-07 RX ADMIN — DIVALPROEX SODIUM 125 MG: 125 TABLET, DELAYED RELEASE ORAL at 14:43

## 2022-11-07 RX ADMIN — ACETAMINOPHEN 650 MG: 325 TABLET ORAL at 22:30

## 2022-11-07 RX ADMIN — PANTOPRAZOLE SODIUM 40 MG: 40 TABLET, DELAYED RELEASE ORAL at 06:00

## 2022-11-07 RX ADMIN — VANCOMYCIN HYDROCHLORIDE 1000 MG: 1 INJECTION, POWDER, LYOPHILIZED, FOR SOLUTION INTRAVENOUS at 20:57

## 2022-11-07 RX ADMIN — AZTREONAM 1000 MG: 1 INJECTION, POWDER, LYOPHILIZED, FOR SOLUTION INTRAMUSCULAR; INTRAVENOUS at 14:42

## 2022-11-07 RX ADMIN — AZTREONAM 1000 MG: 1 INJECTION, POWDER, LYOPHILIZED, FOR SOLUTION INTRAMUSCULAR; INTRAVENOUS at 06:00

## 2022-11-07 RX ADMIN — AZTREONAM 1000 MG: 1 INJECTION, POWDER, LYOPHILIZED, FOR SOLUTION INTRAMUSCULAR; INTRAVENOUS at 22:29

## 2022-11-07 RX ADMIN — ENOXAPARIN SODIUM 30 MG: 100 INJECTION SUBCUTANEOUS at 20:58

## 2022-11-07 RX ADMIN — SODIUM CHLORIDE, POTASSIUM CHLORIDE, SODIUM LACTATE AND CALCIUM CHLORIDE: 600; 310; 30; 20 INJECTION, SOLUTION INTRAVENOUS at 03:41

## 2022-11-07 RX ADMIN — DIVALPROEX SODIUM 125 MG: 125 TABLET, DELAYED RELEASE ORAL at 20:58

## 2022-11-07 RX ADMIN — MEMANTINE 5 MG: 5 TABLET ORAL at 20:58

## 2022-11-07 ASSESSMENT — PAIN DESCRIPTION - LOCATION: LOCATION: KNEE

## 2022-11-07 ASSESSMENT — PAIN SCALES - GENERAL
PAINLEVEL_OUTOF10: 3
PAINLEVEL_OUTOF10: 2

## 2022-11-07 NOTE — PROGRESS NOTES
VANCO DAILY FOLLOW UP RENAL INSUFFICIENCY PATIENT   [facility] Pharmacokinetic Monitoring Service - Vancomycin    Consulting Provider: Dr Cameron Huerta   Indication: UTI  Target Concentration: Random level ? 15 mg/L  Day of Therapy: 3  Additional Antimicrobials:  Azactam 1 Gram IV every 8 hours    Patient eligible for piperacillin-tazobactam to cefepime auto-substitution per P&T approved protocol? N/A    Pertinent Laboratory Values: Wt Readings from Last 1 Encounters:   11/05/22 107 lb (48.5 kg)     Temp Readings from Last 1 Encounters:   11/07/22 99.5 °F (37.5 °C) (Axillary)     Recent Labs     11/05/22  1124 11/06/22  0550 11/07/22  0548   BUN 60*  --  19   CREATININE 1.25* 0.68 0.46*   WBC 9.2  --  5.2   PROCAL 3.32*  --   --        Lab Results   Component Value Date/Time    VANCORANDOM 3.7 11/06/2022 08:09 PM       MRSA Nasal Swab: N/A. Non-respiratory infection. .      Plan:  Based on improvement in renal function, entered information into \"[facility]\"  Dose of Vancomycin 750 mg q12h has a predicted AUC/Tr of 415/12.4     Since tonight's dose will be last dose (to complete 3 days), will order 1 gm x 1 dose.   If Vancomycin is continued following tonight's dose, will re-evaluate continued dose  Pharmacy will continue to monitor patient and adjust therapy as indicated      Thank you for the consult,    Luis Armando Dawn, JessicaD

## 2022-11-07 NOTE — PROGRESS NOTES
Progress Note    Patient: Nicko Ramirez MRN: [id number]  SSN: [id number]    YOB: 1945  Age: 68 y.o. Sex: female      Admit Date: 11/5/2022    LOS: 2 days     Assessment and Plan:   72-year-old female with a past medical history of advanced dementia, depression, hypertension, recurrent UTIs, that presents from memory care unit in the setting of altered mental status     1. Acute metabolic encephalopathy likely in the setting of UTI  11/7-  Patient mentation is improving, is more alert and awake however continues to be pleasantly confused which is her baseline from underlying dementia. -CT of the brain without acute intracranial abnormalities  -Decrease IV fluids to 50 cc/h. -Continue on IV aztreonam, EOT 11/15/2022. Stopping IV vancomycin today.  -Urine culture from 11/5 positive for Pseudomonas  -Avoid sedatives  -Delirium precautions    2. Abnormal EKG with concerns of atrial fibrillation, right bundle branch block and T wave inversions in V1, V2, V3 on admission likely from poor quality EKG and possibly demand ischemia  -Telemetry monitoring  -Repeated EKG with sinus rhythm, right bundle branch block  -Troponin 16.3  -Currently no chest pain  -EKG as needed    3. Hypertension  -Continue Toprol-XL and lisinopril  -Hold hydrochlorothiazide      4. Depression  -Continue Cymbalta    5. Advanced dementia  -Continue Depakote and Namenda    DVT prophylaxis with Lovenox     Disposition: Patient will be discharged back to [facility] care but will likely need IV antibiotics with EOT 11/15/22. I spent 37 minutes of time caring for this patient on the unit nearby or at bedside, and more than 50 percent was spent on coordination of care activities, and/or patient/family counseling regarding status and plan of care. Subjective:   11/7-  Patient seen at bedside, resting in bed comfortably. She is alert and awake but pleasantly confused, not able to coherently talk.   Patient's  present at bedside, discussed with  about ongoing patient's plan of care. Patient is more alert and awake over the past 24 hours as per the . Patient's oral intake is improving as well. She is currently on IV aztreonam, urine culture positive for pansensitive Pseudomonas however patient is allergic to multiple antibiotics, making options limited. ROS:  10 point review of system is negative except for what mentioned above. Objective:     Vitals:    11/06/22 2343 11/07/22 0426 11/07/22 0625 11/07/22 0711   BP: 102/85 100/65 (!) 143/77 (!) 149/86   Pulse: 79 75 75 85   Resp: 19 18  17   Temp: 98.2 °F (36.8 °C) 97 °F (36.1 °C)  97.3 °F (36.3 °C)   TempSrc:       SpO2: 98% 98%  99%   Weight:            Intake and Output:  Current Shift: No intake/output data recorded. Last three shifts: 11/05 1901 - 11/07 0700  In: 900 [P.O.:100;  I.V.:800]  Out: 0     ROS  10 ROS negative except from stated on subjective    Physical Exam:   General: Alert, awake, pleasantly confused, NAD, on room air  HEENT: Head NCAT, PERRLA positive, MMM  Neck: supple, no JVD  Cardiovascular: RRR, S1, S2, no murmurs  Respiratory: Lungs are clear, no wheezes or rales  Abdomen: Soft, nontender, nondistended, bowel sounds are normoactive  Back: No CVA tenderness, no paraspinal tenderness  Extremities: LE without pedal edema, no erythema  Neuro: GCS 15, cranial nerves intact, following commands intermittently, speech is frail  Skin: no rash or ulcers  Psych: Oriented x1, flat affect    Lab/Data Review:  I have personally reviewed patients laboratory data showing  Recent Results (from the past 24 hour(s))   Vancomycin Level, Random    Collection Time: 11/06/22  8:09 PM   Result Value Ref Range    Vancomycin Rm 3.7 UG/ML   CBC with Auto Differential    Collection Time: 11/07/22  5:48 AM   Result Value Ref Range    WBC 5.2 4.3 - 11.1 K/uL    RBC 3.42 (L) 4.05 - 5.2 M/uL    Hemoglobin 10.3 (L) 11.7 - 15.4 g/dL    Hematocrit 31.5 (L) 35.8 - 46.3 %    MCV 92.1 82.0 - 102.0 FL    MCH 30.1 26.1 - 32.9 PG    MCHC 32.7 31.4 - 35.0 g/dL    RDW 12.6 11.9 - 14.6 %    Platelets 680 957 - 722 K/uL    MPV 9.8 9.4 - 12.3 FL    nRBC 0.00 0.0 - 0.2 K/uL    Differential Type AUTOMATED      Seg Neutrophils 54 43 - 78 %    Lymphocytes 30 13 - 44 %    Monocytes 9 4.0 - 12.0 %    Eosinophils % 6 0.5 - 7.8 %    Basophils 1 0.0 - 2.0 %    Immature Granulocytes 0 0.0 - 5.0 %    Segs Absolute 2.9 1.7 - 8.2 K/UL    Absolute Lymph # 1.6 0.5 - 4.6 K/UL    Absolute Mono # 0.5 0.1 - 1.3 K/UL    Absolute Eos # 0.3 0.0 - 0.8 K/UL    Basophils Absolute 0.0 0.0 - 0.2 K/UL    Absolute Immature Granulocyte 0.0 0.0 - 0.5 K/UL   Basic Metabolic Panel    Collection Time: 11/07/22  5:48 AM   Result Value Ref Range    Sodium 141 133 - 143 mmol/L    Potassium 3.3 (L) 3.5 - 5.1 mmol/L    Chloride 107 101 - 110 mmol/L    CO2 28 21 - 32 mmol/L    Anion Gap 6 2 - 11 mmol/L    Glucose 86 65 - 100 mg/dL    BUN 19 8 - 23 MG/DL    Creatinine 0.46 (L) 0.6 - 1.0 MG/DL    Est, Glom Filt Rate >60 >60 ml/min/1.73m2    Calcium 8.9 8.3 - 10.4 MG/DL        Image:  I have personally reviewed patients imaging showing  CT HEAD WO CONTRAST   Final Result   Chronic appearing white matter change without acute intracranial   abnormality.             XR CHEST PORTABLE   Final Result   No acute findings in the chest              Current Facility-Administered Medications   Medication Dose Route Frequency Provider Last Rate Last Admin    lisinopril (PRINIVIL;ZESTRIL) tablet 40 mg  40 mg Oral Daily Wilson Days, MD   40 mg at 11/06/22 1530    sodium chloride flush 0.9 % injection 5-40 mL  5-40 mL IntraVENous 2 times per day Maddie Days, MD   5 mL at 11/06/22 2056    sodium chloride flush 0.9 % injection 5-40 mL  5-40 mL IntraVENous PRN Maddie Nielsen MD        0.9 % sodium chloride infusion   IntraVENous PRN Maddie Nielsen MD        enoxaparin Sodium (LOVENOX) injection 30 mg  30 mg SubCUTAneous Nightly Becca Ley MD   30 mg at 11/06/22 2046    ondansetron (ZOFRAN-ODT) disintegrating tablet 4 mg  4 mg Oral Q8H PRN Becca Ley MD        Or    ondansetron [facility]) injection 4 mg  4 mg IntraVENous Q6H PRN Becca Ley MD        polyethylene glycol (GLYCOLAX) packet 17 g  17 g Oral Daily PRN Becca Ley MD        acetaminophen (TYLENOL) tablet 650 mg  650 mg Oral Q6H PRN Becca Ley MD        Or    acetaminophen (TYLENOL) suppository 650 mg  650 mg Rectal Q6H PRN Becca Ley MD        aztreonam (AZACTAM) 1000 mg in sodium chloride 0.9% 100 mL IVPB mini-bag  1,000 mg IntraVENous Q8H Becca Ley  mL/hr at 11/07/22 0600 1,000 mg at 11/07/22 0600    divalproex (DEPAKOTE) DR tablet 125 mg  125 mg Oral TID Becca Ley MD   125 mg at 11/06/22 2046    DULoxetine (CYMBALTA) extended release capsule 60 mg  60 mg Oral Daily Becca Ley MD   60 mg at 11/06/22 0933    [Held by provider] hydroCHLOROthiazide (HYDRODIURIL) tablet 25 mg  25 mg Oral Daily Becca Ley MD        hydroxychloroquine (PLAQUENIL) tablet 200 mg  200 mg Oral Daily Becca Ley MD   200 mg at 11/06/22 0933    magnesium oxide (MAG-OX) tablet 400 mg  400 mg Oral Daily Becca Ley MD   400 mg at 11/06/22 0933    memantine (NAMENDA) tablet 5 mg  5 mg Oral BID Becca Ley MD   5 mg at 11/06/22 2046    metoprolol succinate (TOPROL XL) extended release tablet 200 mg  200 mg Oral Daily Becca Ley MD   200 mg at 11/06/22 0933    pantoprazole (PROTONIX) tablet 40 mg  40 mg Oral QAM AC Aime Chou MD   40 mg at 11/07/22 0600    lactated ringers infusion   IntraVENous Continuous Becca Ley  mL/hr at 11/07/22 0341 New Bag at 11/07/22 0341    vancomycin (VANCOCIN) intermittent dosing (placeholder)   Other RX Vick Chou, [facility] problems     Patient Active Problem List   Diagnosis Vitamin D deficiency    Anxiety    Chronic pain of both knees    HLD (hyperlipidemia)    Trochanteric bursitis of right hip    Dehydration    Chronic maxillary sinusitis    Fall    Depression    Pure hypercholesterolemia    Perennial allergic rhinitis    Dementia of the Alzheimer's type, with late onset, uncomplicated ([address]    Nonallergic rhinitis    Acquired hypothyroidism    Balance disorder    Spondylolisthesis of lumbar region    HOLLY (dyspnea on exertion)    Exposure to viral disease    Primary osteoarthritis    Spinal stenosis of lumbar region without neurogenic claudication    Gait difficulty    Hyperglycemia    Depression with anxiety    Gastroesophageal reflux disease with esophagitis    Osteopenia    Hypertension    Alzheimer's dementia without behavioral disturbance ([address]    Recurrent UTI    Encephalopathy    UTI (urinary tract infection)        Signed By: Kim Cervantes MD     November 7, 2022

## 2022-11-08 PROCEDURE — 6370000000 HC RX 637 (ALT 250 FOR IP): Performed by: INTERNAL MEDICINE

## 2022-11-08 PROCEDURE — 1100000000 HC RM PRIVATE

## 2022-11-08 PROCEDURE — 2500000003 HC RX 250 WO HCPCS: Performed by: INTERNAL MEDICINE

## 2022-11-08 PROCEDURE — 2580000003 HC RX 258: Performed by: HOSPITALIST

## 2022-11-08 PROCEDURE — 2580000003 HC RX 258: Performed by: INTERNAL MEDICINE

## 2022-11-08 PROCEDURE — 6360000002 HC RX W HCPCS: Performed by: INTERNAL MEDICINE

## 2022-11-08 RX ADMIN — DIVALPROEX SODIUM 125 MG: 125 TABLET, DELAYED RELEASE ORAL at 10:52

## 2022-11-08 RX ADMIN — PANTOPRAZOLE SODIUM 40 MG: 40 TABLET, DELAYED RELEASE ORAL at 10:50

## 2022-11-08 RX ADMIN — HYDROXYCHLOROQUINE SULFATE 200 MG: 200 TABLET ORAL at 10:52

## 2022-11-08 RX ADMIN — ENOXAPARIN SODIUM 30 MG: 100 INJECTION SUBCUTANEOUS at 20:47

## 2022-11-08 RX ADMIN — METOPROLOL SUCCINATE 200 MG: 100 TABLET, EXTENDED RELEASE ORAL at 10:50

## 2022-11-08 RX ADMIN — AZTREONAM 1000 MG: 1 INJECTION, POWDER, LYOPHILIZED, FOR SOLUTION INTRAMUSCULAR; INTRAVENOUS at 06:13

## 2022-11-08 RX ADMIN — LISINOPRIL 40 MG: 20 TABLET ORAL at 10:50

## 2022-11-08 RX ADMIN — AZTREONAM 1000 MG: 1 INJECTION, POWDER, LYOPHILIZED, FOR SOLUTION INTRAMUSCULAR; INTRAVENOUS at 22:00

## 2022-11-08 RX ADMIN — DULOXETINE HYDROCHLORIDE 60 MG: 60 CAPSULE, DELAYED RELEASE ORAL at 10:52

## 2022-11-08 RX ADMIN — MEMANTINE 5 MG: 5 TABLET ORAL at 10:50

## 2022-11-08 RX ADMIN — SODIUM CHLORIDE, POTASSIUM CHLORIDE, SODIUM LACTATE AND CALCIUM CHLORIDE: 600; 310; 30; 20 INJECTION, SOLUTION INTRAVENOUS at 06:15

## 2022-11-08 RX ADMIN — MEMANTINE 5 MG: 5 TABLET ORAL at 20:47

## 2022-11-08 RX ADMIN — DIVALPROEX SODIUM 125 MG: 125 TABLET, DELAYED RELEASE ORAL at 20:47

## 2022-11-08 RX ADMIN — Medication 400 MG: at 10:52

## 2022-11-08 RX ADMIN — AZTREONAM 1000 MG: 1 INJECTION, POWDER, LYOPHILIZED, FOR SOLUTION INTRAMUSCULAR; INTRAVENOUS at 16:00

## 2022-11-08 RX ADMIN — DIVALPROEX SODIUM 125 MG: 125 TABLET, DELAYED RELEASE ORAL at 15:57

## 2022-11-08 NOTE — PROGRESS NOTES
Progress Note    Patient: Yu Louis MRN: [id number]  SSN: [id number]    YOB: 1945  Age: 68 y.o. Sex: female      Admit Date: 11/5/2022    LOS: 3 days     Assessment and Plan:   66-year-old female with a past medical history of advanced dementia, depression, hypertension, recurrent UTIs, that presents from memory care unit in the setting of altered mental status     1. Acute metabolic encephalopathy likely in the setting of UTI  11/8-  Patient mentation continues to improve, is more alert and awake however continues to be pleasantly confused which is her baseline from underlying dementia. -CT of the brain without acute intracranial abnormalities  -Decrease IV fluids to 50 cc/h. -Continue on IV aztreonam, EOT 11/15/2022. Stop vancomycin on 11/7. Case discussed with  to find out if skilled nursing and [facility] can continue IV antibiotics till 11/15/2022.  -Urine culture from 11/5 positive for Pseudomonas  -Avoid sedatives  -Delirium precautions    2. Abnormal EKG with concerns of atrial fibrillation, right bundle branch block and T wave inversions in V1, V2, V3 on admission likely from poor quality EKG and possibly demand ischemia  -Telemetry monitoring  -Repeated EKG with sinus rhythm, right bundle branch block  -Troponin 16.3  -Currently no chest pain  -EKG as needed    3. Hypertension  -Continue Toprol-XL and lisinopril  -Hold hydrochlorothiazide      4. Depression  -Continue Cymbalta    5. Advanced dementia  -Continue Depakote and Namenda    DVT prophylaxis with Lovenox     Disposition: Patient will be discharged back to [facility] but will likely need IV antibiotics with EOT 11/15/22. I spent 38 minutes of time caring for this patient on the unit nearby or at bedside, and more than 50 percent was spent on coordination of care activities, and/or patient/family counseling regarding status and plan of care.      Subjective:   11/8-  Patient seen at bedside, resting in bed comfortably. She is alert and awake but minimally verbal and continues to be pleasantly confused and is at baseline. Patient  is present at bedside, discussed with patient's  about need for IV antibiotic as patient is allergic to multiple antibiotics and cannot be discharged on oral antibiotics. No reported fever, diarrhea, vomiting per nursing staff. She is currently on IV aztreonam, urine culture positive for pansensitive Pseudomonas however patient is allergic to multiple antibiotics, making options limited. ROS:  10 point review of system is negative except for what mentioned above. Objective:     Vitals:    11/07/22 2047 11/07/22 2320 11/08/22 0356 11/08/22 0728   BP: (!) 151/89 (!) 174/88 (!) 148/90 (!) 180/99   Pulse: 87 85 97 (!) 102   Resp: 20 20 21    Temp: 98.3 °F (36.8 °C) 97.4 °F (36.3 °C) 97 °F (36.1 °C) 98.2 °F (36.8 °C)   TempSrc: Axillary Oral Axillary    SpO2: 97% 97% 99% 97%   Weight:            Intake and Output:  Current Shift: No intake/output data recorded. Last three shifts: 11/06 1901 - 11/08 0700  In: 800 [I.V.:800]  Out: 1050 [Urine:1050]    ROS  10 ROS negative except from stated on subjective    Physical Exam:   General: Alert, awake, pleasantly confused, NAD, on room air  HEENT: Head NCAT, PERRLA positive, MMM  Neck: supple, no JVD  Cardiovascular: RRR, S1, S2, no murmurs  Respiratory: Lungs are clear, no wheezes or rales  Abdomen: Soft, nontender, nondistended, bowel sounds are normoactive  Back: No CVA tenderness, no paraspinal tenderness  Extremities: LE without pedal edema, no erythema  Neuro: GCS 15, cranial nerves intact, following commands intermittently, speech is frail  Skin: no rash or ulcers  Psych: Oriented x1, flat affect    Lab/Data Review:  I have personally reviewed patients laboratory data showing  No results found for this or any previous visit (from the past 24 hour(s)).        Image:  I have personally reviewed patients imaging showing  CT HEAD WO CONTRAST   Final Result   Chronic appearing white matter change without acute intracranial   abnormality.             XR CHEST PORTABLE   Final Result   No acute findings in the chest              Current Facility-Administered Medications   Medication Dose Route Frequency Provider Last Rate Last Admin    lisinopril (PRINIVIL;ZESTRIL) tablet 40 mg  40 mg Oral Daily Ryann Rm MD   40 mg at 11/06/22 1530    sodium chloride flush 0.9 % injection 5-40 mL  5-40 mL IntraVENous 2 times per day Ryann Rm MD   5 mL at 11/06/22 2056    sodium chloride flush 0.9 % injection 5-40 mL  5-40 mL IntraVENous PRN Ryann Rm MD        0.9 % sodium chloride infusion   IntraVENous PRN Ryann Rm MD        enoxaparin Sodium (LOVENOX) injection 30 mg  30 mg SubCUTAneous Nightly Ryann Rm MD   30 mg at 11/07/22 2058    ondansetron (ZOFRAN-ODT) disintegrating tablet 4 mg  4 mg Oral Q8H PRN Ryann Rm MD        Or    ondansetron [facility]) injection 4 mg  4 mg IntraVENous Q6H PRN Ryann Rm MD        polyethylene glycol (GLYCOLAX) packet 17 g  17 g Oral Daily PRN Ryann Rm MD        acetaminophen (TYLENOL) tablet 650 mg  650 mg Oral Q6H PRN Ryann Rm MD   650 mg at 11/07/22 2230    Or    acetaminophen (TYLENOL) suppository 650 mg  650 mg Rectal Q6H PRN Ryann Rm MD        aztreonam (AZACTAM) 1000 mg in sodium chloride 0.9% 100 mL IVPB mini-bag  1,000 mg IntraVENous Q8H Ryann Rm  mL/hr at 11/08/22 0613 1,000 mg at 11/08/22 0613    divalproex (DEPAKOTE) DR tablet 125 mg  125 mg Oral TID Ryann Rm MD   125 mg at 11/07/22 2058    DULoxetine (CYMBALTA) extended release capsule 60 mg  60 mg Oral Daily Ryann Rm MD   60 mg at 11/06/22 0933    [Held by provider] hydroCHLOROthiazide (HYDRODIURIL) tablet 25 mg  25 mg Oral Daily Ryann Rm MD        hydroxychloroquine (PLAQUENIL) tablet 200 mg 200 mg Oral Daily Nadja Zamora MD   200 mg at 11/06/22 3951    magnesium oxide (MAG-OX) tablet 400 mg  400 mg Oral Daily Nadja Zamora MD   400 mg at 11/06/22 0933    memantine (NAMENDA) tablet 5 mg  5 mg Oral BID Nadja Zamora MD   5 mg at 11/07/22 2058    metoprolol succinate (TOPROL XL) extended release tablet 200 mg  200 mg Oral Daily Nadja Zamora MD   200 mg at 11/06/22 0933    pantoprazole (PROTONIX) tablet 40 mg  40 mg Oral QAM AC Aime Torres MD   40 mg at 11/07/22 0600    lactated ringers infusion   IntraVENous Continuous Reva Lyle MD 50 mL/hr at 11/08/22 0615 New Bag at 11/08/22 [address] problems     Patient Active Problem List   Diagnosis    Vitamin D deficiency    Anxiety    Chronic pain of both knees    HLD (hyperlipidemia)    Trochanteric bursitis of right hip    Dehydration    Chronic maxillary sinusitis    Fall    Depression    Pure hypercholesterolemia    Perennial allergic rhinitis    Dementia of the Alzheimer's type, with late onset, uncomplicated ([address]    Nonallergic rhinitis    Acquired hypothyroidism    Balance disorder    Spondylolisthesis of lumbar region    HOLLY (dyspnea on exertion)    Exposure to viral disease    Primary osteoarthritis    Spinal stenosis of lumbar region without neurogenic claudication    Gait difficulty    Hyperglycemia    Depression with anxiety    Gastroesophageal reflux disease with esophagitis    Osteopenia    Hypertension    Alzheimer's dementia without behavioral disturbance ([address]    Recurrent UTI    Encephalopathy    UTI (urinary tract infection)        Signed By: Reva Lyle MD     November 8, 2022

## 2022-11-09 ENCOUNTER — APPOINTMENT (OUTPATIENT)
Dept: GENERAL RADIOLOGY | Age: 77
DRG: 689 | End: 2022-11-09
Payer: MEDICARE

## 2022-11-09 PROCEDURE — 1100000000 HC RM PRIVATE

## 2022-11-09 PROCEDURE — 2580000003 HC RX 258: Performed by: INTERNAL MEDICINE

## 2022-11-09 PROCEDURE — 6370000000 HC RX 637 (ALT 250 FOR IP): Performed by: INTERNAL MEDICINE

## 2022-11-09 PROCEDURE — 71045 X-RAY EXAM CHEST 1 VIEW: CPT

## 2022-11-09 PROCEDURE — 2500000003 HC RX 250 WO HCPCS: Performed by: INTERNAL MEDICINE

## 2022-11-09 PROCEDURE — 6360000002 HC RX W HCPCS: Performed by: INTERNAL MEDICINE

## 2022-11-09 PROCEDURE — 2580000003 HC RX 258: Performed by: HOSPITALIST

## 2022-11-09 RX ADMIN — SODIUM CHLORIDE, POTASSIUM CHLORIDE, SODIUM LACTATE AND CALCIUM CHLORIDE: 600; 310; 30; 20 INJECTION, SOLUTION INTRAVENOUS at 19:22

## 2022-11-09 RX ADMIN — MEMANTINE 5 MG: 5 TABLET ORAL at 21:19

## 2022-11-09 RX ADMIN — AZTREONAM 1000 MG: 1 INJECTION, POWDER, LYOPHILIZED, FOR SOLUTION INTRAMUSCULAR; INTRAVENOUS at 14:05

## 2022-11-09 RX ADMIN — HYDROXYCHLOROQUINE SULFATE 200 MG: 200 TABLET ORAL at 08:42

## 2022-11-09 RX ADMIN — SODIUM CHLORIDE, PRESERVATIVE FREE 5 ML: 5 INJECTION INTRAVENOUS at 08:49

## 2022-11-09 RX ADMIN — DIVALPROEX SODIUM 125 MG: 125 TABLET, DELAYED RELEASE ORAL at 14:04

## 2022-11-09 RX ADMIN — LISINOPRIL 40 MG: 20 TABLET ORAL at 08:42

## 2022-11-09 RX ADMIN — AZTREONAM 1000 MG: 1 INJECTION, POWDER, LYOPHILIZED, FOR SOLUTION INTRAMUSCULAR; INTRAVENOUS at 06:14

## 2022-11-09 RX ADMIN — DULOXETINE HYDROCHLORIDE 60 MG: 60 CAPSULE, DELAYED RELEASE ORAL at 08:42

## 2022-11-09 RX ADMIN — MEMANTINE 5 MG: 5 TABLET ORAL at 08:42

## 2022-11-09 RX ADMIN — DIVALPROEX SODIUM 125 MG: 125 TABLET, DELAYED RELEASE ORAL at 21:19

## 2022-11-09 RX ADMIN — AZTREONAM 1000 MG: 1 INJECTION, POWDER, LYOPHILIZED, FOR SOLUTION INTRAMUSCULAR; INTRAVENOUS at 22:12

## 2022-11-09 RX ADMIN — METOPROLOL SUCCINATE 200 MG: 100 TABLET, EXTENDED RELEASE ORAL at 08:42

## 2022-11-09 RX ADMIN — PANTOPRAZOLE SODIUM 40 MG: 40 TABLET, DELAYED RELEASE ORAL at 06:14

## 2022-11-09 RX ADMIN — SODIUM CHLORIDE, PRESERVATIVE FREE 10 ML: 5 INJECTION INTRAVENOUS at 21:19

## 2022-11-09 RX ADMIN — DIVALPROEX SODIUM 125 MG: 125 TABLET, DELAYED RELEASE ORAL at 08:42

## 2022-11-09 RX ADMIN — Medication 400 MG: at 08:42

## 2022-11-09 RX ADMIN — ENOXAPARIN SODIUM 30 MG: 100 INJECTION SUBCUTANEOUS at 21:19

## 2022-11-09 NOTE — CARE COORDINATION
CM spoke with bedside nurse about discharge plans and the order for a PICC line. Due to pts late stage of dementia, PICC placement may not be a safe plan. Per spouse, pt has a history of pulling at IV lines. Charge nurse sent message to attending physician who placed a consult to ID for opinion / options. Will await their recommendations. CM left a message at the facility and spoke with patient's spouse to provide an update. Continue to follow.

## 2022-11-09 NOTE — PROGRESS NOTES
Progress Note    Patient: Romain Smith MRN: [id number]  SSN: [id number]    YOB: 1945  Age: 68 y.o. Sex: female      Admit Date: 11/5/2022    LOS: 4 days     Assessment and Plan:   79-year-old female with a past medical history of advanced dementia, depression, hypertension, recurrent UTIs, that presents from memory care unit in the setting of altered mental status     1. Acute metabolic encephalopathy likely in the setting of UTI  11/8-  Patient mentation continues to improve, is more alert and awake however continues to be pleasantly confused which is her baseline from underlying dementia. -CT of the brain without acute intracranial abnormalities  -Decrease IV fluids to 50 cc/h. -Continue on IV aztreonam, EOT 11/9/2022. Stopped vancomycin on 11/7.  -Urine culture from 11/5 positive for Pseudomonas  -Avoid sedatives  -Delirium precautions    2. Abnormal EKG with concerns of atrial fibrillation, right bundle branch block and T wave inversions in V1, V2, V3 on admission likely from poor quality EKG and possibly demand ischemia  -Telemetry monitoring  -Repeated EKG with sinus rhythm, right bundle branch block  -Troponin 16.3  -Currently no chest pain  -EKG as needed    3. Hypertension  -Continue Toprol-XL and lisinopril  -Hold hydrochlorothiazide    4. Depression  -Continue Cymbalta    5. Advanced dementia  -Continue Depakote and Namenda    DVT prophylaxis with Lovenox     Disposition: Patient will be discharged back to [facility] care likely tomorrow    I spent 36 minutes of time caring for this patient on the unit nearby or at bedside, and more than 50 percent was spent on coordination of care activities, and/or patient/family counseling regarding status and plan of care. Subjective:   11/9-  Patient seen at bedside, resting in bed comfortably. Patient is minimally verbal and pleasantly confused.  at bedside and discussed plan of care.   I also discussed case with infectious disease who recommend to continue IV aztreonam today and then stop. Patient does not need prolonged antibiotic likely urine is colonized with Pseudomonas. ROS:  10 point review of system is negative except for what mentioned above. Objective:     Vitals:    11/09/22 0823 11/09/22 0825 11/09/22 1122 11/09/22 1240   BP: (!) 167/131 125/84  118/60   Pulse: 87 85 78 69   Resp: 16   16   Temp: 97.5 °F (36.4 °C)   97.9 °F (36.6 °C)   TempSrc: Oral   Oral   SpO2: 97% 95%  99%   Weight:            Intake and Output:  Current Shift: 11/09 0701 - 11/09 1900  In: 2274 [P.O.:540; I.V.:500]  Out: 400 [Urine:400]  Last three shifts: 11/07 1901 - 11/09 0700  In: 120 [P.O.:120]  Out: 2050 [Urine:2050]    ROS  10 ROS negative except from stated on subjective    Physical Exam:   General: Alert, awake, pleasantly confused, NAD, on room air  HEENT: Head NCAT, PERRLA positive, MMM  Neck: supple, no JVD  Cardiovascular: RRR, S1, S2, no murmurs  Respiratory: Lungs are clear, no wheezes or rales  Abdomen: Soft, nontender, nondistended, bowel sounds are normoactive  Back: No CVA tenderness, no paraspinal tenderness  Extremities: LE without pedal edema, no erythema  Neuro: GCS 15, cranial nerves intact, following commands intermittently, speech is frail  Skin: no rash or ulcers  Psych: Oriented x1, flat affect    Lab/Data Review:  I have personally reviewed patients laboratory data showing  No results found for this or any previous visit (from the past 24 hour(s)). Image:  I have personally reviewed patients imaging showing  XR CHEST 1 VIEW   Final Result      CT HEAD WO CONTRAST   Final Result   Chronic appearing white matter change without acute intracranial   abnormality.             XR CHEST PORTABLE   Final Result   No acute findings in the chest              Current Facility-Administered Medications   Medication Dose Route Frequency Provider Last Rate Last Admin    lisinopril (PRINIVIL;ZESTRIL) tablet 40 mg  40 mg Oral Daily Jacque Fong MD   40 mg at 11/09/22 4723    sodium chloride flush 0.9 % injection 5-40 mL  5-40 mL IntraVENous 2 times per day Jacque Fong MD   5 mL at 11/09/22 0849    sodium chloride flush 0.9 % injection 5-40 mL  5-40 mL IntraVENous PRN Jacque Fong MD        0.9 % sodium chloride infusion   IntraVENous PRN Jacque Fong MD        enoxaparin Sodium (LOVENOX) injection 30 mg  30 mg SubCUTAneous Nightly Jacque Fong MD   30 mg at 11/08/22 2047    ondansetron (ZOFRAN-ODT) disintegrating tablet 4 mg  4 mg Oral Q8H PRN Jacque Fong MD        Or    ondansetron [facility]) injection 4 mg  4 mg IntraVENous Q6H PRN Jacque Fong MD        polyethylene glycol (GLYCOLAX) packet 17 g  17 g Oral Daily PRN Jacque Fong MD        acetaminophen (TYLENOL) tablet 650 mg  650 mg Oral Q6H PRN Jacque Fong MD   650 mg at 11/07/22 2230    Or    acetaminophen (TYLENOL) suppository 650 mg  650 mg Rectal Q6H PRN Jacque Fong MD        aztreonam (AZACTAM) 1000 mg in sodium chloride 0.9% 100 mL IVPB mini-bag  1,000 mg IntraVENous Archer Spurling, MD   Stopped at 11/09/22 1520    divalproex (DEPAKOTE) DR tablet 125 mg  125 mg Oral TID Jacque Fong MD   125 mg at 11/09/22 1404    DULoxetine (CYMBALTA) extended release capsule 60 mg  60 mg Oral Daily aJcque Fong MD   60 mg at 11/09/22 0842    [Held by provider] hydroCHLOROthiazide (HYDRODIURIL) tablet 25 mg  25 mg Oral Daily Jacque Fong MD        hydroxychloroquine (PLAQUENIL) tablet 200 mg  200 mg Oral Daily Jacque Fong MD   200 mg at 11/09/22 0842    magnesium oxide (MAG-OX) tablet 400 mg  400 mg Oral Daily Jacque Fong MD   400 mg at 11/09/22 0842    memantine (NAMENDA) tablet 5 mg  5 mg Oral BID Jacque Fong MD   5 mg at 11/09/22 1390    metoprolol succinate (TOPROL XL) extended release tablet 200 mg  200 mg Oral Daily Jacque Fong MD   200 mg at 11/09/22 0842    pantoprazole (PROTONIX) tablet 40 mg  40 mg Oral QAM AC Aime Peraza MD   40 mg at 11/09/22 0462    lactated ringers infusion   IntraVENous Continuous Boo Collins MD   Stopped at 11/09/22 [address] problems     Patient Active Problem List   Diagnosis    Vitamin D deficiency    Anxiety    Chronic pain of both knees    HLD (hyperlipidemia)    Trochanteric bursitis of right hip    Dehydration    Chronic maxillary sinusitis    Fall    Depression    Pure hypercholesterolemia    Perennial allergic rhinitis    Dementia of the Alzheimer's type, with late onset, uncomplicated ([address]    Nonallergic rhinitis    Acquired hypothyroidism    Balance disorder    Spondylolisthesis of lumbar region    HOLLY (dyspnea on exertion)    Exposure to viral disease    Primary osteoarthritis    Spinal stenosis of lumbar region without neurogenic claudication    Gait difficulty    Hyperglycemia    Depression with anxiety    Gastroesophageal reflux disease with esophagitis    Osteopenia    Hypertension    Alzheimer's dementia without behavioral disturbance ([address]    Recurrent UTI    Encephalopathy    UTI (urinary tract infection)        Signed By: Anusha Benitez MD     November 9, 2022

## 2022-11-10 VITALS
DIASTOLIC BLOOD PRESSURE: 94 MMHG | HEART RATE: 72 BPM | TEMPERATURE: 98.2 F | SYSTOLIC BLOOD PRESSURE: 165 MMHG | OXYGEN SATURATION: 95 % | WEIGHT: 107 LBS | BODY MASS INDEX: 17.27 KG/M2 | RESPIRATION RATE: 18 BRPM

## 2022-11-10 PROCEDURE — 6370000000 HC RX 637 (ALT 250 FOR IP): Performed by: INTERNAL MEDICINE

## 2022-11-10 PROCEDURE — 6370000000 HC RX 637 (ALT 250 FOR IP): Performed by: FAMILY MEDICINE

## 2022-11-10 PROCEDURE — 87635 SARS-COV-2 COVID-19 AMP PRB: CPT

## 2022-11-10 PROCEDURE — 2500000003 HC RX 250 WO HCPCS: Performed by: INTERNAL MEDICINE

## 2022-11-10 PROCEDURE — 2580000003 HC RX 258: Performed by: INTERNAL MEDICINE

## 2022-11-10 RX ORDER — POTASSIUM CHLORIDE 20 MEQ/1
40 TABLET, EXTENDED RELEASE ORAL ONCE
Status: COMPLETED | OUTPATIENT
Start: 2022-11-10 | End: 2022-11-10

## 2022-11-10 RX ADMIN — MEMANTINE 5 MG: 5 TABLET ORAL at 09:17

## 2022-11-10 RX ADMIN — SODIUM CHLORIDE, PRESERVATIVE FREE 10 ML: 5 INJECTION INTRAVENOUS at 09:17

## 2022-11-10 RX ADMIN — AZTREONAM 1000 MG: 1 INJECTION, POWDER, LYOPHILIZED, FOR SOLUTION INTRAMUSCULAR; INTRAVENOUS at 06:32

## 2022-11-10 RX ADMIN — DIVALPROEX SODIUM 125 MG: 125 TABLET, DELAYED RELEASE ORAL at 09:14

## 2022-11-10 RX ADMIN — Medication 400 MG: at 09:17

## 2022-11-10 RX ADMIN — METOPROLOL SUCCINATE 200 MG: 100 TABLET, EXTENDED RELEASE ORAL at 09:17

## 2022-11-10 RX ADMIN — POTASSIUM CHLORIDE 40 MEQ: 1500 TABLET, EXTENDED RELEASE ORAL at 09:17

## 2022-11-10 RX ADMIN — HYDROXYCHLOROQUINE SULFATE 200 MG: 200 TABLET ORAL at 09:17

## 2022-11-10 RX ADMIN — LISINOPRIL 40 MG: 20 TABLET ORAL at 09:17

## 2022-11-10 RX ADMIN — PANTOPRAZOLE SODIUM 40 MG: 40 TABLET, DELAYED RELEASE ORAL at 06:33

## 2022-11-10 RX ADMIN — DULOXETINE HYDROCHLORIDE 60 MG: 60 CAPSULE, DELAYED RELEASE ORAL at 09:17

## 2022-11-10 ASSESSMENT — PAIN SCALES - WONG BAKER: WONGBAKER_NUMERICALRESPONSE: 0

## 2022-11-10 ASSESSMENT — PAIN SCALES - GENERAL: PAINLEVEL_OUTOF10: 0

## 2022-11-10 NOTE — PROGRESS NOTES
Pt has been discharged home. Hourly rounds completed. PIV removed with no bleeding. Report called to Adarsh at [facility] on [address] [facility] for questions provided. Pt denies needs at this time. All needs met at this time.

## 2022-11-10 NOTE — PLAN OF CARE
Problem: Discharge Planning  Goal: Discharge to home or other facility with appropriate resources  Outcome: Adequate for Discharge  Flowsheets (Taken 11/10/2022 9145)  Discharge to home or other facility with appropriate resources: Identify barriers to discharge with patient and caregiver     Problem: Pain  Goal: Verbalizes/displays adequate comfort level or baseline comfort level  Outcome: Adequate for Discharge     Problem: Skin/Tissue Integrity  Goal: Absence of new skin breakdown  Description: 1. Monitor for areas of redness and/or skin breakdown  2. Assess vascular access sites hourly  3. Every 4-6 hours minimum:  Change oxygen saturation probe site  4. Every 4-6 hours:  If on nasal continuous positive airway pressure, respiratory therapy assess nares and determine need for appliance change or resting period.   Outcome: Adequate for Discharge     Problem: Safety - Adult  Goal: Free from fall injury  Outcome: Adequate for Discharge

## 2022-11-10 NOTE — DISCHARGE SUMMARY
UTI.  CT brain negative for acute intracranial abnormalities. Patient started on IV aztreonam/vancomycin given allergies to many antibiotics. Urine culture positive for Pseudomonas aeruginosa. Vancomycin discontinued. Patient has completed 5 days of antibiotics. Josephbside consult with infectious disease who recommend patient does not need prolonged antibiotics, likely urine is colonized with Pseudomonas. All other chronic conditions stable and we continued essential home meds. No new complaint on the day of discharge. Patient is calm and at her baseline. Stable to discharge back to memory care today with close follow-up with PCP. Disposition: Memory Care  Diet: ADULT DIET; Regular  ADULT ORAL NUTRITION SUPPLEMENT; AM Snack, PM Snack; Standard High Calorie/High Protein Oral Supplement  Code Status: Full Code    Follow Ups:   Follow-up Information     Dottie Fong MD Follow up in 3 day(s). Specialty: Internal Medicine  Why: 11/10/22 - Patient need to call office and schedule a 3-5 day   hospital follow-up  Contact information:  [address]  [facility] [address] [place] [address]                       Time spent in patient discharge and coordination 38 minutes. Plan was discussed with patient's . All questions answered. Patient was stable at time of discharge. Instructions given to call a physician or return if any concerns.     Current Discharge Medication List        CONTINUE these medications which have NOT CHANGED    Details   acetaminophen (TYLENOL) 500 MG tablet Take 500 mg by mouth every 6 hours as needed for Pain      divalproex (DEPAKOTE) 125 MG DR tablet Take 125 mg by mouth 3 times daily      memantine (NAMENDA) 5 MG tablet Take 5 mg by mouth 2 times daily      nitrofurantoin (MACRODANTIN) 50 MG capsule Take 50 mg by mouth 4 times daily      ondansetron (ZOFRAN) 4 MG tablet Take 4 mg by mouth every 8 hours as needed for Nausea or Vomiting      docusate sodium (COLACE) 100 MG capsule Take 1 capsule by mouth daily  Qty: 90 capsule, Refills: 1    Associated Diagnoses: Constipation, unspecified constipation type      Cranberry 500 MG CAPS Take 1 capsule by mouth 2 times daily  Qty: 180 capsule, Refills: 1    Associated Diagnoses: Recurrent UTI      lisinopril (PRINIVIL;ZESTRIL) 40 MG tablet Take 1 tablet by mouth in the morning. Qty: 30 tablet, Refills: 0      metoprolol succinate (TOPROL XL) 200 MG extended release tablet Take 1 tablet by mouth in the morning. Qty: 30 tablet, Refills: 0      DULoxetine (CYMBALTA) 60 MG extended release capsule Take 1 capsule by mouth daily TAKE 1 CAPSULES DAILY. Qty: 30 capsule, Refills: 0      magnesium oxide (MAG-OX) 400 MG tablet Take 400 mg by mouth daily      diclofenac sodium (VOLTAREN) 1 % GEL Apply 4 g topically 4 times daily  Qty: 100 g, Refills: 3    Associated Diagnoses: Trochanteric bursitis of right hip      hydroxychloroquine (PLAQUENIL) 200 mg tablet Take 1 pill once a day after food. Qty: 90 tablet, Refills: 0    Associated Diagnoses: Pseudogout of knee, unspecified laterality      TURMERIC PO Take 1 capsule by mouth daily      azelastine (ASTELIN) 0.1 % nasal spray 1 spray by Nasal route 2 times daily      vitamin D 25 MCG (1000 UT) CAPS Take 1,000 Units by mouth daily      diphenhydrAMINE (SOMINEX) 25 MG tablet Take 25 mg by mouth every 6 hours as needed      hydroCHLOROthiazide (HYDRODIURIL) 25 MG tablet TAKE 1 TABLET BY MOUTH EACH DAY. omeprazole (PRILOSEC) 40 MG delayed release capsule TAKE 1 CAPSULE EACH DAY.            STOP taking these medications       nitrofurantoin, macrocrystal-monohydrate, (MACROBID) 100 MG capsule Comments:   Reason for Stopping:         donepezil (ARICEPT) 10 MG tablet Comments:   Reason for Stopping:         Naproxen Sodium 220 MG CAPS Comments:   Reason for Stopping:               Procedures done this admission:  * No surgery found *    Consults this admission:  IP CONSULT TO INFECTIOUS DISEASES    Echocardiogram results:  06/14/22    TRANSTHORACIC ECHOCARDIOGRAM (TTE) COMPLETE (CONTRAST/BUBBLE/3D PRN) 06/14/2022  6:14 PM, 06/14/2022 12:00 AM (Final)    Interpretation Summary    Left Ventricle: Normal left ventricular systolic function with a visually estimated EF of 60 - 65%. Left ventricle size is normal. Normal wall thickness. Signed by: Malu Echeverria MD on 6/14/2022  6:14 PM, Signed by: Unknown Provider Result on 6/14/2022 12:00 AM      Diagnostic Imaging/Tests:   CT HEAD WO CONTRAST    Result Date: 11/5/2022  Chronic appearing white matter change without acute intracranial abnormality. XR CHEST PORTABLE    Result Date: 11/5/2022  No acute findings in the chest        Labs: Results:       BMP, Mg, Phos No results for input(s): NA, K, CL, CO2, ANIONGAP, BUN, CREATININE, LABGLOM, GFRAA, CALCIUM, GLUCOSE, MG, PHOS in the last 72 hours. CBC No results for input(s): WBC, RBC, HGB, HCT, MCV, MCH, MCHC, RDW, PLT, MPV, NRBC, SEGS, LYMPHOPCT, EOSRELPCT, MONOPCT, BASOPCT, IMMGRAN, SEGSABS, LYMPHSABS, EOSABS, MONOSABS, BASOSABS, ABSIMMGRAN in the last 72 hours. LFT No results for input(s): BILITOT, BILIDIR, ALKPHOS, AST, ALT, PROT, LABALBU, GLOB in the last 72 hours.    Cardiac  Lab Results   Component Value Date/Time    TROPHS 16.3 11/05/2022 05:00 PM    TROPHS 9.5 07/08/2022 11:28 AM      Coags No results found for: PROTIME, INR, APTT   A1c Lab Results   Component Value Date/Time    LABA1C 5.6 05/05/2021 03:56 PM    LABA1C 5.5 10/03/2019 09:18 AM     05/05/2021 03:56 PM     10/03/2019 09:18 AM      Lipids Lab Results   Component Value Date/Time    CHOL 265 11/05/2020 04:27 PM    LDLCALC 177 11/05/2020 04:27 PM    LABVLDL 22 10/03/2019 09:18 AM    HDL 52 11/05/2020 04:27 PM    TRIG 195 11/05/2020 04:27 PM      Thyroid  Lab Results   Component Value Date/Time    TSHELE 2.82 09/16/2022 12:14 PM        Most Recent UA Lab Results   Component Value Date/Time    COLORU YELLOW/STRAW 11/05/2022 01:28 PM    APPEARANCE CLOUDY 11/05/2022 01:28 PM    SPECGRAV 1.021 11/05/2022 01:28 PM    LABPH 5.5 11/05/2022 01:28 PM    PROTEINU TRACE 11/05/2022 01:28 PM    GLUCOSEU Negative 11/05/2022 01:28 PM    KETUA TRACE 11/05/2022 01:28 PM    BILIRUBINUR Negative 11/05/2022 01:28 PM    BLOODU Negative 11/05/2022 01:28 PM    UROBILINOGEN 0.2 11/05/2022 01:28 PM    NITRU Negative 11/05/2022 01:28 PM    LEUKOCYTESUR LARGE 11/05/2022 01:28 PM    WBCUA >100 11/05/2022 01:28 PM    RBCUA 0-5 09/16/2022 01:58 PM    EPITHUA 5-10 11/05/2022 01:28 PM    BACTERIA 0 11/05/2022 01:28 PM    LABCAST 0-2 09/16/2022 01:58 PM    MUCUS 0 07/08/2022 11:46 AM        Recent Labs     11/05/22  1701 11/05/22  1441 11/05/22  1328   CULTURE NO GROWTH 5 DAYS NO GROWTH 5 DAYS >100,000 COLONIES/mL PSEUDOMONAS AERUGINOSA*       All Labs from Last 24 Hrs:  No results found for this or any previous visit (from the past 24 hour(s)).     Allergies   Allergen Reactions    Levofloxacin Other (See Comments)    Penicillins Shortness Of Breath and Swelling    Poison Oak Extract Anaphylaxis    Bupropion Other (See Comments)     Other anxiety      Cephalosporins Other (See Comments)     encephalopathy    Sulfamethoxazole Hives and Other (See Comments)     She thinks she does not do well with this    Montelukast Anxiety     Immunization History   Administered Date(s) Administered    Influenza Virus Vaccine 01/01/2014, 12/30/2016, 11/18/2017    Influenza, FLUAD, (age 72 y+), Adjuvanted, 0.5mL 11/05/2020    Influenza, FLUCELVAX, (age 10 mo+), MDCK, PF, 0.5mL 11/18/2017    Influenza, High Dose (Fluzone 65 yrs and older) 11/13/2018    Influenza, Triv, inactivated, subunit, adjuvanted, IM (Fluad 65 yrs and older) 10/03/2019    PPD Test 07/08/2022, 09/16/2022    Pneumococcal Conjugate 13-valent ([id number]) 04/10/2012, 02/06/2014, 05/20/2015    Pneumococcal Polysaccharide ([id number]) 01/01/2011    Pneumococcal Vaccine 01/01/2011, 10/01/2011 Tdap (Boostrix, Adacel) 04/16/2016, 05/09/2021    Zoster Live (Zostavax) 01/01/2010       Recent Vital Data:  Patient Vitals for the past 24 hrs:   Temp Pulse Resp BP SpO2   11/10/22 0742 98.4 °F (36.9 °C) 95 16 (!) 176/96 97 %   11/10/22 0738 -- 94 -- -- 96 %   11/10/22 0400 97.2 °F (36.2 °C) 85 16 (!) 160/91 97 %   11/09/22 2341 97.3 °F (36.3 °C) 78 16 (!) 162/84 96 %   11/09/22 1918 97.6 °F (36.4 °C) 68 16 (!) 170/87 99 %   11/09/22 1835 -- 80 -- (!) 154/88 --   11/09/22 1714 97.2 °F (36.2 °C) 78 16 -- 95 %   11/09/22 1240 97.9 °F (36.6 °C) 69 16 118/60 99 %   11/09/22 1122 -- 78 -- -- --       Oxygen Therapy  SpO2: 97 %  O2 Device: None (Room air)    Estimated body mass index is 17.27 kg/m² as calculated from the following:    Height as of 9/16/22: 5' 6\" (1.676 m). Weight as of this encounter: 107 lb (48.5 kg). Intake/Output Summary (Last 24 hours) at 11/10/2022 1038  Last data filed at 11/10/2022 2536  Gross per 24 hour   Intake 1170 ml   Output 2375 ml   Net -1205 ml         Physical Exam:    General:    No overt distress, calm and mentation at baseline  Head:  Normocephalic, atraumatic  Eyes:  Sclerae appear normal.  Pupils equally round. HENT:  Nares appear normal, no drainage. Moist mucous membranes  Neck:  No restricted ROM. Trachea midline  CV:   RRR. No m/r/g. No JVD  Lungs:   CTAB. Abdomen:   Soft, nontender, nondistended. Extremities: Warm and dry. No cyanosis or clubbing. No edema. Skin:     No rashes. Normal coloration  Neuro:  CN II-XII grossly intact. Psych:  Dementia, mentation at baseline    Signed:  Blessing Hyman MD    Part of this note may have been written by using a voice dictation software. The note has been proof read but may still contain some grammatical/other typographical errors.

## 2022-11-10 NOTE — CARE COORDINATION
Patient care plan reviewed in interdisciplinary rounds with the following disciplines: MD, nursing, case management, therapy, and nutrition services. Patient is medically stable and ready for discharge today. IVABX treatment was completed on 11/9. Pt will return to her [facility]. Transport arranged for 2:30. Discharge packet prepared. Family and facility are aware of and in agreement with plans. No additional discharge needs noted. 11/06/22 1530   Service Assessment   Patient Orientation Unresponsive   Cognition Dementia / Early Alzheimer's   History Provided By Significant Other   Primary Caregiver Other (Comment)  (Memory Care Staff)   Support Systems Spouse/Significant Other; Other (Comment)  (Memory Care)   PCP Verified by CM Yes   Prior Functional Level Bathing;Dressing; Toileting;Cooking; Shopping;Housework;Assistance with the following:   Current Functional Level Assistance with the following:;Bathing;Dressing;Cooking; Toileting; Shopping;Housework   Can patient return to prior living arrangement Yes   Ability to make needs known: Poor   Family able to assist with home care needs: Yes   Would you like for me to discuss the discharge plan with any other family members/significant others, and if so, who?  Yes  ()   Financial Resources Medicare   Community Resources Assisted Living  ([facility])   Social/Functional History   Lives With Other (comment)   Type of Home Facility  (The [address] [place] [address])   ADL Assistance Needs assistance   Ambulation Assistance Needs assistance   Occupation Retired   Discharge Planning   Patient expects to be discharged to: Assisted living  ([facility])   [address]charge   Mode of Transport at Discharge [address] [facility]

## 2022-11-11 ENCOUNTER — CARE COORDINATION (OUTPATIENT)
Dept: CARE COORDINATION | Facility: CLINIC | Age: 77
End: 2022-11-11

## 2022-11-11 NOTE — CARE COORDINATION
No transition of care outreach indicated due to patient discharge back to her [facility] @ [facility].

## 2022-12-05 PROBLEM — N39.0 UTI (URINARY TRACT INFECTION): Status: RESOLVED | Noted: 2022-11-05 | Resolved: 2022-12-05

## 2023-02-16 ENCOUNTER — OFFICE VISIT (OUTPATIENT)
Dept: UROLOGY | Age: 78
End: 2023-02-16
Payer: MEDICARE

## 2023-02-16 DIAGNOSIS — N76.89 BOIL, VAGINA: ICD-10-CM

## 2023-02-16 DIAGNOSIS — N39.0 RECURRENT UTI: Primary | ICD-10-CM

## 2023-02-16 DIAGNOSIS — N20.0 KIDNEY STONE: ICD-10-CM

## 2023-02-16 PROCEDURE — G8399 PT W/DXA RESULTS DOCUMENT: HCPCS | Performed by: NURSE PRACTITIONER

## 2023-02-16 PROCEDURE — 1036F TOBACCO NON-USER: CPT | Performed by: NURSE PRACTITIONER

## 2023-02-16 PROCEDURE — 1123F ACP DISCUSS/DSCN MKR DOCD: CPT | Performed by: NURSE PRACTITIONER

## 2023-02-16 PROCEDURE — G8427 DOCREV CUR MEDS BY ELIG CLIN: HCPCS | Performed by: NURSE PRACTITIONER

## 2023-02-16 PROCEDURE — G8418 CALC BMI BLW LOW PARAM F/U: HCPCS | Performed by: NURSE PRACTITIONER

## 2023-02-16 PROCEDURE — 1090F PRES/ABSN URINE INCON ASSESS: CPT | Performed by: NURSE PRACTITIONER

## 2023-02-16 PROCEDURE — G8484 FLU IMMUNIZE NO ADMIN: HCPCS | Performed by: NURSE PRACTITIONER

## 2023-02-16 PROCEDURE — 99215 OFFICE O/P EST HI 40 MIN: CPT | Performed by: NURSE PRACTITIONER

## 2023-02-16 ASSESSMENT — ENCOUNTER SYMPTOMS
EYES NEGATIVE: 1
RESPIRATORY NEGATIVE: 1

## 2023-02-16 NOTE — PROGRESS NOTES
[facility]  [address] [facility] [address], [address] [facility]  Noe Smith  : 1945    Chief Complaint   Patient presents with    Follow-up          HPI     Leola Muñoz is a 68 y.o. female w dementia being seen today for hospital fu. Accompanied by  and he provides all info. Hospitalized at [facility] 22-22 for UTI. Urine cx with enterococcus faecalis group D, sensitive to macrobid. CT A/P wo contrast revealed non obstructing stones to left kidney and sig constipation. Upon chart review, she had 3 UTI from May to July of this yr.  reports her main sx is AMS. She has a long hx of constipation. Currently on daily miralax. She is in a pull up, but does use toilet at times. Macrobid suppression was started in Oct 2022. Cranberry supplement was started. Admitted to [facility] 22-11/10/22 for encephalopathy, HERON, and UTI. Cr 1.25. Urine culture positive for Pseudomonas aeruginosa. Tx w vancomycin. Curbside consult w ID while inpt who rec no prolong use of antibiotics as pt is likely colonized. Suppression was dc. Cr 0.46 at discharge. Today,  reports pt is feeling well. Has been more alert. She has a boil he would like for me to check. Prev PVR 0 cc via u/s. Could not provide a voided urine specimen. Lives at [facility] memory care unit.                Past Medical History:   Diagnosis Date    Alcohol abuse, daily use 2014    Alzheimer's dementia ([facility] [address]     Depression 2014    Drug-induced encephalopathy 2022    Due to cefepime    Hypertension     Lumbar stenosis 2012    Osteopenia 3/19/2015    Pseudomonas aeruginosa infection 2022    Sinus problem     Spondylolisthesis of lumbar region 2012    UTI (urinary tract infection) 2022    Vitamin D deficiency 2014     Past Surgical History:   Procedure Laterality Date    BACK SURGERY  12    L3-L4 Metrix TLIF with sextant/bilateral decompression - Dr. Schaeffer Code LUMPECTOMY Bilateral     HEENT Bilateral 2015    sinus    HYSTERECTOMY (CERVIX STATUS UNKNOWN)      complete    OTHER SURGICAL HISTORY      sinuses x2    TONSILLECTOMY      WRIST FRACTURE SURGERY       Current Outpatient Medications   Medication Sig Dispense Refill    acetaminophen (TYLENOL) 500 MG tablet Take 500 mg by mouth every 6 hours as needed for Pain      divalproex (DEPAKOTE) 125 MG DR tablet Take 125 mg by mouth 3 times daily      memantine (NAMENDA) 5 MG tablet Take 5 mg by mouth 2 times daily      nitrofurantoin (MACRODANTIN) 50 MG capsule Take 50 mg by mouth 4 times daily      ondansetron (ZOFRAN) 4 MG tablet Take 4 mg by mouth every 8 hours as needed for Nausea or Vomiting      docusate sodium (COLACE) 100 MG capsule Take 1 capsule by mouth daily 90 capsule 1    Cranberry 500 MG CAPS Take 1 capsule by mouth 2 times daily 180 capsule 1    lisinopril (PRINIVIL;ZESTRIL) 40 MG tablet Take 1 tablet by mouth in the morning. 30 tablet 0    metoprolol succinate (TOPROL XL) 200 MG extended release tablet Take 1 tablet by mouth in the morning. 30 tablet 0    DULoxetine (CYMBALTA) 60 MG extended release capsule Take 1 capsule by mouth daily TAKE 1 CAPSULES DAILY. 30 capsule 0    magnesium oxide (MAG-OX) 400 MG tablet Take 400 mg by mouth daily      diclofenac sodium (VOLTAREN) 1 % GEL Apply 4 g topically 4 times daily 100 g 3    hydroxychloroquine (PLAQUENIL) 200 mg tablet Take 1 pill once a day after food. 90 tablet 0    TURMERIC PO Take 1 capsule by mouth daily      azelastine (ASTELIN) 0.1 % nasal spray 1 spray by Nasal route 2 times daily      vitamin D 25 MCG (1000 UT) CAPS Take 1,000 Units by mouth daily      diphenhydrAMINE (SOMINEX) 25 MG tablet Take 25 mg by mouth every 6 hours as needed      hydroCHLOROthiazide (HYDRODIURIL) 25 MG tablet TAKE 1 TABLET BY MOUTH EACH DAY. omeprazole (PRILOSEC) 40 MG delayed release capsule TAKE 1 CAPSULE EACH DAY. No current facility-administered medications for this visit. Allergies   Allergen Reactions    Levofloxacin Other (See Comments)    Penicillins Shortness Of Breath and Swelling    Poison Oak Extract Anaphylaxis    Bupropion Other (See Comments)     Other anxiety      Cephalosporins Other (See Comments)     encephalopathy    Sulfamethoxazole Hives and Other (See Comments)     She thinks she does not do well with this    [facility] Anxiety     Social History     Socioeconomic History    Marital status:      Spouse name: Not on file    Number of children: Not on file    Years of education: 1 post HS    Highest education level: Not on file   Occupational History    Not on file   Tobacco Use    Smoking status: Former     Packs/day: 1.00     Types: Cigarettes     Quit date: 1972     Years since quittin.1    Smokeless tobacco: Never   Substance and Sexual Activity    Alcohol use: Yes     Alcohol/week: 14.0 standard drinks    Drug use: No    Sexual activity: Not on file   Other Topics Concern    Not on file   Social History Narrative    , not active, gets up around 12 noon and then does things in the afternoon if a friend comes over. Unable to watch TV as cant figure out the controls.       Social Determinants of Health     Financial Resource Strain: Not on file   Food Insecurity: Not on file   Transportation Needs: Not on file   Physical Activity: Not on file   Stress: Not on file   Social Connections: Not on file   Intimate Partner Violence: Not on file   Housing Stability: Not on file     Family History   Problem Relation Age of Onset    Osteoarthritis Mother     Heart Disease Mother     Stroke Mother     Breast Cancer Neg Hx     Coronary Art Dis Maternal Uncle     Heart Attack Mother     Hypertension Sister     Alzheimer's Disease Father 48    Dementia Father     Stroke Father     Heart Disease Sister         stent and AVR       Review of Systems  Constitutional: Negative  Skin: Negative skin ROS  Eyes: Eyes negative  ENT: HENT negative  Respiratory: Respiratory negative  Cardiovascular: Neg cardio ROS  GI: Neg GI ROS  Genitourinary: Genitourinary negative  Musculoskeletal: Musculoskeletal negative  Psychological: Neg psych ROS  Endocrine: Endocrine negative  Hem/Lymphatic: Hematologic/lymphatic negative    Urinalysis  UA - Dipstick  Results for orders placed or performed in visit on 05/24/22   AMB POC URINALYSIS DIP STICK AUTO W/O MICRO   Result Value Ref Range    Color, Urine, POC yell     Clarity, Urine, POC turb     Glucose, Urine, POC Negative Negative    Bilirubin, Urine, POC Negative Negative    Ketones, Urine, POC Negative Negative    Specific Gravity, Urine, POC 1.015 1.001 - 1.035    Blood, Urine, POC neg Negative    pH, Urine, POC 5.0 4.6 - 8.0    Protein, Urine, POC Negative Negative    Urobilinogen, POC 0.2     Nitrate, Urine, POC Positive Negative    Leukocyte Esterase, Urine, POC 2+ Negative       PHYSICAL EXAM    General appearance - well appearing and in no distress  Mental status - alert, oriented to person, place, and time  Neck - supple, no significant adenopathy  Chest/Lung-  Quiet, even and easy respiratory effort without use of accessory muscles  Skin - normal coloration and turgor, no rashes, red boil noted to left labia-tender to touch, no drianage    Assessment and Plan    ICD-10-CM    1. Recurrent UTI  N39.0 AMB POC URINALYSIS DIP STICK AUTO W/O MICRO      2. Kidney stone  N20.0 AMB POC URINALYSIS DIP STICK AUTO W/O MICRO      3. Boil, vagina  N76.89       We discussed asx bacteruria and when to tx w antibiotics. He is advised to call office w fever, chills, abd pain, or N/V. I have educated patient and  to behavioral changes that can decrease the frequency of any urinary infection.   These include eliminating constipation, front to back wiping, frequent voiding to keep bladder volumes low, double voiding, avoid soaking in water (including baths, pools, hot tubs), avoiding tight fitting clothes, avoiding douching, use of cranberry products, and use of probiotics. I encouraged consuming minimum of 64 oz of water daily. I also recommend avoiding consumption of sugary beverages and other known bladder irritants.    Asx. KUB at next visit.  would like to avoid surgical procedures.     Advised warm compresses and local antibiotic ointment. Will refer to gyn. Can cancel id sx improve.     ROV in 6 months. To call sooner if needed.     EWA Melgar - CNP  Dr. Raman is supervising physician today and he approves plan of care.    I have spent 43 minutes today reviewing previous notes, test results and face to face with the patient as well as documenting.

## 2023-02-28 ENCOUNTER — OFFICE VISIT (OUTPATIENT)
Dept: OBGYN CLINIC | Age: 78
End: 2023-02-28
Payer: MEDICARE

## 2023-02-28 VITALS — WEIGHT: 113 LBS | BODY MASS INDEX: 18.24 KG/M2 | DIASTOLIC BLOOD PRESSURE: 80 MMHG | SYSTOLIC BLOOD PRESSURE: 120 MMHG

## 2023-02-28 DIAGNOSIS — N89.8 VAGINAL LESION: Primary | ICD-10-CM

## 2023-02-28 PROCEDURE — 3079F DIAST BP 80-89 MM HG: CPT | Performed by: OBSTETRICS & GYNECOLOGY

## 2023-02-28 PROCEDURE — G8399 PT W/DXA RESULTS DOCUMENT: HCPCS | Performed by: OBSTETRICS & GYNECOLOGY

## 2023-02-28 PROCEDURE — G8484 FLU IMMUNIZE NO ADMIN: HCPCS | Performed by: OBSTETRICS & GYNECOLOGY

## 2023-02-28 PROCEDURE — 99214 OFFICE O/P EST MOD 30 MIN: CPT | Performed by: OBSTETRICS & GYNECOLOGY

## 2023-02-28 PROCEDURE — 1090F PRES/ABSN URINE INCON ASSESS: CPT | Performed by: OBSTETRICS & GYNECOLOGY

## 2023-02-28 PROCEDURE — G8418 CALC BMI BLW LOW PARAM F/U: HCPCS | Performed by: OBSTETRICS & GYNECOLOGY

## 2023-02-28 PROCEDURE — 1123F ACP DISCUSS/DSCN MKR DOCD: CPT | Performed by: OBSTETRICS & GYNECOLOGY

## 2023-02-28 PROCEDURE — G8428 CUR MEDS NOT DOCUMENT: HCPCS | Performed by: OBSTETRICS & GYNECOLOGY

## 2023-02-28 PROCEDURE — 1036F TOBACCO NON-USER: CPT | Performed by: OBSTETRICS & GYNECOLOGY

## 2023-02-28 PROCEDURE — 3074F SYST BP LT 130 MM HG: CPT | Performed by: OBSTETRICS & GYNECOLOGY

## 2023-02-28 RX ORDER — ERYTHROMYCIN 500 MG/1
500 TABLET, COATED ORAL 4 TIMES DAILY
Qty: 40 TABLET | Refills: 0 | Status: SHIPPED | OUTPATIENT
Start: 2023-02-28 | End: 2023-03-10

## 2023-02-28 NOTE — PROGRESS NOTES
The patient is here for  problems including vulvar lesion     HISTORY:      No LMP recorded. Patient has had a hysterectomy.SEE BELOW      Current Outpatient Medications on File Prior to Visit   Medication Sig Dispense Refill    acetaminophen (TYLENOL) 500 MG tablet Take 500 mg by mouth every 6 hours as needed for Pain      divalproex (DEPAKOTE) 125 MG DR tablet Take 125 mg by mouth 3 times daily      memantine (NAMENDA) 5 MG tablet Take 5 mg by mouth 2 times daily      ondansetron (ZOFRAN) 4 MG tablet Take 4 mg by mouth every 8 hours as needed for Nausea or Vomiting      docusate sodium (COLACE) 100 MG capsule Take 1 capsule by mouth daily 90 capsule 1    Cranberry 500 MG CAPS Take 1 capsule by mouth 2 times daily 180 capsule 1    lisinopril (PRINIVIL;ZESTRIL) 40 MG tablet Take 1 tablet by mouth in the morning. 30 tablet 0    metoprolol succinate (TOPROL XL) 200 MG extended release tablet Take 1 tablet by mouth in the morning. 30 tablet 0    DULoxetine (CYMBALTA) 60 MG extended release capsule Take 1 capsule by mouth daily TAKE 1 CAPSULES DAILY. 30 capsule 0    magnesium oxide (MAG-OX) 400 MG tablet Take 400 mg by mouth daily      diclofenac sodium (VOLTAREN) 1 % GEL Apply 4 g topically 4 times daily 100 g 3    hydroxychloroquine (PLAQUENIL) 200 mg tablet Take 1 pill once a day after food. 90 tablet 0    TURMERIC PO Take 1 capsule by mouth daily      azelastine (ASTELIN) 0.1 % nasal spray 1 spray by Nasal route 2 times daily      vitamin D 25 MCG (1000 UT) CAPS Take 1,000 Units by mouth daily      diphenhydrAMINE (SOMINEX) 25 MG tablet Take 25 mg by mouth every 6 hours as needed      omeprazole (PRILOSEC) 40 MG delayed release capsule TAKE 1 CAPSULE EACH DAY.      [DISCONTINUED] donepezil (ARICEPT) 10 MG tablet TAKE ONE TABLET AT BEDTIME.      [DISCONTINUED] Naproxen Sodium 220 MG CAPS Take 1 capsule by mouth 2 times daily       No current facility-administered medications on file prior to visit.   SEE  LIST    ROS:      PHYSICAL EXAM:  Blood pressure 120/80, weight 113 lb (51.3 kg). The patient appears well, alert, oriented x 3, in no distress. Lungs are clear. Heart RRR, no murmurs. Abdomen soft without tenderness, guarding, mass or organomegaly. Pelvic: bg     ASSESSMENT:DIAGNOSIS DISCUSSED INCLUDING DIFFERENTIAL  vulvar lesion left labia phyllis  1.3 cm ulcerated area w skin missing from scratching /diaper etc no abscess or severe cellulitis  toes : 2nd toe w distal necrosis neg for cellulitis has fu in 12 h w Podiatrist  see allergies add [address]  renal stones sees uro  ch RPR fu 1-2w  sp hyst  fam member given instr about observation and warnings    PLAN:    No orders of the defined types were placed in this encounter.     Complex high risk decision making many questions answered history reviewed precharting done required 30 minute of time discussing a vaiety of problems  goals are set  follow up planned

## 2023-03-01 ENCOUNTER — TELEPHONE (OUTPATIENT)
Dept: OBGYN CLINIC | Age: 78
End: 2023-03-01

## 2023-03-01 LAB — RPR SER QL: NONREACTIVE

## 2023-03-01 NOTE — TELEPHONE ENCOUNTER
Patient's  called- wanting to know if they can get a different antibiotic. Their cost for this is over $500.00.

## 2023-03-03 ENCOUNTER — TELEPHONE (OUTPATIENT)
Dept: OBGYN CLINIC | Age: 78
End: 2023-03-03

## 2023-03-03 NOTE — TELEPHONE ENCOUNTER
Patient's  informed that PA came back as not needed.  He states that there was a mix up at pharmacy and he got the medication

## 2023-03-07 ENCOUNTER — OFFICE VISIT (OUTPATIENT)
Dept: OBGYN CLINIC | Age: 78
End: 2023-03-07
Payer: MEDICARE

## 2023-03-07 VITALS — WEIGHT: 113 LBS | HEIGHT: 66 IN | BODY MASS INDEX: 18.16 KG/M2

## 2023-03-07 DIAGNOSIS — N76.3 SUBACUTE VULVITIS: Primary | ICD-10-CM

## 2023-03-07 PROCEDURE — G8399 PT W/DXA RESULTS DOCUMENT: HCPCS | Performed by: OBSTETRICS & GYNECOLOGY

## 2023-03-07 PROCEDURE — G8428 CUR MEDS NOT DOCUMENT: HCPCS | Performed by: OBSTETRICS & GYNECOLOGY

## 2023-03-07 PROCEDURE — 1036F TOBACCO NON-USER: CPT | Performed by: OBSTETRICS & GYNECOLOGY

## 2023-03-07 PROCEDURE — G8484 FLU IMMUNIZE NO ADMIN: HCPCS | Performed by: OBSTETRICS & GYNECOLOGY

## 2023-03-07 PROCEDURE — 1123F ACP DISCUSS/DSCN MKR DOCD: CPT | Performed by: OBSTETRICS & GYNECOLOGY

## 2023-03-07 PROCEDURE — 99214 OFFICE O/P EST MOD 30 MIN: CPT | Performed by: OBSTETRICS & GYNECOLOGY

## 2023-03-07 PROCEDURE — 1090F PRES/ABSN URINE INCON ASSESS: CPT | Performed by: OBSTETRICS & GYNECOLOGY

## 2023-03-07 PROCEDURE — G8418 CALC BMI BLW LOW PARAM F/U: HCPCS | Performed by: OBSTETRICS & GYNECOLOGY

## 2023-03-07 NOTE — PROGRESS NOTES
The patient is here for  problems including vulvitis     HISTORY:      No LMP recorded. Patient has had a hysterectomy. SEE BELOW      Current Outpatient Medications on File Prior to Visit   Medication Sig Dispense Refill    erythromycin base (E-MYCIN) 500 MG tablet Take 1 tablet by mouth 4 times daily for 10 days 40 tablet 0    acetaminophen (TYLENOL) 500 MG tablet Take 500 mg by mouth every 6 hours as needed for Pain      divalproex (DEPAKOTE) 125 MG DR tablet Take 125 mg by mouth 3 times daily      memantine (NAMENDA) 5 MG tablet Take 5 mg by mouth 2 times daily      ondansetron (ZOFRAN) 4 MG tablet Take 4 mg by mouth every 8 hours as needed for Nausea or Vomiting      docusate sodium (COLACE) 100 MG capsule Take 1 capsule by mouth daily 90 capsule 1    Cranberry 500 MG CAPS Take 1 capsule by mouth 2 times daily 180 capsule 1    lisinopril (PRINIVIL;ZESTRIL) 40 MG tablet Take 1 tablet by mouth in the morning. 30 tablet 0    metoprolol succinate (TOPROL XL) 200 MG extended release tablet Take 1 tablet by mouth in the morning. 30 tablet 0    DULoxetine (CYMBALTA) 60 MG extended release capsule Take 1 capsule by mouth daily TAKE 1 CAPSULES DAILY. 30 capsule 0    magnesium oxide (MAG-OX) 400 MG tablet Take 400 mg by mouth daily      diclofenac sodium (VOLTAREN) 1 % GEL Apply 4 g topically 4 times daily 100 g 3    hydroxychloroquine (PLAQUENIL) 200 mg tablet Take 1 pill once a day after food. 90 tablet 0    TURMERIC PO Take 1 capsule by mouth daily      azelastine (ASTELIN) 0.1 % nasal spray 1 spray by Nasal route 2 times daily      vitamin D 25 MCG (1000 UT) CAPS Take 1,000 Units by mouth daily      diphenhydrAMINE (SOMINEX) 25 MG tablet Take 25 mg by mouth every 6 hours as needed      omeprazole (PRILOSEC) 40 MG delayed release capsule TAKE 1 CAPSULE EACH DAY.       [DISCONTINUED] donepezil (ARICEPT) 10 MG tablet TAKE ONE TABLET AT BEDTIME.      [DISCONTINUED] Naproxen Sodium 220 MG CAPS Take 1 capsule by mouth 2 times daily       No current facility-administered medications on file prior to visit. SEE LIST    ROS:      PHYSICAL EXAM:  Height 5' 6\" (1.676 m), weight 113 lb (51.3 kg). The patient appears well, alert, oriented x 3, in no distress. Lungs are clear. Heart RRR, no murmurs. Abdomen soft without tenderness, guarding, mass or organomegaly. Pelvic: bg     ASSESSMENT:DIAGNOSIS DISCUSSED INCLUDING DIFFERENTIAL  l labia phyllis 80% healed   toes getting care     PLAN:    No orders of the defined types were placed in this encounter.     Complex high risk decision making many questions answered history reviewed precharting done required 30 minute of time discussing a vaiety of problems  goals are set  follow up planned

## 2023-05-08 ENCOUNTER — HOSPITAL ENCOUNTER (EMERGENCY)
Age: 78
Discharge: HOME | End: 2023-05-08
Attending: EMERGENCY MEDICINE
Payer: MEDICARE

## 2023-05-08 ENCOUNTER — APPOINTMENT (OUTPATIENT)
Dept: GENERAL RADIOLOGY | Age: 78
End: 2023-05-08
Payer: MEDICARE

## 2023-05-08 ENCOUNTER — APPOINTMENT (OUTPATIENT)
Dept: CT IMAGING | Age: 78
End: 2023-05-08
Payer: MEDICARE

## 2023-05-08 VITALS
HEIGHT: 64 IN | RESPIRATION RATE: 16 BRPM | WEIGHT: 113 LBS | OXYGEN SATURATION: 98 % | TEMPERATURE: 97.5 F | HEART RATE: 64 BPM | SYSTOLIC BLOOD PRESSURE: 112 MMHG | BODY MASS INDEX: 19.29 KG/M2 | DIASTOLIC BLOOD PRESSURE: 52 MMHG
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
## 2023-05-08 DIAGNOSIS — N39.0 URINARY TRACT INFECTION WITHOUT HEMATURIA, SITE UNSPECIFIED: ICD-10-CM

## 2023-05-08 DIAGNOSIS — S09.90XA CLOSED HEAD INJURY, INITIAL ENCOUNTER: ICD-10-CM

## 2023-05-08 LAB
APPEARANCE UR: ABNORMAL
BACTERIA URNS QL MICRO: ABNORMAL /HPF
BILIRUB UR QL: NEGATIVE
CASTS URNS QL MICRO: 0 /LPF
COLOR UR: ABNORMAL
CRYSTALS URNS QL MICRO: ABNORMAL /LPF
EPI CELLS #/AREA URNS HPF: ABNORMAL /HPF
GLUCOSE UR STRIP.AUTO-MCNC: NEGATIVE MG/DL
HGB UR QL STRIP: ABNORMAL
KETONES UR QL STRIP.AUTO: NEGATIVE MG/DL
LEUKOCYTE ESTERASE UR QL STRIP.AUTO: ABNORMAL
MUCOUS THREADS URNS QL MICRO: 0 /LPF
NITRITE UR QL STRIP.AUTO: POSITIVE
PH UR STRIP: 8 (ref 5–9)
PROT UR STRIP-MCNC: 30 MG/DL
RBC #/AREA URNS HPF: 0 /HPF
SP GR UR REFRACTOMETRY: 1.02 (ref 1–1.02)
UROBILINOGEN UR QL STRIP.AUTO: 0.2 EU/DL (ref 0.2–1)
WBC URNS QL MICRO: ABNORMAL /HPF

## 2023-05-08 PROCEDURE — 99284 EMERGENCY DEPT VISIT MOD MDM: CPT

## 2023-05-08 PROCEDURE — 72125 CT NECK SPINE W/O DYE: CPT

## 2023-05-08 PROCEDURE — 87088 URINE BACTERIA CULTURE: CPT

## 2023-05-08 PROCEDURE — 74018 RADEX ABDOMEN 1 VIEW: CPT

## 2023-05-08 PROCEDURE — 70450 CT HEAD/BRAIN W/O DYE: CPT

## 2023-05-08 PROCEDURE — 87186 SC STD MICRODIL/AGAR DIL: CPT

## 2023-05-08 PROCEDURE — 87086 URINE CULTURE/COLONY COUNT: CPT

## 2023-05-08 PROCEDURE — 81001 URINALYSIS AUTO W/SCOPE: CPT

## 2023-05-08 PROCEDURE — 81015 MICROSCOPIC EXAM OF URINE: CPT

## 2023-05-08 RX ORDER — NITROFURANTOIN 25; 75 MG/1; MG/1
100 CAPSULE ORAL 2 TIMES DAILY
Qty: 10 CAPSULE | Refills: 0 | Status: SHIPPED | OUTPATIENT
Start: 2023-05-08 | End: 2023-05-13

## 2023-05-08 ASSESSMENT — LIFESTYLE VARIABLES
HOW OFTEN DO YOU HAVE A DRINK CONTAINING ALCOHOL: NEVER
HOW MANY STANDARD DRINKS CONTAINING ALCOHOL DO YOU HAVE ON A TYPICAL DAY: PATIENT DOES NOT DRINK

## 2023-05-08 NOTE — ED TRIAGE NOTES
Pt came by ems,pt is from memory care from the [facility], staffed found pt on the floor, pt fell some time after 4pm pt at baseline and is now able to explain how she fell, pt c/o leo, has hematoma to head

## 2023-05-08 NOTE — DISCHARGE INSTRUCTIONS
Finish antibiotic. Head injury precautions were given. Recheck for fever vomiting or other concerns.

## 2023-05-08 NOTE — ED NOTES
I have reviewed discharge instructions with the caregiver. The caregiver verbalized understanding. Patient left ED via Discharge Method: wheelchair to Nursing Home with self and daughter. Opportunity for questions and clarification provided. Patient given 1 script. To continue your aftercare when you leave the hospital, you may receive an automated call from our care team to check in on how you are doing. This is a free service and part of our promise to provide the best care and service to meet your aftercare needs.  If you have questions, or wish to unsubscribe from this service please call [phone]. Thank you for Choosing our [facility] Emergency Department.        Shayan Lovell RN  05/08/23 1002

## 2023-05-09 NOTE — ED PROVIDER NOTES
12    L3-L4 Metrix TLIF with sextant/bilateral decompression - Dr. Marianne Milligan LUMPECTOMY Bilateral     HEENT Bilateral 2015    sinus    HYSTERECTOMY (CERVIX STATUS UNKNOWN)      complete    OTHER SURGICAL HISTORY      sinuses x2    TONSILLECTOMY      WRIST FRACTURE SURGERY          Social History     Socioeconomic History    Marital status:     Years of education: 1 post HS   Tobacco Use    Smoking status: Former     Packs/day: 1.00     Types: Cigarettes     Quit date: 1972     Years since quittin.3    Smokeless tobacco: Never   Substance and Sexual Activity    Alcohol use: Yes     Alcohol/week: 14.0 standard drinks    Drug use: No    Sexual activity: Not Currently   Social History Narrative    , not active, gets up around 12 noon and then does things in the afternoon if a friend comes over. Unable to watch TV as cant figure out the controls.          Discharge Medication List as of 2023  9:55 AM        CONTINUE these medications which have NOT CHANGED    Details   acetaminophen (TYLENOL) 500 MG tablet Take 500 mg by mouth every 6 hours as needed for PainHistorical Med      divalproex (DEPAKOTE) 125 MG DR tablet Take 125 mg by mouth 3 times dailyHistorical Med      memantine (NAMENDA) 5 MG tablet Take 5 mg by mouth 2 times dailyHistorical Med      ondansetron (ZOFRAN) 4 MG tablet Take 4 mg by mouth every 8 hours as needed for Nausea or VomitingHistorical Med      docusate sodium (COLACE) 100 MG capsule Take 1 capsule by mouth daily, Disp-90 capsule, R-1Print      Cranberry 500 MG CAPS Take 1 capsule by mouth 2 times daily, Disp-180 capsule, R-1Print      lisinopril (PRINIVIL;ZESTRIL) 40 MG tablet Take 1 tablet by mouth in the morning., Disp-30 tablet, R-0Normal      metoprolol succinate (TOPROL XL) 200 MG extended release tablet Take 1 tablet by mouth in the morning., Disp-30 tablet, R-0Normal      DULoxetine (CYMBALTA) 60 MG extended release capsule Take 1 capsule by mouth daily

## 2023-05-11 LAB
BACTERIA SPEC CULT: ABNORMAL
SERVICE CMNT-IMP: ABNORMAL

## 2023-05-11 RX ORDER — GRANULES FOR ORAL 3 G/1
3 POWDER ORAL ONCE
Qty: 1 EACH | Refills: 0 | Status: SHIPPED | OUTPATIENT
Start: 2023-05-11 | End: 2023-05-11

## 2023-05-11 NOTE — PROGRESS NOTES
ED pharmacist reviewed recent results of urine culture. The patient received a prescription for nitrofurantion upon discharge. Based on culture results, the patient requires alternative antimicrobial therapy. Discussed case with Dr. Shira Acharya. A prescription for fosfomycin has been called into THE [facility] on 5/11 per Dr. Shira Acharya. The patient has been advised to follow-up with their primary care provider or return to the ED if symptoms do not resolve or worsen. Allergies as of 05/08/2023 - Fully Reviewed 05/08/2023   Allergen Reaction Noted    Levofloxacin Other (See Comments) 09/14/2016    Penicillins Shortness Of Breath and Swelling 04/24/2011    Poison oak extract Anaphylaxis 11/21/2014    Shrimp (diagnostic) Anaphylaxis 02/28/2023    Bupropion Other (See Comments) 08/23/2016    Cephalosporins Other (See Comments) 07/17/2022    Sulfamethoxazole Hives and Other (See Comments) 07/20/2015    Montelukast Anxiety 01/17/2017     James Gutierrez, PharmD.   Emergency Medicine Clinical Pharmacist

## 2023-06-01 ENCOUNTER — HOSPITAL ENCOUNTER (EMERGENCY)
Age: 78
Discharge: HOME | End: 2023-06-01
Attending: EMERGENCY MEDICINE
Payer: MEDICARE

## 2023-06-01 VITALS
HEIGHT: 62 IN | DIASTOLIC BLOOD PRESSURE: 67 MMHG | OXYGEN SATURATION: 98 % | TEMPERATURE: 98.7 F | HEART RATE: 98 BPM | SYSTOLIC BLOOD PRESSURE: 102 MMHG | BODY MASS INDEX: 23.92 KG/M2 | RESPIRATION RATE: 18 BRPM | WEIGHT: 130 LBS

## 2023-06-01 DIAGNOSIS — B37.41 CANDIDA CYSTITIS: Primary | ICD-10-CM

## 2023-06-01 LAB
ALBUMIN SERPL-MCNC: 3.3 G/DL (ref 3.2–4.6)
ALBUMIN/GLOB SERPL: 0.8 (ref 0.4–1.6)
ALP SERPL-CCNC: 100 U/L (ref 50–130)
ALT SERPL-CCNC: 47 U/L (ref 12–65)
ANION GAP SERPL CALC-SCNC: 9 MMOL/L (ref 2–11)
APPEARANCE UR: CLEAR
AST SERPL-CCNC: 29 U/L (ref 15–37)
BACTERIA URNS QL MICRO: 0 /HPF
BASOPHILS # BLD: 0 K/UL (ref 0–0.2)
BASOPHILS NFR BLD: 0 % (ref 0–2)
BILIRUB SERPL-MCNC: 0.5 MG/DL (ref 0.2–1.1)
BILIRUB UR QL: NEGATIVE
BUN SERPL-MCNC: 24 MG/DL (ref 8–23)
CALCIUM SERPL-MCNC: 9.7 MG/DL (ref 8.3–10.4)
CHLORIDE SERPL-SCNC: 105 MMOL/L (ref 101–110)
CO2 SERPL-SCNC: 27 MMOL/L (ref 21–32)
COLOR UR: ABNORMAL
CREAT SERPL-MCNC: 0.76 MG/DL (ref 0.6–1)
DIFFERENTIAL METHOD BLD: ABNORMAL
EOSINOPHIL # BLD: 0.2 K/UL (ref 0–0.8)
EOSINOPHIL NFR BLD: 2 % (ref 0.5–7.8)
EPI CELLS #/AREA URNS HPF: ABNORMAL /HPF
ERYTHROCYTE [DISTWIDTH] IN BLOOD BY AUTOMATED COUNT: 12.5 % (ref 11.9–14.6)
GLOBULIN SER CALC-MCNC: 4.1 G/DL (ref 2.8–4.5)
GLUCOSE SERPL-MCNC: 108 MG/DL (ref 65–100)
GLUCOSE UR STRIP.AUTO-MCNC: NEGATIVE MG/DL
HCT VFR BLD AUTO: 35.9 % (ref 35.8–46.3)
HGB BLD-MCNC: 11.8 G/DL (ref 11.7–15.4)
HGB UR QL STRIP: NEGATIVE
IMM GRANULOCYTES # BLD AUTO: 0 K/UL (ref 0–0.5)
IMM GRANULOCYTES NFR BLD AUTO: 0 % (ref 0–5)
KETONES UR QL STRIP.AUTO: ABNORMAL MG/DL
LACTATE SERPL-SCNC: 1.4 MMOL/L (ref 0.4–2)
LEUKOCYTE ESTERASE UR QL STRIP.AUTO: ABNORMAL
LYMPHOCYTES # BLD: 1 K/UL (ref 0.5–4.6)
LYMPHOCYTES NFR BLD: 9 % (ref 13–44)
MAGNESIUM SERPL-MCNC: 2.1 MG/DL (ref 1.8–2.4)
MCH RBC QN AUTO: 32 PG (ref 26.1–32.9)
MCHC RBC AUTO-ENTMCNC: 32.9 G/DL (ref 31.4–35)
MCV RBC AUTO: 97.3 FL (ref 82–102)
MONOCYTES # BLD: 0.7 K/UL (ref 0.1–1.3)
MONOCYTES NFR BLD: 6 % (ref 4–12)
NEUTS SEG # BLD: 9.8 K/UL (ref 1.7–8.2)
NEUTS SEG NFR BLD: 83 % (ref 43–78)
NITRITE UR QL STRIP.AUTO: NEGATIVE
NRBC # BLD: 0 K/UL (ref 0–0.2)
PH UR STRIP: 7 (ref 5–9)
PLATELET # BLD AUTO: 273 K/UL (ref 150–450)
PMV BLD AUTO: 8.9 FL (ref 9.4–12.3)
POTASSIUM SERPL-SCNC: 4.1 MMOL/L (ref 3.5–5.1)
PROCALCITONIN SERPL-MCNC: 0.14 NG/ML (ref 0–0.49)
PROT SERPL-MCNC: 7.4 G/DL (ref 6.3–8.2)
PROT UR STRIP-MCNC: 30 MG/DL
RBC # BLD AUTO: 3.69 M/UL (ref 4.05–5.2)
RBC #/AREA URNS HPF: ABNORMAL /HPF
SODIUM SERPL-SCNC: 141 MMOL/L (ref 133–143)
SP GR UR REFRACTOMETRY: 1.02 (ref 1–1.02)
UROBILINOGEN UR QL STRIP.AUTO: 1 EU/DL (ref 0.2–1)
WBC # BLD AUTO: 11.8 K/UL (ref 4.3–11.1)
WBC URNS QL MICRO: ABNORMAL /HPF
YEAST URNS QL MICRO: ABNORMAL

## 2023-06-01 PROCEDURE — 96361 HYDRATE IV INFUSION ADD-ON: CPT

## 2023-06-01 PROCEDURE — 6370000000 HC RX 637 (ALT 250 FOR IP): Performed by: EMERGENCY MEDICINE

## 2023-06-01 PROCEDURE — 80053 COMPREHEN METABOLIC PANEL: CPT

## 2023-06-01 PROCEDURE — 87088 URINE BACTERIA CULTURE: CPT

## 2023-06-01 PROCEDURE — 83605 ASSAY OF LACTIC ACID: CPT

## 2023-06-01 PROCEDURE — 84145 PROCALCITONIN (PCT): CPT

## 2023-06-01 PROCEDURE — 87086 URINE CULTURE/COLONY COUNT: CPT

## 2023-06-01 PROCEDURE — 99284 EMERGENCY DEPT VISIT MOD MDM: CPT

## 2023-06-01 PROCEDURE — 96360 HYDRATION IV INFUSION INIT: CPT

## 2023-06-01 PROCEDURE — 83735 ASSAY OF MAGNESIUM: CPT

## 2023-06-01 PROCEDURE — 81001 URINALYSIS AUTO W/SCOPE: CPT

## 2023-06-01 PROCEDURE — 87040 BLOOD CULTURE FOR BACTERIA: CPT

## 2023-06-01 PROCEDURE — 2580000003 HC RX 258: Performed by: EMERGENCY MEDICINE

## 2023-06-01 PROCEDURE — 85025 COMPLETE CBC W/AUTO DIFF WBC: CPT

## 2023-06-01 PROCEDURE — 51701 INSERT BLADDER CATHETER: CPT

## 2023-06-01 PROCEDURE — 87186 SC STD MICRODIL/AGAR DIL: CPT

## 2023-06-01 RX ORDER — SODIUM CHLORIDE, SODIUM LACTATE, POTASSIUM CHLORIDE, AND CALCIUM CHLORIDE .6; .31; .03; .02 G/100ML; G/100ML; G/100ML; G/100ML
1000 INJECTION, SOLUTION INTRAVENOUS
Status: COMPLETED | OUTPATIENT
Start: 2023-06-01 | End: 2023-06-01

## 2023-06-01 RX ORDER — FLUCONAZOLE 100 MG/1
200 TABLET ORAL
Status: COMPLETED | OUTPATIENT
Start: 2023-06-01 | End: 2023-06-01

## 2023-06-01 RX ORDER — FLUCONAZOLE 100 MG/1
100 TABLET ORAL DAILY
Qty: 3 TABLET | Refills: 0 | Status: SHIPPED | OUTPATIENT
Start: 2023-06-01 | End: 2023-06-04

## 2023-06-01 RX ADMIN — FLUCONAZOLE 200 MG: 100 TABLET ORAL at 18:16

## 2023-06-01 RX ADMIN — SODIUM CHLORIDE, POTASSIUM CHLORIDE, SODIUM LACTATE AND CALCIUM CHLORIDE 1000 ML: 600; 310; 30; 20 INJECTION, SOLUTION INTRAVENOUS at 16:45

## 2023-06-01 ASSESSMENT — ENCOUNTER SYMPTOMS
DIARRHEA: 1
VOMITING: 1

## 2023-06-01 NOTE — ED TRIAGE NOTES
Pt comes from the [facility]. Per  pt woke up this morning and was more somnolent than usual. Pt has also been holding her stomach a bit. Per  this is very usual for her when she has a urinary tract infection. Pt  also states that she had a runny nose and some diarrhea a few days ago but not since then.

## 2023-06-01 NOTE — ED PROVIDER NOTES
Emergency Department Provider Note       PCP: Dheeraj García MD   Age: 68 y.o. Sex: female     DISPOSITION Discharge - Pending Orders Complete 06/01/2023 06:03:46 PM       ICD-10-CM    1. Candida cystitis  B37.41           Medical Decision Making     Complexity of Problems Addressed:  1 or more acute illnesses that pose a threat to life or bodily function. Data Reviewed and Analyzed:  Category 1:   I independently ordered and reviewed each unique test.  I reviewed external records: provider visit note from PCP. The patients assessment required an independent historian: Family member. The reason they were needed is dementia. Category 2:       Category 3: Discussion of management or test interpretation. Laboratory data is all generally within normal limits. There is evidence of some urinary tract infection with 20-50 white blood cells and moderate leukocyte esterase however no bacteria were seen microscopic evaluation there is note of yeast present. At recheck patient is awake and alert and baseline mental status. We will treat for candidiasis. Urine culture will be pending. Risk of Complications and/or Morbidity of Patient Management:  Prescription drug management performed. Shared medical decision making was utilized in creating the patients health plan today. History      Elizabeth Louis is a 68 y.o. female who presents to the Emergency Department with chief complaint of    Chief Complaint   Patient presents with    Fatigue    Urinary Tract Infection      Patient brought to the emergency room for evaluation from a memory care unit at the [facility]. Patient has been unusually somnolent today. Although this is a frequent occurrence with the patient when she has a urinary tract infection according to the family member. Patient is unable to give any history or review of systems secondary to her somnolence and dementia. History obtained from the family member.   There is history of

## 2023-06-01 NOTE — ED NOTES
I have reviewed discharge instructions with the patient. The patient verbalized understanding. Patient left ED via Discharge Method: ambulatory to Home with spouse    Opportunity for questions and clarification provided. Patient given 1 scripts. To continue your aftercare when you leave the hospital, you may receive an automated call from our care team to check in on how you are doing. This is a free service and part of our promise to provide the best care and service to meet your aftercare needs.  If you have questions, or wish to unsubscribe from this service please call [phone]. Thank you for Choosing our [address] [place] [address] Emergency Department.         [address] [place] [address]  06/01/23 5896

## 2023-06-03 LAB
BACTERIA SPEC CULT: ABNORMAL
SERVICE CMNT-IMP: ABNORMAL

## 2023-06-03 NOTE — PROGRESS NOTES
ED pharmacist reviewed recent results of urine culture. The patient was treated with fluconazole 200mg PO x1 in the emergency department and received a prescription for fluconazole 100mg PO daily x3d upon discharge. Based on culture results, the patient may require alternative antimicrobial therapy. Discussed case with Dr. Ariela Marroquin and recommended faxing results to the patient's nurse and provider at her facility. She is a resident at [facility]. Faxed culture results on 6/3 @ 6880.     Allergies as of 06/01/2023 - Fully Reviewed 06/01/2023   Allergen Reaction Noted    Levofloxacin Other (See Comments) 09/14/2016    Penicillins Shortness Of Breath and Swelling 04/24/2011    Poison oak extract Anaphylaxis 11/21/2014    Shrimp (diagnostic) Anaphylaxis 02/28/2023    Bupropion Other (See Comments) 08/23/2016    Cephalosporins Other (See Comments) 07/17/2022    Sulfamethoxazole Hives and Other (See Comments) 07/20/2015    Montelukast Anxiety 01/17/2017     Jaylon Cortes, PharmD  Emergency Medicine Clinical Pharmacist

## 2023-08-17 ENCOUNTER — OFFICE VISIT (OUTPATIENT)
Dept: UROLOGY | Age: 78
End: 2023-08-17
Payer: MEDICARE

## 2023-08-17 DIAGNOSIS — N20.0 KIDNEY STONE: ICD-10-CM

## 2023-08-17 DIAGNOSIS — N39.0 RECURRENT UTI: Primary | ICD-10-CM

## 2023-08-17 PROCEDURE — 99214 OFFICE O/P EST MOD 30 MIN: CPT | Performed by: NURSE PRACTITIONER

## 2023-08-17 PROCEDURE — 74018 RADEX ABDOMEN 1 VIEW: CPT | Performed by: NURSE PRACTITIONER

## 2023-08-17 PROCEDURE — G8399 PT W/DXA RESULTS DOCUMENT: HCPCS | Performed by: NURSE PRACTITIONER

## 2023-08-17 PROCEDURE — 1123F ACP DISCUSS/DSCN MKR DOCD: CPT | Performed by: NURSE PRACTITIONER

## 2023-08-17 PROCEDURE — G8427 DOCREV CUR MEDS BY ELIG CLIN: HCPCS | Performed by: NURSE PRACTITIONER

## 2023-08-17 PROCEDURE — 1036F TOBACCO NON-USER: CPT | Performed by: NURSE PRACTITIONER

## 2023-08-17 PROCEDURE — G8420 CALC BMI NORM PARAMETERS: HCPCS | Performed by: NURSE PRACTITIONER

## 2023-08-17 PROCEDURE — 1090F PRES/ABSN URINE INCON ASSESS: CPT | Performed by: NURSE PRACTITIONER

## 2023-08-17 ASSESSMENT — ENCOUNTER SYMPTOMS: BACK PAIN: 0

## 2023-08-17 NOTE — PROGRESS NOTES
every 6 hours as needed      omeprazole (PRILOSEC) 40 MG delayed release capsule TAKE 1 CAPSULE EACH DAY. No current facility-administered medications for this visit. Allergies   Allergen Reactions    Levofloxacin Other (See Comments)    Penicillins Shortness Of Breath and Swelling    Poison Oak Extract Anaphylaxis    Shrimp (Diagnostic) Anaphylaxis    Bupropion Other (See Comments)     Other anxiety      Cephalosporins Other (See Comments)     encephalopathy    Sulfamethoxazole Hives and Other (See Comments)     She thinks she does not do well with this    Montelukast Anxiety     Social History     Socioeconomic History    Marital status:      Spouse name: Not on file    Number of children: Not on file    Years of education: 1 post HS    Highest education level: Not on file   Occupational History    Not on file   Tobacco Use    Smoking status: Former     Packs/day: 1.00     Types: Cigarettes     Quit date: 1972     Years since quittin.6    Smokeless tobacco: Never   Substance and Sexual Activity    Alcohol use: Yes     Alcohol/week: 14.0 standard drinks    Drug use: No    Sexual activity: Not Currently   Other Topics Concern    Not on file   Social History Narrative    , not active, gets up around 12 noon and then does things in the afternoon if a friend comes over. Unable to watch TV as cant figure out the controls.       Social Determinants of Health     Financial Resource Strain: Not on file   Food Insecurity: Not on file   Transportation Needs: Not on file   Physical Activity: Not on file   Stress: Not on file   Social Connections: Not on file   Intimate Partner Violence: Not on file   Housing Stability: Not on file     Family History   Problem Relation Age of Onset    Osteoarthritis Mother     Heart Disease Mother     Stroke Mother     Breast Cancer Neg Hx     Coronary Art Dis Maternal Uncle     Heart Attack Mother     Hypertension Sister     Alzheimer's Disease Father 48

## 2023-09-15 DIAGNOSIS — M11.269 PSEUDOGOUT OF KNEE, UNSPECIFIED LATERALITY: ICD-10-CM

## 2023-09-18 RX ORDER — HYDROXYCHLOROQUINE SULFATE 200 MG/1
TABLET, FILM COATED ORAL
Qty: 90 TABLET | Refills: 0 | OUTPATIENT
Start: 2023-09-18

## 2023-09-29 ENCOUNTER — TELEPHONE (OUTPATIENT)
Dept: UROLOGY | Age: 78
End: 2023-09-29

## 2023-09-29 NOTE — TELEPHONE ENCOUNTER
Tammy Mei called wanting urine collected for UTI. Called BAL on [facility] asking for UA /UC per Marisol. Called Leora Wallace to  rine and bring to office.

## 2023-10-03 ENCOUNTER — TELEPHONE (OUTPATIENT)
Dept: UROLOGY | Age: 78
End: 2023-10-03

## 2023-10-03 ENCOUNTER — OFFICE VISIT (OUTPATIENT)
Dept: UROLOGY | Age: 78
End: 2023-10-03
Payer: MEDICARE

## 2023-10-03 DIAGNOSIS — N39.0 RECURRENT UTI: Primary | ICD-10-CM

## 2023-10-03 LAB
BILIRUBIN, URINE, POC: NEGATIVE
BILIRUBIN, URINE, POC: NEGATIVE
BLOOD URINE, POC: NEGATIVE
BLOOD URINE, POC: NEGATIVE
GLUCOSE URINE, POC: 500
GLUCOSE URINE, POC: 500
KETONES, URINE, POC: NEGATIVE
KETONES, URINE, POC: NEGATIVE
LEUKOCYTE ESTERASE, URINE, POC: NEGATIVE
LEUKOCYTE ESTERASE, URINE, POC: NEGATIVE
NITRITE, URINE, POC: NEGATIVE
NITRITE, URINE, POC: NEGATIVE
PH, URINE, POC: 5.5 (ref 4.6–8)
PH, URINE, POC: 5.5 (ref 4.6–8)
PROTEIN,URINE, POC: NEGATIVE
PROTEIN,URINE, POC: NEGATIVE
SPECIFIC GRAVITY, URINE, POC: 1.03 (ref 1–1.03)
SPECIFIC GRAVITY, URINE, POC: 1.03 (ref 1–1.03)
URINALYSIS CLARITY, POC: NORMAL
URINALYSIS CLARITY, POC: NORMAL
URINALYSIS COLOR, POC: NORMAL
URINALYSIS COLOR, POC: NORMAL
UROBILINOGEN, POC: NORMAL
UROBILINOGEN, POC: NORMAL

## 2023-10-03 PROCEDURE — 81003 URINALYSIS AUTO W/O SCOPE: CPT | Performed by: NURSE PRACTITIONER

## 2023-10-03 NOTE — PROGRESS NOTES
Results for orders placed or performed in visit on 10/03/23   AMB POC URINALYSIS DIP STICK AUTO W/O MICRO   Result Value Ref Range    Color (UA POC)      Clarity (UA POC)      Glucose, Urine,  Negative    Bilirubin, Urine, POC Negative Negative    KETONES, Urine, POC Negative Negative    Specific Gravity, Urine, POC 1.030 1.001 - 1.035    Blood (UA POC) Negative Negative    pH, Urine, POC 5.5 4.6 - 8.0    Protein, Urine, POC Negative Negative    Urobilinogen, POC 0.2 mg/dL     Nitrite, Urine, POC Negative Negative    Leukocyte Esterase, Urine, POC Negative Negative     Urine normal.  Dianna Hopper, APRN - CNP

## 2023-10-05 ENCOUNTER — OFFICE VISIT (OUTPATIENT)
Dept: RHEUMATOLOGY | Age: 78
End: 2023-10-05
Payer: MEDICARE

## 2023-10-05 VITALS
HEIGHT: 62 IN | SYSTOLIC BLOOD PRESSURE: 142 MMHG | WEIGHT: 115 LBS | HEART RATE: 96 BPM | DIASTOLIC BLOOD PRESSURE: 92 MMHG | BODY MASS INDEX: 21.16 KG/M2

## 2023-10-05 DIAGNOSIS — M11.269 PSEUDOGOUT OF KNEE, UNSPECIFIED LATERALITY: ICD-10-CM

## 2023-10-05 DIAGNOSIS — M11.269 PSEUDOGOUT OF KNEE, UNSPECIFIED LATERALITY: Primary | ICD-10-CM

## 2023-10-05 DIAGNOSIS — Z79.899 LONG-TERM USE OF HIGH-RISK MEDICATION: ICD-10-CM

## 2023-10-05 LAB
ALBUMIN SERPL-MCNC: 3.9 G/DL (ref 3.2–4.6)
ALBUMIN/GLOB SERPL: 1.1 (ref 0.4–1.6)
ALP SERPL-CCNC: 85 U/L (ref 50–136)
ALT SERPL-CCNC: 22 U/L (ref 12–65)
ANION GAP SERPL CALC-SCNC: 8 MMOL/L (ref 2–11)
AST SERPL-CCNC: 13 U/L (ref 15–37)
BASOPHILS # BLD: 0.1 K/UL (ref 0–0.2)
BASOPHILS NFR BLD: 1 % (ref 0–2)
BILIRUB SERPL-MCNC: 0.3 MG/DL (ref 0.2–1.1)
BUN SERPL-MCNC: 35 MG/DL (ref 8–23)
CALCIUM SERPL-MCNC: 9.7 MG/DL (ref 8.3–10.4)
CHLORIDE SERPL-SCNC: 106 MMOL/L (ref 101–110)
CO2 SERPL-SCNC: 28 MMOL/L (ref 21–32)
CREAT SERPL-MCNC: 1 MG/DL (ref 0.6–1)
CRP SERPL-MCNC: 1.4 MG/DL (ref 0–0.9)
DIFFERENTIAL METHOD BLD: ABNORMAL
EOSINOPHIL # BLD: 0.4 K/UL (ref 0–0.8)
EOSINOPHIL NFR BLD: 4 % (ref 0.5–7.8)
ERYTHROCYTE [DISTWIDTH] IN BLOOD BY AUTOMATED COUNT: 12.2 % (ref 11.9–14.6)
GLOBULIN SER CALC-MCNC: 3.4 G/DL (ref 2.8–4.5)
GLUCOSE SERPL-MCNC: 122 MG/DL (ref 65–100)
HCT VFR BLD AUTO: 38.9 % (ref 35.8–46.3)
HGB BLD-MCNC: 12.2 G/DL (ref 11.7–15.4)
IMM GRANULOCYTES # BLD AUTO: 0 K/UL (ref 0–0.5)
IMM GRANULOCYTES NFR BLD AUTO: 0 % (ref 0–5)
LYMPHOCYTES # BLD: 1.8 K/UL (ref 0.5–4.6)
LYMPHOCYTES NFR BLD: 18 % (ref 13–44)
MCH RBC QN AUTO: 30.9 PG (ref 26.1–32.9)
MCHC RBC AUTO-ENTMCNC: 31.4 G/DL (ref 31.4–35)
MCV RBC AUTO: 98.5 FL (ref 82–102)
MONOCYTES # BLD: 0.8 K/UL (ref 0.1–1.3)
MONOCYTES NFR BLD: 8 % (ref 4–12)
NEUTS SEG # BLD: 6.7 K/UL (ref 1.7–8.2)
NEUTS SEG NFR BLD: 69 % (ref 43–78)
NRBC # BLD: 0 K/UL (ref 0–0.2)
PLATELET # BLD AUTO: 302 K/UL (ref 150–450)
PMV BLD AUTO: 9 FL (ref 9.4–12.3)
POTASSIUM SERPL-SCNC: 4.1 MMOL/L (ref 3.5–5.1)
PROT SERPL-MCNC: 7.3 G/DL (ref 6.3–8.2)
RBC # BLD AUTO: 3.95 M/UL (ref 4.05–5.2)
SODIUM SERPL-SCNC: 142 MMOL/L (ref 133–143)
WBC # BLD AUTO: 9.7 K/UL (ref 4.3–11.1)

## 2023-10-05 PROCEDURE — 1036F TOBACCO NON-USER: CPT | Performed by: INTERNAL MEDICINE

## 2023-10-05 PROCEDURE — 3080F DIAST BP >= 90 MM HG: CPT | Performed by: INTERNAL MEDICINE

## 2023-10-05 PROCEDURE — 3077F SYST BP >= 140 MM HG: CPT | Performed by: INTERNAL MEDICINE

## 2023-10-05 PROCEDURE — G8399 PT W/DXA RESULTS DOCUMENT: HCPCS | Performed by: INTERNAL MEDICINE

## 2023-10-05 PROCEDURE — G8427 DOCREV CUR MEDS BY ELIG CLIN: HCPCS | Performed by: INTERNAL MEDICINE

## 2023-10-05 PROCEDURE — 99214 OFFICE O/P EST MOD 30 MIN: CPT | Performed by: INTERNAL MEDICINE

## 2023-10-05 PROCEDURE — 1090F PRES/ABSN URINE INCON ASSESS: CPT | Performed by: INTERNAL MEDICINE

## 2023-10-05 PROCEDURE — 1123F ACP DISCUSS/DSCN MKR DOCD: CPT | Performed by: INTERNAL MEDICINE

## 2023-10-05 PROCEDURE — G8484 FLU IMMUNIZE NO ADMIN: HCPCS | Performed by: INTERNAL MEDICINE

## 2023-10-05 PROCEDURE — G8420 CALC BMI NORM PARAMETERS: HCPCS | Performed by: INTERNAL MEDICINE

## 2023-10-05 RX ORDER — D-MANNOSE 99 %
POWDER (GRAM) MISCELLANEOUS
COMMUNITY

## 2023-10-05 RX ORDER — ASPIRIN 81 MG/1
81 TABLET ORAL DAILY
COMMUNITY

## 2023-10-05 RX ORDER — HYDROXYCHLOROQUINE SULFATE 200 MG/1
TABLET, FILM COATED ORAL
Qty: 90 TABLET | Refills: 1 | Status: SHIPPED | OUTPATIENT
Start: 2023-10-05

## 2023-10-05 RX ORDER — WHEAT DEXTRIN 3 G/3.8 G
4 POWDER (GRAM) ORAL
COMMUNITY

## 2023-10-05 RX ORDER — HYDROCHLOROTHIAZIDE 12.5 MG/1
12.5 CAPSULE, GELATIN COATED ORAL DAILY
COMMUNITY

## 2023-10-05 RX ORDER — ONDANSETRON 4 MG/1
4 TABLET, FILM COATED ORAL EVERY 8 HOURS PRN
COMMUNITY

## 2023-10-05 ASSESSMENT — JOINT PAIN
TOTAL NUMBER OF TENDER JOINTS: 2
TOTAL NUMBER OF SWOLLEN JOINTS: 0

## 2023-10-05 ASSESSMENT — ROUTINE ASSESSMENT OF PATIENT INDEX DATA (RAPID3)
WHEN YOU AWAKENED IN THE MORNING OVER THE LAST WEEK, PLEASE INDICATE THE AMOUNT OF TIME IT TAKES UNTIL YOU ARE AS LIMBER AS YOU WILL BE FOR THE DAY: < 10 MIN
ON A SCALE OF ONE TO TEN, HOW MUCH OF A PROBLEM HAS UNUSUAL FATIGUE OR TIREDNESS BEEN FOR YOU OVER THE PAST WEEK?: 5
ON A SCALE OF ONE TO TEN, CONSIDERING ALL THE WAYS IN WHICH ILLNESS AND HEALTH CONDITIONS MAY AFFECT YOU AT THIS TIME, PLEASE INDICATE BELOW HOW YOU ARE DOING:: 6
ON A SCALE OF ONE TO TEN, HOW DIFFICULT WAS IT FOR YOU TO COMPLETE THE LISTED DAILY PHYSICAL TASKS OVER THE LAST WEEK: 1.1
ON A SCALE OF ONE TO TEN, HOW MUCH PAIN HAVE YOU HAD BECAUSE OF YOUR CONDITION OVER THE PAST WEEK?: 5

## 2023-10-05 NOTE — PROGRESS NOTES
her on her follow-up visit. -     CBC with Auto Differential; Future  -     Comprehensive Metabolic Panel; Future    Disease activity plan:  As stated above. Steroid management plan:  As stated above, if applicable. Pain management plan:  As stated above, if applicable. Weight management plan:  Weight loss through diet and exercise is always encouraged    Disease prognosis: Good      I appreciate the opportunity to continue to participate in the care of this patient. Follow-up and Dispositions    Return in about 4 months (around 2/5/2024). Electronically signed by:  Jayant Ruiz MD      This note was dictated using dragon voice recognition software.   It has been proofread, but there may still exist voice recognition errors that the author did not detect.                --------------------------------------------------------------------------------------------------------------------------------------------------------------------------------------------------------------------------------

## 2024-01-22 ENCOUNTER — APPOINTMENT (OUTPATIENT)
Dept: CT IMAGING | Age: 79
End: 2024-01-22
Payer: MEDICARE

## 2024-01-22 ENCOUNTER — HOSPITAL ENCOUNTER (EMERGENCY)
Age: 79
Discharge: HOME | End: 2024-01-22
Attending: EMERGENCY MEDICINE
Payer: MEDICARE

## 2024-01-22 VITALS
SYSTOLIC BLOOD PRESSURE: 134 MMHG | TEMPERATURE: 98 F | RESPIRATION RATE: 18 BRPM | DIASTOLIC BLOOD PRESSURE: 71 MMHG | HEART RATE: 64 BPM | OXYGEN SATURATION: 98 %

## 2024-01-22 DIAGNOSIS — S00.03XA HEMATOMA OF SCALP, INITIAL ENCOUNTER: ICD-10-CM

## 2024-01-22 DIAGNOSIS — M50.30 DEGENERATIVE DISC DISEASE, CERVICAL: ICD-10-CM

## 2024-01-22 DIAGNOSIS — S09.90XA CLOSED HEAD INJURY, INITIAL ENCOUNTER: Primary | ICD-10-CM

## 2024-01-22 PROCEDURE — 99284 EMERGENCY DEPT VISIT MOD MDM: CPT

## 2024-01-22 PROCEDURE — 70450 CT HEAD/BRAIN W/O DYE: CPT

## 2024-01-22 PROCEDURE — 72125 CT NECK SPINE W/O DYE: CPT

## 2024-01-22 ASSESSMENT — ENCOUNTER SYMPTOMS
ABDOMINAL PAIN: 0
NAUSEA: 0
COUGH: 0
VOMITING: 0
SHORTNESS OF BREATH: 0

## 2024-01-22 ASSESSMENT — PAIN - FUNCTIONAL ASSESSMENT: PAIN_FUNCTIONAL_ASSESSMENT: 0-10

## 2024-01-22 ASSESSMENT — PAIN SCALES - GENERAL: PAINLEVEL_OUTOF10: 0

## 2024-01-22 NOTE — ED PROVIDER NOTES
Emergency Department Provider Note       PCP: Emelia Hendricks MD   Age: 78 y.o.   Sex: female     DISPOSITION Decision To Discharge 01/22/2024 12:30:59 PM       ICD-10-CM    1. Closed head injury, initial encounter  S09.90XA       2. Hematoma of scalp, initial encounter  S00.03XA       3. Degenerative disc disease, cervical  M50.30           Medical Decision Making     Complexity of Problems Addressed:  1 or more acute illnesses that pose a threat to life or bodily function.     Data Reviewed and Analyzed:  I independently ordered and reviewed each unique test.     The patients assessment required an independent historian: Additional history information provided by patient's son who present at bedside..  The reason they were needed is dementia.    I interpreted the CT Scan CT head with no acute intracranial abnormality noted.  Diffuse cerebral atrophy.  CT C-spine with no acute bony fracture or subluxation.  Severe degenerative disc disease at C5-C6 and near complete disc collapse at C6-C7.  Agree with radiology interpretation.    Discussion of management or test interpretation.  78-year-old female with history of Alzheimer disease, hypertension, osteopenia presents with posterior scalp hematoma.  Uncertain if patient had recent falls or injuries.  Patient at baseline mental status.    Patient at baseline mental status.  Moving all extremities equally.  CT with no acute intracranial abnormality.  No acute fracture noted.  Will discharge home with instructions for close follow-up with primary care physician.  Given return precautions.        Risk of Complications and/or Morbidity of Patient Management:  Chronic medical problems impacting care include dementia.  Shared medical decision making was utilized in creating the patients health plan today.    ED Course as of 01/22/24 1238   Mon Jan 22, 2024   1218 CT head    IMPRESSION:  No significant changes compared to prior study with no CT evidence  of acute

## 2024-01-22 NOTE — DISCHARGE INSTRUCTIONS
Schedule close follow-up with primary care physician.    Return to ED if symptoms worsen or progress in any way.

## 2024-02-08 ENCOUNTER — OFFICE VISIT (OUTPATIENT)
Dept: RHEUMATOLOGY | Age: 79
End: 2024-02-08
Payer: MEDICARE

## 2024-02-08 VITALS
HEART RATE: 66 BPM | WEIGHT: 110 LBS | SYSTOLIC BLOOD PRESSURE: 104 MMHG | HEIGHT: 62 IN | BODY MASS INDEX: 20.24 KG/M2 | DIASTOLIC BLOOD PRESSURE: 52 MMHG

## 2024-02-08 DIAGNOSIS — M11.269 PSEUDOGOUT OF KNEE, UNSPECIFIED LATERALITY: ICD-10-CM

## 2024-02-08 DIAGNOSIS — Z79.899 LONG-TERM USE OF HIGH-RISK MEDICATION: ICD-10-CM

## 2024-02-08 DIAGNOSIS — M11.269 PSEUDOGOUT OF KNEE, UNSPECIFIED LATERALITY: Primary | ICD-10-CM

## 2024-02-08 LAB
ALBUMIN SERPL-MCNC: 4 G/DL (ref 3.2–4.6)
ALBUMIN/GLOB SERPL: 1.3 (ref 0.4–1.6)
ALP SERPL-CCNC: 90 U/L (ref 50–136)
ALT SERPL-CCNC: 19 U/L (ref 12–65)
ANION GAP SERPL CALC-SCNC: 4 MMOL/L (ref 2–11)
AST SERPL-CCNC: 11 U/L (ref 15–37)
BASOPHILS # BLD: 0 K/UL (ref 0–0.2)
BASOPHILS NFR BLD: 1 % (ref 0–2)
BILIRUB SERPL-MCNC: 0.3 MG/DL (ref 0.2–1.1)
BUN SERPL-MCNC: 40 MG/DL (ref 8–23)
CALCIUM SERPL-MCNC: 9.3 MG/DL (ref 8.3–10.4)
CHLORIDE SERPL-SCNC: 106 MMOL/L (ref 103–113)
CO2 SERPL-SCNC: 29 MMOL/L (ref 21–32)
CREAT SERPL-MCNC: 1.4 MG/DL (ref 0.6–1)
CRP SERPL-MCNC: 1.4 MG/DL (ref 0–0.9)
DIFFERENTIAL METHOD BLD: ABNORMAL
EOSINOPHIL # BLD: 0.2 K/UL (ref 0–0.8)
EOSINOPHIL NFR BLD: 3 % (ref 0.5–7.8)
ERYTHROCYTE [DISTWIDTH] IN BLOOD BY AUTOMATED COUNT: 11.9 % (ref 11.9–14.6)
GLOBULIN SER CALC-MCNC: 3.1 G/DL (ref 2.8–4.5)
GLUCOSE SERPL-MCNC: 122 MG/DL (ref 65–100)
HCT VFR BLD AUTO: 37 % (ref 35.8–46.3)
HGB BLD-MCNC: 12.1 G/DL (ref 11.7–15.4)
IMM GRANULOCYTES # BLD AUTO: 0 K/UL (ref 0–0.5)
IMM GRANULOCYTES NFR BLD AUTO: 0 % (ref 0–5)
LYMPHOCYTES # BLD: 2.1 K/UL (ref 0.5–4.6)
LYMPHOCYTES NFR BLD: 28 % (ref 13–44)
MCH RBC QN AUTO: 31.6 PG (ref 26.1–32.9)
MCHC RBC AUTO-ENTMCNC: 32.7 G/DL (ref 31.4–35)
MCV RBC AUTO: 96.6 FL (ref 82–102)
MONOCYTES # BLD: 0.7 K/UL (ref 0.1–1.3)
MONOCYTES NFR BLD: 9 % (ref 4–12)
NEUTS SEG # BLD: 4.5 K/UL (ref 1.7–8.2)
NEUTS SEG NFR BLD: 59 % (ref 43–78)
NRBC # BLD: 0 K/UL (ref 0–0.2)
PLATELET # BLD AUTO: 324 K/UL (ref 150–450)
PMV BLD AUTO: 9.4 FL (ref 9.4–12.3)
POTASSIUM SERPL-SCNC: 4.1 MMOL/L (ref 3.5–5.1)
PROT SERPL-MCNC: 7.1 G/DL (ref 6.3–8.2)
RBC # BLD AUTO: 3.83 M/UL (ref 4.05–5.2)
SODIUM SERPL-SCNC: 139 MMOL/L (ref 136–146)
WBC # BLD AUTO: 7.6 K/UL (ref 4.3–11.1)

## 2024-02-08 PROCEDURE — 1036F TOBACCO NON-USER: CPT | Performed by: INTERNAL MEDICINE

## 2024-02-08 PROCEDURE — 1123F ACP DISCUSS/DSCN MKR DOCD: CPT | Performed by: INTERNAL MEDICINE

## 2024-02-08 PROCEDURE — 1090F PRES/ABSN URINE INCON ASSESS: CPT | Performed by: INTERNAL MEDICINE

## 2024-02-08 PROCEDURE — G8484 FLU IMMUNIZE NO ADMIN: HCPCS | Performed by: INTERNAL MEDICINE

## 2024-02-08 PROCEDURE — G8399 PT W/DXA RESULTS DOCUMENT: HCPCS | Performed by: INTERNAL MEDICINE

## 2024-02-08 PROCEDURE — 99214 OFFICE O/P EST MOD 30 MIN: CPT | Performed by: INTERNAL MEDICINE

## 2024-02-08 PROCEDURE — 3074F SYST BP LT 130 MM HG: CPT | Performed by: INTERNAL MEDICINE

## 2024-02-08 PROCEDURE — 3078F DIAST BP <80 MM HG: CPT | Performed by: INTERNAL MEDICINE

## 2024-02-08 PROCEDURE — G8420 CALC BMI NORM PARAMETERS: HCPCS | Performed by: INTERNAL MEDICINE

## 2024-02-08 PROCEDURE — G8427 DOCREV CUR MEDS BY ELIG CLIN: HCPCS | Performed by: INTERNAL MEDICINE

## 2024-02-08 RX ORDER — HYDROXYCHLOROQUINE SULFATE 200 MG/1
TABLET, FILM COATED ORAL
Qty: 90 TABLET | Refills: 1 | Status: SHIPPED | OUTPATIENT
Start: 2024-02-08

## 2024-02-08 RX ORDER — METOPROLOL SUCCINATE 25 MG/1
25 TABLET, EXTENDED RELEASE ORAL DAILY
COMMUNITY

## 2024-02-08 RX ORDER — MINOXIDIL 5 %
SOLUTION, NON-ORAL TOPICAL
COMMUNITY

## 2024-02-08 RX ORDER — DONEPEZIL HYDROCHLORIDE 10 MG/1
10 TABLET, FILM COATED ORAL NIGHTLY
COMMUNITY

## 2024-02-08 ASSESSMENT — JOINT PAIN
TOTAL NUMBER OF TENDER JOINTS: 2
TOTAL NUMBER OF SWOLLEN JOINTS: 0

## 2024-02-08 NOTE — PROGRESS NOTES
Prenatal Vit-Fe Fumarate-FA (M-VIT PO) Take by mouth      mupirocin (BACTROBAN) 2 % ointment Apply topically 3 times daily Apply topically 3 times daily.      ondansetron (ZOFRAN) 4 MG tablet Take 1 tablet by mouth every 8 hours as needed for Nausea or Vomiting      acetaminophen (TYLENOL) 500 MG tablet Take 1 tablet by mouth every 6 hours as needed for Pain      divalproex (DEPAKOTE) 125 MG DR tablet Take 1 tablet by mouth 3 times daily      memantine (NAMENDA) 5 MG tablet Take 2 tablets by mouth 2 times daily      docusate sodium (COLACE) 100 MG capsule Take 1 capsule by mouth daily 90 capsule 1    Cranberry 500 MG CAPS Take 1 capsule by mouth 2 times daily 180 capsule 1    lisinopril (PRINIVIL;ZESTRIL) 40 MG tablet Take 1 tablet by mouth in the morning. 30 tablet 0    magnesium oxide (MAG-OX) 400 MG tablet Take 1 tablet by mouth daily      diclofenac sodium (VOLTAREN) 1 % GEL Apply 4 g topically 4 times daily 100 g 3    TURMERIC PO Take 1 capsule by mouth daily      azelastine (ASTELIN) 0.1 % nasal spray 1 spray by Nasal route 2 times daily      vitamin D 25 MCG (1000 UT) CAPS Take 1 capsule by mouth daily      omeprazole (PRILOSEC) 40 MG delayed release capsule TAKE 1 CAPSULE EACH DAY.      [DISCONTINUED] hydroxychloroquine (PLAQUENIL) 200 MG tablet Take 1 pill once a day after food. 90 tablet 1    [DISCONTINUED] metoprolol succinate (TOPROL XL) 200 MG extended release tablet Take 1 tablet by mouth in the morning. 30 tablet 0    [DISCONTINUED] donepezil (ARICEPT) 10 MG tablet TAKE ONE TABLET AT BEDTIME.      [DISCONTINUED] Naproxen Sodium 220 MG CAPS Take 1 capsule by mouth 2 times daily       No facility-administered encounter medications on file as of 2/8/2024.           REVIEW OF SYSTEMS: The following systems were reviewed with patient today and were negative except for the following (depicted with an \"X\"):        \"X\" General  \"X\" Head and Neck  \"X\" Heart and Breathing  \"X\" Gastrointestinal    Fever/chills

## 2024-02-16 ENCOUNTER — TELEPHONE (OUTPATIENT)
Dept: UROLOGY | Age: 79
End: 2024-02-16

## 2024-02-16 ENCOUNTER — OFFICE VISIT (OUTPATIENT)
Dept: UROLOGY | Age: 79
End: 2024-02-16
Payer: MEDICARE

## 2024-02-16 DIAGNOSIS — N20.0 KIDNEY STONE: ICD-10-CM

## 2024-02-16 DIAGNOSIS — N39.0 RECURRENT UTI: Primary | ICD-10-CM

## 2024-02-16 LAB
BILIRUBIN, URINE, POC: NEGATIVE
BLOOD URINE, POC: NORMAL
GLUCOSE URINE, POC: NEGATIVE
KETONES, URINE, POC: NEGATIVE
LEUKOCYTE ESTERASE, URINE, POC: NORMAL
NITRITE, URINE, POC: POSITIVE
PH, URINE, POC: 6.5 (ref 4.6–8)
PROTEIN,URINE, POC: NEGATIVE
SPECIFIC GRAVITY, URINE, POC: 1.02 (ref 1–1.03)
URINALYSIS CLARITY, POC: NORMAL
URINALYSIS COLOR, POC: NORMAL
UROBILINOGEN, POC: NORMAL

## 2024-02-16 PROCEDURE — 81003 URINALYSIS AUTO W/O SCOPE: CPT | Performed by: NURSE PRACTITIONER

## 2024-02-16 PROCEDURE — 99214 OFFICE O/P EST MOD 30 MIN: CPT | Performed by: NURSE PRACTITIONER

## 2024-02-16 PROCEDURE — G8484 FLU IMMUNIZE NO ADMIN: HCPCS | Performed by: NURSE PRACTITIONER

## 2024-02-16 PROCEDURE — G8427 DOCREV CUR MEDS BY ELIG CLIN: HCPCS | Performed by: NURSE PRACTITIONER

## 2024-02-16 PROCEDURE — 1036F TOBACCO NON-USER: CPT | Performed by: NURSE PRACTITIONER

## 2024-02-16 PROCEDURE — 1090F PRES/ABSN URINE INCON ASSESS: CPT | Performed by: NURSE PRACTITIONER

## 2024-02-16 PROCEDURE — 1123F ACP DISCUSS/DSCN MKR DOCD: CPT | Performed by: NURSE PRACTITIONER

## 2024-02-16 PROCEDURE — G8399 PT W/DXA RESULTS DOCUMENT: HCPCS | Performed by: NURSE PRACTITIONER

## 2024-02-16 PROCEDURE — G8420 CALC BMI NORM PARAMETERS: HCPCS | Performed by: NURSE PRACTITIONER

## 2024-02-16 RX ORDER — NITROFURANTOIN 25; 75 MG/1; MG/1
100 CAPSULE ORAL 2 TIMES DAILY
Qty: 14 CAPSULE | Refills: 0 | Status: SHIPPED | OUTPATIENT
Start: 2024-02-16 | End: 2024-02-23

## 2024-02-16 NOTE — PROGRESS NOTES
[facility]  [address]  [place] [address]  [phone]          Karen Lockhart  : 1945    Chief Complaint   Patient presents with    Follow-up    Urinary Tract Infection          HPI     Karen Lockhart is a 78 y.o. female w dementia followed for UTI.  Accompanied by  and he provides all info.      Hospitalized at [facility] 22-22 for UTI. Urine cx with enterococcus faecalis group D, sensitive to macrobid. CT A/P wo contrast revealed non obstructing stones to left kidney and sig constipation.  Upon chart review, she had 3 UTI from May to July of this yr.  reports her main sx is AMS. She has a long hx of constipation. Currently on daily miralax. She is in a pull up, but does use toilet at times. Macrobid suppression was started in Oct 2022. Cranberry supplement was started.      Admitted to [facility] 22-11/10/22 for encephalopathy, HERON, and UTI. Cr 1.25. Urine culture positive for Pseudomonas aeruginosa. Tx w vancomycin. Curbside consult w ID while inpt who rec no prolong use of antibiotics as pt is likely colonized. Suppression was dc.  Cr 0.46 at discharge.      Back to ER in  w AMS. Urine culture showed 50-100k pseudomonas. She was treated for yeast. Mentation went back to baseline. Recently started D-Mannose. Incontinence has improved. She is non verbal. Constipation relieved w benefiber and prunes.      KUB in office 23 reviewed by myself and shows 5-6 mm stone to LMP.      Prev PVR 0 cc via u/s.      Here today w increased confusion and drowsiness. Afebrile. Recently tx for sinus congestion. On Bromfed. I/O cath for UA. Does not appear over infected.      Lives at [facility] unit.           Past Medical History:   Diagnosis Date    Alcohol abuse, daily use 2014    Alzheimer's dementia (HCC)     Depression 2014    Drug-induced encephalopathy 2022    Due to cefepime    Hypertension     Lumbar stenosis

## 2024-02-18 LAB
BACTERIA SPEC CULT: ABNORMAL
SERVICE CMNT-IMP: ABNORMAL

## 2024-03-01 ENCOUNTER — HOSPITAL ENCOUNTER (EMERGENCY)
Age: 79
Discharge: HOME | End: 2024-03-01
Attending: EMERGENCY MEDICINE
Payer: MEDICARE

## 2024-03-01 ENCOUNTER — APPOINTMENT (OUTPATIENT)
Dept: GENERAL RADIOLOGY | Age: 79
End: 2024-03-01
Payer: MEDICARE

## 2024-03-01 VITALS
HEART RATE: 62 BPM | WEIGHT: 110 LBS | SYSTOLIC BLOOD PRESSURE: 131 MMHG | OXYGEN SATURATION: 97 % | DIASTOLIC BLOOD PRESSURE: 68 MMHG | TEMPERATURE: 97.9 F | RESPIRATION RATE: 16 BRPM | BODY MASS INDEX: 20.12 KG/M2

## 2024-03-01 DIAGNOSIS — N30.00 ACUTE CYSTITIS WITHOUT HEMATURIA: ICD-10-CM

## 2024-03-01 DIAGNOSIS — R55 SYNCOPE AND COLLAPSE: Primary | ICD-10-CM

## 2024-03-01 LAB
ALBUMIN SERPL-MCNC: 3.6 G/DL (ref 3.2–4.6)
ALBUMIN/GLOB SERPL: 0.9 (ref 0.4–1.6)
ALP SERPL-CCNC: 84 U/L (ref 50–136)
ALT SERPL-CCNC: 16 U/L (ref 12–65)
ANION GAP SERPL CALC-SCNC: 4 MMOL/L (ref 2–11)
AST SERPL-CCNC: 10 U/L (ref 15–37)
BACTERIA URNS QL MICRO: NEGATIVE /HPF
BILIRUB SERPL-MCNC: 0.1 MG/DL (ref 0.2–1.1)
BILIRUB UR QL: NEGATIVE
BUN SERPL-MCNC: 50 MG/DL (ref 8–23)
CALCIUM SERPL-MCNC: 9.5 MG/DL (ref 8.3–10.4)
CASTS URNS QL MICRO: ABNORMAL /LPF
CHLORIDE SERPL-SCNC: 110 MMOL/L (ref 103–113)
CO2 SERPL-SCNC: 30 MMOL/L (ref 21–32)
CREAT SERPL-MCNC: 1.17 MG/DL (ref 0.6–1)
EPI CELLS #/AREA URNS HPF: ABNORMAL /HPF
ERYTHROCYTE [DISTWIDTH] IN BLOOD BY AUTOMATED COUNT: 12.4 % (ref 11.9–14.6)
GLOBULIN SER CALC-MCNC: 3.8 G/DL (ref 2.8–4.5)
GLUCOSE SERPL-MCNC: 205 MG/DL (ref 65–100)
GLUCOSE UR QL STRIP.AUTO: NEGATIVE MG/DL
HCT VFR BLD AUTO: 38.8 % (ref 35.8–46.3)
HGB BLD-MCNC: 12.4 G/DL (ref 11.7–15.4)
KETONES UR-MCNC: NEGATIVE MG/DL
LEUKOCYTE ESTERASE UR QL STRIP: ABNORMAL
MCH RBC QN AUTO: 31.4 PG (ref 26.1–32.9)
MCHC RBC AUTO-ENTMCNC: 32 G/DL (ref 31.4–35)
MCV RBC AUTO: 98.2 FL (ref 82–102)
NITRITE UR QL: NEGATIVE
NRBC # BLD: 0 K/UL (ref 0–0.2)
PH UR: 6.5 (ref 5–9)
PLATELET # BLD AUTO: 356 K/UL (ref 150–450)
PMV BLD AUTO: 8.7 FL (ref 9.4–12.3)
POTASSIUM SERPL-SCNC: 4.6 MMOL/L (ref 3.5–5.1)
PROT SERPL-MCNC: 7.4 G/DL (ref 6.3–8.2)
PROT UR QL: 30 MG/DL
RBC # BLD AUTO: 3.95 M/UL (ref 4.05–5.2)
RBC # UR STRIP: ABNORMAL
RBC #/AREA URNS HPF: ABNORMAL /HPF
SERVICE CMNT-IMP: ABNORMAL
SODIUM SERPL-SCNC: 144 MMOL/L (ref 136–146)
SP GR UR: 1.02 (ref 1–1.02)
UROBILINOGEN UR QL: 0.2 EU/DL (ref 0.2–1)
WBC # BLD AUTO: 11.3 K/UL (ref 4.3–11.1)
WBC URNS QL MICRO: >100 /HPF

## 2024-03-01 PROCEDURE — 81015 MICROSCOPIC EXAM OF URINE: CPT

## 2024-03-01 PROCEDURE — 80053 COMPREHEN METABOLIC PANEL: CPT

## 2024-03-01 PROCEDURE — 99285 EMERGENCY DEPT VISIT HI MDM: CPT

## 2024-03-01 PROCEDURE — 87086 URINE CULTURE/COLONY COUNT: CPT

## 2024-03-01 PROCEDURE — 93005 ELECTROCARDIOGRAM TRACING: CPT | Performed by: EMERGENCY MEDICINE

## 2024-03-01 PROCEDURE — 2580000003 HC RX 258: Performed by: EMERGENCY MEDICINE

## 2024-03-01 PROCEDURE — 6360000002 HC RX W HCPCS: Performed by: EMERGENCY MEDICINE

## 2024-03-01 PROCEDURE — 81003 URINALYSIS AUTO W/O SCOPE: CPT

## 2024-03-01 PROCEDURE — 71045 X-RAY EXAM CHEST 1 VIEW: CPT

## 2024-03-01 PROCEDURE — 96374 THER/PROPH/DIAG INJ IV PUSH: CPT

## 2024-03-01 PROCEDURE — 85027 COMPLETE CBC AUTOMATED: CPT

## 2024-03-01 RX ORDER — SODIUM CHLORIDE, SODIUM LACTATE, POTASSIUM CHLORIDE, AND CALCIUM CHLORIDE .6; .31; .03; .02 G/100ML; G/100ML; G/100ML; G/100ML
500 INJECTION, SOLUTION INTRAVENOUS
Status: COMPLETED | OUTPATIENT
Start: 2024-03-01 | End: 2024-03-01

## 2024-03-01 RX ORDER — CEFPODOXIME PROXETIL 200 MG/1
200 TABLET, FILM COATED ORAL 2 TIMES DAILY
Qty: 20 TABLET | Refills: 0 | Status: SHIPPED | OUTPATIENT
Start: 2024-03-02 | End: 2024-03-12

## 2024-03-01 RX ORDER — CEFDINIR 300 MG/1
300 CAPSULE ORAL ONCE
Status: DISCONTINUED | OUTPATIENT
Start: 2024-03-01 | End: 2024-03-01

## 2024-03-01 RX ADMIN — SODIUM CHLORIDE, POTASSIUM CHLORIDE, SODIUM LACTATE AND CALCIUM CHLORIDE 500 ML: 600; 310; 30; 20 INJECTION, SOLUTION INTRAVENOUS at 20:05

## 2024-03-01 RX ADMIN — CEFTRIAXONE 1000 MG: 1 INJECTION, POWDER, FOR SOLUTION INTRAMUSCULAR; INTRAVENOUS at 21:07

## 2024-03-02 LAB
EKG ATRIAL RATE: 63 BPM
EKG DIAGNOSIS: NORMAL
EKG P AXIS: 81 DEGREES
EKG P-R INTERVAL: 181 MS
EKG Q-T INTERVAL: 463 MS
EKG QRS DURATION: 156 MS
EKG QTC CALCULATION (BAZETT): 474 MS
EKG R AXIS: 90 DEGREES
EKG T AXIS: 52 DEGREES
EKG VENTRICULAR RATE: 63 BPM

## 2024-03-02 PROCEDURE — 93010 ELECTROCARDIOGRAM REPORT: CPT | Performed by: INTERNAL MEDICINE

## 2024-03-02 NOTE — ED TRIAGE NOTES
Pt. Moved of EMS stretcher to bed. Pt. From memory care. Per EMS pt. Was being given protein shake by staff and passed out. Per EMS pt. Was lowered to floor by staff. Per EMS upon their arrival pt was only responsive to painful stimuli and hypotensive. Upon arrival to ER pt, and alert and responsive to commands.

## 2024-03-02 NOTE — ED PROVIDER NOTES
Emergency Department Provider Note       PCP: Emelia Hendricks MD   Age: 78 y.o.   Sex: female     DISPOSITION Discharge - Pending Orders Complete 03/01/2024 09:11:47 PM       ICD-10-CM    1. Syncope and collapse  R55       2. Acute cystitis without hematuria  N30.00           Medical Decision Making     Patient had a syncopal event at the nursing facility.  She was bradycardic and hypotensive consistent with vasovagal syncope.  Patient improved just with time and did not require IV fluids by EMS to improve blood pressure.  No sign of anemia, kidney injury, or trauma.  It was reported this was witnessed and she was helped to the floor.  No CPR was ever performed.  No arrhythmias on the monitor after 2 hours of monitoring in ED.  Laboratory evaluation unremarkable.  Urine shows significant pyuria and will be cultured and treated.     1 or more acute illnesses that pose a threat to life or bodily function.   Prescription drug management performed.  Shared medical decision making was utilized in creating the patients health plan today.    I independently ordered and reviewed each unique test.     The patients assessment required an independent historian: EMS and the patient's.  The reason they were needed is important historical information not provided by the patient.  Dementia  I interpreted the X-rays chest x-ray shows no infiltrate, normal cardiomediastinal silhouette and no pneumothorax or effusion.  My Independent EKG Interpretation: sinus rhythm, no evidence of arrhythmia and right bundle branch block      ST Segments:Normal ST segments - NO STEMI   Rate: 63, large amount of artifact high lateral and inferior leads      Exclusion criteria - the patient is NOT to be included for SEP-1 Core Measure due to: 2 SIRS criteria not found      History     Witnessed syncopal event at nursing home.  Patient helped to the floor and was unconscious with eyes closed for several minutes.  For EMS she would respond to pain

## 2024-03-02 NOTE — DISCHARGE INSTRUCTIONS
Medications as prescribed twice daily for 10 days.  Follow-up with primary care doctor this coming week and with urologist at scheduled appointment and to check urine culture on Monday or Tuesday.  Return if any new, worsening or concerning symptom

## 2024-03-02 NOTE — ED NOTES
I have reviewed discharge instructions with the patient and caregiver.  The patient and caregiver verbalized understanding.    Patient left ED via Discharge Method: ambulatory to Skilled nursing facility with family.    Opportunity for questions and clarification provided.       Patient given 1 scripts.         To continue your aftercare when you leave the hospital, you may receive an automated call from our care team to check in on how you are doing.  This is a free service and part of our promise to provide the best care and service to meet your aftercare needs.” If you have questions, or wish to unsubscribe from this service please call [phone].  Thank you for Choosing our [facility] Emergency Department.

## 2024-03-03 LAB
BACTERIA SPEC CULT: ABNORMAL
BACTERIA SPEC CULT: ABNORMAL
SERVICE CMNT-IMP: ABNORMAL

## 2024-03-04 ENCOUNTER — OFFICE VISIT (OUTPATIENT)
Dept: UROLOGY | Age: 79
End: 2024-03-04
Payer: MEDICARE

## 2024-03-04 DIAGNOSIS — N20.0 KIDNEY STONE: ICD-10-CM

## 2024-03-04 DIAGNOSIS — N39.0 RECURRENT UTI: Primary | ICD-10-CM

## 2024-03-04 DIAGNOSIS — K59.00 CONSTIPATION, UNSPECIFIED CONSTIPATION TYPE: ICD-10-CM

## 2024-03-04 PROCEDURE — G8427 DOCREV CUR MEDS BY ELIG CLIN: HCPCS | Performed by: NURSE PRACTITIONER

## 2024-03-04 PROCEDURE — 1090F PRES/ABSN URINE INCON ASSESS: CPT | Performed by: NURSE PRACTITIONER

## 2024-03-04 PROCEDURE — 99214 OFFICE O/P EST MOD 30 MIN: CPT | Performed by: NURSE PRACTITIONER

## 2024-03-04 PROCEDURE — 1036F TOBACCO NON-USER: CPT | Performed by: NURSE PRACTITIONER

## 2024-03-04 PROCEDURE — G8420 CALC BMI NORM PARAMETERS: HCPCS | Performed by: NURSE PRACTITIONER

## 2024-03-04 PROCEDURE — G8484 FLU IMMUNIZE NO ADMIN: HCPCS | Performed by: NURSE PRACTITIONER

## 2024-03-04 PROCEDURE — G8399 PT W/DXA RESULTS DOCUMENT: HCPCS | Performed by: NURSE PRACTITIONER

## 2024-03-04 PROCEDURE — 1123F ACP DISCUSS/DSCN MKR DOCD: CPT | Performed by: NURSE PRACTITIONER

## 2024-03-04 NOTE — PROGRESS NOTES
1 tablet by mouth 3 times daily      memantine (NAMENDA) 5 MG tablet Take 2 tablets by mouth 2 times daily      docusate sodium (COLACE) 100 MG capsule Take 1 capsule by mouth daily 90 capsule 1    Cranberry 500 MG CAPS Take 1 capsule by mouth 2 times daily 180 capsule 1    lisinopril (PRINIVIL;ZESTRIL) 40 MG tablet Take 1 tablet by mouth in the morning. 30 tablet 0    magnesium oxide (MAG-OX) 400 MG tablet Take 1 tablet by mouth daily      diclofenac sodium (VOLTAREN) 1 % GEL Apply 4 g topically 4 times daily 100 g 3    TURMERIC PO Take 1 capsule by mouth daily      azelastine (ASTELIN) 0.1 % nasal spray 1 spray by Nasal route 2 times daily      vitamin D 25 MCG (1000 UT) CAPS Take 1 capsule by mouth daily      omeprazole (PRILOSEC) 40 MG delayed release capsule TAKE 1 CAPSULE EACH DAY.       No current facility-administered medications for this visit.     Allergies   Allergen Reactions    Levofloxacin Other (See Comments)    Penicillins Shortness Of Breath and Swelling    Poison Oak Extract Anaphylaxis    Shrimp (Diagnostic) Anaphylaxis    Bupropion Other (See Comments)     Other anxiety      Cephalosporins Other (See Comments)     encephalopathy    Sulfamethoxazole Hives and Other (See Comments)     She thinks she does not do well with this    Montelukast Anxiety     Social History     Socioeconomic History    Marital status:      Spouse name: Not on file    Number of children: Not on file    Years of education: 1 post HS    Highest education level: Not on file   Occupational History    Not on file   Tobacco Use    Smoking status: Former     Current packs/day: 0.00     Types: Cigarettes     Quit date: 1972     Years since quittin.1    Smokeless tobacco: Never   Substance and Sexual Activity    Alcohol use: Not Currently     Alcohol/week: 14.0 standard drinks of alcohol     Types: 14 Standard drinks or equivalent per week    Drug use: No    Sexual activity: Not Currently   Other Topics Concern

## 2024-03-06 ENCOUNTER — TELEPHONE (OUTPATIENT)
Dept: UROLOGY | Age: 79
End: 2024-03-06

## 2024-03-06 LAB
BACTERIA SPEC CULT: ABNORMAL
SERVICE CMNT-IMP: ABNORMAL

## 2024-03-13 ENCOUNTER — TELEPHONE (OUTPATIENT)
Dept: UROLOGY | Age: 79
End: 2024-03-13

## 2024-03-13 DIAGNOSIS — N39.0 RECURRENT UTI: Primary | ICD-10-CM

## 2024-03-13 RX ORDER — METHENAMINE HIPPURATE 1000 MG/1
1 TABLET ORAL 2 TIMES DAILY WITH MEALS
Qty: 60 TABLET | Refills: 11 | Status: SHIPPED | OUTPATIENT
Start: 2024-03-13

## 2024-03-13 NOTE — TELEPHONE ENCOUNTER
Pt would like Hiprex sent to  [facility]  Phone: [phone]  Fax: [phone] [address] [facility] [id number]

## 2024-04-02 DIAGNOSIS — M70.61 TROCHANTERIC BURSITIS OF RIGHT HIP: ICD-10-CM

## 2024-04-28 DIAGNOSIS — M70.61 TROCHANTERIC BURSITIS OF RIGHT HIP: ICD-10-CM

## 2024-05-22 ENCOUNTER — TELEPHONE (OUTPATIENT)
Dept: UROLOGY | Age: 79
End: 2024-05-22

## 2024-06-07 ENCOUNTER — OFFICE VISIT (OUTPATIENT)
Dept: UROLOGY | Age: 79
End: 2024-06-07
Payer: MEDICARE

## 2024-06-07 DIAGNOSIS — N20.0 KIDNEY STONE: ICD-10-CM

## 2024-06-07 DIAGNOSIS — N39.0 RECURRENT UTI: Primary | ICD-10-CM

## 2024-06-07 PROCEDURE — 1036F TOBACCO NON-USER: CPT | Performed by: NURSE PRACTITIONER

## 2024-06-07 PROCEDURE — G8427 DOCREV CUR MEDS BY ELIG CLIN: HCPCS | Performed by: NURSE PRACTITIONER

## 2024-06-07 PROCEDURE — G8420 CALC BMI NORM PARAMETERS: HCPCS | Performed by: NURSE PRACTITIONER

## 2024-06-07 PROCEDURE — 99214 OFFICE O/P EST MOD 30 MIN: CPT | Performed by: NURSE PRACTITIONER

## 2024-06-07 PROCEDURE — 1123F ACP DISCUSS/DSCN MKR DOCD: CPT | Performed by: NURSE PRACTITIONER

## 2024-06-07 PROCEDURE — 1090F PRES/ABSN URINE INCON ASSESS: CPT | Performed by: NURSE PRACTITIONER

## 2024-06-07 PROCEDURE — G8399 PT W/DXA RESULTS DOCUMENT: HCPCS | Performed by: NURSE PRACTITIONER

## 2024-06-07 ASSESSMENT — ENCOUNTER SYMPTOMS: BACK PAIN: 0

## 2024-06-07 NOTE — PROGRESS NOTES
[facility]  [address]  [place] [address]  [phone]          Karen Lockhart  : 1945    Chief Complaint   Patient presents with    Follow-up    Urinary Tract Infection          HPI     Karen Lockhart is a 78 y.o. female w advanced dementia followed for UTI.  Accompanied by  and he provides all info.      Hospitalized at [facility] 22-22 for UTI. Urine cx with enterococcus faecalis group D, sensitive to macrobid. CT A/P wo contrast revealed non obstructing stones to left kidney and sig constipation.  Upon chart review, she had 3 UTI from May to July of this yr.  reports her main sx is AMS. She is in a pull up, but does use toilet at times. Macrobid suppression was started in Oct 2022. Cranberry supplement was started.      Admitted to [facility] 22-11/10/22 for encephalopathy, HERON, and UTI. Cr 1.25. Urine culture positive for Pseudomonas aeruginosa. Tx w vancomycin. Curbside consult w ID while inpt who rec no prolong use of antibiotics as pt is likely colonized. Suppression was dc.  Cr 0.46 at discharge.      Seen 24 for confusion and AMS. Recently tx for sinus congestion. On Bromfed. I/O cath for UA. Ucx E.Coli. Tx w macrobid. To ER on 3/1/24 w syncope and collapse c/w vagal event. UA cont to appear infected. Started on vantin. Prelim culture pseudomonas. We then started hiprex and vit c in . She is doing very well on this.      She has a long hx of constipation. Currently on daily miralax. Recently added colace. This is helping.      KUB in office 23 reviewed by myself and shows 5-6 mm stone to LMP.  declined to tx.      Prev PVR 0 cc via u/s.      Lives at [facility] care unit.      Unable to give voided specimen; had BM w urination.                Past Medical History:   Diagnosis Date    Alcohol abuse, daily use 2014    Alzheimer's dementia (HCC)     Depression 2014    Drug-induced encephalopathy

## 2024-07-11 ENCOUNTER — OFFICE VISIT (OUTPATIENT)
Dept: RHEUMATOLOGY | Age: 79
End: 2024-07-11
Payer: MEDICARE

## 2024-07-11 VITALS
WEIGHT: 106.4 LBS | DIASTOLIC BLOOD PRESSURE: 71 MMHG | HEART RATE: 104 BPM | BODY MASS INDEX: 19.58 KG/M2 | HEIGHT: 62 IN | SYSTOLIC BLOOD PRESSURE: 101 MMHG

## 2024-07-11 DIAGNOSIS — Z79.899 LONG-TERM USE OF HIGH-RISK MEDICATION: ICD-10-CM

## 2024-07-11 DIAGNOSIS — M70.61 TROCHANTERIC BURSITIS OF RIGHT HIP: ICD-10-CM

## 2024-07-11 DIAGNOSIS — M11.269 PSEUDOGOUT OF KNEE, UNSPECIFIED LATERALITY: Primary | ICD-10-CM

## 2024-07-11 PROCEDURE — G8427 DOCREV CUR MEDS BY ELIG CLIN: HCPCS | Performed by: INTERNAL MEDICINE

## 2024-07-11 PROCEDURE — 1036F TOBACCO NON-USER: CPT | Performed by: INTERNAL MEDICINE

## 2024-07-11 PROCEDURE — 3074F SYST BP LT 130 MM HG: CPT | Performed by: INTERNAL MEDICINE

## 2024-07-11 PROCEDURE — 99214 OFFICE O/P EST MOD 30 MIN: CPT | Performed by: INTERNAL MEDICINE

## 2024-07-11 PROCEDURE — G2211 COMPLEX E/M VISIT ADD ON: HCPCS | Performed by: INTERNAL MEDICINE

## 2024-07-11 PROCEDURE — G8420 CALC BMI NORM PARAMETERS: HCPCS | Performed by: INTERNAL MEDICINE

## 2024-07-11 PROCEDURE — 3078F DIAST BP <80 MM HG: CPT | Performed by: INTERNAL MEDICINE

## 2024-07-11 PROCEDURE — 1123F ACP DISCUSS/DSCN MKR DOCD: CPT | Performed by: INTERNAL MEDICINE

## 2024-07-11 PROCEDURE — 1090F PRES/ABSN URINE INCON ASSESS: CPT | Performed by: INTERNAL MEDICINE

## 2024-07-11 PROCEDURE — G8399 PT W/DXA RESULTS DOCUMENT: HCPCS | Performed by: INTERNAL MEDICINE

## 2024-07-11 RX ORDER — HYDROXYCHLOROQUINE SULFATE 200 MG/1
TABLET, FILM COATED ORAL
Qty: 90 TABLET | Refills: 1 | Status: SHIPPED | OUTPATIENT
Start: 2024-07-11

## 2024-07-11 RX ORDER — MULTIVIT-MINERALS/FOLIC ACID 80 MCG
TABLET,CHEWABLE ORAL
COMMUNITY

## 2024-07-11 ASSESSMENT — ROUTINE ASSESSMENT OF PATIENT INDEX DATA (RAPID3)
ON A SCALE OF ONE TO TEN, HOW MUCH OF A PROBLEM HAS UNUSUAL FATIGUE OR TIREDNESS BEEN FOR YOU OVER THE PAST WEEK?: 5
ON A SCALE OF ONE TO TEN, CONSIDERING ALL THE WAYS IN WHICH ILLNESS AND HEALTH CONDITIONS MAY AFFECT YOU AT THIS TIME, PLEASE INDICATE BELOW HOW YOU ARE DOING:: 5
ON A SCALE OF ONE TO TEN, HOW MUCH PAIN HAVE YOU HAD BECAUSE OF YOUR CONDITION OVER THE PAST WEEK?: 7
ON A SCALE OF ONE TO TEN, HOW DIFFICULT WAS IT FOR YOU TO COMPLETE THE LISTED DAILY PHYSICAL TASKS OVER THE LAST WEEK: 1.4
WHEN YOU AWAKENED IN THE MORNING OVER THE LAST WEEK, PLEASE INDICATE THE AMOUNT OF TIME IT TAKES UNTIL YOU ARE AS LIMBER AS YOU WILL BE FOR THE DAY: < 10 MIN

## 2024-07-11 ASSESSMENT — JOINT PAIN
TOTAL NUMBER OF SWOLLEN JOINTS: 0
TOTAL NUMBER OF TENDER JOINTS: 1

## 2024-07-11 NOTE — PROGRESS NOTES
Negative  NEG mg/dL Final    Ketones, Urine, POC 03/01/2024 Negative  NEG mg/dL Final    Bilirubin, Urine, POC 03/01/2024 Negative  NEG   Final    Blood, UA POC 03/01/2024 Trace Intact (A)  NEG   Final    URINE UROBILINOGEN POC 03/01/2024 0.2  0.2 - 1.0 EU/dL Final    Nitrite, Urine, POC 03/01/2024 Negative  NEG   Final    Leukocyte Est, UA POC 03/01/2024 SMALL (A)  NEG   Final    Performed by: 03/01/2024 [facility] [place]   Final    WBC, UA 03/01/2024 >100 (A)  U4 /hpf Final    RBC, UA 03/01/2024 0-5  U5 /hpf Final    Epithelial Cells, UA 03/01/2024 0-5  U5 /hpf Final    BACTERIA, URINE 03/01/2024 Negative  NEG /hpf Final    Casts 03/01/2024 0-2  U2 /lpf Final    Special Requests 03/01/2024 NO SPECIAL REQUESTS    Final    Culture 03/01/2024 10,000 to 50,000 COLONIES/mL PSEUDOMONAS AERUGINOSA (A)    Final     The results above were reviewed and discussed with patient.       Assessment/Plan:   Karen Lockhart is a 78 y.o. female who presents with:     Pseudogout of knee, unspecified laterality: Patient was instructed to continue hydroxychloroquine 200 mg 1 pill to be taken once a day after food.  Last eye exam from 3/18/2024 did not show any evidence of Plaquenil toxicity.  While on Plaquenil patient is aware that she would need to keep up with yearly eye exams.  -     hydroxychloroquine (PLAQUENIL) 200 MG tablet; Take 1 pill once a day after food.    Trochanteric bursitis of right hip: Patient was instructed to continue diclofenac 1% gel 4 g to be applied 4 times a day to help with the bursitis involving her right hip.  These instructions were given to the patient's caregiver in the room.  -     diclofenac sodium (VOLTAREN) 1 % GEL; Apply 4 g topically 4 times daily    Long-term use of high-risk medication: Since patient's spouse states that she did have lab work done recently at the facility that patient is living in, I did request him to fax over the results to us so that I can review it.  Hence I did not put in lab

## 2024-07-16 ENCOUNTER — TELEPHONE (OUTPATIENT)
Dept: UROLOGY | Age: 79
End: 2024-07-16

## 2024-11-12 ENCOUNTER — OFFICE VISIT (OUTPATIENT)
Dept: RHEUMATOLOGY | Age: 79
End: 2024-11-12
Payer: MEDICARE

## 2024-11-12 VITALS
HEART RATE: 91 BPM | WEIGHT: 109.6 LBS | DIASTOLIC BLOOD PRESSURE: 75 MMHG | BODY MASS INDEX: 20.17 KG/M2 | SYSTOLIC BLOOD PRESSURE: 105 MMHG | HEIGHT: 62 IN

## 2024-11-12 DIAGNOSIS — Z79.899 LONG-TERM USE OF HIGH-RISK MEDICATION: ICD-10-CM

## 2024-11-12 DIAGNOSIS — M70.61 TROCHANTERIC BURSITIS OF RIGHT HIP: ICD-10-CM

## 2024-11-12 DIAGNOSIS — M11.269 PSEUDOGOUT OF KNEE, UNSPECIFIED LATERALITY: Primary | ICD-10-CM

## 2024-11-12 DIAGNOSIS — M11.269 PSEUDOGOUT OF KNEE, UNSPECIFIED LATERALITY: ICD-10-CM

## 2024-11-12 LAB
ALBUMIN SERPL-MCNC: 4.2 G/DL (ref 3.2–4.6)
ALBUMIN/GLOB SERPL: 1.4 (ref 1–1.9)
ALP SERPL-CCNC: 83 U/L (ref 35–104)
ALT SERPL-CCNC: 18 U/L (ref 8–45)
ANION GAP SERPL CALC-SCNC: 13 MMOL/L (ref 7–16)
AST SERPL-CCNC: 19 U/L (ref 15–37)
BASOPHILS # BLD: 0.1 K/UL (ref 0–0.2)
BASOPHILS NFR BLD: 1 % (ref 0–2)
BILIRUB SERPL-MCNC: <0.2 MG/DL (ref 0–1.2)
BUN SERPL-MCNC: 51 MG/DL (ref 8–23)
CALCIUM SERPL-MCNC: 9.9 MG/DL (ref 8.8–10.2)
CHLORIDE SERPL-SCNC: 103 MMOL/L (ref 98–107)
CO2 SERPL-SCNC: 28 MMOL/L (ref 20–29)
CREAT SERPL-MCNC: 0.94 MG/DL (ref 0.6–1.1)
CRP SERPL-MCNC: 0.5 MG/DL (ref 0–0.4)
DIFFERENTIAL METHOD BLD: ABNORMAL
EOSINOPHIL # BLD: 0.3 K/UL (ref 0–0.8)
EOSINOPHIL NFR BLD: 4 % (ref 0.5–7.8)
ERYTHROCYTE [DISTWIDTH] IN BLOOD BY AUTOMATED COUNT: 11.6 % (ref 11.9–14.6)
GLOBULIN SER CALC-MCNC: 3.1 G/DL (ref 2.3–3.5)
GLUCOSE SERPL-MCNC: 105 MG/DL (ref 70–99)
HCT VFR BLD AUTO: 40.4 % (ref 35.8–46.3)
HGB BLD-MCNC: 12.9 G/DL (ref 11.7–15.4)
IMM GRANULOCYTES # BLD AUTO: 0 K/UL (ref 0–0.5)
IMM GRANULOCYTES NFR BLD AUTO: 0 % (ref 0–5)
LYMPHOCYTES # BLD: 1.4 K/UL (ref 0.5–4.6)
LYMPHOCYTES NFR BLD: 21 % (ref 13–44)
MCH RBC QN AUTO: 31.7 PG (ref 26.1–32.9)
MCHC RBC AUTO-ENTMCNC: 31.9 G/DL (ref 31.4–35)
MCV RBC AUTO: 99.3 FL (ref 82–102)
MONOCYTES # BLD: 0.6 K/UL (ref 0.1–1.3)
MONOCYTES NFR BLD: 9 % (ref 4–12)
NEUTS SEG # BLD: 4.2 K/UL (ref 1.7–8.2)
NEUTS SEG NFR BLD: 65 % (ref 43–78)
NRBC # BLD: 0 K/UL (ref 0–0.2)
PLATELET # BLD AUTO: 276 K/UL (ref 150–450)
PMV BLD AUTO: 8.8 FL (ref 9.4–12.3)
POTASSIUM SERPL-SCNC: 4.4 MMOL/L (ref 3.5–5.1)
PROT SERPL-MCNC: 7.2 G/DL (ref 6.3–8.2)
RBC # BLD AUTO: 4.07 M/UL (ref 4.05–5.2)
SODIUM SERPL-SCNC: 143 MMOL/L (ref 136–145)
WBC # BLD AUTO: 6.6 K/UL (ref 4.3–11.1)

## 2024-11-12 PROCEDURE — G8399 PT W/DXA RESULTS DOCUMENT: HCPCS | Performed by: INTERNAL MEDICINE

## 2024-11-12 PROCEDURE — 1036F TOBACCO NON-USER: CPT | Performed by: INTERNAL MEDICINE

## 2024-11-12 PROCEDURE — 1123F ACP DISCUSS/DSCN MKR DOCD: CPT | Performed by: INTERNAL MEDICINE

## 2024-11-12 PROCEDURE — 1160F RVW MEDS BY RX/DR IN RCRD: CPT | Performed by: INTERNAL MEDICINE

## 2024-11-12 PROCEDURE — 1090F PRES/ABSN URINE INCON ASSESS: CPT | Performed by: INTERNAL MEDICINE

## 2024-11-12 PROCEDURE — G8484 FLU IMMUNIZE NO ADMIN: HCPCS | Performed by: INTERNAL MEDICINE

## 2024-11-12 PROCEDURE — G8420 CALC BMI NORM PARAMETERS: HCPCS | Performed by: INTERNAL MEDICINE

## 2024-11-12 PROCEDURE — 3074F SYST BP LT 130 MM HG: CPT | Performed by: INTERNAL MEDICINE

## 2024-11-12 PROCEDURE — G8427 DOCREV CUR MEDS BY ELIG CLIN: HCPCS | Performed by: INTERNAL MEDICINE

## 2024-11-12 PROCEDURE — 99214 OFFICE O/P EST MOD 30 MIN: CPT | Performed by: INTERNAL MEDICINE

## 2024-11-12 PROCEDURE — G2211 COMPLEX E/M VISIT ADD ON: HCPCS | Performed by: INTERNAL MEDICINE

## 2024-11-12 PROCEDURE — 3078F DIAST BP <80 MM HG: CPT | Performed by: INTERNAL MEDICINE

## 2024-11-12 PROCEDURE — 1159F MED LIST DOCD IN RCRD: CPT | Performed by: INTERNAL MEDICINE

## 2024-11-12 RX ORDER — HYDROXYCHLOROQUINE SULFATE 200 MG/1
TABLET, FILM COATED ORAL
Qty: 90 TABLET | Refills: 1 | Status: SHIPPED | OUTPATIENT
Start: 2024-11-12

## 2024-11-12 ASSESSMENT — ROUTINE ASSESSMENT OF PATIENT INDEX DATA (RAPID3)
ON A SCALE OF ONE TO TEN, HOW DIFFICULT WAS IT FOR YOU TO COMPLETE THE LISTED DAILY PHYSICAL TASKS OVER THE LAST WEEK: 1.5

## 2024-11-12 ASSESSMENT — JOINT PAIN
TOTAL NUMBER OF SWOLLEN JOINTS: 0
TOTAL NUMBER OF TENDER JOINTS: 1

## 2024-11-12 NOTE — PROGRESS NOTES
[facility]  Rajendra River M.D.  [address]   [facility] [facility] [place]  Office : [phone], Fax: [phone]     RHEUMATOLOGY OFFICE VISIT NOTE  Date of Visit:  2024 2:43 PM    Patient Information:  Name:  Karen Lockhart  :  1945  Age:  78 y.o.   Gender:  female      Ms. Lockhart is here today for follow-up of pseudogout, bursitis and medication monitoring.     Last visit: 24    History of Present Illness: On reviewing patient's records it does appear that her last eye exam was done on 24 at [facility], which showed no evidence of Plaquenil toxicity. She still continues to ambulate, but one of the aides has noticed that she sometimes favors her left knee. Her  has not noticed any major changes. He states that she does not complain about anything.    Since the last visit, patient is feeling \"the same\".     Pain: She is continue she can't tell us/10  Location:  Occasional left knee pain with no swelling, warmth and redness. No buckling of the left knee.  She has been independent with regard to her activities of daily living.        2024     2:00 PM   DMARD/Biologic   AM Stiffness --   Pain --   Fatigue --   MDHAQ 1.5   Patient Global Score --   Medication Name Plaquenil     Last TB screen: Unsure  TB result: Unsure     Current dose of steroids: None  How long on current dose of steroids:na  How long on continuous steroid therapy:na     Past DMARDs, if applicable (methotrexate, plaquenil/hydroxychloroquine, sulfasalazine, Arava/leflunomide): Currently on Plaquenil 200 mg once a day since May of this year.      Past biologics, if applicable (enbrel, humira, simponi, cimzia, xeljanz, orencia, remicade, simponi aria, actemra, rituximab, otezla, stelara, cosentyx):none     Past NSAIDs, if applicable (motrin, aleve, naproxen, advil, ibuprofen, celebrex, voltaren/diclofenac, etc.):Aleve in the past. Currently uses Diclofenac gel.

## 2024-11-17 ENCOUNTER — APPOINTMENT (OUTPATIENT)
Dept: CT IMAGING | Age: 79
End: 2024-11-17
Payer: MEDICARE

## 2024-11-17 ENCOUNTER — HOSPITAL ENCOUNTER (EMERGENCY)
Age: 79
Discharge: HOME | End: 2024-11-17
Attending: STUDENT IN AN ORGANIZED HEALTH CARE EDUCATION/TRAINING PROGRAM
Payer: MEDICARE

## 2024-11-17 ENCOUNTER — APPOINTMENT (OUTPATIENT)
Dept: GENERAL RADIOLOGY | Age: 79
End: 2024-11-17
Payer: MEDICARE

## 2024-11-17 VITALS
WEIGHT: 109 LBS | BODY MASS INDEX: 20.06 KG/M2 | TEMPERATURE: 97.7 F | HEIGHT: 62 IN | HEART RATE: 67 BPM | DIASTOLIC BLOOD PRESSURE: 66 MMHG | OXYGEN SATURATION: 100 % | SYSTOLIC BLOOD PRESSURE: 110 MMHG | RESPIRATION RATE: 16 BRPM

## 2024-11-17 DIAGNOSIS — N39.0 URINARY TRACT INFECTION WITHOUT HEMATURIA, SITE UNSPECIFIED: ICD-10-CM

## 2024-11-17 DIAGNOSIS — S09.90XA CLOSED HEAD INJURY, INITIAL ENCOUNTER: Primary | ICD-10-CM

## 2024-11-17 LAB
BACTERIA URNS QL MICRO: ABNORMAL /HPF
BILIRUB UR QL: NEGATIVE
CASTS URNS QL MICRO: 0 /LPF
CRYSTALS URNS QL MICRO: 0 /LPF
EPI CELLS #/AREA URNS HPF: ABNORMAL /HPF
GLUCOSE UR QL STRIP.AUTO: NEGATIVE MG/DL
KETONES UR-MCNC: NEGATIVE MG/DL
LEUKOCYTE ESTERASE UR QL STRIP: ABNORMAL
MUCOUS THREADS URNS QL MICRO: 0 /LPF
NITRITE UR QL: NEGATIVE
OTHER OBSERVATIONS: ABNORMAL
PH UR: 5.5 (ref 5–9)
PROT UR QL: 30 MG/DL
RBC # UR STRIP: ABNORMAL
RBC #/AREA URNS HPF: ABNORMAL /HPF
SERVICE CMNT-IMP: ABNORMAL
SP GR UR: >1.03 (ref 1–1.02)
UROBILINOGEN UR QL: 0.2 EU/DL (ref 0.2–1)
WBC URNS QL MICRO: ABNORMAL /HPF

## 2024-11-17 PROCEDURE — 87086 URINE CULTURE/COLONY COUNT: CPT

## 2024-11-17 PROCEDURE — 81001 URINALYSIS AUTO W/SCOPE: CPT

## 2024-11-17 PROCEDURE — 87186 SC STD MICRODIL/AGAR DIL: CPT

## 2024-11-17 PROCEDURE — 70450 CT HEAD/BRAIN W/O DYE: CPT

## 2024-11-17 PROCEDURE — 71046 X-RAY EXAM CHEST 2 VIEWS: CPT

## 2024-11-17 PROCEDURE — 99284 EMERGENCY DEPT VISIT MOD MDM: CPT

## 2024-11-17 PROCEDURE — 87088 URINE BACTERIA CULTURE: CPT

## 2024-11-17 PROCEDURE — 72125 CT NECK SPINE W/O DYE: CPT

## 2024-11-17 RX ORDER — CEFPODOXIME PROXETIL 100 MG/1
200 TABLET, FILM COATED ORAL 2 TIMES DAILY
Qty: 40 TABLET | Refills: 0 | Status: SHIPPED | OUTPATIENT
Start: 2024-11-17 | End: 2024-11-27

## 2024-11-17 ASSESSMENT — PAIN - FUNCTIONAL ASSESSMENT
PAIN_FUNCTIONAL_ASSESSMENT: NONE - DENIES PAIN
PAIN_FUNCTIONAL_ASSESSMENT: ADULT NONVERBAL PAIN SCALE (NPVS)

## 2024-11-17 NOTE — ED PROVIDER NOTES
all of the following:  Automated exposure control, adjustment of the mA and/or kVp according to  patient's size, iterative reconstruction.    COMPARISON: CT of the cervical spine from same day CT of the head dated 22 January 2024    FINDINGS:  Diffuse parenchymal volume loss with compensatory dilation of the ventricular  system, likely age-related. Periventricular and deep white matter hypodensities  which are nonspecific and can be seen with small vessel ischemia.    Right-sided frontal soft tissue hematoma measuring approximately 4 mm in  thickness.    Intracranial underscore disease.    Postsurgical changes of the paranasal sinuses with minimal mucosal thickening.  No fluid levels identified. Mastoid air cells are clear.    There is no CT evidence of acute hemorrhage or infarction. There are no  extra-axial fluid collections. No masses are seen. There are no bony lesions.      Impression    1. There is small soft tissue hematoma overlying the right frontal region  measuring up to 4 mm in thickness. No acute intracranial finding.  2. Diffuse parenchymal volume loss with compensatory dilation of the ventricular  system, likely age-related.   3. Periventricular and deep white matter hypodensities which are nonspecific and  can be seen with small vessel ischemia  4. Postsurgical changes of the paranasal sinuses with mild paranasal sinus  mucosal thickening.    Electronically signed by Peyton Holder   CT CERVICAL SPINE WO CONTRAST    Narrative    CT of the Cervical Spine    INDICATION:  fall, unwitnessed    TECHNIQUE: [id number]  axial images were obtained through the cervical spine  without intravenous contrast. Coronal and sagittal reformatted images were also  reviewed.  Radiation dose reduction techniques were used for this study:  All CT  scans performed at this facility use one or all of the following: Automated  exposure control, adjustment of the mA and/or kVp according to patient's size,  iterative

## 2024-11-17 NOTE — ED TRIAGE NOTES
Pt presents via EMS after falling out of wheelchair at facility. EMS notes fall was unwitnessed.  Pt takes ASA at facility but is on no other thinners.  Pt at baseline mentation.

## 2024-11-18 NOTE — DISCHARGE INSTRUCTIONS
CT imaging shows no evidence of acute intracranial injury, CT neck and chest appear stable as well.  Urinalysis did show evidence of UTI which will be treated with oral antibiotics.  A culture has been sent to confirm appropriate antibiotic treatment, the patient has received this antibiotic with adequate coverage in the past.  Please ensure the entirety of the prescription is taken.

## 2024-11-20 LAB
BACTERIA SPEC CULT: ABNORMAL
SERVICE CMNT-IMP: ABNORMAL

## 2024-11-20 NOTE — PROGRESS NOTES
ED pharmacist reviewed recent results of urine culture.    The patient  received a prescription for cefpodoxime upon discharge. Based on culture results, the patient is being adequately treated for the identified infection with existing antimicrobial therapy. No further intervention needed.    Allergies as of 11/17/2024 - Fully Reviewed 11/17/2024   Allergen Reaction Noted    Levofloxacin Other (See Comments) 09/14/2016    Penicillins Shortness Of Breath and Swelling 04/24/2011    Poison oak extract Anaphylaxis 11/21/2014    Shrimp (diagnostic) Anaphylaxis 02/28/2023    Bupropion Other (See Comments) 08/23/2016    Cephalosporins Other (See Comments) 07/17/2022    Sulfamethoxazole Hives and Other (See Comments) 07/20/2015    Montelukast Anxiety 01/17/2017     Liz Kevin, PharmD   PGY1 Pharmacy Resident

## 2024-12-02 ENCOUNTER — OFFICE VISIT (OUTPATIENT)
Dept: UROLOGY | Age: 79
End: 2024-12-02
Payer: MEDICARE

## 2024-12-02 DIAGNOSIS — N39.0 RECURRENT UTI: Primary | ICD-10-CM

## 2024-12-02 DIAGNOSIS — N20.0 KIDNEY STONE: ICD-10-CM

## 2024-12-02 LAB
BILIRUBIN, URINE, POC: NEGATIVE
BLOOD URINE, POC: NEGATIVE
GLUCOSE URINE, POC: NEGATIVE MG/DL
KETONES, URINE, POC: NEGATIVE MG/DL
LEUKOCYTE ESTERASE, URINE, POC: NORMAL
NITRITE, URINE, POC: NEGATIVE
PH, URINE, POC: 7 (ref 4.6–8)
PROTEIN,URINE, POC: 30 MG/DL
SPECIFIC GRAVITY, URINE, POC: 1.02 (ref 1–1.03)
URINALYSIS CLARITY, POC: NORMAL
URINALYSIS COLOR, POC: NORMAL
UROBILINOGEN, POC: NORMAL MG/DL

## 2024-12-02 PROCEDURE — 99214 OFFICE O/P EST MOD 30 MIN: CPT | Performed by: NURSE PRACTITIONER

## 2024-12-02 PROCEDURE — 81003 URINALYSIS AUTO W/O SCOPE: CPT | Performed by: NURSE PRACTITIONER

## 2024-12-02 PROCEDURE — G8420 CALC BMI NORM PARAMETERS: HCPCS | Performed by: NURSE PRACTITIONER

## 2024-12-02 PROCEDURE — 1090F PRES/ABSN URINE INCON ASSESS: CPT | Performed by: NURSE PRACTITIONER

## 2024-12-02 PROCEDURE — 1159F MED LIST DOCD IN RCRD: CPT | Performed by: NURSE PRACTITIONER

## 2024-12-02 PROCEDURE — 1160F RVW MEDS BY RX/DR IN RCRD: CPT | Performed by: NURSE PRACTITIONER

## 2024-12-02 PROCEDURE — G8399 PT W/DXA RESULTS DOCUMENT: HCPCS | Performed by: NURSE PRACTITIONER

## 2024-12-02 PROCEDURE — G8484 FLU IMMUNIZE NO ADMIN: HCPCS | Performed by: NURSE PRACTITIONER

## 2024-12-02 PROCEDURE — 51702 INSERT TEMP BLADDER CATH: CPT | Performed by: NURSE PRACTITIONER

## 2024-12-02 PROCEDURE — 1123F ACP DISCUSS/DSCN MKR DOCD: CPT | Performed by: NURSE PRACTITIONER

## 2024-12-02 PROCEDURE — 1036F TOBACCO NON-USER: CPT | Performed by: NURSE PRACTITIONER

## 2024-12-02 PROCEDURE — G8427 DOCREV CUR MEDS BY ELIG CLIN: HCPCS | Performed by: NURSE PRACTITIONER

## 2024-12-02 ASSESSMENT — ENCOUNTER SYMPTOMS: BACK PAIN: 0

## 2024-12-02 NOTE — PROGRESS NOTES
pain.      Urinalysis  UA - Dipstick  Results for orders placed or performed in visit on 12/02/24   AMB POC URINALYSIS DIP STICK AUTO W/O MICRO    Collection Time: 12/02/24  2:47 PM   Result Value Ref Range    Color (UA POC)      Clarity (UA POC)      Glucose, Urine, POC Negative Negative mg/dL    Bilirubin, Urine, POC Negative Negative    KETONES, Urine, POC Negative Negative mg/dL    Specific Gravity, Urine, POC 1.020 1.001 - 1.035    Blood (UA POC) Negative     pH, Urine, POC 7 4.6 - 8.0    Protein, Urine, POC 30 Negative mg/dL    Urobilinogen, POC 0.2 mg/dL <1.1 mg/dL    Nitrite, Urine, POC Negative Negative    Leukocyte Esterase, Urine, POC Small Negative   Results for orders placed or performed in visit on 05/24/22   AMB POC URINALYSIS DIP STICK AUTO W/O MICRO    Collection Time: 05/24/22  9:37 AM   Result Value Ref Range    Color, Urine, POC yell     Clarity, Urine, POC turb     Glucose, Urine, POC Negative Negative    Bilirubin, Urine, POC Negative Negative    Ketones, Urine, POC Negative Negative    Specific Gravity, Urine, POC 1.015 1.001 - 1.035    Blood, Urine, POC neg Negative    pH, Urine, POC 5.0 4.6 - 8.0    Protein, Urine, POC Negative Negative    Urobilinogen, POC 0.2     Nitrite, Urine, POC Positive Negative    Leukocyte Esterase, Urine, POC 2+ Negative       PHYSICAL EXAM    General appearance - well appearing and in no distress  Mental status - drowsy, non verbal  Neck - supple, no significant adenopathy  Chest/Lung-  Quiet, even and easy respiratory effort without use of accessory muscles  Skin - normal coloration and turgor, no rashes        Assessment and Plan    ICD-10-CM    1. Recurrent UTI  N39.0 AMB POC URINALYSIS DIP STICK AUTO W/O MICRO     INSERT,TEMP INDWELLING BLAD CATH,SIMPLE      2. Kidney stone  N20.0 AMB POC URINALYSIS DIP STICK AUTO W/O MICRO      I/o cath for UA. UA is clear. Will cont hiprex w vit C. UTI prevention discussed.     KUB at .     [facility] in 6 months. To call sooner

## 2025-03-18 ENCOUNTER — OFFICE VISIT (OUTPATIENT)
Dept: RHEUMATOLOGY | Age: 80
End: 2025-03-18
Payer: MEDICARE

## 2025-03-18 VITALS
HEIGHT: 62 IN | DIASTOLIC BLOOD PRESSURE: 78 MMHG | HEART RATE: 65 BPM | BODY MASS INDEX: 20.24 KG/M2 | SYSTOLIC BLOOD PRESSURE: 108 MMHG | OXYGEN SATURATION: 97 % | WEIGHT: 110 LBS

## 2025-03-18 DIAGNOSIS — M70.61 TROCHANTERIC BURSITIS OF RIGHT HIP: ICD-10-CM

## 2025-03-18 DIAGNOSIS — M11.269 PSEUDOGOUT OF KNEE, UNSPECIFIED LATERALITY: ICD-10-CM

## 2025-03-18 DIAGNOSIS — Z79.899 LONG-TERM USE OF HIGH-RISK MEDICATION: ICD-10-CM

## 2025-03-18 DIAGNOSIS — M11.269 PSEUDOGOUT OF KNEE, UNSPECIFIED LATERALITY: Primary | ICD-10-CM

## 2025-03-18 LAB
ALBUMIN SERPL-MCNC: 4.2 G/DL (ref 3.2–4.6)
ALBUMIN/GLOB SERPL: 1.3 (ref 1–1.9)
ALP SERPL-CCNC: 67 U/L (ref 35–104)
ALT SERPL-CCNC: 16 U/L (ref 8–45)
ANION GAP SERPL CALC-SCNC: 11 MMOL/L (ref 7–16)
AST SERPL-CCNC: 21 U/L (ref 15–37)
BASOPHILS # BLD: 0.07 K/UL (ref 0–0.2)
BASOPHILS NFR BLD: 1 % (ref 0–2)
BILIRUB SERPL-MCNC: <0.2 MG/DL (ref 0–1.2)
BUN SERPL-MCNC: 31 MG/DL (ref 8–23)
CALCIUM SERPL-MCNC: 9.9 MG/DL (ref 8.8–10.2)
CHLORIDE SERPL-SCNC: 102 MMOL/L (ref 98–107)
CO2 SERPL-SCNC: 29 MMOL/L (ref 20–29)
CREAT SERPL-MCNC: 1.19 MG/DL (ref 0.6–1.1)
CRP SERPL-MCNC: <0.3 MG/DL (ref 0–0.4)
DIFFERENTIAL METHOD BLD: ABNORMAL
EOSINOPHIL # BLD: 0.33 K/UL (ref 0–0.8)
EOSINOPHIL NFR BLD: 4.7 % (ref 0.5–7.8)
ERYTHROCYTE [DISTWIDTH] IN BLOOD BY AUTOMATED COUNT: 11.6 % (ref 11.9–14.6)
GLOBULIN SER CALC-MCNC: 3.3 G/DL (ref 2.3–3.5)
GLUCOSE SERPL-MCNC: 86 MG/DL (ref 70–99)
HCT VFR BLD AUTO: 38.8 % (ref 35.8–46.3)
HGB BLD-MCNC: 12.9 G/DL (ref 11.7–15.4)
IMM GRANULOCYTES # BLD AUTO: 0.02 K/UL (ref 0–0.5)
IMM GRANULOCYTES NFR BLD AUTO: 0.3 % (ref 0–5)
LYMPHOCYTES # BLD: 1.59 K/UL (ref 0.5–4.6)
LYMPHOCYTES NFR BLD: 22.4 % (ref 13–44)
MCH RBC QN AUTO: 31.9 PG (ref 26.1–32.9)
MCHC RBC AUTO-ENTMCNC: 33.2 G/DL (ref 31.4–35)
MCV RBC AUTO: 95.8 FL (ref 82–102)
MONOCYTES # BLD: 0.66 K/UL (ref 0.1–1.3)
MONOCYTES NFR BLD: 9.3 % (ref 4–12)
NEUTS SEG # BLD: 4.42 K/UL (ref 1.7–8.2)
NEUTS SEG NFR BLD: 62.3 % (ref 43–78)
NRBC # BLD: 0 K/UL (ref 0–0.2)
PLATELET # BLD AUTO: 269 K/UL (ref 150–450)
PMV BLD AUTO: 9 FL (ref 9.4–12.3)
POTASSIUM SERPL-SCNC: 4.3 MMOL/L (ref 3.5–5.1)
PROT SERPL-MCNC: 7.4 G/DL (ref 6.3–8.2)
RBC # BLD AUTO: 4.05 M/UL (ref 4.05–5.2)
SODIUM SERPL-SCNC: 142 MMOL/L (ref 136–145)
WBC # BLD AUTO: 7.1 K/UL (ref 4.3–11.1)

## 2025-03-18 PROCEDURE — 1160F RVW MEDS BY RX/DR IN RCRD: CPT | Performed by: INTERNAL MEDICINE

## 2025-03-18 PROCEDURE — 1159F MED LIST DOCD IN RCRD: CPT | Performed by: INTERNAL MEDICINE

## 2025-03-18 PROCEDURE — 3074F SYST BP LT 130 MM HG: CPT | Performed by: INTERNAL MEDICINE

## 2025-03-18 PROCEDURE — 1090F PRES/ABSN URINE INCON ASSESS: CPT | Performed by: INTERNAL MEDICINE

## 2025-03-18 PROCEDURE — G8427 DOCREV CUR MEDS BY ELIG CLIN: HCPCS | Performed by: INTERNAL MEDICINE

## 2025-03-18 PROCEDURE — 1036F TOBACCO NON-USER: CPT | Performed by: INTERNAL MEDICINE

## 2025-03-18 PROCEDURE — 1123F ACP DISCUSS/DSCN MKR DOCD: CPT | Performed by: INTERNAL MEDICINE

## 2025-03-18 PROCEDURE — G8399 PT W/DXA RESULTS DOCUMENT: HCPCS | Performed by: INTERNAL MEDICINE

## 2025-03-18 PROCEDURE — G2211 COMPLEX E/M VISIT ADD ON: HCPCS | Performed by: INTERNAL MEDICINE

## 2025-03-18 PROCEDURE — 99214 OFFICE O/P EST MOD 30 MIN: CPT | Performed by: INTERNAL MEDICINE

## 2025-03-18 PROCEDURE — G8420 CALC BMI NORM PARAMETERS: HCPCS | Performed by: INTERNAL MEDICINE

## 2025-03-18 PROCEDURE — 3078F DIAST BP <80 MM HG: CPT | Performed by: INTERNAL MEDICINE

## 2025-03-18 RX ORDER — HYDROXYCHLOROQUINE SULFATE 200 MG/1
TABLET, FILM COATED ORAL
Qty: 90 TABLET | Refills: 1 | Status: SHIPPED | OUTPATIENT
Start: 2025-03-18

## 2025-03-18 ASSESSMENT — ROUTINE ASSESSMENT OF PATIENT INDEX DATA (RAPID3)
WHEN YOU AWAKENED IN THE MORNING OVER THE LAST WEEK, PLEASE INDICATE THE AMOUNT OF TIME IT TAKES UNTIL YOU ARE AS LIMBER AS YOU WILL BE FOR THE DAY: < 10 MIN
ON A SCALE OF ONE TO TEN, HOW DIFFICULT WAS IT FOR YOU TO COMPLETE THE LISTED DAILY PHYSICAL TASKS OVER THE LAST WEEK: 1.1
ON A SCALE OF ONE TO TEN, CONSIDERING ALL THE WAYS IN WHICH ILLNESS AND HEALTH CONDITIONS MAY AFFECT YOU AT THIS TIME, PLEASE INDICATE BELOW HOW YOU ARE DOING:: 3
ON A SCALE OF ONE TO TEN, HOW MUCH OF A PROBLEM HAS UNUSUAL FATIGUE OR TIREDNESS BEEN FOR YOU OVER THE PAST WEEK?: 5
ON A SCALE OF ONE TO TEN, HOW MUCH PAIN HAVE YOU HAD BECAUSE OF YOUR CONDITION OVER THE PAST WEEK?: 4

## 2025-03-18 ASSESSMENT — JOINT PAIN
TOTAL NUMBER OF TENDER JOINTS: 1
TOTAL NUMBER OF SWOLLEN JOINTS: 0

## 2025-03-18 NOTE — PROGRESS NOTES
[facility]  Rajendra River M.D.  [address] , [address]   [place]  Office : [phone], Fax: [phone]     RHEUMATOLOGY OFFICE VISIT NOTE  Date of Visit:  3/18/2025 2:14 PM    Patient Information:  Name:  Karen Lockhart  :  1945  Age:  79 y.o.   Gender:  female      Ms. Lockhart is here today for follow-up of pseudogout, bursitis and medication monitoring.     Last visit: 24    History of Present Illness: On talking to the patient via her  who is the historian of today's conversation he states that she did have an eye exam on 3/18/24 at [facility], which did not show any evidence of Plaquenil toxicity. Patient will be going soon for her next eye exam in . My MA will send ISHAN to get those eye notes. On talking to her  he states that she has been eating by herself, but needs help with most of her ADL's as mentioned below.     Since the last visit, patient is feeling \"good\".    Pain: 4/10  Location:  Bilateral knee pain with pain in her toes.   Associated Symptoms:  Needs help with bending forwards to put her shoes and socks on.           3/18/2025     2:00 PM   DMARD/Biologic   AM Stiffness < 10 min   Pain 4   Fatigue 5   MDHAQ 1.1   Patient Global Score 3   Medication Name Plaquenil     Last TB screen: Unsure  TB result: Unsure     Current dose of steroids: None  How long on current dose of steroids:na  How long on continuous steroid therapy:na     Past DMARDs, if applicable (methotrexate, plaquenil/hydroxychloroquine, sulfasalazine, Arava/leflunomide): Currently on Plaquenil 200 mg once a day since May of 2023.      Past biologics, if applicable (enbrel, humira, simponi, cimzia, xeljanz, orencia, remicade, simponi aria, actemra, rituximab, otezla, stelara, cosentyx): Never taken any of these    Past NSAIDs, if applicable (motrin, aleve, naproxen, advil, ibuprofen, celebrex, voltaren/diclofenac, etc.):Aleve in the past.

## 2025-03-19 ENCOUNTER — TELEPHONE (OUTPATIENT)
Dept: RHEUMATOLOGY | Age: 80
End: 2025-03-19

## 2025-03-19 NOTE — TELEPHONE ENCOUNTER
Signed release of info for eye note from [facility] faxed to [phone] on 3/19/25. Confirmation received. Sent for scanning.

## 2025-04-25 ENCOUNTER — TELEPHONE (OUTPATIENT)
Dept: RHEUMATOLOGY | Age: 80
End: 2025-04-25

## 2025-04-25 NOTE — TELEPHONE ENCOUNTER
Eye note received 04/24/25 from [facility] 04/15/25, it was put in Dr River's folder to be  reviewed and signed, then it will be sent to front office be scanned into the chart.

## 2025-06-02 ENCOUNTER — OFFICE VISIT (OUTPATIENT)
Dept: UROLOGY | Age: 80
End: 2025-06-02
Payer: MEDICARE

## 2025-06-02 DIAGNOSIS — N39.0 RECURRENT UTI: Primary | ICD-10-CM

## 2025-06-02 DIAGNOSIS — N20.0 KIDNEY STONE: ICD-10-CM

## 2025-06-02 PROCEDURE — 1159F MED LIST DOCD IN RCRD: CPT | Performed by: NURSE PRACTITIONER

## 2025-06-02 PROCEDURE — G8399 PT W/DXA RESULTS DOCUMENT: HCPCS | Performed by: NURSE PRACTITIONER

## 2025-06-02 PROCEDURE — 99214 OFFICE O/P EST MOD 30 MIN: CPT | Performed by: NURSE PRACTITIONER

## 2025-06-02 PROCEDURE — G8427 DOCREV CUR MEDS BY ELIG CLIN: HCPCS | Performed by: NURSE PRACTITIONER

## 2025-06-02 PROCEDURE — 74018 RADEX ABDOMEN 1 VIEW: CPT | Performed by: NURSE PRACTITIONER

## 2025-06-02 PROCEDURE — 1090F PRES/ABSN URINE INCON ASSESS: CPT | Performed by: NURSE PRACTITIONER

## 2025-06-02 PROCEDURE — 1160F RVW MEDS BY RX/DR IN RCRD: CPT | Performed by: NURSE PRACTITIONER

## 2025-06-02 PROCEDURE — 1036F TOBACCO NON-USER: CPT | Performed by: NURSE PRACTITIONER

## 2025-06-02 PROCEDURE — G8420 CALC BMI NORM PARAMETERS: HCPCS | Performed by: NURSE PRACTITIONER

## 2025-06-02 PROCEDURE — 1123F ACP DISCUSS/DSCN MKR DOCD: CPT | Performed by: NURSE PRACTITIONER

## 2025-06-02 ASSESSMENT — ENCOUNTER SYMPTOMS: BACK PAIN: 0

## 2025-06-24 ENCOUNTER — CLINICAL SUPPORT (OUTPATIENT)
Dept: UROLOGY | Age: 80
End: 2025-06-24
Payer: MEDICARE

## 2025-06-24 DIAGNOSIS — N39.0 RECURRENT UTI: Primary | ICD-10-CM

## 2025-06-24 LAB
BILIRUBIN, URINE, POC: NEGATIVE
BLOOD URINE, POC: NEGATIVE
GLUCOSE URINE, POC: NEGATIVE
KETONES, URINE, POC: NEGATIVE
LEUKOCYTE ESTERASE, URINE, POC: NORMAL
NITRITE, URINE, POC: POSITIVE
PH, URINE, POC: 7 (ref 4.6–8)
PROTEIN,URINE, POC: NEGATIVE
SPECIFIC GRAVITY, URINE, POC: 1.02 (ref 1–1.03)
URINALYSIS CLARITY, POC: NORMAL
URINALYSIS COLOR, POC: NORMAL
UROBILINOGEN, POC: NORMAL

## 2025-06-24 PROCEDURE — 81003 URINALYSIS AUTO W/O SCOPE: CPT | Performed by: NURSE PRACTITIONER

## 2025-06-24 NOTE — PROGRESS NOTES
Sx: pt sleepy  Results for orders placed or performed in visit on 06/24/25   AMB POC URINALYSIS DIP STICK AUTO W/O MICRO   Result Value Ref Range    Color, Urine, POC Dark Yellow     Clarity, Urine, POC Cloudy     Glucose, Urine, POC Negative     Bilirubin, Urine, POC Negative     Ketones, Urine, POC Negative     Specific Gravity, Urine, POC 1.025 1.001 - 1.035    Blood, Urine, POC Negative Negative    pH, Urine, POC 7.0 4.6 - 8.0    Protein, Urine, POC Negative     Urobilinogen, POC 0.2 mg/dL     Nitrite, Urine, POC Positive     Leukocyte Esterase, Urine, POC Moderate      UA looks ok. Push fluids. Cont hiprex.   Venita Miller, APRN - CNP

## 2025-07-17 ENCOUNTER — OFFICE VISIT (OUTPATIENT)
Dept: RHEUMATOLOGY | Age: 80
End: 2025-07-17
Payer: MEDICARE

## 2025-07-17 VITALS
HEIGHT: 62 IN | BODY MASS INDEX: 20.17 KG/M2 | OXYGEN SATURATION: 91 % | DIASTOLIC BLOOD PRESSURE: 60 MMHG | HEART RATE: 109 BPM | WEIGHT: 109.6 LBS | SYSTOLIC BLOOD PRESSURE: 90 MMHG

## 2025-07-17 DIAGNOSIS — M70.61 TROCHANTERIC BURSITIS OF RIGHT HIP: ICD-10-CM

## 2025-07-17 DIAGNOSIS — M11.269 PSEUDOGOUT OF KNEE, UNSPECIFIED LATERALITY: ICD-10-CM

## 2025-07-17 DIAGNOSIS — Z79.899 LONG-TERM USE OF HIGH-RISK MEDICATION: ICD-10-CM

## 2025-07-17 DIAGNOSIS — M11.269 PSEUDOGOUT OF KNEE, UNSPECIFIED LATERALITY: Primary | ICD-10-CM

## 2025-07-17 LAB
ALBUMIN SERPL-MCNC: 4.1 G/DL (ref 3.2–4.6)
ALBUMIN/GLOB SERPL: 1.4 (ref 1–1.9)
ALP SERPL-CCNC: 65 U/L (ref 35–104)
ALT SERPL-CCNC: 15 U/L (ref 8–45)
ANION GAP SERPL CALC-SCNC: 14 MMOL/L (ref 7–16)
AST SERPL-CCNC: 17 U/L (ref 15–37)
BASOPHILS # BLD: 0.04 K/UL (ref 0–0.2)
BASOPHILS NFR BLD: 0.6 % (ref 0–2)
BILIRUB SERPL-MCNC: 0.2 MG/DL (ref 0–1.2)
BUN SERPL-MCNC: 43 MG/DL (ref 8–23)
CALCIUM SERPL-MCNC: 10 MG/DL (ref 8.8–10.2)
CHLORIDE SERPL-SCNC: 101 MMOL/L (ref 98–107)
CO2 SERPL-SCNC: 27 MMOL/L (ref 20–29)
CREAT SERPL-MCNC: 1.19 MG/DL (ref 0.6–1.1)
CRP SERPL-MCNC: <0.3 MG/DL (ref 0–0.4)
DIFFERENTIAL METHOD BLD: ABNORMAL
EOSINOPHIL # BLD: 0.24 K/UL (ref 0–0.8)
EOSINOPHIL NFR BLD: 3.6 % (ref 0.5–7.8)
ERYTHROCYTE [DISTWIDTH] IN BLOOD BY AUTOMATED COUNT: 11.9 % (ref 11.9–14.6)
GLOBULIN SER CALC-MCNC: 2.8 G/DL (ref 2.3–3.5)
GLUCOSE SERPL-MCNC: 97 MG/DL (ref 70–99)
HCT VFR BLD AUTO: 38.8 % (ref 35.8–46.3)
HGB BLD-MCNC: 12.5 G/DL (ref 11.7–15.4)
IMM GRANULOCYTES # BLD AUTO: 0.01 K/UL (ref 0–0.5)
IMM GRANULOCYTES NFR BLD AUTO: 0.1 % (ref 0–5)
LYMPHOCYTES # BLD: 1.26 K/UL (ref 0.5–4.6)
LYMPHOCYTES NFR BLD: 18.9 % (ref 13–44)
MCH RBC QN AUTO: 31.6 PG (ref 26.1–32.9)
MCHC RBC AUTO-ENTMCNC: 32.2 G/DL (ref 31.4–35)
MCV RBC AUTO: 98.2 FL (ref 82–102)
MONOCYTES # BLD: 0.6 K/UL (ref 0.1–1.3)
MONOCYTES NFR BLD: 9 % (ref 4–12)
NEUTS SEG # BLD: 4.53 K/UL (ref 1.7–8.2)
NEUTS SEG NFR BLD: 67.8 % (ref 43–78)
NRBC # BLD: 0 K/UL (ref 0–0.2)
PLATELET # BLD AUTO: 252 K/UL (ref 150–450)
PMV BLD AUTO: 9.6 FL (ref 9.4–12.3)
POTASSIUM SERPL-SCNC: 4.6 MMOL/L (ref 3.5–5.1)
PROT SERPL-MCNC: 6.9 G/DL (ref 6.3–8.2)
RBC # BLD AUTO: 3.95 M/UL (ref 4.05–5.2)
SODIUM SERPL-SCNC: 141 MMOL/L (ref 136–145)
WBC # BLD AUTO: 6.7 K/UL (ref 4.3–11.1)

## 2025-07-17 PROCEDURE — G2211 COMPLEX E/M VISIT ADD ON: HCPCS | Performed by: INTERNAL MEDICINE

## 2025-07-17 PROCEDURE — 3074F SYST BP LT 130 MM HG: CPT | Performed by: INTERNAL MEDICINE

## 2025-07-17 PROCEDURE — 1159F MED LIST DOCD IN RCRD: CPT | Performed by: INTERNAL MEDICINE

## 2025-07-17 PROCEDURE — 99214 OFFICE O/P EST MOD 30 MIN: CPT | Performed by: INTERNAL MEDICINE

## 2025-07-17 PROCEDURE — 1123F ACP DISCUSS/DSCN MKR DOCD: CPT | Performed by: INTERNAL MEDICINE

## 2025-07-17 PROCEDURE — 3078F DIAST BP <80 MM HG: CPT | Performed by: INTERNAL MEDICINE

## 2025-07-17 PROCEDURE — G8420 CALC BMI NORM PARAMETERS: HCPCS | Performed by: INTERNAL MEDICINE

## 2025-07-17 PROCEDURE — 1036F TOBACCO NON-USER: CPT | Performed by: INTERNAL MEDICINE

## 2025-07-17 PROCEDURE — 1160F RVW MEDS BY RX/DR IN RCRD: CPT | Performed by: INTERNAL MEDICINE

## 2025-07-17 PROCEDURE — G8399 PT W/DXA RESULTS DOCUMENT: HCPCS | Performed by: INTERNAL MEDICINE

## 2025-07-17 PROCEDURE — G8427 DOCREV CUR MEDS BY ELIG CLIN: HCPCS | Performed by: INTERNAL MEDICINE

## 2025-07-17 PROCEDURE — 1090F PRES/ABSN URINE INCON ASSESS: CPT | Performed by: INTERNAL MEDICINE

## 2025-07-17 RX ORDER — HYDROXYCHLOROQUINE SULFATE 200 MG/1
TABLET, FILM COATED ORAL
Qty: 90 TABLET | Refills: 1 | Status: SHIPPED | OUTPATIENT
Start: 2025-07-17

## 2025-07-17 ASSESSMENT — ROUTINE ASSESSMENT OF PATIENT INDEX DATA (RAPID3)
ON A SCALE OF ONE TO TEN, HOW MUCH OF A PROBLEM HAS UNUSUAL FATIGUE OR TIREDNESS BEEN FOR YOU OVER THE PAST WEEK?: 3
ON A SCALE OF ONE TO TEN, HOW MUCH PAIN HAVE YOU HAD BECAUSE OF YOUR CONDITION OVER THE PAST WEEK?: 5
ON A SCALE OF ONE TO TEN, CONSIDERING ALL THE WAYS IN WHICH ILLNESS AND HEALTH CONDITIONS MAY AFFECT YOU AT THIS TIME, PLEASE INDICATE BELOW HOW YOU ARE DOING:: 3
WHEN YOU AWAKENED IN THE MORNING OVER THE LAST WEEK, PLEASE INDICATE THE AMOUNT OF TIME IT TAKES UNTIL YOU ARE AS LIMBER AS YOU WILL BE FOR THE DAY: < 10 MIN
ON A SCALE OF ONE TO TEN, HOW DIFFICULT WAS IT FOR YOU TO COMPLETE THE LISTED DAILY PHYSICAL TASKS OVER THE LAST WEEK: 1.9

## 2025-07-17 ASSESSMENT — JOINT PAIN
TOTAL NUMBER OF SWOLLEN JOINTS: 0
TOTAL NUMBER OF TENDER JOINTS: 1